# Patient Record
Sex: MALE | Race: WHITE | NOT HISPANIC OR LATINO | Employment: OTHER
[De-identification: names, ages, dates, MRNs, and addresses within clinical notes are randomized per-mention and may not be internally consistent; named-entity substitution may affect disease eponyms.]

---

## 2019-10-03 ENCOUNTER — HEALTH MAINTENANCE LETTER (OUTPATIENT)
Age: 69
End: 2019-10-03

## 2019-11-11 ENCOUNTER — PATIENT OUTREACH (OUTPATIENT)
Dept: ONCOLOGY | Facility: CLINIC | Age: 69
End: 2019-11-11

## 2019-11-11 NOTE — TELEPHONE ENCOUNTER
Per message from scheduling on 11/5, pt called regarding scheduling a 5 yr return appt with Dr. Blane Briceno.  Pt last seen in May, 2016 so not 5 years yet.  Called and left message for pt today, apologized for not calling back earlier but stated had hoped to speak with pt regarding appt request.  Stated last visit was in 2016 so not 5 years yet, however, wondering if he is having some symptoms or other concerns prompting his call.  Requested call back.   On home phone, left more general message stating first name and returning call to our office on 11/5.  Stated more detailed message on his cell.    Received voice mail message from pt stating he has not had any issues or concerns.  He will call back in couple years to schedule follow up.  Per message, no need for author to call him back.

## 2020-02-08 ENCOUNTER — HEALTH MAINTENANCE LETTER (OUTPATIENT)
Age: 70
End: 2020-02-08

## 2020-02-13 ENCOUNTER — TRANSFERRED RECORDS (OUTPATIENT)
Dept: HEALTH INFORMATION MANAGEMENT | Facility: CLINIC | Age: 70
End: 2020-02-13

## 2020-11-07 ENCOUNTER — HEALTH MAINTENANCE LETTER (OUTPATIENT)
Age: 70
End: 2020-11-07

## 2020-12-12 ENCOUNTER — TRANSFERRED RECORDS (OUTPATIENT)
Dept: HEALTH INFORMATION MANAGEMENT | Facility: CLINIC | Age: 70
End: 2020-12-12

## 2021-01-25 ENCOUNTER — TRANSFERRED RECORDS (OUTPATIENT)
Dept: HEALTH INFORMATION MANAGEMENT | Facility: CLINIC | Age: 71
End: 2021-01-25

## 2021-02-11 ENCOUNTER — TRANSFERRED RECORDS (OUTPATIENT)
Dept: HEALTH INFORMATION MANAGEMENT | Facility: CLINIC | Age: 71
End: 2021-02-11

## 2021-02-19 ENCOUNTER — TRANSFERRED RECORDS (OUTPATIENT)
Dept: HEALTH INFORMATION MANAGEMENT | Facility: CLINIC | Age: 71
End: 2021-02-19

## 2021-03-09 ENCOUNTER — PATIENT OUTREACH (OUTPATIENT)
Dept: ONCOLOGY | Facility: CLINIC | Age: 71
End: 2021-03-09

## 2021-03-09 NOTE — PROGRESS NOTES
Received voice mail message from pt on 3/8 discussing having local provider's office send records to us (including faxed records).  Spoke with pt and wife, on speaker phone, this afternoon apologizing for not calling back earlier.  They reported they spoke with Dr. Blane Briceno couple weeks ago and discussion was may not need to return visit but Dr. Blane Briceno did want records about recent pancreatic issues.  Per pt, they spoke with medical records at [facility], Dr. Echeverria's office yesterday.  Office was to send records to a fax number they received from a [facility] Coordinator (not at home so not sure which fax they received).  They thought it was going to be from [facility] medical records to our medical records.   Pt stated since ~ Aug, lost 18 lbs and GI tract out of sorts.  ~1/25/21, strong pains in abdomen going to ED.  After imaging, and labs, pancreatic insufficiency, shrunk, and malabsorption re: 3 pancreatic enzymes.  About 3 weeks ago start on Creon enzymes 3 times a day at meal time.  Creon seems to be helping.  Maintaining weight last 2 weeks and bowels more normal.   Update sent to Dr. Blane Briceno.  Request sent to Laurie to check on status of obtaining records from [facility].

## 2021-03-10 ENCOUNTER — PATIENT OUTREACH (OUTPATIENT)
Dept: ONCOLOGY | Facility: CLINIC | Age: 71
End: 2021-03-10

## 2021-03-10 NOTE — PROGRESS NOTES
"Spoke with pt this morning informing him discussed situation with Dr. Blane Briceno.  Dr. Briceno happy that seems that he's local providers have diagnosed and have him on the right track.  Pt doesn't need to see Dr. Blane Briceno at this time but \"See how he does over next 3 months.\"  Pt encouraged to call back prior to 3 months if any concerns develop.  Stated have colleague checking into records from [facility].  Pt appreciated update.  "

## 2021-03-27 ENCOUNTER — HEALTH MAINTENANCE LETTER (OUTPATIENT)
Age: 71
End: 2021-03-27

## 2021-05-14 ENCOUNTER — TRANSFERRED RECORDS (OUTPATIENT)
Dept: HEALTH INFORMATION MANAGEMENT | Facility: CLINIC | Age: 71
End: 2021-05-14

## 2021-09-05 ENCOUNTER — HEALTH MAINTENANCE LETTER (OUTPATIENT)
Age: 71
End: 2021-09-05

## 2022-01-01 ENCOUNTER — HOSPITAL ENCOUNTER (EMERGENCY)
Facility: HOSPITAL | Age: 72
Discharge: HOME | End: 2022-11-08
Attending: EMERGENCY MEDICINE | Admitting: EMERGENCY MEDICINE
Payer: MEDICARE

## 2022-01-01 ENCOUNTER — HEALTH MAINTENANCE LETTER (OUTPATIENT)
Age: 72
End: 2022-01-01

## 2022-01-01 ENCOUNTER — ONCOLOGY VISIT (OUTPATIENT)
Dept: ONCOLOGY | Facility: CLINIC | Age: 72
End: 2022-01-01
Attending: INTERNAL MEDICINE
Payer: MEDICARE

## 2022-01-01 ENCOUNTER — PATIENT OUTREACH (OUTPATIENT)
Dept: ONCOLOGY | Facility: CLINIC | Age: 72
End: 2022-01-01

## 2022-01-01 ENCOUNTER — TELEPHONE (OUTPATIENT)
Dept: INTERNAL MEDICINE | Facility: CLINIC | Age: 72
End: 2022-01-01

## 2022-01-01 ENCOUNTER — DOCUMENTATION ONLY (OUTPATIENT)
Dept: ONCOLOGY | Facility: CLINIC | Age: 72
End: 2022-01-01

## 2022-01-01 ENCOUNTER — TELEPHONE (OUTPATIENT)
Dept: ONCOLOGY | Facility: CLINIC | Age: 72
End: 2022-01-01

## 2022-01-01 ENCOUNTER — LAB (OUTPATIENT)
Dept: LAB | Facility: CLINIC | Age: 72
End: 2022-01-01
Payer: MEDICARE

## 2022-01-01 ENCOUNTER — PRE VISIT (OUTPATIENT)
Dept: ONCOLOGY | Facility: CLINIC | Age: 72
End: 2022-01-01

## 2022-01-01 ENCOUNTER — OFFICE VISIT (OUTPATIENT)
Dept: INTERNAL MEDICINE | Facility: CLINIC | Age: 72
End: 2022-01-01
Payer: MEDICARE

## 2022-01-01 ENCOUNTER — APPOINTMENT (OUTPATIENT)
Dept: CT IMAGING | Facility: HOSPITAL | Age: 72
End: 2022-01-01
Attending: EMERGENCY MEDICINE
Payer: MEDICARE

## 2022-01-01 VITALS
BODY MASS INDEX: 27.22 KG/M2 | HEIGHT: 69 IN | TEMPERATURE: 98.4 F | DIASTOLIC BLOOD PRESSURE: 54 MMHG | WEIGHT: 183.8 LBS | RESPIRATION RATE: 20 BRPM | OXYGEN SATURATION: 99 % | HEART RATE: 81 BPM | SYSTOLIC BLOOD PRESSURE: 103 MMHG

## 2022-01-01 VITALS
SYSTOLIC BLOOD PRESSURE: 102 MMHG | BODY MASS INDEX: 26.28 KG/M2 | HEIGHT: 69 IN | HEART RATE: 75 BPM | OXYGEN SATURATION: 100 % | WEIGHT: 177.4 LBS | DIASTOLIC BLOOD PRESSURE: 64 MMHG

## 2022-01-01 VITALS
WEIGHT: 175.9 LBS | OXYGEN SATURATION: 100 % | HEIGHT: 68 IN | HEART RATE: 79 BPM | SYSTOLIC BLOOD PRESSURE: 83 MMHG | TEMPERATURE: 97.6 F | BODY MASS INDEX: 26.66 KG/M2 | DIASTOLIC BLOOD PRESSURE: 47 MMHG

## 2022-01-01 DIAGNOSIS — I34.0 NONRHEUMATIC MITRAL VALVE REGURGITATION: ICD-10-CM

## 2022-01-01 DIAGNOSIS — G89.29 CHRONIC RIGHT SHOULDER PAIN: ICD-10-CM

## 2022-01-01 DIAGNOSIS — C7A.8 NEUROENDOCRINE CANCER (H): Primary | ICD-10-CM

## 2022-01-01 DIAGNOSIS — C7B.8: ICD-10-CM

## 2022-01-01 DIAGNOSIS — C7A.8 NEUROENDOCRINE CANCER (H): ICD-10-CM

## 2022-01-01 DIAGNOSIS — C81.18 NODULAR SCLEROSIS HODGKIN LYMPHOMA OF LYMPH NODES OF MULTIPLE REGIONS (H): Primary | ICD-10-CM

## 2022-01-01 DIAGNOSIS — I27.82 OTHER CHRONIC PULMONARY EMBOLISM, UNSPECIFIED WHETHER ACUTE COR PULMONALE PRESENT (H): ICD-10-CM

## 2022-01-01 DIAGNOSIS — C25.4 MALIGNANT NEOPLASM OF ENDOCRINE PANCREAS (H): Primary | ICD-10-CM

## 2022-01-01 DIAGNOSIS — E78.2 MIXED HYPERLIPIDEMIA: ICD-10-CM

## 2022-01-01 DIAGNOSIS — M85.89 OSTEOPENIA OF MULTIPLE SITES: ICD-10-CM

## 2022-01-01 DIAGNOSIS — S09.90XA INJURY OF HEAD, INITIAL ENCOUNTER: ICD-10-CM

## 2022-01-01 DIAGNOSIS — I89.0 LYMPHEDEMA OF EXTREMITY: Primary | ICD-10-CM

## 2022-01-01 DIAGNOSIS — C25.4 MALIGNANT NEOPLASM OF ENDOCRINE PANCREAS (H): ICD-10-CM

## 2022-01-01 DIAGNOSIS — C81.18 NODULAR SCLEROSIS HODGKIN LYMPHOMA OF LYMPH NODES OF MULTIPLE REGIONS (H): ICD-10-CM

## 2022-01-01 DIAGNOSIS — K86.89 PANCREATIC INSUFFICIENCY: ICD-10-CM

## 2022-01-01 DIAGNOSIS — Z00.00 ENCOUNTER FOR MEDICARE ANNUAL WELLNESS EXAM: Primary | ICD-10-CM

## 2022-01-01 DIAGNOSIS — F33.41 RECURRENT MAJOR DEPRESSIVE DISORDER, IN PARTIAL REMISSION (H): ICD-10-CM

## 2022-01-01 DIAGNOSIS — E03.9 ACQUIRED HYPOTHYROIDISM: ICD-10-CM

## 2022-01-01 DIAGNOSIS — M25.511 CHRONIC RIGHT SHOULDER PAIN: ICD-10-CM

## 2022-01-01 DIAGNOSIS — E55.9 VITAMIN D DEFICIENCY: ICD-10-CM

## 2022-01-01 DIAGNOSIS — I50.20 HFREF (HEART FAILURE WITH REDUCED EJECTION FRACTION) (H): ICD-10-CM

## 2022-01-01 DIAGNOSIS — Z85.71 HISTORY OF HODGKIN'S LYMPHOMA: ICD-10-CM

## 2022-01-01 LAB
ALBUMIN SERPL BCG-MCNC: 3.8 G/DL (ref 3.5–5.2)
ALBUMIN SERPL BCG-MCNC: 3.8 G/DL (ref 3.5–5.2)
ALP SERPL-CCNC: 93 U/L (ref 40–129)
ALP SERPL-CCNC: 94 U/L (ref 40–129)
ALT SERPL W P-5'-P-CCNC: 16 U/L (ref 10–50)
ALT SERPL W P-5'-P-CCNC: 16 U/L (ref 10–50)
ANION GAP SERPL CALCULATED.3IONS-SCNC: 11 MMOL/L (ref 7–15)
ANION GAP SERPL CALCULATED.3IONS-SCNC: 11 MMOL/L (ref 7–15)
AST SERPL W P-5'-P-CCNC: 28 U/L (ref 10–50)
AST SERPL W P-5'-P-CCNC: 36 U/L (ref 10–50)
BASOPHILS # BLD AUTO: 0 10E3/UL (ref 0–0.2)
BASOPHILS # BLD AUTO: 0 10E3/UL (ref 0–0.2)
BASOPHILS NFR BLD AUTO: 1 %
BASOPHILS NFR BLD AUTO: 1 %
BILIRUB SERPL-MCNC: 0.5 MG/DL
BILIRUB SERPL-MCNC: 0.6 MG/DL
BUN SERPL-MCNC: 21.6 MG/DL (ref 8–23)
BUN SERPL-MCNC: 23.5 MG/DL (ref 8–23)
CALCIUM SERPL-MCNC: 8.9 MG/DL (ref 8.8–10.2)
CALCIUM SERPL-MCNC: 9 MG/DL (ref 8.8–10.2)
CGA SERPL-MCNC: 134 NG/ML
CHLORIDE SERPL-SCNC: 103 MMOL/L (ref 98–107)
CHLORIDE SERPL-SCNC: 105 MMOL/L (ref 98–107)
CHOLEST SERPL-MCNC: 121 MG/DL
CREAT SERPL-MCNC: 1.16 MG/DL (ref 0.67–1.17)
CREAT SERPL-MCNC: 1.24 MG/DL (ref 0.67–1.17)
DEPRECATED CALCIDIOL+CALCIFEROL SERPL-MC: 22 UG/L (ref 20–75)
DEPRECATED HCO3 PLAS-SCNC: 22 MMOL/L (ref 22–29)
DEPRECATED HCO3 PLAS-SCNC: 23 MMOL/L (ref 22–29)
EOSINOPHIL # BLD AUTO: 0.1 10E3/UL (ref 0–0.7)
EOSINOPHIL # BLD AUTO: 0.1 10E3/UL (ref 0–0.7)
EOSINOPHIL NFR BLD AUTO: 2 %
EOSINOPHIL NFR BLD AUTO: 2 %
ERYTHROCYTE [DISTWIDTH] IN BLOOD BY AUTOMATED COUNT: 18.5 % (ref 10–15)
ERYTHROCYTE [DISTWIDTH] IN BLOOD BY AUTOMATED COUNT: 19.1 % (ref 10–15)
GFR SERPL CREATININE-BSD FRML MDRD: 62 ML/MIN/1.73M2
GFR SERPL CREATININE-BSD FRML MDRD: 67 ML/MIN/1.73M2
GLUCOSE SERPL-MCNC: 101 MG/DL (ref 70–99)
GLUCOSE SERPL-MCNC: 93 MG/DL (ref 70–99)
HCT VFR BLD AUTO: 31.4 % (ref 40–53)
HCT VFR BLD AUTO: 34.8 % (ref 40–53)
HDLC SERPL-MCNC: 54 MG/DL
HGB BLD-MCNC: 10.8 G/DL (ref 13.3–17.7)
HGB BLD-MCNC: 11.6 G/DL (ref 13.3–17.7)
IMM GRANULOCYTES # BLD: 0 10E3/UL
IMM GRANULOCYTES # BLD: 0 10E3/UL
IMM GRANULOCYTES NFR BLD: 0 %
IMM GRANULOCYTES NFR BLD: 0 %
LDLC SERPL CALC-MCNC: 15 MG/DL
LYMPHOCYTES # BLD AUTO: 0.5 10E3/UL (ref 0.8–5.3)
LYMPHOCYTES # BLD AUTO: 0.5 10E3/UL (ref 0.8–5.3)
LYMPHOCYTES NFR BLD AUTO: 9 %
LYMPHOCYTES NFR BLD AUTO: 9 %
MCH RBC QN AUTO: 38 PG (ref 26.5–33)
MCH RBC QN AUTO: 39 PG (ref 26.5–33)
MCHC RBC AUTO-ENTMCNC: 33.3 G/DL (ref 31.5–36.5)
MCHC RBC AUTO-ENTMCNC: 34.4 G/DL (ref 31.5–36.5)
MCV RBC AUTO: 113 FL (ref 78–100)
MCV RBC AUTO: 114 FL (ref 78–100)
MONOCYTES # BLD AUTO: 0.6 10E3/UL (ref 0–1.3)
MONOCYTES # BLD AUTO: 0.8 10E3/UL (ref 0–1.3)
MONOCYTES NFR BLD AUTO: 11 %
MONOCYTES NFR BLD AUTO: 15 %
NEUTROPHILS # BLD AUTO: 4.2 10E3/UL (ref 1.6–8.3)
NEUTROPHILS # BLD AUTO: 4.3 10E3/UL (ref 1.6–8.3)
NEUTROPHILS NFR BLD AUTO: 74 %
NEUTROPHILS NFR BLD AUTO: 77 %
NONHDLC SERPL-MCNC: 67 MG/DL
PLATELET # BLD AUTO: 154 10E3/UL (ref 150–450)
PLATELET # BLD AUTO: 192 10E3/UL (ref 150–450)
POTASSIUM SERPL-SCNC: 4.9 MMOL/L (ref 3.4–5.3)
POTASSIUM SERPL-SCNC: 5.6 MMOL/L (ref 3.4–5.3)
PROT SERPL-MCNC: 7 G/DL (ref 6.4–8.3)
PROT SERPL-MCNC: 7 G/DL (ref 6.4–8.3)
RBC # BLD AUTO: 2.77 10E6/UL (ref 4.4–5.9)
RBC # BLD AUTO: 3.05 10E6/UL (ref 4.4–5.9)
SODIUM SERPL-SCNC: 136 MMOL/L (ref 136–145)
SODIUM SERPL-SCNC: 139 MMOL/L (ref 136–145)
T4 FREE SERPL-MCNC: 0.81 NG/DL (ref 0.9–1.7)
TRIGL SERPL-MCNC: 262 MG/DL
TSH SERPL DL<=0.005 MIU/L-ACNC: 7 UIU/ML (ref 0.3–4.2)
WBC # BLD AUTO: 5.7 10E3/UL (ref 4–11)
WBC # BLD AUTO: 5.7 10E3/UL (ref 4–11)

## 2022-01-01 PROCEDURE — 99204 OFFICE O/P NEW MOD 45 MIN: CPT | Mod: 25 | Performed by: INTERNAL MEDICINE

## 2022-01-01 PROCEDURE — 80053 COMPREHEN METABOLIC PANEL: CPT

## 2022-01-01 PROCEDURE — 80061 LIPID PANEL: CPT | Performed by: INTERNAL MEDICINE

## 2022-01-01 PROCEDURE — G0463 HOSPITAL OUTPT CLINIC VISIT: HCPCS

## 2022-01-01 PROCEDURE — 82306 VITAMIN D 25 HYDROXY: CPT | Performed by: INTERNAL MEDICINE

## 2022-01-01 PROCEDURE — 36415 COLL VENOUS BLD VENIPUNCTURE: CPT | Performed by: INTERNAL MEDICINE

## 2022-01-01 PROCEDURE — 85025 COMPLETE CBC W/AUTO DIFF WBC: CPT

## 2022-01-01 PROCEDURE — 99000 SPECIMEN HANDLING OFFICE-LAB: CPT

## 2022-01-01 PROCEDURE — 36415 COLL VENOUS BLD VENIPUNCTURE: CPT

## 2022-01-01 PROCEDURE — G1010 CDSM STANSON: HCPCS

## 2022-01-01 PROCEDURE — 99205 OFFICE O/P NEW HI 60 MIN: CPT | Performed by: INTERNAL MEDICINE

## 2022-01-01 PROCEDURE — 99284 EMERGENCY DEPT VISIT MOD MDM: CPT | Mod: 25

## 2022-01-01 PROCEDURE — 99417 PROLNG OP E/M EACH 15 MIN: CPT | Performed by: INTERNAL MEDICINE

## 2022-01-01 PROCEDURE — G0439 PPPS, SUBSEQ VISIT: HCPCS | Performed by: INTERNAL MEDICINE

## 2022-01-01 PROCEDURE — 84439 ASSAY OF FREE THYROXINE: CPT | Performed by: INTERNAL MEDICINE

## 2022-01-01 PROCEDURE — 84443 ASSAY THYROID STIM HORMONE: CPT | Performed by: INTERNAL MEDICINE

## 2022-01-01 PROCEDURE — 86316 IMMUNOASSAY TUMOR OTHER: CPT | Mod: 90

## 2022-01-01 RX ORDER — VENLAFAXINE HYDROCHLORIDE 75 MG/1
75 CAPSULE, EXTENDED RELEASE ORAL DAILY
Qty: 90 CAPSULE | Refills: 3 | Status: SHIPPED | OUTPATIENT
Start: 2022-01-01 | End: 2023-01-01

## 2022-01-01 RX ORDER — UREA 200 MG/G
CREAM TOPICAL
COMMUNITY
Start: 2022-01-11 | End: 2022-01-01

## 2022-01-01 RX ORDER — KETOCONAZOLE 20 MG/G
CREAM TOPICAL
COMMUNITY
Start: 2021-05-19 | End: 2023-01-01

## 2022-01-01 RX ORDER — TEMOZOLOMIDE 100 MG/1
200 CAPSULE ORAL AT BEDTIME
Qty: 20 CAPSULE | Refills: 0 | Status: SHIPPED | OUTPATIENT
Start: 2022-01-01 | End: 2023-01-01

## 2022-01-01 RX ORDER — PROCHLORPERAZINE MALEATE 10 MG
10 TABLET ORAL EVERY 6 HOURS PRN
COMMUNITY
Start: 2021-08-10

## 2022-01-01 RX ORDER — ACETAMINOPHEN 100MG/10ML
SYRINGE (ML) INTRAVENOUS
COMMUNITY
Start: 2020-12-12 | End: 2023-01-01

## 2022-01-01 RX ORDER — CALCIUM CARBONATE 500(1250)
1 TABLET,CHEWABLE ORAL
COMMUNITY
End: 2023-01-01

## 2022-01-01 RX ORDER — LOVASTATIN 40 MG
40 TABLET ORAL AT BEDTIME
Qty: 90 TABLET | Refills: 3 | Status: SHIPPED | OUTPATIENT
Start: 2022-01-01

## 2022-01-01 RX ORDER — CAPECITABINE 150 MG/1
150 TABLET, FILM COATED ORAL 2 TIMES DAILY
Qty: 28 TABLET | Refills: 0 | Status: SHIPPED | OUTPATIENT
Start: 2022-01-01 | End: 2023-01-01

## 2022-01-01 RX ORDER — BETAMETHASONE DIPROPIONATE 0.05 %
OINTMENT (GRAM) TOPICAL
COMMUNITY
Start: 2022-02-10 | End: 2022-01-01

## 2022-01-01 RX ORDER — APIXABAN 5 MG/1
5 TABLET, FILM COATED ORAL 2 TIMES DAILY
COMMUNITY
Start: 2022-01-01 | End: 2023-01-01

## 2022-01-01 RX ORDER — LEVOTHYROXINE SODIUM 75 UG/1
75 TABLET ORAL
COMMUNITY
Start: 2022-02-24 | End: 2022-01-01

## 2022-01-01 RX ORDER — VENLAFAXINE HYDROCHLORIDE 150 MG/1
150 CAPSULE, EXTENDED RELEASE ORAL
COMMUNITY
Start: 2021-03-21 | End: 2022-01-01

## 2022-01-01 RX ORDER — CAPECITABINE 150 MG/1
150 TABLET, FILM COATED ORAL 2 TIMES DAILY
Qty: 28 TABLET | Refills: 0 | Status: SHIPPED | OUTPATIENT
Start: 2023-01-01 | End: 2023-01-01

## 2022-01-01 RX ORDER — ONDANSETRON 8 MG/1
8 TABLET, FILM COATED ORAL
COMMUNITY
Start: 2021-08-10 | End: 2023-01-01

## 2022-01-01 RX ORDER — LEVOTHYROXINE SODIUM 88 UG/1
88 TABLET ORAL DAILY
Qty: 30 TABLET | Refills: 3 | Status: SHIPPED | OUTPATIENT
Start: 2022-01-01 | End: 2023-01-01

## 2022-01-01 RX ORDER — LANOLIN ALCOHOL/MO/W.PET/CERES
3 CREAM (GRAM) TOPICAL
COMMUNITY
Start: 2022-02-22

## 2022-01-01 RX ORDER — CAPECITABINE 500 MG/1
625 TABLET, FILM COATED ORAL 2 TIMES DAILY
Qty: 56 TABLET | Refills: 0 | Status: SHIPPED | OUTPATIENT
Start: 2022-01-01 | End: 2023-01-01

## 2022-01-01 RX ORDER — SACUBITRIL AND VALSARTAN 24; 26 MG/1; MG/1
TABLET, FILM COATED ORAL
COMMUNITY
Start: 2022-01-01 | End: 2023-01-01

## 2022-01-01 RX ORDER — LOVASTATIN 40 MG
TABLET ORAL
COMMUNITY
Start: 2022-09-01 | End: 2022-01-01

## 2022-01-01 RX ORDER — TEMOZOLOMIDE 100 MG/1
200 CAPSULE ORAL AT BEDTIME
Qty: 20 CAPSULE | Refills: 0 | Status: SHIPPED | OUTPATIENT
Start: 2023-01-01 | End: 2023-01-01

## 2022-01-01 RX ORDER — ALBUTEROL SULFATE 90 UG/1
AEROSOL, METERED RESPIRATORY (INHALATION)
COMMUNITY
Start: 2022-02-10 | End: 2022-01-01

## 2022-01-01 RX ORDER — CAPECITABINE 500 MG/1
625 TABLET, FILM COATED ORAL 2 TIMES DAILY
Qty: 56 TABLET | Refills: 0 | Status: SHIPPED | OUTPATIENT
Start: 2023-01-01 | End: 2023-01-01

## 2022-01-01 RX ORDER — TEMOZOLOMIDE 100 MG/1
400 CAPSULE ORAL
COMMUNITY
Start: 2021-08-10 | End: 2023-01-01

## 2022-01-01 RX ORDER — ERGOCALCIFEROL 1.25 MG/1
CAPSULE ORAL
COMMUNITY
Start: 2022-05-02 | End: 2023-01-01

## 2022-01-01 RX ORDER — PSYLLIUM SEED
1 PACKET (EA) ORAL DAILY PRN
COMMUNITY
End: 2023-01-01

## 2022-01-01 RX ORDER — VENLAFAXINE HYDROCHLORIDE 150 MG/1
150 CAPSULE, EXTENDED RELEASE ORAL DAILY
Qty: 90 CAPSULE | Refills: 3 | Status: SHIPPED | OUTPATIENT
Start: 2022-01-01 | End: 2023-01-01

## 2022-01-01 RX ORDER — CAPECITABINE 500 MG/1
1000 TABLET, FILM COATED ORAL
COMMUNITY
Start: 2022-01-01 | End: 2023-01-01

## 2022-01-01 ASSESSMENT — PAIN SCALES - GENERAL
PAINLEVEL: NO PAIN (0)
PAINLEVEL: NO PAIN (0)

## 2022-01-01 ASSESSMENT — ENCOUNTER SYMPTOMS
SORE THROAT: 0
PARESTHESIAS: 1
HEMATOCHEZIA: 0
JOINT SWELLING: 0
MYALGIAS: 0
WEAKNESS: 1
HEARTBURN: 0
NAUSEA: 0
CONSTIPATION: 0
FREQUENCY: 1
FEVER: 0
HEMATURIA: 0
SHORTNESS OF BREATH: 1
ABDOMINAL PAIN: 0
CHILLS: 0
NERVOUS/ANXIOUS: 0
HEADACHES: 0
EYE PAIN: 0
DIARRHEA: 0
ARTHRALGIAS: 0
DIZZINESS: 1
PALPITATIONS: 0
COUGH: 0
DYSURIA: 0

## 2022-01-01 ASSESSMENT — ACTIVITIES OF DAILY LIVING (ADL)
CURRENT_FUNCTION: TRANSPORTATION REQUIRES ASSISTANCE
CURRENT_FUNCTION: SHOPPING REQUIRES ASSISTANCE
CURRENT_FUNCTION: TELEPHONE REQUIRES ASSISTANCE
ADLS_ACUITY_SCORE: 35

## 2022-04-13 DIAGNOSIS — I34.0 NONRHEUMATIC MITRAL VALVE REGURGITATION: Primary | ICD-10-CM

## 2022-04-13 NOTE — PROGRESS NOTES
Is going to be having mitral valve surgery.  Needs preop Covid test.  Currently without any symptoms.  Has been fully vaccinated.  Surgery is planned for April 22.    Marci DOMINGUEZ

## 2022-04-17 ENCOUNTER — HEALTH MAINTENANCE LETTER (OUTPATIENT)
Age: 72
End: 2022-04-17

## 2022-04-19 ENCOUNTER — LAB (OUTPATIENT)
Dept: LAB | Facility: CLINIC | Age: 72
End: 2022-04-19
Payer: MEDICARE

## 2022-04-19 DIAGNOSIS — I34.0 NONRHEUMATIC MITRAL VALVE REGURGITATION: ICD-10-CM

## 2022-04-19 PROCEDURE — U0003 INFECTIOUS AGENT DETECTION BY NUCLEIC ACID (DNA OR RNA); SEVERE ACUTE RESPIRATORY SYNDROME CORONAVIRUS 2 (SARS-COV-2) (CORONAVIRUS DISEASE [COVID-19]), AMPLIFIED PROBE TECHNIQUE, MAKING USE OF HIGH THROUGHPUT TECHNOLOGIES AS DESCRIBED BY CMS-2020-01-R: HCPCS

## 2022-04-19 PROCEDURE — U0005 INFEC AGEN DETEC AMPLI PROBE: HCPCS

## 2022-04-20 LAB — SARS-COV-2 RNA RESP QL NAA+PROBE: NEGATIVE

## 2022-06-16 ENCOUNTER — TRANSFERRED RECORDS (OUTPATIENT)
Dept: HEALTH INFORMATION MANAGEMENT | Facility: CLINIC | Age: 72
End: 2022-06-16

## 2022-10-31 NOTE — PROGRESS NOTES
"[facility]: Cancer Care                                                                                          RN received call from pt, William, and his wife, Kacie.  He has hx of Hodgkin's lymphoma that Dr. Blane Briceno followed him for and currently on 5 yr plan for return visit.  Pt reports he has moved to [facility] from [place] and need advise on transferring care as he developed neuroendocrine of pancrease and live in Aug, 2021 but doing well on trt with capecitabine.  Asking to speak with Dr. Blane Briceno regarding advise for transferring care.   Per chart review, last saw Dr. Blane Briceno in 2021. Per care everywhere, pt seen by Alba Dubois, of [facility], [place], [place], on 10/24/22.  Pt taking capecitabine and temozolomide (CAPTEM).  Next cycle to start on 11/4/22.  Also follows with Dr. Velazquez at [facility] every 3 months.  Spoke with pt and wife.  They confirmed above information and stated he was doing so well from Hodgkin's perspective they \"skipped\" last years visit with Dr. Blane Briceno.  They need new provider here for the monthly neuroendocrine care (including chemo) but will continue to follow with Dr. Velazquez @ [facility].  They also stated William has developed CHF and had micro clips placed thru [facility].  They would like to discuss provider names they received regarding cardiac follow up as well.  Message sent to Dr. lBane Briceno.    Signature:  Helena Hobson, LAURA, OCN  "

## 2022-11-08 NOTE — ED TRIAGE NOTES
Patient arrives to triage with his son following a fall from ground level onto the grass.  Patient reports no LOC, hit his head, is on thinners.  Patient reports hitting left elbow and then the back of his head.  Minimal neck tenderness, per Dr. Lewis no C-collar indicated at this time.  Patient is alert and oriented x4.

## 2022-11-08 NOTE — ED NOTES
ED Triage Provider Note  [facility] EMERGENCY DEPARTMENT  Encounter Date: Nov 8, 2022    History:  Chief Complaint   Patient presents with     Fall     Trauma Alert     Tyrel Harrell is a 72 year old male who presents to the ED with nonsyncopal fall on anticoag.  Eliquis.  Fell working out side.  Hit head.  Mild neck pain initially now resolved. No other injury in the incident.    Review of Systems:  Mild head pain     Exam:  /55   Pulse 85   Resp 20   Wt 83.4 kg (183 lb 12.8 oz)   SpO2 98%   General: No acute distress. Appears stated age.   Cardio: normal rate, extremities well perfused  Resp: Normal work of breathing, grossly normal respiratory rate  Neuro: Alert. Facial movement grossly symmetric. Grossly intact strength.       Medical Decision Making:  Patient arriving to the ED with problem as above. A medical screening exam was performed. Initial differential diagnosis includes but not limited to contusion, head bleed.    Orders Placed This Encounter   Procedures     Head CT w/o contrast     Cervical spine CT w/o contrast     [place] coma scale     ED Place Call To: CT awareness - Trauma Alert     ED Place Call To: Radiology awareness - Trauma Alert     ED Place Call To: Lab awareness - Trauma Alert     ED Place Call To: Nursing Supervisor - Trauma Alert      orders initiated from Triage. The patient is most appropriate to be immediately roomed.       Marcello Lewis MD  11/8/2022 at 4:30 PM     Marcello Lewis MD  11/08/22 1237

## 2022-11-08 NOTE — ED PROVIDER NOTES
EMERGENCY DEPARTMENT ENCOUNTER        IMPRESSION  Fall  Head injury      MEDICAL DECISION MAKING    Patient evaluated for head and neck injury.  He is on anticoagulation.  He fell backward today from standing position striking his head.  He denies additional injury.  There was no syncope or loss of consciousness    On exam his vital signs are normal.  He does not appear ill.  There is no evidence of head injury.  No cervical spine tenderness.  He has right upper extremity lymphedema.    CT of the head and cervical spine are normal    Results were discussed with the patient.  He is stable for discharge home.        CHIEF COMPLAINT    Chief Complaint   Patient presents with     Fall     Trauma Alert          HPI    Tyrel Harrell is a 72 year old male with a history of PE, CHF, hodgkin's lymphoma, HLD, afib, and sepsis who presents to the ED by walk in for evaluation of a fall.     This afternoon, the patient was working outside when he fell to the ground onto the grass. He reports hitting his left elbow and then the back of his head. No LOC. The patient reports mild neck pain initially but notes it has resolved. He denies any other complaints at this time.     The patient is taking Eliquis.     I, Eva Ram am serving as a scribe to document services personally performed by Dr. Kenney Main MD, based on my observation and the provider's statements to me. I, Dr. Kenney Main MD attest that Eva Ram is acting in a scribe capacity, has observed my performance of the services and has documented them in accordance with my direction.    PAST MEDICAL HISTORY    No past surgical history on file.    CURRENT MEDICATIONS    Discharge Medication List as of 11/8/2022  5:25 PM      CONTINUE these medications which have NOT CHANGED    Details   aspirin 81 MG tablet Take 1 tablet by mouth daily., 1 tablet, Oral, DAILY, Until Discontinued, Historical      LOVASTATIN 1 tablet daily., 1 tablet, DAILY, Until Discontinued,  "Historical      Multiple Vitamin (MULTIVITAMIN) per tablet Take 1 tablet by mouth daily., Historical             ALLERGIES    Allergies   Allergen Reactions     Nkda [No Known Drug Allergies]        FAMILY HISTORY    No family history on file.    SOCIAL HISTORY    Social History     Socioeconomic History     Marital status:    Tobacco Use     Smoking status: Never     Smokeless tobacco: Never       REVIEW OF SYSTEMS    Constitutional: No dizziness, lightheadedness,   Eyes:  No pain, diplopia, or vision loss,   HENT:  Denies sore throat or ear pain. No Dysphagia, no neck pain, no facial pain  Respiratory: No SOB, wheeze or cough, no hemoptysis,   Cardiovascular:  No CP, ALLEN, palpitations, syncope, no chest wall tenderness  GI:  Denies abdominal pain, nausea, vomiting, or dark, bloody stools.   Musculoskeletal:  Denies any new muscle/joint pain, swelling or loss of function.  No back pain, patient is ambulatory  Skin:  Denies laceration or abrasion  Neurologic:  Denies headache, focal weakness or sensory changes, vertigo, seizure      All other systems negative unless noted in HPI.    PHYSICAL EXAM    VITAL SIGNS: /54   Pulse 81   Temp 98.4  F (36.9  C) (Oral)   Resp 20   Ht 1.753 m (5' 9\")   Wt 83.4 kg (183 lb 12.8 oz)   SpO2 99%   BMI 27.14 kg/m      General Appearance: Alert, cooperative, normal speech and facial symmetry,  appears stated age,    Head:  Normocephalic, without obvious abnormality, atraumatic  ENT:  No obvious facial deformity.    Neck:  No midline cervical spine tenderness.  No paraspinal tenderness.  Supple, symmetrical, trachea midline, no stridor or dysphonia, no soft tissue swelling or tenderness  Chest:  No tenderness or deformity,   Cardio:  Regular   Pulm:  respirations unlabored,   Back:  Symmetric, no curvature, ROM normal, no CVA tenderness, no spinal tenderness  Abdomen:  Soft, non-tender, no distention  Extremities:   No  obvious deformity, all joints palpated with " full range of motion.    Skin: No abrasions ecchymosis or open injury  Neuro:  Awake, alert, responsive to voice, follows commands, normal speech, No gross motor weakness or sensory loss, moves all extremities, baseline ambulation, GCS 15    LABS  Pertinent lab results reviewed in chart.  Results for orders placed or performed during the hospital encounter of 11/08/22   Head CT w/o contrast    Impression    IMPRESSION:  HEAD CT:  1.  No acute intracranial process.    CERVICAL SPINE CT:  1.  No CT evidence for acute fracture or post traumatic subluxation.   Cervical spine CT w/o contrast    Impression    IMPRESSION:  HEAD CT:  1.  No acute intracranial process.    CERVICAL SPINE CT:  1.  No CT evidence for acute fracture or post traumatic subluxation.       RADIOLOGY  Cervical spine CT w/o contrast   Final Result   IMPRESSION:   HEAD CT:   1.  No acute intracranial process.      CERVICAL SPINE CT:   1.  No CT evidence for acute fracture or post traumatic subluxation.      Head CT w/o contrast   Final Result   IMPRESSION:   HEAD CT:   1.  No acute intracranial process.      CERVICAL SPINE CT:   1.  No CT evidence for acute fracture or post traumatic subluxation.             ED COURSE   4:26PM I met the patient and performed my initial interview and exam.   5:20 PM I rechecked and updated the patient   5:25 PM We discussed the plan for discharge and the patient is agreeable. Reviewed supportive cares, symptomatic treatment, outpatient follow up, and reasons to return to the Emergency Department. All questions and concerns were addressed. Patient to be discharged by ED RN.        FINAL DIAGNOSIS:     ICD-10-CM    1. Injury of head, initial encounter  S09.90XA                At the conclusion of the encounter I discussed  the results of all of the tests and the disposition.   The questions were answered.  The patient or family acknowledged understanding and was agreeable with the care plan.        Kenney Main,  MD  11/08/22 8364

## 2022-11-09 NOTE — PROGRESS NOTES
RECORDS STATUS - ALL OTHER DIAGNOSIS     Neuroendocrine tumor and primary care to establish care   RECORDS RECEIVED FROM: [facility]/ [facility] including [facility] and [facility]/ [facility]/ [facility]     DATE RECEIVED: 11/14/2022    Action    Action Taken 11/9/2022 12:59PM KECOSME   I called [place] IMG dept  [phone] option 2    3:08PM KECOSME   I resolved scans from [place] in PACS    11/11/2022 2:47pm HANNAH   I called [facility] including [facility] and [facility]    I called [facility] Ph: [phone] - unavailable. They are closed for the weekend.     I called pt Tyrel - all records should be accounted for in Epic/    11/14/2022 9:27AM KECOSME   I called [facility] again - they will push the four MRI scans from 2022 to [place] PACS      NOTES STATUS DETAILS   OFFICE NOTE from referring provider Complete EPIC  Bruno Briceno MD   OFFICE NOTE from medical oncologist Complete 10/24/2022- Neuroendocrine tumor of pancreas (Primary Dx)    More in [facility] Recs are in Epic [facility] are in    OPERATIVE REPORT Complete See Biopsy From [facility] in [facility]   MEDICATION LIST Complete [facility]   CLINICAL TRIAL TREATMENTS TO DATE     LABS     PATHOLOGY REPORTS  [facility]     Colon Biopsy 1/25/2021  Case: [id number]     ANYTHING RELATED TO DIAGNOSIS Complete CE- labs last updated on 11/4/2022   GENONOMIC TESTING     TYPE:     IMAGING (NEED IMAGES & REPORT)     CT SCANS Complete - [place] CT Abdomen Pelvis 9/14/2022, 5/9/2022, 2/7/2022, 10/26/2021    CT Chest 10/26/2021   MRI Complete- [place]    Requested- [facility]  MRI Brain 5/27/2020   MAMMO     ULTRASOUND     PET Requested- [facility]     Ph:  [phone]  Fax: [phone]    Mailing Address:   [address] [place] [address]    [email or web address] Tracking Number:  [id number]

## 2022-11-14 PROBLEM — I77.810 ASCENDING AORTA DILATION (H): Status: ACTIVE | Noted: 2022-02-01

## 2022-11-14 PROBLEM — Z85.71 HISTORY OF HODGKIN'S LYMPHOMA: Status: ACTIVE | Noted: 2021-05-28

## 2022-11-14 PROBLEM — H25.813 COMBINED FORMS OF AGE-RELATED CATARACT OF BOTH EYES: Status: ACTIVE | Noted: 2021-05-14

## 2022-11-14 PROBLEM — R73.02 IMPAIRED GLUCOSE TOLERANCE: Status: ACTIVE | Noted: 2022-01-01

## 2022-11-14 PROBLEM — D69.6 THROMBOCYTOPENIA (H): Status: ACTIVE | Noted: 2022-01-31

## 2022-11-14 PROBLEM — E66.3 OVERWEIGHT: Status: ACTIVE | Noted: 2022-01-01

## 2022-11-14 PROBLEM — K86.89 ATROPHY OF PANCREAS: Status: ACTIVE | Noted: 2021-05-28

## 2022-11-14 PROBLEM — D63.8 ANEMIA IN OTHER CHRONIC DISEASES CLASSIFIED ELSEWHERE: Status: ACTIVE | Noted: 2022-01-31

## 2022-11-14 PROBLEM — D12.6 ADENOMATOUS POLYP OF COLON: Status: ACTIVE | Noted: 2022-01-01

## 2022-11-14 PROBLEM — Q89.01 ASPLENIA: Status: ACTIVE | Noted: 2021-08-05

## 2022-11-14 PROBLEM — C25.4: Status: ACTIVE | Noted: 2021-10-27

## 2022-11-14 PROBLEM — E78.5 HYPERLIPIDEMIA: Status: ACTIVE | Noted: 2022-01-01

## 2022-11-14 PROBLEM — I26.99 ACUTE PULMONARY EMBOLISM (H): Status: ACTIVE | Noted: 2022-01-31

## 2022-11-14 PROBLEM — I89.0 LYMPHEDEMA OF EXTREMITY: Status: ACTIVE | Noted: 2022-01-01

## 2022-11-14 PROBLEM — F33.8 SEASONAL AFFECTIVE DISORDER (H): Status: ACTIVE | Noted: 2022-01-31

## 2022-11-14 PROBLEM — I50.20 HFREF (HEART FAILURE WITH REDUCED EJECTION FRACTION) (H): Status: ACTIVE | Noted: 2022-02-01

## 2022-11-14 PROBLEM — C77.9 MALIGNANT NEOPLASM METASTATIC TO LYMPH NODES (H): Status: ACTIVE | Noted: 2022-01-01

## 2022-11-14 PROBLEM — F41.1 GENERALIZED ANXIETY DISORDER: Status: ACTIVE | Noted: 2020-05-19

## 2022-11-14 PROBLEM — C44.91 BCC (BASAL CELL CARCINOMA): Status: ACTIVE | Noted: 2022-08-11

## 2022-11-14 PROBLEM — C7B.8: Status: ACTIVE | Noted: 2021-10-27

## 2022-11-14 PROBLEM — E03.8 OTHER SPECIFIED HYPOTHYROIDISM: Status: ACTIVE | Noted: 2022-01-31

## 2022-11-14 PROBLEM — E55.9 VITAMIN D DEFICIENCY: Status: ACTIVE | Noted: 2022-01-01

## 2022-11-14 PROBLEM — I48.0 PAROXYSMAL ATRIAL FIBRILLATION (H): Status: ACTIVE | Noted: 2022-02-01

## 2022-11-14 PROBLEM — R44.1 VISUAL HALLUCINATIONS: Status: ACTIVE | Noted: 2020-05-29

## 2022-11-14 PROBLEM — K86.89 PANCREATIC INSUFFICIENCY: Status: ACTIVE | Noted: 2021-05-27

## 2022-11-14 NOTE — NURSING NOTE
"Oncology Rooming Note    November 14, 2022 4:04 PM   Tyrel Harrell is a 72 year old male who presents for:    Chief Complaint   Patient presents with     Oncology Clinic Visit     New Eval for Hodgkin's      Initial Vitals: Blood Pressure 102/64   Pulse 75   Height 1.753 m (5' 9\")   Weight 80.5 kg (177 lb 6.4 oz)   Oxygen Saturation 100%   Body Mass Index 26.20 kg/m   Estimated body mass index is 26.2 kg/m  as calculated from the following:    Height as of this encounter: 1.753 m (5' 9\").    Weight as of this encounter: 80.5 kg (177 lb 6.4 oz). Body surface area is 1.98 meters squared.  No Pain (0) Comment: Data Unavailable   No LMP for male patient.  Allergies reviewed: Yes  Medications reviewed: Yes    Medications: Medication refills not needed today.  Pharmacy name entered into [employer]: [id number] [place] [address]    Clinical concerns: none       Julienne Davies MA            "

## 2022-11-14 NOTE — LETTER
11/14/2022         RE: Tyrel Harrell  [address]  [place] [address]          Tyrel Harrell is a new patient who has just moved back to the [facility] here today for ongoing care of metastatic neuroendocrine carcinoma the pancreas.    He is a 72-year-old gentleman cured of Hodgkin's disease more than 20 years ago and recently found to have a high-grade but well differentiated neuroendocrine carcinoma in his pancreas a little over a year ago.  His tumor had a Ki-67 labeling index of 60% and was metastatic from the pancreas to the liver at diagnosis.  He was started on treatment with CAPTEM in August 2021 with a good response but treatment was held after 6 cycles in January of this year when he developed heart failure with severe mitral regurgitation.  After that issue been dealt with and he had some evidence of progression he resumed CAPTEM in May of this year and has been able to continue that on a regular basis ever since.  His last evaluation of his disease status was in September when his liver metastases were improving but his primary pancreatic mass appeared to be getting larger.  He has plans for another response assessment in mid December.  He tells me he currently feels fairly well and is always quite busy but tires more easily than he is used to.  Says he currently takes a nap for about 90 minutes every day.  He has been very busy the last 3 weeks continuing to unpack their belongings in their new house.  He tells me he tolerates his chemotherapy quite well.  He had some trouble with hand-foot toxicity early on but with some adjustments in his regimen he now gets minimal to no discernible toxicity.  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  He has chronic lymphedema in his right upper extremity related to his prior Hodgkin's disease.  He currently has no problems with chest pains or dyspnea or other  peripheral edema.    His past medical history is most notable for Hodgkin's disease treated with mantle radiation and I believe MOPP/ABVD in 1988.  He also has a history of depression which has been severe enough to acquire ECT in the past but he feels that is currently well controlled.  He has a history of a pulmonary embolism at the time of his original presentation of his neuroendocrine tumor and has not had further thrombotic events since.  With regards his heart disease he is required clipping of his mitral valve and has some degree of mitral regurgitation.  He has had ejection fractions as low as 29% in the past but more recently these have been in the 45% range.    On physical exam he appears well and younger than his stated age.  His vital signs are unremarkable.  He has no icterus.  He has obvious edema of his right upper extremity in which he is wearing a compression sleeve.    I reviewed his I reviewed his most recent outside imaging confirming the above impression that he has had some progression of the primary in the pancreas but improvement in his liver metastases with his current therapy.  His most recent outside lab work shows normal electrolytes and renal function, normal liver enzymes and unremarkable blood counts with marked macrocytosis.    Assessment/plan: Metastatic neuroendocrine carcinoma the pancreas currently on active therapy with CAPTEM.  He has a planned repeat response assessment coming up down at [place] in the next few weeks.  We will help facilitate locally things we can do for his lab work, etc.  He is going to decide after his next visit where he wishes to manage the bulk of his care.    Total time by me on the date of service in elusive of the above visit and review of very extensive outside records and imaging was greater than 90 minutes.      Bradly Parker MD

## 2022-11-14 NOTE — Clinical Note
11/14/2022         RE: Tyrel Harrell  [address]  [place] [address]        Dear Colleague,    Thank you for referring your patient, Tyrel Harrell, to the [facility]. Please see a copy of my visit note below.    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    He is a 72-year-old gentleman cured of Hodgkin's disease more than 20 years ago and recently found to have a high-grade but well differentiated neuroendocrine carcinoma in his pancreas a little over a year ago.  His tumor had a Ki-67 labeling index of 60% and was metastatic from the pancreas to the liver at diagnosis.  He was started on treatment with CAPTEM in August 2021 with a good response but treatment was held after 6 cycles in January of this year when he developed heart failure with severe mitral regurgitation.  After that issue been dealt with and he had some evidence of progression he resumed CAPTEM in May of this year and has been able to continue that on a regular basis ever since.  His last evaluation of his disease status was in September when his liver metastases were improving but his primary pancreatic mass appeared to be getting larger.  He has plans for another response assessment in mid December.  He tells me he currently feels fairly well and is always quite busy but tires more easily than he is used to.  Says he currently takes a nap for about 90 minutes every day.  He has been very busy the last 3 weeks continuing to unpack their belongings in their new house.  He tells me he tolerates his chemotherapy quite well.  He had some trouble with hand-foot toxicity early on but  with some adjustments in his regimen he now gets minimal to no discernible toxicity.  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  He has chronic lymphedema in his right upper extremity related to his prior Hodgkin's disease.  He currently has no problems with chest pains or dyspnea or other peripheral edema.    His past medical history is most notable for Hodgkin's disease treated with mantle radiation and I believe MOPP/ABVD in 1988.  He also has a history of depression which has been severe enough to acquire ECT in the past but he feels that is currently well controlled.  He has a history of a pulmonary embolism at the time of his original presentation of his neuroendocrine tumor and has not had further thrombotic events since.  With regards his heart disease he is required clipping of his mitral valve and has some degree of mitral regurgitation.  He has had ejection fractions as low as 29% in the past but more recently these have been in the 45% range.    On physical exam he appears well and younger than his stated age.  His vital signs are unremarkable.  He has no icterus.  He has obvious edema of his right upper extremity in which he is wearing a compression sleeve.          Again, thank you for allowing me to participate in the care of your patient.        Sincerely,        Bradly Parker MD

## 2022-11-14 NOTE — PROGRESS NOTES
Tyrel Harrell is a new patient who has just moved back to the [facility] here today for ongoing care of metastatic neuroendocrine carcinoma the pancreas.    He is a 72-year-old gentleman cured of Hodgkin's disease more than 20 years ago and recently found to have a high-grade but well differentiated neuroendocrine carcinoma in his pancreas a little over a year ago.  His tumor had a Ki-67 labeling index of 60% and was metastatic from the pancreas to the liver at diagnosis.  He was started on treatment with CAPTEM in August 2021 with a good response but treatment was held after 6 cycles in January of this year when he developed heart failure with severe mitral regurgitation.  After that issue been dealt with and he had some evidence of progression he resumed CAPTEM in May of this year and has been able to continue that on a regular basis ever since.  His last evaluation of his disease status was in September when his liver metastases were improving but his primary pancreatic mass appeared to be getting larger.  He has plans for another response assessment in mid December.  He tells me he currently feels fairly well and is always quite busy but tires more easily than he is used to.  Says he currently takes a nap for about 90 minutes every day.  He has been very busy the last 3 weeks continuing to unpack their belongings in their new house.  He tells me he tolerates his chemotherapy quite well.  He had some trouble with hand-foot toxicity early on but with some adjustments in his regimen he now gets minimal to no discernible toxicity.  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  He has chronic lymphedema in his right upper extremity related to his prior Hodgkin's disease.  He currently has no problems with chest pains or dyspnea or other peripheral edema.    His past medical history is most notable for Hodgkin's disease treated with  mantle radiation and I believe MOPP/ABVD in 1988.  He also has a history of depression which has been severe enough to acquire ECT in the past but he feels that is currently well controlled.  He has a history of a pulmonary embolism at the time of his original presentation of his neuroendocrine tumor and has not had further thrombotic events since.  With regards his heart disease he is required clipping of his mitral valve and has some degree of mitral regurgitation.  He has had ejection fractions as low as 29% in the past but more recently these have been in the 45% range.    On physical exam he appears well and younger than his stated age.  His vital signs are unremarkable.  He has no icterus.  He has obvious edema of his right upper extremity in which he is wearing a compression sleeve.    I reviewed his I reviewed his most recent outside imaging confirming the above impression that he has had some progression of the primary in the pancreas but improvement in his liver metastases with his current therapy.  His most recent outside lab work shows normal electrolytes and renal function, normal liver enzymes and unremarkable blood counts with marked macrocytosis.    Assessment/plan: Metastatic neuroendocrine carcinoma the pancreas currently on active therapy with CAPTEM.  He has a planned repeat response assessment coming up down at [place] in the next few weeks.  We will help facilitate locally things we can do for his lab work, etc.  He is going to decide after his next visit where he wishes to manage the bulk of his care.    Total time by me on the date of service in elusive of the above visit and review of very extensive outside records and imaging was greater than 90 minutes.

## 2022-11-15 NOTE — TELEPHONE ENCOUNTER
Imaging Received  November 15, 2022 11:43 AM ABT   Action: Imaging disc from [facility] received and taken to [address] [place] [address] for upload.

## 2022-11-28 NOTE — PROGRESS NOTES
[facility]: Cancer Care Initial Note                                    Discussion with Patient:                                                      Introduced self and role of RN Care Coordinator at [facility]. Provided my contact information, [facility] phone number (which has options to talk with a Nurse available 24/7 - triage and RNCC via this option during business hours).     Reviewed current adjustments in clinic flow due to Covid-19 pandemic including addition of telemedicine visits, visitor restrictions, RNCCs working remotely at varying intervals, and Covid testing.    Reviewed [facility] care team members including midlevel providers in oncology dept and Dr. Parker usual clinic hours.    Patient states he is currently on his 2 weeks off Cap/Tem. He is on a 14 day on/14 day off cycle. He will be having labs rechecked on 11/30. He reports he will be due again to start next cycle on 12/3. RNCC introduced oral chemo pharmacy monitoring program.     Answered all questions to their stated satisfaction.      Assessment:                                                      Initial  Current living arrangement:: I live in a private home with spouse  Informal Support system:: Family  Mobility Status: Independent  Transportation means:: Regular car      Intervention/Education provided during outreach:                                                       Patient to follow up as scheduled at next appt  Patient to call/[employer] message with updates  Confirmed patient has clinic and triage numbers      Halle Narvaez RN, BSN, OCN   RN Care Coordinator   [facility]

## 2022-12-01 NOTE — TELEPHONE ENCOUNTER
Prior Authorization Not Needed per Insurance    Medication: Capecitabine and Temozolomide PA Not Needed  Insurance Company:    Expected CoPay: $0  Medicare + Supplement   Pharmacy Filling the Rx:   [facility]  Pharmacy Notified:    Patient Notified:      Eduardo Biggs CPhT  [facility]  Oncology Pharmacy Liaison   [email or web address]  [phone] (phone  [phone] (fax

## 2022-12-01 NOTE — ORAL ONC MGMT
Oral Chemotherapy Monitoring Program    Lab Monitoring Plan  Monthly: CMP, CBC  Subjective/Objective:  Tyrel Harrell is a 72 year old male contacted by phone for an initial visit for oral chemotherapy education.  His wife Kacie was also on the call    Tyrel did report to me his prior Xeloda dosing has been 1,150 mg (2x 500mg tabs + 1x 150mg tab) and wanted me to clarify if Dr Parker intended to change him to 1,000 mg that the signed orders were for. I did release the 1,000mg prescription to [facility] so that Tyrel will be able to start next cycle on 12/3/22. If dose is changed to 1,150mg will need to send additional RX for the 150mg tablets.      Assessment:  Patient is appropriate to start therapy.    Plan:  Basic chemotherapy teaching was reviewed with the patient and the patient's family including indication, start date of therapy, dose, administration, adverse effects, missed doses, food and drug interactions, monitoring, side effect management, office contact information, and safe handling. Written materials were provided and all questions answered.    Follow-Up:  1 week following initial start of therapy    Milla Grant, PharmD  Oral Chemotherapy Monitoring Program  [facility]  [phone]  December 1, 2022        ORAL CHEMOTHERAPY 12/1/2022 12/1/2022 12/1/2022 12/1/2022   Assessment Type Initial Work up Initial Work up New Teach New Teach   Diagnosis Code Neuroendocrine Cancer Neuroendocrine Cancer Neuroendocrine Cancer Neuroendocrine Cancer   Providers Dr Keith Parker   [facility]   Drug Name Xeloda (capecitabine) Temodar (temozolomide) Temodar (temozolomide) Xeloda (capecitabine)   Dose 1,000 mg 400 mg 400 mg 1,000 mg   Current Schedule BID Daily Daily BID   Cycle Details 2 weeks on, 2 weeks off (No Data) (No Data) 2 weeks on, 2 weeks off   Any new drug interactions? No No No No   Is the dose as ordered appropriate for the  "patient? Yes Yes Yes Yes       Last PHQ-2 Score on record: No flowsheet data found.    Vitals:  BP:   BP Readings from Last 1 Encounters:   11/14/22 102/64     Wt Readings from Last 1 Encounters:   11/14/22 80.5 kg (177 lb 6.4 oz)     Estimated body surface area is 1.98 meters squared as calculated from the following:    Height as of 11/14/22: 1.753 m (5' 9\").    Weight as of 11/14/22: 80.5 kg (177 lb 6.4 oz).    Labs:  _  Result Component Current Result Ref Range   Sodium 136 (11/30/2022) 136 - 145 mmol/L     _  Result Component Current Result Ref Range   Potassium 5.6 (H) (11/30/2022) 3.4 - 5.3 mmol/L     _  Result Component Current Result Ref Range   Calcium 8.9 (11/30/2022) 8.8 - 10.2 mg/dL     No results found for Mag within last 30 days.     No results found for Phos within last 30 days.     _  Result Component Current Result Ref Range   Albumin 3.8 (11/30/2022) 3.5 - 5.2 g/dL     _  Result Component Current Result Ref Range   Urea Nitrogen 23.5 (H) (11/30/2022) 8.0 - 23.0 mg/dL     _  Result Component Current Result Ref Range   Creatinine 1.16 (11/30/2022) 0.67 - 1.17 mg/dL     _  Result Component Current Result Ref Range   AST 36 (11/30/2022) 10 - 50 U/L     _  Result Component Current Result Ref Range   ALT 16 (11/30/2022) 10 - 50 U/L     _  Result Component Current Result Ref Range   Bilirubin Total 0.6 (11/30/2022) <=1.2 mg/dL     _  Result Component Current Result Ref Range   WBC Count 5.7 (11/30/2022) 4.0 - 11.0 10e3/uL     _  Result Component Current Result Ref Range   Hemoglobin 11.6 (L) (11/30/2022) 13.3 - 17.7 g/dL     _  Result Component Current Result Ref Range   Platelet Count 154 (11/30/2022) 150 - 450 10e3/uL     No results found for ANC within last 30 days.       "

## 2022-12-02 NOTE — ORAL ONC MGMT
"Oral Chemotherapy Monitoring Program     Tyrel Harrell called in follow up of Xeloda oral chemotherapy.     Tyrel has been on Xeloda 1,150 mg twice daily, 2 weeks on and 2 weeks off and asked me to clarify with Dr Parker if he can continue that dose. Per IB from Mick, RNCC: \"I talked with Dr Parker. He said he's fine with moving to 1150mg. He didn't think there was enough of a difference between 1000mg and 1150 to make much difference but if Tyrel felt strongly about it, he was fine changing it.\"  Order was updated, signed by Dr Parker, and released to [facility] pharmacy today. I spoke with Tyrel to relay the plan to continue Xeloda 1,150 mg as he has been on prior and that [facility] will reach out today/Monday to arrange the delivery of the extra RX.     Pt verbalized understanding and agrees to plan. No further questions or concerns at this time, but encouraged pt to call if any were to arise.    Milla Grant, PharmD  Oral Chemotherapy Monitoring Program  [facility]  [phone]  December 2, 2022  "

## 2022-12-09 NOTE — ORAL ONC MGMT
Oral Chemotherapy Monitoring Program    Subjective/Objective:  Tyrel Harrell is a 72 year old male contacted by phone for a follow-up visit for oral chemotherapy.  Tyrel reports doing well on the Capectiabine/Temozolomide therapy. Tyrel report he does have some peeling on the palms of his hands but has no pain. He has had issues with HFS before with major blisters/pain on his feet so he knows what to look out for and will call in if this gets any worse, he feels the current amount of peeling is manageable. He has not had any nausea issues with the temozolomide as long as he takes the ondansetron prior. He has noticed more fatigue and the need for an afternoon nap but he is still able to get his normal activities completed, he and his wife are out running errands right now. Tyrel has no other concerns but is wondering if either of these medications can cause dental issues, as he was found to have a number of cavities at the dentist recently, I'm not finding any information that either of these medications would likely contribute to that. Tyrel has no other questions and thanked me for the call.    ORAL CHEMOTHERAPY 12/1/2022 12/1/2022 12/1/2022 12/1/2022 12/2/2022 12/9/2022 12/9/2022   Assessment Type Initial Work up Initial Work up New Teach New Teach Other Initial Follow up Initial Follow up   Diagnosis Code Neuroendocrine Cancer Neuroendocrine Cancer Neuroendocrine Cancer Neuroendocrine Cancer Neuroendocrine Cancer Neuroendocrine Cancer Neuroendocrine Cancer   Providers Dr Keith Parker   [facility]   Drug Name Xeloda (capecitabine) Temodar (temozolomide) Temodar (temozolomide) Xeloda (capecitabine) Xeloda (capecitabine) Xeloda (capecitabine) Temodar (temozolomide)   Dose 1,000 mg 400 mg 400 mg 1,000 mg (No Data) (No Data) 400 mg   Current Schedule BID Daily Daily BID BID BID Daily   Cycle  "Details 2 weeks on, 2 weeks off (No Data) (No Data) 2 weeks on, 2 weeks off 2 weeks on, 2 weeks off 2 weeks on, 2 weeks off (No Data)   Start Date of Last Cycle - - - - - 12/3/2022 -   Planned next cycle start date - - - - 12/3/2022 - -   Doses missed in last 2 weeks - - - - - - 0   Adherence Assessment - - - - - - Adherent   Adverse Effects - - - - - - Palmar-plantar Erythrodysethesia Syndrome   Palmar-plantar Erythrodysethesia syndrome[hand-foot syndrome] - - - - - - Grade 1   Pharmacist Intervention(Palmar-plantar) - - - - - - No   Any new drug interactions? No No No No No - -   Is the dose as ordered appropriate for the patient? Yes Yes Yes Yes Yes - -       Last PHQ-2 Score on record: No flowsheet data found.    Vitals:  BP:   BP Readings from Last 1 Encounters:   11/14/22 102/64     Wt Readings from Last 1 Encounters:   11/14/22 80.5 kg (177 lb 6.4 oz)     Estimated body surface area is 1.98 meters squared as calculated from the following:    Height as of 11/14/22: 1.753 m (5' 9\").    Weight as of 11/14/22: 80.5 kg (177 lb 6.4 oz).    Labs:  _  Result Component Current Result Ref Range   Sodium 136 (11/30/2022) 136 - 145 mmol/L     _  Result Component Current Result Ref Range   Potassium 5.6 (H) (11/30/2022) 3.4 - 5.3 mmol/L     _  Result Component Current Result Ref Range   Calcium 8.9 (11/30/2022) 8.8 - 10.2 mg/dL     No results found for Mag within last 30 days.     No results found for Phos within last 30 days.     _  Result Component Current Result Ref Range   Albumin 3.8 (11/30/2022) 3.5 - 5.2 g/dL     _  Result Component Current Result Ref Range   Urea Nitrogen 23.5 (H) (11/30/2022) 8.0 - 23.0 mg/dL     _  Result Component Current Result Ref Range   Creatinine 1.16 (11/30/2022) 0.67 - 1.17 mg/dL     _  Result Component Current Result Ref Range   AST 36 (11/30/2022) 10 - 50 U/L     _  Result Component Current Result Ref Range   ALT 16 (11/30/2022) 10 - 50 U/L     _  Result Component Current Result Ref " Range   Bilirubin Total 0.6 (11/30/2022) <=1.2 mg/dL     _  Result Component Current Result Ref Range   WBC Count 5.7 (11/30/2022) 4.0 - 11.0 10e3/uL     _  Result Component Current Result Ref Range   Hemoglobin 11.6 (L) (11/30/2022) 13.3 - 17.7 g/dL     _  Result Component Current Result Ref Range   Platelet Count 154 (11/30/2022) 150 - 450 10e3/uL     No results found for ANC within last 30 days.     _  Result Component Current Result Ref Range   Absolute Neutrophils 4.2 (11/30/2022) 1.6 - 8.3 10e3/uL          Assessment/Plan:  Continue capecitabine/temozolomide as prescribed.    Follow-Up:  Monthly labs will be due around the end of December - IB sent to scheduling team to get this scheduled.    Refill Due:  12/31/22    Maricel Arroyo, PharmD, [facility]  Oral Chemotherapy Monitoring Program  [facility]  [phone]

## 2022-12-20 PROBLEM — Z00.00 ENCOUNTER FOR MEDICARE ANNUAL WELLNESS EXAM: Status: ACTIVE | Noted: 2022-01-01

## 2022-12-20 PROBLEM — M25.511 CHRONIC RIGHT SHOULDER PAIN: Status: ACTIVE | Noted: 2022-01-01

## 2022-12-20 PROBLEM — I34.0 NONRHEUMATIC MITRAL VALVE REGURGITATION: Status: ACTIVE | Noted: 2021-05-04

## 2022-12-20 PROBLEM — G89.29 CHRONIC RIGHT SHOULDER PAIN: Status: ACTIVE | Noted: 2022-01-01

## 2022-12-20 PROBLEM — E03.9 ACQUIRED HYPOTHYROIDISM: Status: ACTIVE | Noted: 2022-01-31

## 2022-12-20 NOTE — ASSESSMENT & PLAN NOTE
Patient is doing very well today.  We will update his labs today.  He is up-to-date on all cancer screening and immunizations.

## 2022-12-20 NOTE — ASSESSMENT & PLAN NOTE
2/2 MR s/p MitraClip (4/22/22). EF improved from 29% to 41% after clip (most recent study was cardiac MRI 10/2022). Follows with cardiology. Slowly adding back GDMT. Current regimen is low dose entresto 1/2 24/26 tab BID, metop XL 37.5 daily and spironolactone 12.5 mg daily. BP in clinic today is low, patient denies current lightheadedness or dizziness.  Home blood pressures are better usually at 90-100s/50s.  He will continue current regimen and follow-up with cardiology if blood pressure remains low after addition of spironolactone.

## 2022-12-20 NOTE — ASSESSMENT & PLAN NOTE
Reports chronic pain in his right shoulder.  He this did improve with cortisone injection 5 years ago.  He is interested in another injection.  He does have pain with palpation along the joint line that is worse with abduction.  Ortho referral placed for consideration of repeat injection

## 2022-12-20 NOTE — ASSESSMENT & PLAN NOTE
Patient is on high-dose vitamin D placement currently.  We will recheck vitamin D and determine further dosing requirements.

## 2022-12-20 NOTE — ASSESSMENT & PLAN NOTE
Following with oncology (Dr. Velazquez at [place] and Dr. Parker at [facility]). Currently on active therapy with CAPTEM (2 weeks on and 2 weeks off). At last visit (12/15/22), CT A/P showed stable liver disease and slight increase in size of the pancreas/ampullary primary. They had discussion on next steps at that visit (continuing current regimen with re-eval in 3 months, switch to systemic therapy, add pembro and/or add radiation to primary). They are going to get rad onc consult and then discuss further.

## 2022-12-20 NOTE — ASSESSMENT & PLAN NOTE
In setting of malignancy.  We will continue lifelong Eliquis 5 mg twice daily given continued active metastatic cancer.

## 2022-12-20 NOTE — PATIENT INSTRUCTIONS
Patient Education   Personalized Prevention Plan  You are due for the preventive services outlined below.  Your care team is available to assist you in scheduling these services.  If you have already completed any of these items, please share that information with your care team to update in your medical record.  Health Maintenance Due   Topic Date Due     ANNUAL REVIEW OF HM ORDERS  Never done     Hepatitis C Screening  Never done     Cholesterol Lab  04/06/2016     Annual Wellness Visit  Never done     AORTIC ANEURYSM SCREENING (SYSTEM ASSIGNED)  Never done     Your Health Risk Assessment indicates you feel you are not in good health    A healthy lifestyle helps keep the body fit and the mind alert. It helps protect you from disease, helps you fight disease, and helps prevent chronic disease (disease that doesn't go away) from getting worse. This is important as you get older and begin to notice twinges in muscles and joints and a decline in the strength and stamina you once took for granted. A healthy lifestyle includes good healthcare, good nutrition, weight control, recreation, and regular exercise. Avoid harmful substances and do what you can to keep safe. Another part of a healthy lifestyle is stay mentally active and socially involved.    Good healthcare     Have a wellness visit every year.     If you have new symptoms, let us know right away. Don't wait until the next checkup.     Take medicines exactly as prescribed and keep your medicines in a safe place. Tell us if your medicine causes problems.   Healthy diet and weight control     Eat 3 or 4 small, nutritious, low-fat, high-fiber meals a day. Include a variety of fruits, vegetables, and whole-grain foods.     Make sure you get enough calcium in your diet. Calcium, vitamin D, and exercise help prevent osteoporosis (bone thinning).     If you live alone, try eating with others when you can. That way you get a good meal and have company while you eat  it.     Try to keep a healthy weight. If you eat more calories than your body uses for energy, it will be stored as fat and you will gain weight.     Recreation   Recreation is not limited to sports and team events. It includes any activity that provides relaxation, interest, enjoyment, and exercise. Recreation provides an outlet for physical, mental, and social energy. It can give a sense of worth and achievement. It can help you stay healthy.    Mental Exercise and Social Involvement  Mental and emotional health is as important as physical health. Keep in touch with friends and family. Stay as active as possible. Continue to learn and challenge yourself.   Things you can do to stay mentally active are:    Learn something new, like a foreign language or musical instrument.     Play SCRABBLE or do crossword puzzles. If you cannot find people to play these games with you at home, you can play them with others on your computer through the Internet.     Join a games club--anything from card games to chess or checkers or lawn bowling.     Start a new hobby.     Go back to school.     Volunteer.     Read.   Keep up with world events.  Activities of Daily Living    Your Health Risk Assessment indicates you have difficulties with activities of daily living such as housework, bathing, preparing meals, taking medication, etc. Please make a follow up appointment for us to address this issue in more detail.    Signs of Hearing Loss      Hearing much better with one ear can be a sign of hearing loss.   Hearing loss is a problem shared by many people. In fact, it is one of the most common health problems, particularly as people age. Most people age 65 and older have some hearing loss. By age 80, almost everyone does. Hearing loss often occurs slowly over the years. So you may not realize your hearing has gotten worse.  Have your hearing checked  Call your healthcare provider if you:    Have to strain to hear normal  conversation    Have to watch other people s faces very carefully to follow what they re saying    Need to ask people to repeat what they ve said    Often misunderstand what people are saying    Turn the volume of the television or radio up so high that others complain    Feel that people are mumbling when they re talking to you    Find that the effort to hear leaves you feeling tired and irritated    Notice, when using the phone, that you hear better with one ear than the other  [employer] last reviewed this educational content on 1/1/2020 2000-2021 [employer]. All rights reserved. This information is not intended as a substitute for professional medical care. Always follow your healthcare professional's instructions.          Preventing Falls at Home  A person can fall for many reasons. Older adults may fall because reaction time slows down as we age. Your muscles and joints may get stiff, weak, or less flexible because of illness, medicines, or a physical condition.   Other health problems that make falls more likely include:     Arthritis    Dizziness or lightheadedness when you stand up (orthostatic hypotension)    History of a stroke    Dizziness    Anemia    Certain medicines taken for mental illness or to control blood pressure.    Problems with balance or gait    Bladder or urinary problems    History of falling    Changes in vision (vision impairment)    Changes in thinking skills and memory (cognitive impairment)  Injuries from a fall can include serious injuries such as broken bones, dislocated joints, internal bleeding and cuts. Injuries like these can limit your independence.   Prevention tips  To help prevent falls and fall-related injuries, follow the tips below.    Floors  To make floors safer:     Put nonskid pads under area rugs.    Remove small rugs.    Replace worn floor coverings.    Tack carpets firmly to each step on carpeted stairs. Put nonskid strips on the edges of  uncarpeted stairs.    Keep floors and stairs free of clutter and cords.    Arrange furniture so there are clear pathways.    Clean up any spills right away.  Bathrooms    To make bathrooms safer:     Install grab bars in the tub or shower.    Apply nonskid strips or put a nonskid rubber mat in the tub or shower.    Sit on a bath chair to bathe.    Use bathmats with nonskid backing.  Lighting  To improve visibility in your home:      Keep a flashlight in each room. Or put a lamp next to the bed within easy reach.    Put nightlights in the bedrooms, hallways, kitchen, and bathrooms.    Make sure all stairways have good lighting.    Take your time when going up and down stairs.    Put handrails on both sides of stairs and in walkways for more support. To prevent injury to your wrist or arm, don t use handrails to pull yourself up.    Install grab bars to pull yourself up.    Move or rearrange items that you use often. This will make them easier to find or reach.    Look at your home to find any safety hazards. Especially look at doorways, walkways, and the driveway. Remove or repair any safety problems that you find.  Other changes to make    Look around to find any safety hazards. Look closely at doorways, walkways, and the driveway. Remove or repair any safety problems that you find.    Wear shoes that fit well.    Take your time when going up and down stairs.    Put handrails on both sides of stairs and in walkways for more support. To prevent injury to your wrist or arm, don t use handrails to pull yourself up.    Install grab bars wherever needed to pull yourself up.    Arrange items that you use often. This will make them easier to find or reach.    [employer] last reviewed this educational content on 3/1/2020    8866-5232 [employer]. All rights reserved. This information is not intended as a substitute for professional medical care. Always follow your healthcare professional's instructions.

## 2022-12-20 NOTE — ASSESSMENT & PLAN NOTE
2/2 pancreatic neuroendocrine carcinoma.  Stable with Creon.  Currently using 2-3 tabs before meals, will increase if he notices more mucousy stools.

## 2022-12-20 NOTE — PROGRESS NOTES
SUBJECTIVE:   Tyrel is a 72 year old male with PMH of remote Hodgkins disease (30 years ago), metastatic neuroendocrine carcinoma of pancreas, PE, HFrEF, MDD, HLD and hypothyroidism who presents for Preventive Visit.    Patient has been advised of split billing requirements and indicates understanding: Yes  Are you in the first 12 months of your Medicare coverage?  No    Neuroendocrine carcinoma: Following with oncology (Dr. Velazquez at [place] and Dr. Parker at [facility]), currently on active therapy with CAPTEM (2 weeks on and 2 weeks off). Saw oncology 12/15, CT A/P showed stable liver disease and slight increase in size of the pancreas/ampullary primary. They had discussion on next steps at that visit (continuing current regimen with re-eval in 3 months, switch to systemic therapy, add pembro and/or add radiation to primary). They are going to get rad onc consult and then discuss further. He is using Creon for pancreatic insufficiency (2-3 tabs TID before meals) and also vanicream for hand/foot pealing and pain 2/2 chemo.     PE: in setting of above cancer, on lifelong eliquis    HFrEF: in s/o MR s/p MitraClip 4/22/22 likely 2/2 radiation for Hodgkin's lympmhoma. EF improved from 29% to 41% after clip. Slowly adding back GDMT, on low dose entresto 1/2 tab 24/26 BID and metop XL 37.5 mg daily -  and spironolactone 12.5 was added by cards yesterday.   - BP today 83/47 (taken over thick sweater and lymphedema cuff); home usually 100s-90s/50s    R rotator cuff - waking up at night, helped with steroid shot 5 years ago - helped for 2 years    HLD: lovastatin 40 mg daily, will repeat lipids today     Eyes glued shut in AM and runny nose often. No cold symptoms.     Hypothyroidism: 75 mcg daily, due for repeat TSH    MDD: venlafaxine 150 mg + 75 mg daily in AM - hospitalized at [place] for depression, needed ECT    Grandkids: 6 years, 3 years and 9 months live in the area - this is why they moved from [place]    Healthy Habits:     " In general, how would you rate your overall health?  Poor    Frequency of exercise:  2-3 days/week    Do you usually eat at least 4 servings of fruit and vegetables a day, include whole grains    & fiber and avoid regularly eating high fat or \"junk\" foods?  Yes    Taking medications regularly:  No    Barriers to taking medications:  None    Medication side effects:  Muscle aches, Lightheadedness and Other    Ability to successfully perform activities of daily living:  Telephone requires assistance, transportation requires assistance and shopping requires assistance    Home Safety:  No safety concerns identified    Hearing Impairment:  Difficulty understanding soft or whispered speech    In the past 6 months, have you been bothered by leaking of urine?  No    In general, how would you rate your overall mental or emotional health?  Good      PHQ-2 Total Score: 0    Additional concerns today:  Yes    Have you ever done Advance Care Planning? (For example, a Health Directive, POLST, or a discussion with a medical provider or your loved ones about your wishes): Yes, patient states has an Advance Care Planning document and will bring a copy to the clinic.    Fall risk  Fallen 2 or more times in the past year?: Yes  Any fall with injury in the past year?: No  Cognitive Screening   1) Repeat 3 items (Leader, Season, Table)    2) Clock draw: NORMAL  3) 3 item recall: Recalls 3 objects  Results: 3 items recalled: COGNITIVE IMPAIRMENT LESS LIKELY    Mini-CogTM Copyright ALESSIA Moore. Licensed by the author for use in [facility]; reprinted with permission (chelsie@.[email or web address]). All rights reserved.      Do you have sleep apnea, excessive snoring or daytime drowsiness?: no    Reviewed and updated as needed this visit by clinical staff   Tobacco  Allergies  Meds  Problems             Reviewed and updated as needed this visit by Provider    Allergies  Meds  Problems            Social History     Tobacco Use     Smoking " status: Never     Smokeless tobacco: Never   Substance Use Topics     Alcohol use: Not on file       Alcohol Use 12/20/2022   Prescreen: >3 drinks/day or >7 drinks/week? No         Current providers sharing in care for this patient include:  Patient Care Team:  Carmen Cho MD as PCP - General (Internal Medicine)  Bradly Parker MD as MD (Hematology & Oncology)  Halle Narvaez, RN as Specialty Care Coordinator (Hematology & Oncology)    The following health maintenance items are reviewed in Epic and correct as of today:  Health Maintenance   Topic Date Due     ANNUAL REVIEW OF HM ORDERS  Never done     LIPID  04/06/2016     MEDICARE ANNUAL WELLNESS VISIT  12/20/2023     FALL RISK ASSESSMENT  12/20/2023     MENINGITIS IMMUNIZATION (3 - Risk start 2-23 months series) 09/23/2025     COLORECTAL CANCER SCREENING  11/02/2025     DTAP/TDAP/TD IMMUNIZATION (2 - Td or Tdap) 01/09/2027     ADVANCE CARE PLANNING  12/20/2027     INFLUENZA VACCINE  Completed     Pneumococcal Vaccine: 65+ Years  Completed     ZOSTER IMMUNIZATION  Completed     COVID-19 Vaccine  Completed     IPV IMMUNIZATION  Aged Out     HEPATITIS C SCREENING  Discontinued     AORTIC ANEURYSM SCREENING (SYSTEM ASSIGNED)  Discontinued       Review of Systems   Constitutional: Negative for chills and fever.   HENT: Negative for congestion, ear pain, hearing loss and sore throat.    Eyes: Negative for pain and visual disturbance.   Respiratory: Positive for shortness of breath. Negative for cough.    Cardiovascular: Negative for chest pain, palpitations and peripheral edema.   Gastrointestinal: Negative for abdominal pain, constipation, diarrhea, heartburn, hematochezia and nausea.   Genitourinary: Positive for frequency and urgency. Negative for dysuria, genital sores, hematuria, impotence and penile discharge.   Musculoskeletal: Negative for arthralgias, joint swelling and myalgias.   Skin: Negative for rash.   Neurological: Positive for  "dizziness, weakness and paresthesias. Negative for headaches.   Psychiatric/Behavioral: Negative for mood changes. The patient is not nervous/anxious.      OBJECTIVE:   BP (!) 83/47 (BP Location: Left arm, Patient Position: Left side, Cuff Size: Adult Regular)   Pulse 79   Temp 97.6  F (36.4  C) (Temporal)   Ht 1.727 m (5' 8\")   Wt 79.8 kg (175 lb 14.4 oz)   SpO2 100%   BMI 26.75 kg/m   Estimated body mass index is 26.75 kg/m  as calculated from the following:    Height as of this encounter: 1.727 m (5' 8\").    Weight as of this encounter: 79.8 kg (175 lb 14.4 oz).  Physical Exam  GENERAL: healthy, alert and no distress  EYES: Eyes grossly normal to inspection, PERRL and conjunctivae and sclerae normal  HENT: ear canals and TM's normal, nose and mouth without ulcers or lesions  NECK: no adenopathy, no asymmetry, masses, or scars and thyroid normal to palpation  RESP: lungs clear to auscultation - no rales, rhonchi or wheezes  CV: regular rate and rhythm, normal S1 S2, no S3 or S4, no murmur, click or rub, trace if any peripheral edema and peripheral pulses strong  ABDOMEN: soft, nontender, no hepatosplenomegaly, no masses and bowel sounds normal  MS: + lymphedema of RUE, pain in shoulder with full abduction of R shoulder. No other MSK defects noted, trace if any peripheral edema of LEs  SKIN: no suspicious lesions or rashes  NEURO: Normal strength and tone, mentation intact and speech normal  PSYCH: mentation appears normal, affect normal/bright    Diagnostic Test Results:  Labs reviewed in Epic    ASSESSMENT / PLAN:     Problem List Items Addressed This Visit        Nervous and Auditory    Chronic right shoulder pain     Reports chronic pain in his right shoulder.  He this did improve with cortisone injection 5 years ago.  He is interested in another injection.  He does have pain with palpation along the joint line that is worse with abduction.  Ortho referral placed for consideration of repeat injection      "    Relevant Orders    Orthopedic  Referral       Digestive    Pancreatic insufficiency     2/2 pancreatic neuroendocrine carcinoma.  Stable with Creon.  Currently using 2-3 tabs before meals, will increase if he notices more mucousy stools.         Relevant Medications    lipase-protease-amylase (CREON 12) 56112-11995-27715 units CPEP    Secondary malignant neuroendocrine tumor of liver (H)     Following with oncology (Dr. Velazquez at [place] and Dr. Parker at [facility]). Currently on active therapy with CAPTEM (2 weeks on and 2 weeks off). At last visit (12/15/22), CT A/P showed stable liver disease and slight increase in size of the pancreas/ampullary primary. They had discussion on next steps at that visit (continuing current regimen with re-eval in 3 months, switch to systemic therapy, add pembro and/or add radiation to primary). They are going to get rad onc consult and then discuss further.          Vitamin D deficiency     Patient is on high-dose vitamin D placement currently.  We will recheck vitamin D and determine further dosing requirements.         Relevant Orders    Vitamin D Deficiency       Endocrine    Hyperlipidemia     On lovastatin 40 daily.  Due for repeat lipids today.         Relevant Medications    lovastatin (MEVACOR) 40 MG tablet    Other Relevant Orders    Lipid Profile (Chol, Trig, HDL, LDL calc)    Acquired hypothyroidism     Has been on Synthroid for many years.  Current dose of 75 mcg daily.  Due for repeat thyroid function test today.         Relevant Orders    TSH with free T4 reflex       Circulatory    Pulmonary embolism (H)     In setting of malignancy.  We will continue lifelong Eliquis 5 mg twice daily given continued active metastatic cancer.         HFrEF (heart failure with reduced ejection fraction) (H)     2/2 MR s/p MitraClip (4/22/22). EF improved from 29% to 41% after clip (most recent study was cardiac MRI 10/2022). Follows with cardiology. Slowly adding back GDMT.  "Current regimen is low dose entresto 1/2 24/26 tab BID, metop XL 37.5 daily and spironolactone 12.5 mg daily. BP in clinic today is low, patient denies current lightheadedness or dizziness.  Home blood pressures are better usually at 90-100s/50s.  He will continue current regimen and follow-up with cardiology if blood pressure remains low after addition of spironolactone.          s/p Leslie (4/2022)     Following with cardiology.  Plan per heart failure.            Musculoskeletal and Integumentary    Osteopenia     Found DEXA 2/13/20. On vitamin D and calcium supplements. Due for repeat DEXA, will order today.          Relevant Orders    DX Hip/Pelvis/Spine       Behavioral    Recurrent major depressive disorder, in partial remission (H)     Patient had fairly severe episode of depression in 2020, requiring admission and ECT.  Current regimen is venlafaxine 225 mg daily which works well for him.  We will refill this today.         Relevant Medications    venlafaxine (EFFEXOR XR) 150 MG 24 hr capsule    venlafaxine (EFFEXOR XR) 75 MG 24 hr capsule       Other    History of Hodgkin's lymphoma    Encounter for Medicare annual wellness exam - Primary     Patient is doing very well today.  We will update his labs today.  He is up-to-date on all cancer screening and immunizations.            Patient has been advised of split billing requirements and indicates understanding: Yes      COUNSELING:  Reviewed preventive health counseling, as reflected in patient instructions  Special attention given to:       Regular exercise       Healthy diet/nutrition       Fall risk prevention      BMI:   Estimated body mass index is 26.75 kg/m  as calculated from the following:    Height as of this encounter: 1.727 m (5' 8\").    Weight as of this encounter: 79.8 kg (175 lb 14.4 oz).   Weight management plan: Discussed healthy diet and exercise guidelines      He reports that he has never smoked. He has never used smokeless " tobacco.      Appropriate preventive services were discussed with this patient, including applicable screening as appropriate for cardiovascular disease, diabetes, osteopenia/osteoporosis, and glaucoma.  As appropriate for age/gender, discussed screening for colorectal cancer, prostate cancer, breast cancer, and cervical cancer. Checklist reviewing preventive services available has been given to the patient.    Reviewed patients plan of care and provided an AVS. The Basic Care Plan (routine screening as documented in Health Maintenance) for Tyrel meets the Care Plan requirement. This Care Plan has been established and reviewed with the Patient and spouse.          Carmen Cho MD  [facility]    Identified Health Risks:    The patient was provided with suggestions to help him develop a healthy physical lifestyle.  The patient reports that he has difficulty with activities of daily living. I have asked that the patient make a follow up appointment in 6 months where this issue will be further evaluated and addressed.  The patient was provided with written information regarding signs of hearing loss.  He is at risk for falling and has been provided with information to reduce the risk of falling at home.

## 2022-12-20 NOTE — ASSESSMENT & PLAN NOTE
Has been on Synthroid for many years.  Current dose of 75 mcg daily.  Due for repeat thyroid function test today.

## 2022-12-20 NOTE — ASSESSMENT & PLAN NOTE
Patient had fairly severe episode of depression in 2020, requiring admission and ECT.  Current regimen is venlafaxine 225 mg daily which works well for him.  We will refill this today.

## 2022-12-21 NOTE — RESULT ENCOUNTER NOTE
It was nice to meet you and Kacie yesterday. Here are your lab results:   - Your vitamin D level is normal but at the low end of normal. Since it is in the normal range now, we don't need to have you do the high dose replacement again. But you should get an over the counter vitamin D3 supplement [id number] international units daily.     - Your thyroid levels look a little low. So, we are going to increase your synthroid to 88 mcg daily. I sent the updated prescription to your pharmacy. We will need to recheck your levels in 6-8 weeks on this increased dose. I will place the lab orders and you can make a lab appointment or call the clinic at [phone] to get a lab appointment in 6-8 weeks.     - Your cholesterol numbers look good on the dose of lovastatin that you are taking.     Please let us know if you have any questions or concerns.

## 2022-12-27 NOTE — TELEPHONE ENCOUNTER
Voicemail left for patient. Message also included that results are available via [employer] for patient to view.     If patient returns call- please relay PCP's message below and assist in scheduling a lab only appt in 6-8 weeks to recheck thyroid level.     Thank you

## 2022-12-27 NOTE — TELEPHONE ENCOUNTER
----- Message from Carmen Cho MD sent at 12/26/2022 12:28 PM CST -----  Team - please call patient with results as he did not open my [employer] message - needs to schedule repeat lab appointment for thyroid function tests in 6-8 weeks.   ---  It was nice to meet you and Kacie yesterday. Here are your lab results:   - Your vitamin D level is normal but at the low end of normal. Since it is in the normal range now, we don't need to have you do the high dose replacement again. But you should get an over the counter vitamin D3 supplement [id number] international units daily.      - Your thyroid levels look a little low. So, we are going to increase your synthroid to 88 mcg daily. I sent the updated prescription to your pharmacy. We will need to recheck your levels in 6-8 weeks on this increased dose. I will place the lab orders and you can make a lab appointment or call the clinic at [phone] to get a lab appointment in 6-8 weeks.      - Your cholesterol numbers look good on the dose of lovastatin that you are taking.      Please let us know if you have any questions or concerns.

## 2022-12-28 NOTE — TELEPHONE ENCOUNTER
Patient calling to request a:    Compression sleeve ( R arm)   *lymphadenia     -insurance should cover 2 per year   -patient requesting 2 of them     Patient needs a referral for [facility] so he can get the measurements done.    Patient stated they forgot to request this at last visit     Call Back   [phone]

## 2022-12-28 NOTE — TELEPHONE ENCOUNTER
Order flavia'd up for review & approval  Once signed, please route back to team so we can fax orders

## 2022-12-30 NOTE — ORAL ONC MGMT
Oral Chemotherapy Monitoring Program  Lab Follow Up    Reviewed lab results from 12/30/22.    ORAL CHEMOTHERAPY 12/2/2022 12/9/2022 12/9/2022 12/23/2022 12/23/2022 12/30/2022 12/30/2022   Assessment Type Other Initial Follow up Initial Follow up Refill Refill Lab Monitoring Lab Monitoring   Diagnosis Code Neuroendocrine Cancer Neuroendocrine Cancer Neuroendocrine Cancer Neuroendocrine Cancer Neuroendocrine Cancer Neuroendocrine Cancer Neuroendocrine Cancer   Providers Dr Keith Parker   [facility]   Drug Name Xeloda (capecitabine) Xeloda (capecitabine) Temodar (temozolomide) Xeloda (capecitabine) Temodar (temozolomide) Temodar (temozolomide) Xeloda (capecitabine)   Dose (No Data) (No Data) 400 mg (No Data) 400 mg 400 mg (No Data)   Current Schedule BID BID Daily BID Daily Daily BID   Cycle Details 2 weeks on, 2 weeks off 2 weeks on, 2 weeks off (No Data) 2 weeks on, 2 weeks off (No Data) (No Data) 2 weeks on, 2 weeks off   Start Date of Last Cycle - 12/3/2022 - - - - -   Planned next cycle start date 12/3/2022 - - - - - -   Doses missed in last 2 weeks - - 0 - - - -   Adherence Assessment - - Adherent - - - -   Adverse Effects - - Palmar-plantar Erythrodysethesia Syndrome - - - -   Palmar-plantar Erythrodysethesia syndrome[hand-foot syndrome] - - Grade 1 - - - -   Pharmacist Intervention(Palmar-plantar) - - No - - - -   Any new drug interactions? No - - - - - -   Is the dose as ordered appropriate for the patient? Yes - - - - - -       Labs:  _  Result Component Current Result Ref Range   Sodium 139 (12/30/2022) 136 - 145 mmol/L     _  Result Component Current Result Ref Range   Potassium 4.9 (12/30/2022) 3.4 - 5.3 mmol/L     _  Result Component Current Result Ref Range   Calcium 9.0 (12/30/2022) 8.8 - 10.2 mg/dL     No results found for Mag within last 30 days.     No results found for Phos within  last 30 days.     _  Result Component Current Result Ref Range   Albumin 3.8 (12/30/2022) 3.5 - 5.2 g/dL     _  Result Component Current Result Ref Range   Urea Nitrogen 21.6 (12/30/2022) 8.0 - 23.0 mg/dL     _  Result Component Current Result Ref Range   Creatinine 1.24 (H) (12/30/2022) 0.67 - 1.17 mg/dL     _  Result Component Current Result Ref Range   AST 28 (12/30/2022) 10 - 50 U/L     _  Result Component Current Result Ref Range   ALT 16 (12/30/2022) 10 - 50 U/L     _  Result Component Current Result Ref Range   Bilirubin Total 0.5 (12/30/2022) <=1.2 mg/dL     _  Result Component Current Result Ref Range   WBC Count 5.7 (12/30/2022) 4.0 - 11.0 10e3/uL     _  Result Component Current Result Ref Range   Hemoglobin 10.8 (L) (12/30/2022) 13.3 - 17.7 g/dL     _  Result Component Current Result Ref Range   Platelet Count 192 (12/30/2022) 150 - 450 10e3/uL     No results found for ANC within last 30 days.     _  Result Component Current Result Ref Range   Absolute Neutrophils 4.3 (12/30/2022) 1.6 - 8.3 10e3/uL        Assessment & Plan:  No concerning abnormalities.    Left voicemail for patient to follow up on labs and for routine assessment    Follow-Up:  Patient currently deciding whether care will be at [facility] or [place]    Maxine Gonzalez, PharmD, BCOP  Hematology/Oncology Clinical Pharmacist  [facility]  [facility]  [phone]

## 2023-01-01 ENCOUNTER — APPOINTMENT (OUTPATIENT)
Dept: PHYSICAL THERAPY | Facility: CLINIC | Age: 73
DRG: 057 | End: 2023-01-01
Payer: MEDICARE

## 2023-01-01 ENCOUNTER — APPOINTMENT (OUTPATIENT)
Dept: CARDIOLOGY | Facility: CLINIC | Age: 73
DRG: 871 | End: 2023-01-01
Attending: PHYSICIAN ASSISTANT
Payer: MEDICARE

## 2023-01-01 ENCOUNTER — APPOINTMENT (OUTPATIENT)
Dept: PHYSICAL THERAPY | Facility: CLINIC | Age: 73
DRG: 871 | End: 2023-01-01
Attending: PHYSICIAN ASSISTANT
Payer: MEDICARE

## 2023-01-01 ENCOUNTER — APPOINTMENT (OUTPATIENT)
Dept: MRI IMAGING | Facility: CLINIC | Age: 73
DRG: 057 | End: 2023-01-01
Attending: STUDENT IN AN ORGANIZED HEALTH CARE EDUCATION/TRAINING PROGRAM
Payer: MEDICARE

## 2023-01-01 ENCOUNTER — APPOINTMENT (OUTPATIENT)
Dept: GENERAL RADIOLOGY | Facility: CLINIC | Age: 73
End: 2023-01-01
Attending: INTERNAL MEDICINE
Payer: MEDICARE

## 2023-01-01 ENCOUNTER — NURSE TRIAGE (OUTPATIENT)
Dept: ONCOLOGY | Facility: CLINIC | Age: 73
End: 2023-01-01
Payer: MEDICARE

## 2023-01-01 ENCOUNTER — APPOINTMENT (OUTPATIENT)
Dept: CT IMAGING | Facility: CLINIC | Age: 73
DRG: 871 | End: 2023-01-01
Attending: EMERGENCY MEDICINE
Payer: MEDICARE

## 2023-01-01 ENCOUNTER — APPOINTMENT (OUTPATIENT)
Dept: CT IMAGING | Facility: CLINIC | Age: 73
DRG: 057 | End: 2023-01-01
Attending: NURSE PRACTITIONER
Payer: MEDICARE

## 2023-01-01 ENCOUNTER — ANESTHESIA EVENT (OUTPATIENT)
Dept: SURGERY | Facility: CLINIC | Age: 73
DRG: 445 | End: 2023-01-01
Payer: MEDICARE

## 2023-01-01 ENCOUNTER — LAB (OUTPATIENT)
Dept: LAB | Facility: CLINIC | Age: 73
End: 2023-01-01
Payer: MEDICARE

## 2023-01-01 ENCOUNTER — DOCUMENTATION ONLY (OUTPATIENT)
Dept: OTHER | Facility: CLINIC | Age: 73
End: 2023-01-01

## 2023-01-01 ENCOUNTER — VIRTUAL VISIT (OUTPATIENT)
Dept: INTERNAL MEDICINE | Facility: CLINIC | Age: 73
End: 2023-01-01
Payer: MEDICARE

## 2023-01-01 ENCOUNTER — TELEPHONE (OUTPATIENT)
Dept: FAMILY MEDICINE | Facility: CLINIC | Age: 73
End: 2023-01-01
Payer: MEDICARE

## 2023-01-01 ENCOUNTER — ANCILLARY PROCEDURE (OUTPATIENT)
Dept: GENERAL RADIOLOGY | Facility: CLINIC | Age: 73
End: 2023-01-01
Attending: FAMILY MEDICINE
Payer: MEDICARE

## 2023-01-01 ENCOUNTER — TELEPHONE (OUTPATIENT)
Dept: INTERNAL MEDICINE | Facility: CLINIC | Age: 73
End: 2023-01-01
Payer: MEDICARE

## 2023-01-01 ENCOUNTER — TELEPHONE (OUTPATIENT)
Dept: ONCOLOGY | Facility: CLINIC | Age: 73
End: 2023-01-01

## 2023-01-01 ENCOUNTER — MYC MEDICAL ADVICE (OUTPATIENT)
Dept: INTERNAL MEDICINE | Facility: CLINIC | Age: 73
End: 2023-01-01

## 2023-01-01 ENCOUNTER — OFFICE VISIT (OUTPATIENT)
Dept: INTERNAL MEDICINE | Facility: CLINIC | Age: 73
End: 2023-01-01
Payer: MEDICARE

## 2023-01-01 ENCOUNTER — TELEPHONE (OUTPATIENT)
Dept: ONCOLOGY | Facility: CLINIC | Age: 73
End: 2023-01-01
Payer: MEDICARE

## 2023-01-01 ENCOUNTER — ANESTHESIA EVENT (OUTPATIENT)
Dept: SURGERY | Facility: CLINIC | Age: 73
End: 2023-01-01
Payer: MEDICARE

## 2023-01-01 ENCOUNTER — MYC MEDICAL ADVICE (OUTPATIENT)
Dept: ONCOLOGY | Facility: CLINIC | Age: 73
End: 2023-01-01
Payer: MEDICARE

## 2023-01-01 ENCOUNTER — PATIENT OUTREACH (OUTPATIENT)
Dept: CARE COORDINATION | Facility: CLINIC | Age: 73
End: 2023-01-01
Payer: MEDICARE

## 2023-01-01 ENCOUNTER — HOSPITAL ENCOUNTER (INPATIENT)
Facility: CLINIC | Age: 73
LOS: 4 days | Discharge: HOME | DRG: 057 | End: 2023-06-26
Attending: INTERNAL MEDICINE | Admitting: INTERNAL MEDICINE
Payer: MEDICARE

## 2023-01-01 ENCOUNTER — PATIENT OUTREACH (OUTPATIENT)
Dept: ONCOLOGY | Facility: CLINIC | Age: 73
End: 2023-01-01
Payer: MEDICARE

## 2023-01-01 ENCOUNTER — IMMUNIZATION (OUTPATIENT)
Dept: FAMILY MEDICINE | Facility: CLINIC | Age: 73
End: 2023-01-01
Payer: MEDICARE

## 2023-01-01 ENCOUNTER — ANESTHESIA (OUTPATIENT)
Dept: SURGERY | Facility: CLINIC | Age: 73
End: 2023-01-01
Payer: MEDICARE

## 2023-01-01 ENCOUNTER — NURSE TRIAGE (OUTPATIENT)
Dept: NURSING | Facility: CLINIC | Age: 73
End: 2023-01-01
Payer: MEDICARE

## 2023-01-01 ENCOUNTER — VIRTUAL VISIT (OUTPATIENT)
Dept: ONCOLOGY | Facility: CLINIC | Age: 73
End: 2023-01-01
Attending: INTERNAL MEDICINE
Payer: MEDICARE

## 2023-01-01 ENCOUNTER — PATIENT OUTREACH (OUTPATIENT)
Dept: GASTROENTEROLOGY | Facility: CLINIC | Age: 73
End: 2023-01-01
Payer: MEDICARE

## 2023-01-01 ENCOUNTER — VIRTUAL VISIT (OUTPATIENT)
Dept: SURGERY | Facility: CLINIC | Age: 73
End: 2023-01-01
Payer: MEDICARE

## 2023-01-01 ENCOUNTER — APPOINTMENT (OUTPATIENT)
Dept: ULTRASOUND IMAGING | Facility: CLINIC | Age: 73
DRG: 871 | End: 2023-01-01
Attending: EMERGENCY MEDICINE
Payer: MEDICARE

## 2023-01-01 ENCOUNTER — DOCUMENTATION ONLY (OUTPATIENT)
Dept: ONCOLOGY | Facility: CLINIC | Age: 73
End: 2023-01-01
Payer: MEDICARE

## 2023-01-01 ENCOUNTER — OFFICE VISIT (OUTPATIENT)
Dept: ORTHOPEDICS | Facility: CLINIC | Age: 73
End: 2023-01-01
Attending: INTERNAL MEDICINE
Payer: MEDICARE

## 2023-01-01 ENCOUNTER — APPOINTMENT (OUTPATIENT)
Dept: CARDIOLOGY | Facility: CLINIC | Age: 73
DRG: 057 | End: 2023-01-01
Payer: MEDICARE

## 2023-01-01 ENCOUNTER — APPOINTMENT (OUTPATIENT)
Dept: PHYSICAL THERAPY | Facility: CLINIC | Age: 73
DRG: 871 | End: 2023-01-01
Payer: MEDICARE

## 2023-01-01 ENCOUNTER — PATIENT OUTREACH (OUTPATIENT)
Dept: GASTROENTEROLOGY | Facility: CLINIC | Age: 73
End: 2023-01-01

## 2023-01-01 ENCOUNTER — PRE VISIT (OUTPATIENT)
Dept: SURGERY | Facility: CLINIC | Age: 73
End: 2023-01-01

## 2023-01-01 ENCOUNTER — ANESTHESIA (OUTPATIENT)
Dept: SURGERY | Facility: CLINIC | Age: 73
DRG: 445 | End: 2023-01-01
Payer: MEDICARE

## 2023-01-01 ENCOUNTER — VIRTUAL VISIT (OUTPATIENT)
Dept: FAMILY MEDICINE | Facility: CLINIC | Age: 73
End: 2023-01-01
Payer: MEDICARE

## 2023-01-01 ENCOUNTER — MYC MEDICAL ADVICE (OUTPATIENT)
Dept: INTERNAL MEDICINE | Facility: CLINIC | Age: 73
End: 2023-01-01
Payer: MEDICARE

## 2023-01-01 ENCOUNTER — HOSPITAL ENCOUNTER (OUTPATIENT)
Facility: CLINIC | Age: 73
Discharge: HOME | End: 2023-06-12
Attending: INTERNAL MEDICINE | Admitting: INTERNAL MEDICINE
Payer: MEDICARE

## 2023-01-01 ENCOUNTER — VIRTUAL VISIT (OUTPATIENT)
Dept: PALLIATIVE MEDICINE | Facility: CLINIC | Age: 73
End: 2023-01-01
Payer: MEDICARE

## 2023-01-01 ENCOUNTER — TELEPHONE (OUTPATIENT)
Dept: INTERNAL MEDICINE | Facility: CLINIC | Age: 73
End: 2023-01-01

## 2023-01-01 ENCOUNTER — APPOINTMENT (OUTPATIENT)
Dept: GENERAL RADIOLOGY | Facility: CLINIC | Age: 73
DRG: 445 | End: 2023-01-01
Attending: INTERNAL MEDICINE
Payer: MEDICARE

## 2023-01-01 ENCOUNTER — OFFICE VISIT (OUTPATIENT)
Dept: DERMATOLOGY | Facility: CLINIC | Age: 73
End: 2023-01-01
Payer: MEDICARE

## 2023-01-01 ENCOUNTER — PATIENT OUTREACH (OUTPATIENT)
Dept: ONCOLOGY | Facility: CLINIC | Age: 73
End: 2023-01-01

## 2023-01-01 ENCOUNTER — VIRTUAL VISIT (OUTPATIENT)
Dept: PALLIATIVE CARE | Facility: CLINIC | Age: 73
End: 2023-01-01
Attending: SOCIAL WORKER
Payer: MEDICARE

## 2023-01-01 ENCOUNTER — APPOINTMENT (OUTPATIENT)
Dept: ULTRASOUND IMAGING | Facility: CLINIC | Age: 73
DRG: 445 | End: 2023-01-01
Attending: NURSE PRACTITIONER
Payer: MEDICARE

## 2023-01-01 ENCOUNTER — TELEPHONE (OUTPATIENT)
Dept: GASTROENTEROLOGY | Facility: CLINIC | Age: 73
End: 2023-01-01

## 2023-01-01 ENCOUNTER — HOSPITAL ENCOUNTER (OUTPATIENT)
Dept: CT IMAGING | Facility: HOSPITAL | Age: 73
Discharge: HOME | End: 2023-07-26
Attending: INTERNAL MEDICINE | Admitting: INTERNAL MEDICINE
Payer: MEDICARE

## 2023-01-01 ENCOUNTER — APPOINTMENT (OUTPATIENT)
Dept: CT IMAGING | Facility: CLINIC | Age: 73
DRG: 445 | End: 2023-01-01
Attending: NURSE PRACTITIONER
Payer: MEDICARE

## 2023-01-01 ENCOUNTER — HOSPITAL ENCOUNTER (OUTPATIENT)
Facility: CLINIC | Age: 73
Discharge: HOME | End: 2023-06-07
Attending: INTERNAL MEDICINE | Admitting: INTERNAL MEDICINE
Payer: MEDICARE

## 2023-01-01 ENCOUNTER — APPOINTMENT (OUTPATIENT)
Dept: GENERAL RADIOLOGY | Facility: CLINIC | Age: 73
DRG: 871 | End: 2023-01-01
Attending: EMERGENCY MEDICINE
Payer: MEDICARE

## 2023-01-01 ENCOUNTER — OFFICE VISIT (OUTPATIENT)
Dept: OPHTHALMOLOGY | Facility: CLINIC | Age: 73
End: 2023-01-01
Payer: MEDICARE

## 2023-01-01 ENCOUNTER — HOSPITAL ENCOUNTER (INPATIENT)
Facility: CLINIC | Age: 73
LOS: 4 days | Discharge: HOSPICE | DRG: 871 | End: 2023-09-16
Attending: EMERGENCY MEDICINE | Admitting: STUDENT IN AN ORGANIZED HEALTH CARE EDUCATION/TRAINING PROGRAM
Payer: MEDICARE

## 2023-01-01 ENCOUNTER — APPOINTMENT (OUTPATIENT)
Dept: INTERVENTIONAL RADIOLOGY/VASCULAR | Facility: CLINIC | Age: 73
DRG: 871 | End: 2023-01-01
Attending: RADIOLOGY
Payer: MEDICARE

## 2023-01-01 ENCOUNTER — APPOINTMENT (OUTPATIENT)
Dept: LAB | Facility: CLINIC | Age: 73
End: 2023-01-01
Payer: MEDICARE

## 2023-01-01 ENCOUNTER — HOSPITAL ENCOUNTER (INPATIENT)
Facility: CLINIC | Age: 73
LOS: 4 days | Discharge: HOME | DRG: 445 | End: 2023-08-05
Attending: EMERGENCY MEDICINE | Admitting: STUDENT IN AN ORGANIZED HEALTH CARE EDUCATION/TRAINING PROGRAM
Payer: MEDICARE

## 2023-01-01 ENCOUNTER — PREP FOR PROCEDURE (OUTPATIENT)
Dept: GASTROENTEROLOGY | Facility: CLINIC | Age: 73
End: 2023-01-01
Payer: MEDICARE

## 2023-01-01 ENCOUNTER — VIRTUAL VISIT (OUTPATIENT)
Dept: ONCOLOGY | Facility: CLINIC | Age: 73
End: 2023-01-01
Attending: PHYSICIAN ASSISTANT
Payer: MEDICARE

## 2023-01-01 VITALS — BODY MASS INDEX: 22.81 KG/M2 | HEIGHT: 69 IN | WEIGHT: 154 LBS

## 2023-01-01 VITALS — BODY MASS INDEX: 23.79 KG/M2 | HEIGHT: 68 IN | WEIGHT: 157 LBS

## 2023-01-01 VITALS
HEART RATE: 107 BPM | RESPIRATION RATE: 18 BRPM | OXYGEN SATURATION: 97 % | SYSTOLIC BLOOD PRESSURE: 109 MMHG | BODY MASS INDEX: 24.96 KG/M2 | WEIGHT: 166.6 LBS | DIASTOLIC BLOOD PRESSURE: 53 MMHG | TEMPERATURE: 98.9 F

## 2023-01-01 VITALS
HEIGHT: 68 IN | BODY MASS INDEX: 26.75 KG/M2 | DIASTOLIC BLOOD PRESSURE: 52 MMHG | SYSTOLIC BLOOD PRESSURE: 87 MMHG | HEART RATE: 83 BPM

## 2023-01-01 VITALS
BODY MASS INDEX: 23.05 KG/M2 | HEIGHT: 70 IN | DIASTOLIC BLOOD PRESSURE: 61 MMHG | WEIGHT: 161 LBS | SYSTOLIC BLOOD PRESSURE: 124 MMHG

## 2023-01-01 VITALS
HEART RATE: 96 BPM | HEIGHT: 68 IN | DIASTOLIC BLOOD PRESSURE: 82 MMHG | WEIGHT: 154.1 LBS | SYSTOLIC BLOOD PRESSURE: 150 MMHG | TEMPERATURE: 96 F | OXYGEN SATURATION: 95 % | BODY MASS INDEX: 23.36 KG/M2 | RESPIRATION RATE: 14 BRPM

## 2023-01-01 VITALS
HEART RATE: 74 BPM | WEIGHT: 172.4 LBS | RESPIRATION RATE: 12 BRPM | BODY MASS INDEX: 25.53 KG/M2 | TEMPERATURE: 97.9 F | HEIGHT: 69 IN | SYSTOLIC BLOOD PRESSURE: 142 MMHG | DIASTOLIC BLOOD PRESSURE: 60 MMHG | OXYGEN SATURATION: 100 %

## 2023-01-01 VITALS
TEMPERATURE: 98 F | HEART RATE: 93 BPM | OXYGEN SATURATION: 99 % | WEIGHT: 168.8 LBS | BODY MASS INDEX: 25.29 KG/M2 | DIASTOLIC BLOOD PRESSURE: 61 MMHG | SYSTOLIC BLOOD PRESSURE: 112 MMHG | RESPIRATION RATE: 16 BRPM

## 2023-01-01 VITALS
DIASTOLIC BLOOD PRESSURE: 58 MMHG | OXYGEN SATURATION: 100 % | SYSTOLIC BLOOD PRESSURE: 102 MMHG | RESPIRATION RATE: 16 BRPM | BODY MASS INDEX: 24.39 KG/M2 | HEART RATE: 71 BPM | WEIGHT: 162.8 LBS

## 2023-01-01 VITALS
HEART RATE: 85 BPM | RESPIRATION RATE: 18 BRPM | TEMPERATURE: 98.2 F | DIASTOLIC BLOOD PRESSURE: 57 MMHG | OXYGEN SATURATION: 99 % | BODY MASS INDEX: 23.17 KG/M2 | WEIGHT: 159.2 LBS | SYSTOLIC BLOOD PRESSURE: 115 MMHG

## 2023-01-01 VITALS
DIASTOLIC BLOOD PRESSURE: 61 MMHG | WEIGHT: 158 LBS | TEMPERATURE: 97.5 F | HEIGHT: 69 IN | BODY MASS INDEX: 23.4 KG/M2 | HEART RATE: 83 BPM | SYSTOLIC BLOOD PRESSURE: 124 MMHG

## 2023-01-01 VITALS
SYSTOLIC BLOOD PRESSURE: 131 MMHG | RESPIRATION RATE: 16 BRPM | DIASTOLIC BLOOD PRESSURE: 73 MMHG | HEIGHT: 68 IN | WEIGHT: 170.19 LBS | HEART RATE: 85 BPM | BODY MASS INDEX: 25.79 KG/M2 | TEMPERATURE: 97.9 F | OXYGEN SATURATION: 98 %

## 2023-01-01 VITALS — TEMPERATURE: 97.3 F

## 2023-01-01 VITALS
SYSTOLIC BLOOD PRESSURE: 114 MMHG | HEIGHT: 69 IN | WEIGHT: 166.4 LBS | DIASTOLIC BLOOD PRESSURE: 54 MMHG | BODY MASS INDEX: 24.65 KG/M2

## 2023-01-01 DIAGNOSIS — I50.20 HFREF (HEART FAILURE WITH REDUCED EJECTION FRACTION) (H): Primary | ICD-10-CM

## 2023-01-01 DIAGNOSIS — D3A.8 NEUROENDOCRINE TUMOR OF PANCREAS (H): ICD-10-CM

## 2023-01-01 DIAGNOSIS — K83.1 BILIARY OBSTRUCTION (H): Primary | ICD-10-CM

## 2023-01-01 DIAGNOSIS — C25.4 MALIGNANT NEOPLASM OF ENDOCRINE PANCREAS (H): ICD-10-CM

## 2023-01-01 DIAGNOSIS — R53.1 GENERALIZED WEAKNESS: ICD-10-CM

## 2023-01-01 DIAGNOSIS — D69.6 THROMBOCYTOPENIA (H): ICD-10-CM

## 2023-01-01 DIAGNOSIS — C25.4 MALIGNANT NEOPLASM OF ENDOCRINE PANCREAS (H): Primary | ICD-10-CM

## 2023-01-01 DIAGNOSIS — K83.1 BILIARY OBSTRUCTION (H): ICD-10-CM

## 2023-01-01 DIAGNOSIS — G89.29 CHRONIC RIGHT SHOULDER PAIN: ICD-10-CM

## 2023-01-01 DIAGNOSIS — L82.1 SEBORRHEIC KERATOSES: ICD-10-CM

## 2023-01-01 DIAGNOSIS — Z12.83 ENCOUNTER FOR SCREENING FOR MALIGNANT NEOPLASM OF SKIN: ICD-10-CM

## 2023-01-01 DIAGNOSIS — F33.41 RECURRENT MAJOR DEPRESSIVE DISORDER, IN PARTIAL REMISSION (H): ICD-10-CM

## 2023-01-01 DIAGNOSIS — J90 PLEURAL EFFUSION: ICD-10-CM

## 2023-01-01 DIAGNOSIS — C7A.8 NEUROENDOCRINE CANCER (H): ICD-10-CM

## 2023-01-01 DIAGNOSIS — Z23 ENCOUNTER FOR IMMUNIZATION: Primary | ICD-10-CM

## 2023-01-01 DIAGNOSIS — C7A.094: ICD-10-CM

## 2023-01-01 DIAGNOSIS — Z51.5 END OF LIFE CARE: ICD-10-CM

## 2023-01-01 DIAGNOSIS — D63.8 ANEMIA IN OTHER CHRONIC DISEASES CLASSIFIED ELSEWHERE: ICD-10-CM

## 2023-01-01 DIAGNOSIS — R53.1 WEAKNESS GENERALIZED: ICD-10-CM

## 2023-01-01 DIAGNOSIS — C7B.8: ICD-10-CM

## 2023-01-01 DIAGNOSIS — E03.9 ACQUIRED HYPOTHYROIDISM: ICD-10-CM

## 2023-01-01 DIAGNOSIS — K86.89 PANCREATIC INSUFFICIENCY: ICD-10-CM

## 2023-01-01 DIAGNOSIS — Z85.71 HISTORY OF HODGKIN'S LYMPHOMA: ICD-10-CM

## 2023-01-01 DIAGNOSIS — K83.09 CHOLANGITIS (H): Primary | ICD-10-CM

## 2023-01-01 DIAGNOSIS — H52.13 MYOPIA OF BOTH EYES WITH REGULAR ASTIGMATISM AND PRESBYOPIA: ICD-10-CM

## 2023-01-01 DIAGNOSIS — E83.51 HYPOCALCEMIA: ICD-10-CM

## 2023-01-01 DIAGNOSIS — A41.9 SEPSIS, DUE TO UNSPECIFIED ORGANISM, UNSPECIFIED WHETHER ACUTE ORGAN DYSFUNCTION PRESENT (H): ICD-10-CM

## 2023-01-01 DIAGNOSIS — I95.1 ORTHOSTATIC HYPOTENSION: ICD-10-CM

## 2023-01-01 DIAGNOSIS — I50.20 HFREF (HEART FAILURE WITH REDUCED EJECTION FRACTION) (H): ICD-10-CM

## 2023-01-01 DIAGNOSIS — R42 LIGHTHEADEDNESS: ICD-10-CM

## 2023-01-01 DIAGNOSIS — H04.123 DRY EYE SYNDROME, BILATERAL: ICD-10-CM

## 2023-01-01 DIAGNOSIS — H25.813 COMBINED FORMS OF AGE-RELATED CATARACT OF BOTH EYES: Primary | ICD-10-CM

## 2023-01-01 DIAGNOSIS — I34.0 NONRHEUMATIC MITRAL VALVE REGURGITATION: ICD-10-CM

## 2023-01-01 DIAGNOSIS — R11.0 CHEMOTHERAPY-INDUCED NAUSEA: ICD-10-CM

## 2023-01-01 DIAGNOSIS — R79.89 ELEVATED LFTS: Primary | ICD-10-CM

## 2023-01-01 DIAGNOSIS — H52.223 MYOPIA OF BOTH EYES WITH REGULAR ASTIGMATISM AND PRESBYOPIA: ICD-10-CM

## 2023-01-01 DIAGNOSIS — R30.0 DYSURIA: Primary | ICD-10-CM

## 2023-01-01 DIAGNOSIS — Z86.711 HISTORY OF PULMONARY EMBOLISM: ICD-10-CM

## 2023-01-01 DIAGNOSIS — C7A.8 NEUROENDOCRINE CARCINOMA OF PANCREAS (H): ICD-10-CM

## 2023-01-01 DIAGNOSIS — G90.9 AUTONOMIC DYSFUNCTION: ICD-10-CM

## 2023-01-01 DIAGNOSIS — R06.02 SHORTNESS OF BREATH: ICD-10-CM

## 2023-01-01 DIAGNOSIS — R07.9 CHEST PAIN, UNSPECIFIED TYPE: ICD-10-CM

## 2023-01-01 DIAGNOSIS — Z91.030 BEE ALLERGY STATUS: Primary | ICD-10-CM

## 2023-01-01 DIAGNOSIS — R41.0 CONFUSION: ICD-10-CM

## 2023-01-01 DIAGNOSIS — L81.4 LENTIGINES: ICD-10-CM

## 2023-01-01 DIAGNOSIS — E78.2 MIXED HYPERLIPIDEMIA: ICD-10-CM

## 2023-01-01 DIAGNOSIS — J96.01 ACUTE RESPIRATORY FAILURE WITH HYPOXIA (H): ICD-10-CM

## 2023-01-01 DIAGNOSIS — H52.4 MYOPIA OF BOTH EYES WITH REGULAR ASTIGMATISM AND PRESBYOPIA: ICD-10-CM

## 2023-01-01 DIAGNOSIS — I50.32 CHRONIC DIASTOLIC HEART FAILURE (H): ICD-10-CM

## 2023-01-01 DIAGNOSIS — Z01.818 PREOP EXAMINATION: Primary | ICD-10-CM

## 2023-01-01 DIAGNOSIS — L30.9 HAND DERMATITIS: Primary | ICD-10-CM

## 2023-01-01 DIAGNOSIS — T45.1X5A CHEMOTHERAPY-INDUCED NAUSEA: ICD-10-CM

## 2023-01-01 DIAGNOSIS — R79.89 ELEVATED BRAIN NATRIURETIC PEPTIDE (BNP) LEVEL: ICD-10-CM

## 2023-01-01 DIAGNOSIS — E03.9 ACQUIRED HYPOTHYROIDISM: Primary | ICD-10-CM

## 2023-01-01 DIAGNOSIS — Z20.822 LAB TEST NEGATIVE FOR COVID-19 VIRUS: Primary | ICD-10-CM

## 2023-01-01 DIAGNOSIS — C7B.8: Primary | ICD-10-CM

## 2023-01-01 DIAGNOSIS — J84.9 INTERSTITIAL LUNG DISEASE (H): ICD-10-CM

## 2023-01-01 DIAGNOSIS — R35.1 NOCTURIA: Primary | ICD-10-CM

## 2023-01-01 DIAGNOSIS — Z11.52 ENCOUNTER FOR SCREENING LABORATORY TESTING FOR COVID-19 VIRUS: ICD-10-CM

## 2023-01-01 DIAGNOSIS — M25.511 CHRONIC RIGHT SHOULDER PAIN: ICD-10-CM

## 2023-01-01 DIAGNOSIS — D64.9 ANEMIA, UNSPECIFIED: ICD-10-CM

## 2023-01-01 DIAGNOSIS — D22.9 MULTIPLE BENIGN NEVI: ICD-10-CM

## 2023-01-01 DIAGNOSIS — N20.0 CALCULUS OF KIDNEY: ICD-10-CM

## 2023-01-01 DIAGNOSIS — L29.9 ITCHING: ICD-10-CM

## 2023-01-01 DIAGNOSIS — R79.89 ELEVATED LFTS: ICD-10-CM

## 2023-01-01 DIAGNOSIS — D18.01 CHERRY ANGIOMA: ICD-10-CM

## 2023-01-01 DIAGNOSIS — R42 DIZZINESS: ICD-10-CM

## 2023-01-01 DIAGNOSIS — E87.1 HYPONATREMIA: ICD-10-CM

## 2023-01-01 DIAGNOSIS — Z51.5 PALLIATIVE CARE PATIENT: Primary | ICD-10-CM

## 2023-01-01 DIAGNOSIS — R53.81 PHYSICAL DECONDITIONING: ICD-10-CM

## 2023-01-01 DIAGNOSIS — L57.0 AK (ACTINIC KERATOSIS): ICD-10-CM

## 2023-01-01 DIAGNOSIS — N39.41 URGE INCONTINENCE OF URINE: ICD-10-CM

## 2023-01-01 LAB
ABO/RH(D): NORMAL
ABO/RH(D): NORMAL
ACANTHOCYTES BLD QL SMEAR: SLIGHT
ACINETOBACTER SPECIES: NOT DETECTED
ACINETOBACTER SPECIES: NOT DETECTED
ALBUMIN SERPL BCG-MCNC: 2.4 G/DL (ref 3.5–5.2)
ALBUMIN SERPL BCG-MCNC: 2.5 G/DL (ref 3.5–5.2)
ALBUMIN SERPL BCG-MCNC: 2.6 G/DL (ref 3.5–5.2)
ALBUMIN SERPL BCG-MCNC: 2.6 G/DL (ref 3.5–5.2)
ALBUMIN SERPL BCG-MCNC: 2.7 G/DL (ref 3.5–5.2)
ALBUMIN SERPL BCG-MCNC: 2.8 G/DL (ref 3.5–5.2)
ALBUMIN SERPL BCG-MCNC: 3 G/DL (ref 3.5–5.2)
ALBUMIN SERPL BCG-MCNC: 3.1 G/DL (ref 3.5–5.2)
ALBUMIN SERPL BCG-MCNC: 3.2 G/DL (ref 3.5–5.2)
ALBUMIN SERPL BCG-MCNC: 3.2 G/DL (ref 3.5–5.2)
ALBUMIN SERPL BCG-MCNC: 3.3 G/DL (ref 3.5–5.2)
ALBUMIN SERPL BCG-MCNC: 3.6 G/DL (ref 3.5–5.2)
ALBUMIN SERPL BCG-MCNC: 3.7 G/DL (ref 3.5–5.2)
ALBUMIN SERPL BCG-MCNC: 3.7 G/DL (ref 3.5–5.2)
ALBUMIN SERPL-MCNC: 3.3 G/DL (ref 3.4–5)
ALBUMIN SERPL-MCNC: 3.4 G/DL (ref 3.4–5)
ALBUMIN SERPL-MCNC: 3.7 G/DL (ref 3.4–5)
ALBUMIN UR-MCNC: 10 MG/DL
ALBUMIN UR-MCNC: 30 MG/DL
ALBUMIN UR-MCNC: NEGATIVE MG/DL
ALP SERPL-CCNC: 114 U/L (ref 40–129)
ALP SERPL-CCNC: 143 U/L (ref 40–129)
ALP SERPL-CCNC: 150 U/L (ref 40–129)
ALP SERPL-CCNC: 156 U/L (ref 40–129)
ALP SERPL-CCNC: 180 U/L (ref 40–150)
ALP SERPL-CCNC: 208 U/L (ref 40–129)
ALP SERPL-CCNC: 246 U/L (ref 40–129)
ALP SERPL-CCNC: 247 U/L (ref 40–129)
ALP SERPL-CCNC: 256 U/L (ref 40–129)
ALP SERPL-CCNC: 287 U/L (ref 40–129)
ALP SERPL-CCNC: 297 U/L (ref 40–129)
ALP SERPL-CCNC: 319 U/L (ref 40–129)
ALP SERPL-CCNC: 322 U/L (ref 40–129)
ALP SERPL-CCNC: 331 U/L (ref 40–129)
ALP SERPL-CCNC: 354 U/L (ref 40–129)
ALP SERPL-CCNC: 363 U/L (ref 40–129)
ALP SERPL-CCNC: 383 U/L (ref 40–129)
ALP SERPL-CCNC: 384 U/L (ref 40–129)
ALP SERPL-CCNC: 493 U/L (ref 40–129)
ALP SERPL-CCNC: 554 U/L (ref 40–129)
ALP SERPL-CCNC: 651 U/L (ref 40–129)
ALP SERPL-CCNC: 89 U/L (ref 40–129)
ALP SERPL-CCNC: 93 U/L (ref 40–150)
ALP SERPL-CCNC: 99 U/L (ref 40–150)
ALT SERPL W P-5'-P-CCNC: 13 U/L (ref 10–50)
ALT SERPL W P-5'-P-CCNC: 161 U/L (ref 10–50)
ALT SERPL W P-5'-P-CCNC: 18 U/L (ref 0–70)
ALT SERPL W P-5'-P-CCNC: 21 U/L (ref 0–70)
ALT SERPL W P-5'-P-CCNC: 21 U/L (ref 0–70)
ALT SERPL W P-5'-P-CCNC: 22 U/L (ref 0–70)
ALT SERPL W P-5'-P-CCNC: 23 U/L (ref 0–70)
ALT SERPL W P-5'-P-CCNC: 25 U/L (ref 0–70)
ALT SERPL W P-5'-P-CCNC: 33 U/L (ref 0–70)
ALT SERPL W P-5'-P-CCNC: 36 U/L (ref 0–70)
ALT SERPL W P-5'-P-CCNC: 37 U/L (ref 0–70)
ALT SERPL W P-5'-P-CCNC: 38 U/L (ref 0–70)
ALT SERPL W P-5'-P-CCNC: 41 U/L (ref 0–70)
ALT SERPL W P-5'-P-CCNC: 43 U/L (ref 0–70)
ALT SERPL W P-5'-P-CCNC: 44 U/L (ref 0–70)
ALT SERPL W P-5'-P-CCNC: 45 U/L (ref 0–70)
ALT SERPL W P-5'-P-CCNC: 49 U/L (ref 0–70)
ALT SERPL W P-5'-P-CCNC: 51 U/L (ref 0–70)
ALT SERPL W P-5'-P-CCNC: 52 U/L (ref 0–70)
ALT SERPL W P-5'-P-CCNC: 53 U/L (ref 10–50)
ALT SERPL W P-5'-P-CCNC: 55 U/L (ref 0–70)
ALT SERPL W P-5'-P-CCNC: 66 U/L (ref 0–70)
ALT SERPL W P-5'-P-CCNC: 85 U/L (ref 10–50)
ALT SERPL W P-5'-P-CCNC: 93 U/L (ref 0–70)
AMMONIA PLAS-SCNC: 29 UMOL/L (ref 16–60)
AMMONIA PLAS-SCNC: 51 UMOL/L (ref 16–60)
AMORPH CRY #/AREA URNS HPF: ABNORMAL /HPF
AMYLASE SERPL-CCNC: 33 U/L (ref 28–100)
AMYLASE SERPL-CCNC: 45 U/L (ref 28–100)
ANION GAP SERPL CALCULATED.3IONS-SCNC: 10 MMOL/L (ref 7–15)
ANION GAP SERPL CALCULATED.3IONS-SCNC: 11 MMOL/L (ref 7–15)
ANION GAP SERPL CALCULATED.3IONS-SCNC: 12 MMOL/L (ref 7–15)
ANION GAP SERPL CALCULATED.3IONS-SCNC: 12 MMOL/L (ref 7–15)
ANION GAP SERPL CALCULATED.3IONS-SCNC: 13 MMOL/L (ref 7–15)
ANION GAP SERPL CALCULATED.3IONS-SCNC: 4 MMOL/L (ref 3–14)
ANION GAP SERPL CALCULATED.3IONS-SCNC: 4 MMOL/L (ref 3–14)
ANION GAP SERPL CALCULATED.3IONS-SCNC: 6 MMOL/L (ref 3–14)
ANION GAP SERPL CALCULATED.3IONS-SCNC: 9 MMOL/L (ref 7–15)
ANION GAP SERPL CALCULATED.3IONS-SCNC: 9 MMOL/L (ref 7–15)
ANTIBODY SCREEN: NEGATIVE
ANTIBODY SCREEN: NEGATIVE
APPEARANCE FLD: ABNORMAL
APPEARANCE UR: ABNORMAL
APPEARANCE UR: CLEAR
APPEARANCE UR: CLEAR
AST SERPL W P-5'-P-CCNC: 102 U/L (ref 0–45)
AST SERPL W P-5'-P-CCNC: 132 U/L (ref 10–50)
AST SERPL W P-5'-P-CCNC: 139 U/L (ref 0–45)
AST SERPL W P-5'-P-CCNC: 155 U/L (ref 0–45)
AST SERPL W P-5'-P-CCNC: 24 U/L (ref 0–45)
AST SERPL W P-5'-P-CCNC: 24 U/L (ref 10–50)
AST SERPL W P-5'-P-CCNC: 34 U/L (ref 0–45)
AST SERPL W P-5'-P-CCNC: 40 U/L (ref 0–45)
AST SERPL W P-5'-P-CCNC: 47 U/L (ref 0–45)
AST SERPL W P-5'-P-CCNC: 48 U/L (ref 0–45)
AST SERPL W P-5'-P-CCNC: 49 U/L (ref 0–45)
AST SERPL W P-5'-P-CCNC: 52 U/L (ref 0–45)
AST SERPL W P-5'-P-CCNC: 55 U/L (ref 10–50)
AST SERPL W P-5'-P-CCNC: 59 U/L (ref 0–45)
AST SERPL W P-5'-P-CCNC: 59 U/L (ref 0–45)
AST SERPL W P-5'-P-CCNC: 64 U/L (ref 10–50)
AST SERPL W P-5'-P-CCNC: 68 U/L (ref 0–45)
AST SERPL W P-5'-P-CCNC: 69 U/L (ref 0–45)
AST SERPL W P-5'-P-CCNC: 71 U/L (ref 0–45)
AST SERPL W P-5'-P-CCNC: 72 U/L (ref 0–45)
AST SERPL W P-5'-P-CCNC: 82 U/L (ref 0–45)
AST SERPL W P-5'-P-CCNC: 85 U/L (ref 0–45)
AST SERPL W P-5'-P-CCNC: ABNORMAL U/L
AST SERPL W P-5'-P-CCNC: ABNORMAL U/L
ATRIAL RATE - MUSE: 110 BPM
ATRIAL RATE - MUSE: 111 BPM
ATRIAL RATE - MUSE: 66 BPM
BACTERIA #/AREA URNS HPF: ABNORMAL /HPF
BACTERIA BLD CULT: ABNORMAL
BACTERIA BLD CULT: NO GROWTH
BACTERIA PLR CULT: NO GROWTH
BACTERIA PLR CULT: NORMAL
BACTERIA UR CULT: NO GROWTH
BACTERIA UR CULT: NORMAL
BACTERIA UR CULT: NORMAL
BASOPHILS # BLD AUTO: 0 10E3/UL (ref 0–0.2)
BASOPHILS # BLD AUTO: 0.1 10E3/UL (ref 0–0.2)
BASOPHILS # BLD AUTO: 0.1 10E3/UL (ref 0–0.2)
BASOPHILS # BLD MANUAL: 0 10E3/UL (ref 0–0.2)
BASOPHILS # BLD MANUAL: 0.1 10E3/UL (ref 0–0.2)
BASOPHILS NFR BLD AUTO: 0 %
BASOPHILS NFR BLD AUTO: 1 %
BASOPHILS NFR BLD MANUAL: 0 %
BASOPHILS NFR BLD MANUAL: 1 %
BI-PLANE LVEF ECHO: NORMAL
BILIRUB DIRECT SERPL-MCNC: 2.36 MG/DL (ref 0–0.3)
BILIRUB DIRECT SERPL-MCNC: 5.75 MG/DL (ref 0–0.3)
BILIRUB DIRECT SERPL-MCNC: NORMAL MG/DL
BILIRUB SERPL-MCNC: 0.3 MG/DL (ref 0.2–1.3)
BILIRUB SERPL-MCNC: 0.4 MG/DL
BILIRUB SERPL-MCNC: 0.5 MG/DL (ref 0.2–1.3)
BILIRUB SERPL-MCNC: 0.7 MG/DL (ref 0.2–1.3)
BILIRUB SERPL-MCNC: 0.8 MG/DL
BILIRUB SERPL-MCNC: 0.9 MG/DL
BILIRUB SERPL-MCNC: 1.2 MG/DL
BILIRUB SERPL-MCNC: 1.5 MG/DL
BILIRUB SERPL-MCNC: 1.5 MG/DL
BILIRUB SERPL-MCNC: 1.9 MG/DL
BILIRUB SERPL-MCNC: 15.8 MG/DL
BILIRUB SERPL-MCNC: 2.2 MG/DL
BILIRUB SERPL-MCNC: 2.3 MG/DL
BILIRUB SERPL-MCNC: 2.3 MG/DL
BILIRUB SERPL-MCNC: 2.6 MG/DL
BILIRUB SERPL-MCNC: 2.8 MG/DL
BILIRUB SERPL-MCNC: 2.9 MG/DL
BILIRUB SERPL-MCNC: 3.3 MG/DL
BILIRUB SERPL-MCNC: 5 MG/DL
BILIRUB SERPL-MCNC: 6.1 MG/DL
BILIRUB SERPL-MCNC: 6.9 MG/DL
BILIRUB SERPL-MCNC: 7.6 MG/DL
BILIRUB SERPL-MCNC: 8.3 MG/DL
BILIRUB SERPL-MCNC: 8.8 MG/DL
BILIRUB UR QL STRIP: ABNORMAL
BILIRUB UR QL STRIP: NEGATIVE
BILIRUB UR QL STRIP: NEGATIVE
BLD PROD TYP BPU: NORMAL
BLOOD COMPONENT TYPE: NORMAL
BUN SERPL-MCNC: 11.6 MG/DL (ref 8–23)
BUN SERPL-MCNC: 12.4 MG/DL (ref 8–23)
BUN SERPL-MCNC: 14.2 MG/DL (ref 8–23)
BUN SERPL-MCNC: 14.7 MG/DL (ref 8–23)
BUN SERPL-MCNC: 15.2 MG/DL (ref 8–23)
BUN SERPL-MCNC: 16.4 MG/DL (ref 8–23)
BUN SERPL-MCNC: 16.6 MG/DL (ref 8–23)
BUN SERPL-MCNC: 17.3 MG/DL (ref 8–23)
BUN SERPL-MCNC: 17.7 MG/DL (ref 8–23)
BUN SERPL-MCNC: 18 MG/DL (ref 8–23)
BUN SERPL-MCNC: 18.7 MG/DL (ref 8–23)
BUN SERPL-MCNC: 19.1 MG/DL (ref 8–23)
BUN SERPL-MCNC: 19.4 MG/DL (ref 8–23)
BUN SERPL-MCNC: 19.8 MG/DL (ref 8–23)
BUN SERPL-MCNC: 20.1 MG/DL (ref 8–23)
BUN SERPL-MCNC: 20.3 MG/DL (ref 8–23)
BUN SERPL-MCNC: 22.1 MG/DL (ref 8–23)
BUN SERPL-MCNC: 22.8 MG/DL (ref 8–23)
BUN SERPL-MCNC: 22.8 MG/DL (ref 8–23)
BUN SERPL-MCNC: 23.6 MG/DL (ref 8–23)
BUN SERPL-MCNC: 24 MG/DL (ref 7–30)
BUN SERPL-MCNC: 28.7 MG/DL (ref 8–23)
BUN SERPL-MCNC: 31 MG/DL (ref 7–30)
BUN SERPL-MCNC: 32 MG/DL (ref 7–30)
CALCIUM SERPL-MCNC: 7.9 MG/DL (ref 8.8–10.2)
CALCIUM SERPL-MCNC: 8 MG/DL (ref 8.8–10.2)
CALCIUM SERPL-MCNC: 8 MG/DL (ref 8.8–10.2)
CALCIUM SERPL-MCNC: 8.1 MG/DL (ref 8.8–10.2)
CALCIUM SERPL-MCNC: 8.1 MG/DL (ref 8.8–10.2)
CALCIUM SERPL-MCNC: 8.2 MG/DL (ref 8.5–10.1)
CALCIUM SERPL-MCNC: 8.3 MG/DL (ref 8.8–10.2)
CALCIUM SERPL-MCNC: 8.4 MG/DL (ref 8.8–10.2)
CALCIUM SERPL-MCNC: 8.5 MG/DL (ref 8.8–10.2)
CALCIUM SERPL-MCNC: 8.6 MG/DL (ref 8.8–10.2)
CALCIUM SERPL-MCNC: 8.7 MG/DL (ref 8.8–10.2)
CALCIUM SERPL-MCNC: 8.8 MG/DL (ref 8.8–10.2)
CALCIUM SERPL-MCNC: 8.9 MG/DL (ref 8.5–10.1)
CALCIUM SERPL-MCNC: 8.9 MG/DL (ref 8.8–10.2)
CALCIUM SERPL-MCNC: 9 MG/DL (ref 8.8–10.2)
CALCIUM SERPL-MCNC: 9.1 MG/DL (ref 8.5–10.1)
CAOX CRY #/AREA URNS HPF: ABNORMAL /HPF
CELL COUNT BODY FLUID SOURCE: ABNORMAL
CGA SERPL-MCNC: 178 NG/ML
CGA SERPL-MCNC: 202 NG/ML
CGA SERPL-MCNC: 272 NG/ML
CHLORIDE BLD-SCNC: 105 MMOL/L (ref 94–109)
CHLORIDE BLD-SCNC: 106 MMOL/L (ref 94–109)
CHLORIDE BLD-SCNC: 107 MMOL/L (ref 94–109)
CHLORIDE SERPL-SCNC: 100 MMOL/L (ref 98–107)
CHLORIDE SERPL-SCNC: 101 MMOL/L (ref 98–107)
CHLORIDE SERPL-SCNC: 101 MMOL/L (ref 98–107)
CHLORIDE SERPL-SCNC: 102 MMOL/L (ref 98–107)
CHLORIDE SERPL-SCNC: 103 MMOL/L (ref 98–107)
CHLORIDE SERPL-SCNC: 104 MMOL/L (ref 98–107)
CHLORIDE SERPL-SCNC: 97 MMOL/L (ref 98–107)
CHLORIDE SERPL-SCNC: 98 MMOL/L (ref 98–107)
CHLORIDE SERPL-SCNC: 98 MMOL/L (ref 98–107)
CHLORIDE SERPL-SCNC: 99 MMOL/L (ref 98–107)
CITROBACTER SPECIES: NOT DETECTED
CITROBACTER SPECIES: NOT DETECTED
CO2 SERPL-SCNC: 24 MMOL/L (ref 20–32)
CO2 SERPL-SCNC: 25 MMOL/L (ref 20–32)
CO2 SERPL-SCNC: 27 MMOL/L (ref 20–32)
CODING SYSTEM: NORMAL
COLOR FLD: YELLOW
COLOR UR AUTO: ABNORMAL
COLOR UR AUTO: ABNORMAL
COLOR UR AUTO: YELLOW
CORTIS SERPL-MCNC: 20.5 UG/DL
CREAT BLD-MCNC: 1 MG/DL (ref 0.7–1.3)
CREAT BLD-MCNC: 1.1 MG/DL (ref 0.7–1.3)
CREAT SERPL-MCNC: 0.8 MG/DL (ref 0.67–1.17)
CREAT SERPL-MCNC: 0.85 MG/DL (ref 0.67–1.17)
CREAT SERPL-MCNC: 0.89 MG/DL (ref 0.67–1.17)
CREAT SERPL-MCNC: 0.96 MG/DL (ref 0.67–1.17)
CREAT SERPL-MCNC: 0.96 MG/DL (ref 0.67–1.17)
CREAT SERPL-MCNC: 0.98 MG/DL (ref 0.67–1.17)
CREAT SERPL-MCNC: 0.98 MG/DL (ref 0.67–1.17)
CREAT SERPL-MCNC: 1.02 MG/DL (ref 0.67–1.17)
CREAT SERPL-MCNC: 1.03 MG/DL (ref 0.67–1.17)
CREAT SERPL-MCNC: 1.04 MG/DL (ref 0.67–1.17)
CREAT SERPL-MCNC: 1.06 MG/DL (ref 0.67–1.17)
CREAT SERPL-MCNC: 1.08 MG/DL (ref 0.67–1.17)
CREAT SERPL-MCNC: 1.1 MG/DL (ref 0.67–1.17)
CREAT SERPL-MCNC: 1.12 MG/DL (ref 0.66–1.25)
CREAT SERPL-MCNC: 1.16 MG/DL (ref 0.66–1.25)
CREAT SERPL-MCNC: 1.16 MG/DL (ref 0.66–1.25)
CREAT SERPL-MCNC: 1.16 MG/DL (ref 0.67–1.17)
CREAT SERPL-MCNC: 1.21 MG/DL (ref 0.67–1.17)
CREAT SERPL-MCNC: 1.21 MG/DL (ref 0.67–1.17)
CREAT SERPL-MCNC: 1.24 MG/DL (ref 0.67–1.17)
CREAT SERPL-MCNC: 1.24 MG/DL (ref 0.67–1.17)
CREAT SERPL-MCNC: 1.27 MG/DL (ref 0.67–1.17)
CREAT SERPL-MCNC: 1.33 MG/DL (ref 0.67–1.17)
CREAT SERPL-MCNC: 1.39 MG/DL (ref 0.67–1.17)
CROSSMATCH: NORMAL
CRP SERPL-MCNC: 19.1 MG/L
CRP SERPL-MCNC: 42.3 MG/L
CRP SERPL-MCNC: 62.7 MG/L
CTX-M: NOT DETECTED
CTX-M: NOT DETECTED
DEPRECATED HCO3 PLAS-SCNC: 21 MMOL/L (ref 22–29)
DEPRECATED HCO3 PLAS-SCNC: 22 MMOL/L (ref 22–29)
DEPRECATED HCO3 PLAS-SCNC: 23 MMOL/L (ref 22–29)
DEPRECATED HCO3 PLAS-SCNC: 24 MMOL/L (ref 22–29)
DEPRECATED HCO3 PLAS-SCNC: 24 MMOL/L (ref 22–29)
DEPRECATED HCO3 PLAS-SCNC: 25 MMOL/L (ref 22–29)
DEPRECATED HCO3 PLAS-SCNC: 27 MMOL/L (ref 22–29)
DEPRECATED HCO3 PLAS-SCNC: 29 MMOL/L (ref 22–29)
DIASTOLIC BLOOD PRESSURE - MUSE: NORMAL MMHG
EGFRCR SERPLBLD CKD-EPI 2021: 64 ML/MIN/1.73M2
EGFRCR SERPLBLD CKD-EPI 2021: 72 ML/MIN/1.73M2
EGFRCR SERPLBLD CKD-EPI 2021: 77 ML/MIN/1.73M2
EGFRCR SERPLBLD CKD-EPI 2021: 85 ML/MIN/1.73M2
ENTEROBACTER SPECIES: NOT DETECTED
ENTEROBACTER SPECIES: NOT DETECTED
ENTEROCOCCUS FAECALIS: DETECTED
ENTEROCOCCUS FAECALIS: NOT DETECTED
ENTEROCOCCUS FAECIUM: NOT DETECTED
ENTEROCOCCUS FAECIUM: NOT DETECTED
EOSINOPHIL # BLD AUTO: 0 10E3/UL (ref 0–0.7)
EOSINOPHIL # BLD AUTO: 0.1 10E3/UL (ref 0–0.7)
EOSINOPHIL # BLD MANUAL: 0 10E3/UL (ref 0–0.7)
EOSINOPHIL # BLD MANUAL: 0.1 10E3/UL (ref 0–0.7)
EOSINOPHIL # BLD MANUAL: 0.1 10E3/UL (ref 0–0.7)
EOSINOPHIL # BLD MANUAL: 0.2 10E3/UL (ref 0–0.7)
EOSINOPHIL NFR BLD AUTO: 0 %
EOSINOPHIL NFR BLD AUTO: 0 %
EOSINOPHIL NFR BLD AUTO: 1 %
EOSINOPHIL NFR BLD AUTO: 2 %
EOSINOPHIL NFR BLD AUTO: 3 %
EOSINOPHIL NFR BLD MANUAL: 0 %
EOSINOPHIL NFR BLD MANUAL: 1 %
EOSINOPHIL NFR BLD MANUAL: 2 %
EOSINOPHIL NFR BLD MANUAL: 3 %
ERCP: NORMAL
ERCP: NORMAL
ERYTHROCYTE [DISTWIDTH] IN BLOOD BY AUTOMATED COUNT: 18.5 % (ref 10–15)
ERYTHROCYTE [DISTWIDTH] IN BLOOD BY AUTOMATED COUNT: 18.6 % (ref 10–15)
ERYTHROCYTE [DISTWIDTH] IN BLOOD BY AUTOMATED COUNT: 18.7 % (ref 10–15)
ERYTHROCYTE [DISTWIDTH] IN BLOOD BY AUTOMATED COUNT: 19 % (ref 10–15)
ERYTHROCYTE [DISTWIDTH] IN BLOOD BY AUTOMATED COUNT: 19.2 % (ref 10–15)
ERYTHROCYTE [DISTWIDTH] IN BLOOD BY AUTOMATED COUNT: 19.6 % (ref 10–15)
ERYTHROCYTE [DISTWIDTH] IN BLOOD BY AUTOMATED COUNT: 19.9 % (ref 10–15)
ERYTHROCYTE [DISTWIDTH] IN BLOOD BY AUTOMATED COUNT: 20.1 % (ref 10–15)
ERYTHROCYTE [DISTWIDTH] IN BLOOD BY AUTOMATED COUNT: 21.2 % (ref 10–15)
ERYTHROCYTE [DISTWIDTH] IN BLOOD BY AUTOMATED COUNT: 21.5 % (ref 10–15)
ERYTHROCYTE [DISTWIDTH] IN BLOOD BY AUTOMATED COUNT: 21.6 % (ref 10–15)
ERYTHROCYTE [DISTWIDTH] IN BLOOD BY AUTOMATED COUNT: 21.6 % (ref 10–15)
ERYTHROCYTE [DISTWIDTH] IN BLOOD BY AUTOMATED COUNT: 21.8 % (ref 10–15)
ERYTHROCYTE [DISTWIDTH] IN BLOOD BY AUTOMATED COUNT: 22 % (ref 10–15)
ERYTHROCYTE [DISTWIDTH] IN BLOOD BY AUTOMATED COUNT: 22 % (ref 10–15)
ERYTHROCYTE [DISTWIDTH] IN BLOOD BY AUTOMATED COUNT: 23.8 % (ref 10–15)
ERYTHROCYTE [DISTWIDTH] IN BLOOD BY AUTOMATED COUNT: 23.9 % (ref 10–15)
ERYTHROCYTE [DISTWIDTH] IN BLOOD BY AUTOMATED COUNT: 24.3 % (ref 10–15)
ERYTHROCYTE [DISTWIDTH] IN BLOOD BY AUTOMATED COUNT: 24.3 % (ref 10–15)
ERYTHROCYTE [DISTWIDTH] IN BLOOD BY AUTOMATED COUNT: 24.9 % (ref 10–15)
ERYTHROCYTE [DISTWIDTH] IN BLOOD BY AUTOMATED COUNT: 25.1 % (ref 10–15)
ERYTHROCYTE [DISTWIDTH] IN BLOOD BY AUTOMATED COUNT: 25.2 % (ref 10–15)
ERYTHROCYTE [DISTWIDTH] IN BLOOD BY AUTOMATED COUNT: 25.4 % (ref 10–15)
ERYTHROCYTE [SEDIMENTATION RATE] IN BLOOD BY WESTERGREN METHOD: 22 MM/HR (ref 0–20)
ESCHERICHIA COLI: NOT DETECTED
ESCHERICHIA COLI: NOT DETECTED
FERRITIN SERPL-MCNC: 330 NG/ML (ref 31–409)
FLUAV RNA SPEC QL NAA+PROBE: NEGATIVE
FLUAV RNA SPEC QL NAA+PROBE: NEGATIVE
FLUBV RNA RESP QL NAA+PROBE: NEGATIVE
FLUBV RNA RESP QL NAA+PROBE: NEGATIVE
FOLATE SERPL-MCNC: NORMAL NG/ML
FRAGMENTS BLD QL SMEAR: SLIGHT
GFR SERPL CREATININE-BSD FRML MDRD: 54 ML/MIN/1.73M2
GFR SERPL CREATININE-BSD FRML MDRD: 57 ML/MIN/1.73M2
GFR SERPL CREATININE-BSD FRML MDRD: 60 ML/MIN/1.73M2
GFR SERPL CREATININE-BSD FRML MDRD: 62 ML/MIN/1.73M2
GFR SERPL CREATININE-BSD FRML MDRD: 62 ML/MIN/1.73M2
GFR SERPL CREATININE-BSD FRML MDRD: 64 ML/MIN/1.73M2
GFR SERPL CREATININE-BSD FRML MDRD: 67 ML/MIN/1.73M2
GFR SERPL CREATININE-BSD FRML MDRD: 70 ML/MIN/1.73M2
GFR SERPL CREATININE-BSD FRML MDRD: 73 ML/MIN/1.73M2
GFR SERPL CREATININE-BSD FRML MDRD: 75 ML/MIN/1.73M2
GFR SERPL CREATININE-BSD FRML MDRD: 78 ML/MIN/1.73M2
GFR SERPL CREATININE-BSD FRML MDRD: 79 ML/MIN/1.73M2
GFR SERPL CREATININE-BSD FRML MDRD: 82 ML/MIN/1.73M2
GFR SERPL CREATININE-BSD FRML MDRD: 82 ML/MIN/1.73M2
GFR SERPL CREATININE-BSD FRML MDRD: 85 ML/MIN/1.73M2
GFR SERPL CREATININE-BSD FRML MDRD: >60 ML/MIN/1.73M2
GFR SERPL CREATININE-BSD FRML MDRD: >60 ML/MIN/1.73M2
GFR SERPL CREATININE-BSD FRML MDRD: >90 ML/MIN/1.73M2
GLUCOSE BLD-MCNC: 81 MG/DL (ref 70–99)
GLUCOSE BLD-MCNC: 92 MG/DL (ref 70–99)
GLUCOSE BLD-MCNC: 93 MG/DL (ref 70–99)
GLUCOSE BODY FLUID SOURCE: NORMAL
GLUCOSE FLD-MCNC: 119 MG/DL
GLUCOSE SERPL-MCNC: 100 MG/DL (ref 70–99)
GLUCOSE SERPL-MCNC: 104 MG/DL (ref 70–99)
GLUCOSE SERPL-MCNC: 104 MG/DL (ref 70–99)
GLUCOSE SERPL-MCNC: 107 MG/DL (ref 70–99)
GLUCOSE SERPL-MCNC: 108 MG/DL (ref 70–99)
GLUCOSE SERPL-MCNC: 114 MG/DL (ref 70–99)
GLUCOSE SERPL-MCNC: 116 MG/DL (ref 70–99)
GLUCOSE SERPL-MCNC: 117 MG/DL (ref 70–99)
GLUCOSE SERPL-MCNC: 118 MG/DL (ref 70–99)
GLUCOSE SERPL-MCNC: 133 MG/DL (ref 70–99)
GLUCOSE SERPL-MCNC: 137 MG/DL (ref 70–99)
GLUCOSE SERPL-MCNC: 138 MG/DL (ref 70–99)
GLUCOSE SERPL-MCNC: 147 MG/DL (ref 70–99)
GLUCOSE SERPL-MCNC: 81 MG/DL (ref 70–99)
GLUCOSE SERPL-MCNC: 83 MG/DL (ref 70–99)
GLUCOSE SERPL-MCNC: 89 MG/DL (ref 70–99)
GLUCOSE SERPL-MCNC: 93 MG/DL (ref 70–99)
GLUCOSE SERPL-MCNC: 97 MG/DL (ref 70–99)
GLUCOSE SERPL-MCNC: 98 MG/DL (ref 70–99)
GLUCOSE UR STRIP-MCNC: 500 MG/DL
GLUCOSE UR STRIP-MCNC: 500 MG/DL
GLUCOSE UR STRIP-MCNC: >=1000 MG/DL
GRAM STAIN RESULT: NORMAL
GRAM STAIN RESULT: NORMAL
HCO3 BLDV-SCNC: 28 MMOL/L (ref 21–28)
HCO3 BLDV-SCNC: 31 MMOL/L (ref 21–28)
HCT VFR BLD AUTO: 20.3 % (ref 40–53)
HCT VFR BLD AUTO: 22.4 % (ref 40–53)
HCT VFR BLD AUTO: 23.2 % (ref 40–53)
HCT VFR BLD AUTO: 23.2 % (ref 40–53)
HCT VFR BLD AUTO: 24.2 % (ref 40–53)
HCT VFR BLD AUTO: 24.9 % (ref 40–53)
HCT VFR BLD AUTO: 25.2 % (ref 40–53)
HCT VFR BLD AUTO: 25.6 % (ref 40–53)
HCT VFR BLD AUTO: 25.6 % (ref 40–53)
HCT VFR BLD AUTO: 25.7 % (ref 40–53)
HCT VFR BLD AUTO: 25.8 % (ref 40–53)
HCT VFR BLD AUTO: 26 % (ref 40–53)
HCT VFR BLD AUTO: 26.1 % (ref 40–53)
HCT VFR BLD AUTO: 26.1 % (ref 40–53)
HCT VFR BLD AUTO: 26.3 % (ref 40–53)
HCT VFR BLD AUTO: 26.8 % (ref 40–53)
HCT VFR BLD AUTO: 27.8 % (ref 40–53)
HCT VFR BLD AUTO: 28.1 % (ref 40–53)
HCT VFR BLD AUTO: 28.3 % (ref 40–53)
HCT VFR BLD AUTO: 29.3 % (ref 40–53)
HCT VFR BLD AUTO: 29.3 % (ref 40–53)
HCT VFR BLD AUTO: 30 % (ref 40–53)
HCT VFR BLD AUTO: 31 % (ref 40–53)
HCT VFR BLD AUTO: 31.4 % (ref 40–53)
HCT VFR BLD AUTO: 31.5 % (ref 40–53)
HGB BLD-MCNC: 10 G/DL (ref 13.3–17.7)
HGB BLD-MCNC: 10.2 G/DL (ref 13.3–17.7)
HGB BLD-MCNC: 10.2 G/DL (ref 13.3–17.7)
HGB BLD-MCNC: 10.4 G/DL (ref 13.3–17.7)
HGB BLD-MCNC: 10.8 G/DL (ref 13.3–17.7)
HGB BLD-MCNC: 6.8 G/DL (ref 13.3–17.7)
HGB BLD-MCNC: 7.4 G/DL (ref 13.3–17.7)
HGB BLD-MCNC: 7.5 G/DL (ref 13.3–17.7)
HGB BLD-MCNC: 8.1 G/DL (ref 13.3–17.7)
HGB BLD-MCNC: 8.2 G/DL (ref 13.3–17.7)
HGB BLD-MCNC: 8.3 G/DL (ref 13.3–17.7)
HGB BLD-MCNC: 8.3 G/DL (ref 13.3–17.7)
HGB BLD-MCNC: 8.8 G/DL (ref 13.3–17.7)
HGB BLD-MCNC: 8.9 G/DL (ref 13.3–17.7)
HGB BLD-MCNC: 8.9 G/DL (ref 13.3–17.7)
HGB BLD-MCNC: 9 G/DL (ref 13.3–17.7)
HGB BLD-MCNC: 9 G/DL (ref 13.3–17.7)
HGB BLD-MCNC: 9.1 G/DL (ref 13.3–17.7)
HGB BLD-MCNC: 9.1 G/DL (ref 13.3–17.7)
HGB BLD-MCNC: 9.2 G/DL (ref 13.3–17.7)
HGB BLD-MCNC: 9.3 G/DL (ref 13.3–17.7)
HGB BLD-MCNC: 9.5 G/DL (ref 13.3–17.7)
HGB BLD-MCNC: 9.6 G/DL (ref 13.3–17.7)
HGB BLD-MCNC: 9.8 G/DL (ref 13.3–17.7)
HGB BLD-MCNC: 9.9 G/DL (ref 13.3–17.7)
HGB UR QL STRIP: ABNORMAL
HGB UR QL STRIP: NEGATIVE
HOLD SPECIMEN: NORMAL
HOWELL-JOLLY BOD BLD QL SMEAR: PRESENT
HOWELL-JOLLY BOD BLD QL SMEAR: PRESENT
HYALINE CASTS: 1 /LPF
IMM GRANULOCYTES # BLD: 0 10E3/UL
IMM GRANULOCYTES # BLD: 0.1 10E3/UL
IMM GRANULOCYTES # BLD: 0.2 10E3/UL
IMM GRANULOCYTES NFR BLD: 1 %
IMP: NOT DETECTED
IMP: NOT DETECTED
INR PPP: 1.33 (ref 0.85–1.15)
INR PPP: 1.47 (ref 0.85–1.15)
INR PPP: 1.72 (ref 0.85–1.15)
INTERPRETATION ECG - MUSE: NORMAL
IRON BINDING CAPACITY (ROCHE): 306 UG/DL (ref 240–430)
IRON BINDING CAPACITY (ROCHE): ABNORMAL
IRON SATN MFR SERPL: 5 % (ref 15–46)
IRON SATN MFR SERPL: ABNORMAL %
IRON SERPL-MCNC: 15 UG/DL (ref 61–157)
IRON SERPL-MCNC: 21 UG/DL (ref 61–157)
ISSUE DATE AND TIME: NORMAL
KETONES UR STRIP-MCNC: NEGATIVE MG/DL
KLEBSIELLA OXYTOCA: NOT DETECTED
KLEBSIELLA OXYTOCA: NOT DETECTED
KLEBSIELLA PNEUMONIAE: DETECTED
KLEBSIELLA PNEUMONIAE: DETECTED
KPC: NOT DETECTED
KPC: NOT DETECTED
LACTATE BLD-SCNC: 0.8 MMOL/L
LACTATE BLD-SCNC: 2.3 MMOL/L
LACTATE SERPL-SCNC: 1.2 MMOL/L (ref 0.7–2)
LACTATE SERPL-SCNC: 1.3 MMOL/L (ref 0.7–2)
LACTATE SERPL-SCNC: 1.5 MMOL/L (ref 0.7–2)
LACTATE SERPL-SCNC: 1.6 MMOL/L (ref 0.7–2)
LACTATE SERPL-SCNC: 1.6 MMOL/L (ref 0.7–2)
LACTATE SERPL-SCNC: 1.7 MMOL/L (ref 0.7–2)
LACTATE SERPL-SCNC: 2.2 MMOL/L (ref 0.7–2)
LACTATE SERPL-SCNC: 2.3 MMOL/L (ref 0.7–2)
LACTATE SERPL-SCNC: 2.6 MMOL/L (ref 0.7–2)
LACTATE SERPL-SCNC: 2.8 MMOL/L (ref 0.7–2)
LD BODY BODY FLUID SOURCE: NORMAL
LDH FLD L TO P-CCNC: 74 U/L
LDH SERPL L TO P-CCNC: 402 U/L (ref 0–250)
LEUKOCYTE ESTERASE UR QL STRIP: NEGATIVE
LIPASE SERPL-CCNC: 10 U/L (ref 13–60)
LIPASE SERPL-CCNC: 12 U/L (ref 13–60)
LIPASE SERPL-CCNC: 7 U/L (ref 13–60)
LIPASE SERPL-CCNC: 7 U/L (ref 13–60)
LISTERIA SPECIES (DETECTED/NOT DETECTED): NOT DETECTED
LISTERIA SPECIES (DETECTED/NOT DETECTED): NOT DETECTED
LVEF ECHO: NORMAL
LYMPHOCYTES # BLD AUTO: 0 10E3/UL (ref 0.8–5.3)
LYMPHOCYTES # BLD AUTO: 0.2 10E3/UL (ref 0.8–5.3)
LYMPHOCYTES # BLD AUTO: 0.4 10E3/UL (ref 0.8–5.3)
LYMPHOCYTES # BLD AUTO: 0.5 10E3/UL (ref 0.8–5.3)
LYMPHOCYTES # BLD AUTO: 0.5 10E3/UL (ref 0.8–5.3)
LYMPHOCYTES # BLD AUTO: 0.7 10E3/UL (ref 0.8–5.3)
LYMPHOCYTES # BLD AUTO: 1.6 10E3/UL (ref 0.8–5.3)
LYMPHOCYTES # BLD MANUAL: 0 10E3/UL (ref 0.8–5.3)
LYMPHOCYTES # BLD MANUAL: 0.1 10E3/UL (ref 0.8–5.3)
LYMPHOCYTES # BLD MANUAL: 0.1 10E3/UL (ref 0.8–5.3)
LYMPHOCYTES # BLD MANUAL: 0.2 10E3/UL (ref 0.8–5.3)
LYMPHOCYTES # BLD MANUAL: 0.4 10E3/UL (ref 0.8–5.3)
LYMPHOCYTES # BLD MANUAL: 0.4 10E3/UL (ref 0.8–5.3)
LYMPHOCYTES NFR BLD AUTO: 0 %
LYMPHOCYTES NFR BLD AUTO: 14 %
LYMPHOCYTES NFR BLD AUTO: 18 %
LYMPHOCYTES NFR BLD AUTO: 2 %
LYMPHOCYTES NFR BLD AUTO: 7 %
LYMPHOCYTES NFR BLD AUTO: 8 %
LYMPHOCYTES NFR BLD AUTO: 9 %
LYMPHOCYTES NFR BLD MANUAL: 0 %
LYMPHOCYTES NFR BLD MANUAL: 1 %
LYMPHOCYTES NFR BLD MANUAL: 2 %
LYMPHOCYTES NFR BLD MANUAL: 4 %
LYMPHOCYTES NFR BLD MANUAL: 4 %
LYMPHOCYTES NFR BLD MANUAL: 5 %
LYMPHOCYTES NFR FLD MANUAL: 22 %
MAGNESIUM SERPL-MCNC: 1.7 MG/DL (ref 1.7–2.3)
MAGNESIUM SERPL-MCNC: 1.8 MG/DL (ref 1.7–2.3)
MAGNESIUM SERPL-MCNC: 1.8 MG/DL (ref 1.7–2.3)
MAGNESIUM SERPL-MCNC: 2 MG/DL (ref 1.7–2.3)
MAGNESIUM SERPL-MCNC: 2.5 MG/DL (ref 1.7–2.3)
MCH RBC QN AUTO: 32.3 PG (ref 26.5–33)
MCH RBC QN AUTO: 33 PG (ref 26.5–33)
MCH RBC QN AUTO: 33.3 PG (ref 26.5–33)
MCH RBC QN AUTO: 34 PG (ref 26.5–33)
MCH RBC QN AUTO: 34.5 PG (ref 26.5–33)
MCH RBC QN AUTO: 34.7 PG (ref 26.5–33)
MCH RBC QN AUTO: 34.8 PG (ref 26.5–33)
MCH RBC QN AUTO: 35.1 PG (ref 26.5–33)
MCH RBC QN AUTO: 35.3 PG (ref 26.5–33)
MCH RBC QN AUTO: 35.5 PG (ref 26.5–33)
MCH RBC QN AUTO: 35.7 PG (ref 26.5–33)
MCH RBC QN AUTO: 36.3 PG (ref 26.5–33)
MCH RBC QN AUTO: 36.6 PG (ref 26.5–33)
MCH RBC QN AUTO: 37.6 PG (ref 26.5–33)
MCH RBC QN AUTO: 37.8 PG (ref 26.5–33)
MCH RBC QN AUTO: 38 PG (ref 26.5–33)
MCH RBC QN AUTO: 38 PG (ref 26.5–33)
MCH RBC QN AUTO: 38.1 PG (ref 26.5–33)
MCH RBC QN AUTO: 38.2 PG (ref 26.5–33)
MCH RBC QN AUTO: 38.4 PG (ref 26.5–33)
MCH RBC QN AUTO: 38.4 PG (ref 26.5–33)
MCH RBC QN AUTO: 39.1 PG (ref 26.5–33)
MCH RBC QN AUTO: 39.2 PG (ref 26.5–33)
MCH RBC QN AUTO: 39.6 PG (ref 26.5–33)
MCH RBC QN AUTO: 40.1 PG (ref 26.5–33)
MCHC RBC AUTO-ENTMCNC: 31.6 G/DL (ref 31.5–36.5)
MCHC RBC AUTO-ENTMCNC: 32.2 G/DL (ref 31.5–36.5)
MCHC RBC AUTO-ENTMCNC: 32.3 G/DL (ref 31.5–36.5)
MCHC RBC AUTO-ENTMCNC: 32.3 G/DL (ref 31.5–36.5)
MCHC RBC AUTO-ENTMCNC: 32.4 G/DL (ref 31.5–36.5)
MCHC RBC AUTO-ENTMCNC: 32.8 G/DL (ref 31.5–36.5)
MCHC RBC AUTO-ENTMCNC: 32.9 G/DL (ref 31.5–36.5)
MCHC RBC AUTO-ENTMCNC: 32.9 G/DL (ref 31.5–36.5)
MCHC RBC AUTO-ENTMCNC: 33 G/DL (ref 31.5–36.5)
MCHC RBC AUTO-ENTMCNC: 33.1 G/DL (ref 31.5–36.5)
MCHC RBC AUTO-ENTMCNC: 33.2 G/DL (ref 31.5–36.5)
MCHC RBC AUTO-ENTMCNC: 33.3 G/DL (ref 31.5–36.5)
MCHC RBC AUTO-ENTMCNC: 33.5 G/DL (ref 31.5–36.5)
MCHC RBC AUTO-ENTMCNC: 33.8 G/DL (ref 31.5–36.5)
MCHC RBC AUTO-ENTMCNC: 34.3 G/DL (ref 31.5–36.5)
MCHC RBC AUTO-ENTMCNC: 34.6 G/DL (ref 31.5–36.5)
MCHC RBC AUTO-ENTMCNC: 34.8 G/DL (ref 31.5–36.5)
MCHC RBC AUTO-ENTMCNC: 35 G/DL (ref 31.5–36.5)
MCHC RBC AUTO-ENTMCNC: 35 G/DL (ref 31.5–36.5)
MCHC RBC AUTO-ENTMCNC: 35.5 G/DL (ref 31.5–36.5)
MCHC RBC AUTO-ENTMCNC: 35.6 G/DL (ref 31.5–36.5)
MCHC RBC AUTO-ENTMCNC: 36.1 G/DL (ref 31.5–36.5)
MCHC RBC AUTO-ENTMCNC: 36.4 G/DL (ref 31.5–36.5)
MCHC RBC AUTO-ENTMCNC: 37.5 G/DL (ref 31.5–36.5)
MCHC RBC AUTO-ENTMCNC: 38.4 G/DL (ref 31.5–36.5)
MCV RBC AUTO: 101 FL (ref 78–100)
MCV RBC AUTO: 102 FL (ref 78–100)
MCV RBC AUTO: 103 FL (ref 78–100)
MCV RBC AUTO: 105 FL (ref 78–100)
MCV RBC AUTO: 105 FL (ref 78–100)
MCV RBC AUTO: 106 FL (ref 78–100)
MCV RBC AUTO: 106 FL (ref 78–100)
MCV RBC AUTO: 107 FL (ref 78–100)
MCV RBC AUTO: 108 FL (ref 78–100)
MCV RBC AUTO: 108 FL (ref 78–100)
MCV RBC AUTO: 109 FL (ref 78–100)
MCV RBC AUTO: 109 FL (ref 78–100)
MCV RBC AUTO: 110 FL (ref 78–100)
MCV RBC AUTO: 112 FL (ref 78–100)
MCV RBC AUTO: 115 FL (ref 78–100)
MCV RBC AUTO: 116 FL (ref 78–100)
MCV RBC AUTO: 96 FL (ref 78–100)
MCV RBC AUTO: 99 FL (ref 78–100)
MCV RBC AUTO: 99 FL (ref 78–100)
METAMYELOCYTES # BLD MANUAL: 0.2 10E3/UL
METAMYELOCYTES NFR BLD MANUAL: 3 %
MONOCYTES # BLD AUTO: 0.4 10E3/UL (ref 0–1.3)
MONOCYTES # BLD AUTO: 0.5 10E3/UL (ref 0–1.3)
MONOCYTES # BLD AUTO: 0.6 10E3/UL (ref 0–1.3)
MONOCYTES # BLD AUTO: 0.7 10E3/UL (ref 0–1.3)
MONOCYTES # BLD AUTO: 0.8 10E3/UL (ref 0–1.3)
MONOCYTES # BLD AUTO: 1 10E3/UL (ref 0–1.3)
MONOCYTES # BLD AUTO: 1.2 10E3/UL (ref 0–1.3)
MONOCYTES # BLD MANUAL: 0.1 10E3/UL (ref 0–1.3)
MONOCYTES # BLD MANUAL: 0.4 10E3/UL (ref 0–1.3)
MONOCYTES # BLD MANUAL: 0.4 10E3/UL (ref 0–1.3)
MONOCYTES # BLD MANUAL: 0.7 10E3/UL (ref 0–1.3)
MONOCYTES # BLD MANUAL: 1.2 10E3/UL (ref 0–1.3)
MONOCYTES # BLD MANUAL: 1.2 10E3/UL (ref 0–1.3)
MONOCYTES NFR BLD AUTO: 11 %
MONOCYTES NFR BLD AUTO: 11 %
MONOCYTES NFR BLD AUTO: 13 %
MONOCYTES NFR BLD AUTO: 18 %
MONOCYTES NFR BLD AUTO: 20 %
MONOCYTES NFR BLD AUTO: 4 %
MONOCYTES NFR BLD AUTO: 6 %
MONOCYTES NFR BLD AUTO: 7 %
MONOCYTES NFR BLD AUTO: 7 %
MONOCYTES NFR BLD AUTO: 9 %
MONOCYTES NFR BLD AUTO: 9 %
MONOCYTES NFR BLD MANUAL: 1 %
MONOCYTES NFR BLD MANUAL: 11 %
MONOCYTES NFR BLD MANUAL: 13 %
MONOCYTES NFR BLD MANUAL: 15 %
MONOCYTES NFR BLD MANUAL: 4 %
MONOCYTES NFR BLD MANUAL: 7 %
MONOS+MACROS NFR FLD MANUAL: 47 %
MUCOUS THREADS #/AREA URNS LPF: PRESENT /LPF
MYELOCYTES # BLD MANUAL: 0.1 10E3/UL
MYELOCYTES # BLD MANUAL: 0.1 10E3/UL
MYELOCYTES NFR BLD MANUAL: 1 %
MYELOCYTES NFR BLD MANUAL: 2 %
NDM: NOT DETECTED
NDM: NOT DETECTED
NEUTROPHILS # BLD AUTO: 10 10E3/UL (ref 1.6–8.3)
NEUTROPHILS # BLD AUTO: 12.2 10E3/UL (ref 1.6–8.3)
NEUTROPHILS # BLD AUTO: 3.4 10E3/UL (ref 1.6–8.3)
NEUTROPHILS # BLD AUTO: 3.9 10E3/UL (ref 1.6–8.3)
NEUTROPHILS # BLD AUTO: 4.4 10E3/UL (ref 1.6–8.3)
NEUTROPHILS # BLD AUTO: 4.6 10E3/UL (ref 1.6–8.3)
NEUTROPHILS # BLD AUTO: 4.9 10E3/UL (ref 1.6–8.3)
NEUTROPHILS # BLD AUTO: 5 10E3/UL (ref 1.6–8.3)
NEUTROPHILS # BLD AUTO: 6 10E3/UL (ref 1.6–8.3)
NEUTROPHILS # BLD AUTO: 7.3 10E3/UL (ref 1.6–8.3)
NEUTROPHILS # BLD AUTO: 7.8 10E3/UL (ref 1.6–8.3)
NEUTROPHILS # BLD MANUAL: 14.8 10E3/UL (ref 1.6–8.3)
NEUTROPHILS # BLD MANUAL: 5.1 10E3/UL (ref 1.6–8.3)
NEUTROPHILS # BLD MANUAL: 5.3 10E3/UL (ref 1.6–8.3)
NEUTROPHILS # BLD MANUAL: 6.4 10E3/UL (ref 1.6–8.3)
NEUTROPHILS # BLD MANUAL: 7.7 10E3/UL (ref 1.6–8.3)
NEUTROPHILS # BLD MANUAL: 8.3 10E3/UL (ref 1.6–8.3)
NEUTROPHILS NFR BLD AUTO: 68 %
NEUTROPHILS NFR BLD AUTO: 69 %
NEUTROPHILS NFR BLD AUTO: 72 %
NEUTROPHILS NFR BLD AUTO: 73 %
NEUTROPHILS NFR BLD AUTO: 77 %
NEUTROPHILS NFR BLD AUTO: 78 %
NEUTROPHILS NFR BLD AUTO: 80 %
NEUTROPHILS NFR BLD AUTO: 90 %
NEUTROPHILS NFR BLD AUTO: 91 %
NEUTROPHILS NFR BLD AUTO: 92 %
NEUTROPHILS NFR BLD AUTO: 95 %
NEUTROPHILS NFR BLD MANUAL: 79 %
NEUTROPHILS NFR BLD MANUAL: 79 %
NEUTROPHILS NFR BLD MANUAL: 85 %
NEUTROPHILS NFR BLD MANUAL: 87 %
NEUTROPHILS NFR BLD MANUAL: 91 %
NEUTROPHILS NFR BLD MANUAL: 99 %
NEUTS BAND NFR FLD MANUAL: 31 %
NITRATE UR QL: NEGATIVE
NRBC # BLD AUTO: 0 10E3/UL
NRBC # BLD AUTO: 0.1 10E3/UL
NRBC # BLD AUTO: 0.3 10E3/UL
NRBC # BLD AUTO: 0.4 10E3/UL
NRBC # BLD AUTO: 0.5 10E3/UL
NRBC # BLD AUTO: 0.6 10E3/UL
NRBC # BLD AUTO: 0.9 10E3/UL
NRBC # BLD AUTO: 1 10E3/UL
NRBC # BLD AUTO: 1 10E3/UL
NRBC # BLD AUTO: 1.2 10E3/UL
NRBC # BLD AUTO: 1.3 10E3/UL
NRBC # BLD AUTO: 1.4 10E3/UL
NRBC # BLD AUTO: 1.8 10E3/UL
NRBC # BLD AUTO: 1.9 10E3/UL
NRBC BLD AUTO-RTO: 1 /100
NRBC BLD AUTO-RTO: 2 /100
NRBC BLD AUTO-RTO: 20 /100
NRBC BLD AUTO-RTO: 31 /100
NRBC BLD AUTO-RTO: 4 /100
NRBC BLD AUTO-RTO: 5 /100
NRBC BLD MANUAL-RTO: 10 %
NRBC BLD MANUAL-RTO: 11 %
NRBC BLD MANUAL-RTO: 18 %
NRBC BLD MANUAL-RTO: 22 %
NRBC BLD MANUAL-RTO: 23 %
NRBC BLD MANUAL-RTO: 7 %
NT-PROBNP SERPL-MCNC: 5860 PG/ML (ref 0–900)
NT-PROBNP SERPL-MCNC: ABNORMAL PG/ML (ref 0–900)
OXA (DETECTED/NOT DETECTED): NOT DETECTED
OXA (DETECTED/NOT DETECTED): NOT DETECTED
P AXIS - MUSE: 4 DEGREES
P AXIS - MUSE: 42 DEGREES
P AXIS - MUSE: 66 DEGREES
PATH REPORT.COMMENTS IMP SPEC: NORMAL
PATH REPORT.FINAL DX SPEC: NORMAL
PATH REPORT.FINAL DX SPEC: NORMAL
PATH REPORT.GROSS SPEC: NORMAL
PATH REPORT.MICROSCOPIC SPEC OTHER STN: NORMAL
PATH REPORT.RELEVANT HX SPEC: NORMAL
PATH REPORT.RELEVANT HX SPEC: NORMAL
PCO2 BLDV: 42 MM HG (ref 40–50)
PCO2 BLDV: 49 MM HG (ref 40–50)
PH BLDV: 7.42 [PH] (ref 7.32–7.43)
PH BLDV: 7.43 [PH] (ref 7.32–7.43)
PH UR STRIP: 5 [PH] (ref 5–7)
PH UR STRIP: 5.5 [PH] (ref 5–7)
PH UR STRIP: 6 [PH] (ref 5–7)
PHOSPHATE SERPL-MCNC: 1.7 MG/DL (ref 2.5–4.5)
PHOSPHATE SERPL-MCNC: 3.2 MG/DL (ref 2.5–4.5)
PHOSPHATE SERPL-MCNC: 3.7 MG/DL (ref 2.5–4.5)
PLAT MORPH BLD: ABNORMAL
PLATELET # BLD AUTO: 102 10E3/UL (ref 150–450)
PLATELET # BLD AUTO: 102 10E3/UL (ref 150–450)
PLATELET # BLD AUTO: 107 10E3/UL (ref 150–450)
PLATELET # BLD AUTO: 108 10E3/UL (ref 150–450)
PLATELET # BLD AUTO: 112 10E3/UL (ref 150–450)
PLATELET # BLD AUTO: 128 10E3/UL (ref 150–450)
PLATELET # BLD AUTO: 141 10E3/UL (ref 150–450)
PLATELET # BLD AUTO: 143 10E3/UL (ref 150–450)
PLATELET # BLD AUTO: 146 10E3/UL (ref 150–450)
PLATELET # BLD AUTO: 156 10E3/UL (ref 150–450)
PLATELET # BLD AUTO: 158 10E3/UL (ref 150–450)
PLATELET # BLD AUTO: 159 10E3/UL (ref 150–450)
PLATELET # BLD AUTO: 168 10E3/UL (ref 150–450)
PLATELET # BLD AUTO: 174 10E3/UL (ref 150–450)
PLATELET # BLD AUTO: 197 10E3/UL (ref 150–450)
PLATELET # BLD AUTO: 197 10E3/UL (ref 150–450)
PLATELET # BLD AUTO: 204 10E3/UL (ref 150–450)
PLATELET # BLD AUTO: 224 10E3/UL (ref 150–450)
PLATELET # BLD AUTO: 77 10E3/UL (ref 150–450)
PLATELET # BLD AUTO: 84 10E3/UL (ref 150–450)
PLATELET # BLD AUTO: 84 10E3/UL (ref 150–450)
PLATELET # BLD AUTO: 90 10E3/UL (ref 150–450)
PLATELET # BLD AUTO: 95 10E3/UL (ref 150–450)
PLATELET # BLD AUTO: 95 10E3/UL (ref 150–450)
PLATELET # BLD AUTO: 98 10E3/UL (ref 150–450)
PO2 BLDV: 16 MM HG (ref 25–47)
PO2 BLDV: 17 MM HG (ref 25–47)
POLYCHROMASIA BLD QL SMEAR: SLIGHT
POTASSIUM BLD-SCNC: 4.3 MMOL/L (ref 3.4–5.3)
POTASSIUM BLD-SCNC: 5 MMOL/L (ref 3.4–5.3)
POTASSIUM BLD-SCNC: 5.2 MMOL/L (ref 3.4–5.3)
POTASSIUM SERPL-SCNC: 3.2 MMOL/L (ref 3.4–5.3)
POTASSIUM SERPL-SCNC: 3.3 MMOL/L (ref 3.4–5.3)
POTASSIUM SERPL-SCNC: 3.4 MMOL/L (ref 3.4–5.3)
POTASSIUM SERPL-SCNC: 3.5 MMOL/L (ref 3.4–5.3)
POTASSIUM SERPL-SCNC: 3.8 MMOL/L (ref 3.4–5.3)
POTASSIUM SERPL-SCNC: 3.9 MMOL/L (ref 3.4–5.3)
POTASSIUM SERPL-SCNC: 4 MMOL/L (ref 3.4–5.3)
POTASSIUM SERPL-SCNC: 4.1 MMOL/L (ref 3.4–5.3)
POTASSIUM SERPL-SCNC: 4.2 MMOL/L (ref 3.4–5.3)
POTASSIUM SERPL-SCNC: 4.2 MMOL/L (ref 3.4–5.3)
POTASSIUM SERPL-SCNC: 4.5 MMOL/L (ref 3.4–5.3)
POTASSIUM SERPL-SCNC: 4.6 MMOL/L (ref 3.4–5.3)
POTASSIUM SERPL-SCNC: 4.8 MMOL/L (ref 3.4–5.3)
PR INTERVAL - MUSE: 138 MS
PR INTERVAL - MUSE: 150 MS
PR INTERVAL - MUSE: 160 MS
PROCALCITONIN SERPL IA-MCNC: 1.27 NG/ML
PROT FLD-MCNC: 1.4 G/DL
PROT SERPL-MCNC: 5.5 G/DL (ref 6.4–8.3)
PROT SERPL-MCNC: 6.1 G/DL (ref 6.4–8.3)
PROT SERPL-MCNC: 6.1 G/DL (ref 6.4–8.3)
PROT SERPL-MCNC: 6.3 G/DL (ref 6.4–8.3)
PROT SERPL-MCNC: 6.4 G/DL (ref 6.4–8.3)
PROT SERPL-MCNC: 6.5 G/DL (ref 6.4–8.3)
PROT SERPL-MCNC: 6.6 G/DL (ref 6.4–8.3)
PROT SERPL-MCNC: 6.8 G/DL (ref 6.4–8.3)
PROT SERPL-MCNC: 6.8 G/DL (ref 6.4–8.3)
PROT SERPL-MCNC: 7 G/DL (ref 6.4–8.3)
PROT SERPL-MCNC: 7.1 G/DL (ref 6.4–8.3)
PROT SERPL-MCNC: 7.2 G/DL (ref 6.4–8.3)
PROT SERPL-MCNC: 7.2 G/DL (ref 6.8–8.8)
PROT SERPL-MCNC: 7.3 G/DL (ref 6.4–8.3)
PROT SERPL-MCNC: 7.3 G/DL (ref 6.8–8.8)
PROT SERPL-MCNC: 7.5 G/DL (ref 6.4–8.3)
PROT SERPL-MCNC: 7.5 G/DL (ref 6.4–8.3)
PROT SERPL-MCNC: 8.1 G/DL (ref 6.8–8.8)
PROTEIN BODY FLUID SOURCE: NORMAL
PROTEUS SPECIES: NOT DETECTED
PROTEUS SPECIES: NOT DETECTED
PSEUDOMONAS AERUGINOSA: NOT DETECTED
PSEUDOMONAS AERUGINOSA: NOT DETECTED
QRS DURATION - MUSE: 88 MS
QRS DURATION - MUSE: 92 MS
QRS DURATION - MUSE: 94 MS
QT - MUSE: 354 MS
QT - MUSE: 364 MS
QT - MUSE: 444 MS
QTC - MUSE: 465 MS
QTC - MUSE: 481 MS
QTC - MUSE: 492 MS
R AXIS - MUSE: 47 DEGREES
R AXIS - MUSE: 75 DEGREES
R AXIS - MUSE: 79 DEGREES
RBC # BLD AUTO: 2.06 10E6/UL (ref 4.4–5.9)
RBC # BLD AUTO: 2.11 10E6/UL (ref 4.4–5.9)
RBC # BLD AUTO: 2.16 10E6/UL (ref 4.4–5.9)
RBC # BLD AUTO: 2.25 10E6/UL (ref 4.4–5.9)
RBC # BLD AUTO: 2.27 10E6/UL (ref 4.4–5.9)
RBC # BLD AUTO: 2.3 10E6/UL (ref 4.4–5.9)
RBC # BLD AUTO: 2.32 10E6/UL (ref 4.4–5.9)
RBC # BLD AUTO: 2.33 10E6/UL (ref 4.4–5.9)
RBC # BLD AUTO: 2.34 10E6/UL (ref 4.4–5.9)
RBC # BLD AUTO: 2.34 10E6/UL (ref 4.4–5.9)
RBC # BLD AUTO: 2.35 10E6/UL (ref 4.4–5.9)
RBC # BLD AUTO: 2.38 10E6/UL (ref 4.4–5.9)
RBC # BLD AUTO: 2.44 10E6/UL (ref 4.4–5.9)
RBC # BLD AUTO: 2.45 10E6/UL (ref 4.4–5.9)
RBC # BLD AUTO: 2.45 10E6/UL (ref 4.4–5.9)
RBC # BLD AUTO: 2.47 10E6/UL (ref 4.4–5.9)
RBC # BLD AUTO: 2.58 10E6/UL (ref 4.4–5.9)
RBC # BLD AUTO: 2.65 10E6/UL (ref 4.4–5.9)
RBC # BLD AUTO: 2.7 10E6/UL (ref 4.4–5.9)
RBC # BLD AUTO: 2.71 10E6/UL (ref 4.4–5.9)
RBC # BLD AUTO: 2.79 10E6/UL (ref 4.4–5.9)
RBC # BLD AUTO: 2.81 10E6/UL (ref 4.4–5.9)
RBC # BLD AUTO: 2.88 10E6/UL (ref 4.4–5.9)
RBC # BLD AUTO: 2.9 10E6/UL (ref 4.4–5.9)
RBC # BLD AUTO: 2.94 10E6/UL (ref 4.4–5.9)
RBC # FLD: 3000 /UL
RBC #/AREA URNS AUTO: ABNORMAL /HPF
RBC MORPH BLD: ABNORMAL
RBC URINE: 10 /HPF
RBC URINE: 36 /HPF
RBC URINE: 4 /HPF
RBC URINE: 7 /HPF
RETICS # AUTO: 0.09 10E6/UL (ref 0.03–0.1)
RETICS/RBC NFR AUTO: 3.9 % (ref 0.5–2)
RSV RNA SPEC NAA+PROBE: NEGATIVE
RSV RNA SPEC NAA+PROBE: NEGATIVE
SAO2 % BLDV: 22 % (ref 94–100)
SAO2 % BLDV: 24 % (ref 94–100)
SARS-COV-2 RNA RESP QL NAA+PROBE: NEGATIVE
SARS-COV-2 RNA RESP QL NAA+PROBE: NEGATIVE
SODIUM SERPL-SCNC: 130 MMOL/L (ref 136–145)
SODIUM SERPL-SCNC: 132 MMOL/L (ref 136–145)
SODIUM SERPL-SCNC: 132 MMOL/L (ref 136–145)
SODIUM SERPL-SCNC: 133 MMOL/L (ref 136–145)
SODIUM SERPL-SCNC: 134 MMOL/L (ref 133–144)
SODIUM SERPL-SCNC: 134 MMOL/L (ref 136–145)
SODIUM SERPL-SCNC: 135 MMOL/L (ref 136–145)
SODIUM SERPL-SCNC: 136 MMOL/L (ref 133–144)
SODIUM SERPL-SCNC: 136 MMOL/L (ref 136–145)
SODIUM SERPL-SCNC: 137 MMOL/L (ref 136–145)
SODIUM SERPL-SCNC: 138 MMOL/L (ref 133–144)
SODIUM SERPL-SCNC: 139 MMOL/L (ref 136–145)
SODIUM SERPL-SCNC: 140 MMOL/L (ref 136–145)
SODIUM SERPL-SCNC: 140 MMOL/L (ref 136–145)
SP GR UR STRIP: 1.02 (ref 1–1.03)
SP GR UR STRIP: 1.03 (ref 1–1.03)
SP GR UR STRIP: 1.03 (ref 1–1.03)
SPECIMEN EXPIRATION DATE: NORMAL
SPECIMEN EXPIRATION DATE: NORMAL
SQUAMOUS #/AREA URNS AUTO: ABNORMAL /LPF
SQUAMOUS EPITHELIAL: <1 /HPF
SQUAMOUS EPITHELIAL: <1 /HPF
STAPHYLOCOCCUS AUREUS: NOT DETECTED
STAPHYLOCOCCUS AUREUS: NOT DETECTED
STAPHYLOCOCCUS EPIDERMIDIS: NOT DETECTED
STAPHYLOCOCCUS EPIDERMIDIS: NOT DETECTED
STAPHYLOCOCCUS LUGDUNENSIS: NOT DETECTED
STAPHYLOCOCCUS LUGDUNENSIS: NOT DETECTED
STAPHYLOCOCCUS SPECIES: DETECTED
STAPHYLOCOCCUS SPECIES: NOT DETECTED
STREPTOCOCCUS AGALACTIAE: NOT DETECTED
STREPTOCOCCUS AGALACTIAE: NOT DETECTED
STREPTOCOCCUS ANGINOSUS GROUP: NOT DETECTED
STREPTOCOCCUS ANGINOSUS GROUP: NOT DETECTED
STREPTOCOCCUS PNEUMONIAE: NOT DETECTED
STREPTOCOCCUS PNEUMONIAE: NOT DETECTED
STREPTOCOCCUS PYOGENES: NOT DETECTED
STREPTOCOCCUS PYOGENES: NOT DETECTED
STREPTOCOCCUS SPECIES: NOT DETECTED
STREPTOCOCCUS SPECIES: NOT DETECTED
SYSTOLIC BLOOD PRESSURE - MUSE: NORMAL MMHG
T AXIS - MUSE: -65 DEGREES
T AXIS - MUSE: 53 DEGREES
T AXIS - MUSE: 86 DEGREES
T4 FREE SERPL-MCNC: 0.77 NG/DL (ref 0.76–1.46)
T4 FREE SERPL-MCNC: 0.86 NG/DL (ref 0.9–1.7)
T4 FREE SERPL-MCNC: 0.93 NG/DL (ref 0.9–1.7)
T4 FREE SERPL-MCNC: 1.05 NG/DL (ref 0.9–1.7)
T4 FREE SERPL-MCNC: 1.16 NG/DL (ref 0.9–1.7)
T4 FREE SERPL-MCNC: 1.35 NG/DL (ref 0.9–1.7)
TARGETS BLD QL SMEAR: ABNORMAL
TARGETS BLD QL SMEAR: SLIGHT
TARGETS BLD QL SMEAR: SLIGHT
TRANSITIONAL EPI: 1 /HPF
TRANSITIONAL EPI: <1 /HPF
TRANSITIONAL EPI: <1 /HPF
TROPONIN T SERPL HS-MCNC: 13 NG/L
TROPONIN T SERPL HS-MCNC: 14 NG/L
TROPONIN T SERPL HS-MCNC: 34 NG/L
TROPONIN T SERPL HS-MCNC: 35 NG/L
TROPONIN T SERPL HS-MCNC: 35 NG/L
TROPONIN T SERPL HS-MCNC: 50 NG/L
TROPONIN T SERPL HS-MCNC: 50 NG/L
TSH SERPL DL<=0.005 MIU/L-ACNC: 2.93 UIU/ML (ref 0.3–4.2)
TSH SERPL DL<=0.005 MIU/L-ACNC: 4.42 UIU/ML (ref 0.3–4.2)
TSH SERPL DL<=0.005 MIU/L-ACNC: 5.18 MU/L (ref 0.4–4)
TSH SERPL DL<=0.005 MIU/L-ACNC: 5.25 UIU/ML (ref 0.3–4.2)
TSH SERPL DL<=0.005 MIU/L-ACNC: 6.3 UIU/ML (ref 0.3–4.2)
TSH SERPL DL<=0.005 MIU/L-ACNC: 6.82 UIU/ML (ref 0.3–4.2)
TSH SERPL DL<=0.005 MIU/L-ACNC: 7.46 UIU/ML (ref 0.3–4.2)
UNIT ABO/RH: NORMAL
UNIT NUMBER: NORMAL
UNIT STATUS: NORMAL
UNIT TYPE ISBT: 5100
UPPER EUS: NORMAL
UROBILINOGEN UR STRIP-ACNC: 2 E.U./DL
UROBILINOGEN UR STRIP-MCNC: 3 MG/DL
UROBILINOGEN UR STRIP-MCNC: 4 MG/DL
UROBILINOGEN UR STRIP-MCNC: 6 MG/DL
UROBILINOGEN UR STRIP-MCNC: 6 MG/DL
VENTRICULAR RATE- MUSE: 110 BPM
VENTRICULAR RATE- MUSE: 111 BPM
VENTRICULAR RATE- MUSE: 66 BPM
VIM: NOT DETECTED
VIM: NOT DETECTED
VIT B12 SERPL-MCNC: 818 PG/ML (ref 232–1245)
WBC # BLD AUTO: 10.5 10E3/UL (ref 4–11)
WBC # BLD AUTO: 11 10E3/UL (ref 4–11)
WBC # BLD AUTO: 13.2 10E3/UL (ref 4–11)
WBC # BLD AUTO: 14.9 10E3/UL (ref 4–11)
WBC # BLD AUTO: 15 10E3/UL (ref 4–11)
WBC # BLD AUTO: 4.9 10E3/UL (ref 4–11)
WBC # BLD AUTO: 5.3 10E3/UL (ref 4–11)
WBC # BLD AUTO: 5.5 10E3/UL (ref 4–11)
WBC # BLD AUTO: 5.6 10E3/UL (ref 4–11)
WBC # BLD AUTO: 5.7 10E3/UL (ref 4–11)
WBC # BLD AUTO: 6 10E3/UL (ref 4–11)
WBC # BLD AUTO: 6.1 10E3/UL (ref 4–11)
WBC # BLD AUTO: 6.4 10E3/UL (ref 4–11)
WBC # BLD AUTO: 6.4 10E3/UL (ref 4–11)
WBC # BLD AUTO: 6.7 10E3/UL (ref 4–11)
WBC # BLD AUTO: 6.8 10E3/UL (ref 4–11)
WBC # BLD AUTO: 7.7 10E3/UL (ref 4–11)
WBC # BLD AUTO: 8.1 10E3/UL (ref 4–11)
WBC # BLD AUTO: 8.1 10E3/UL (ref 4–11)
WBC # BLD AUTO: 8.5 10E3/UL (ref 4–11)
WBC # BLD AUTO: 8.6 10E3/UL (ref 4–11)
WBC # BLD AUTO: 8.7 10E3/UL (ref 4–11)
WBC # BLD AUTO: 9 10E3/UL (ref 4–11)
WBC # BLD AUTO: 9.1 10E3/UL (ref 4–11)
WBC # BLD AUTO: 9.2 10E3/UL (ref 4–11)
WBC # FLD AUTO: 162 /UL
WBC #/AREA URNS AUTO: ABNORMAL /HPF
WBC URINE: 12 /HPF
WBC URINE: 20 /HPF
WBC URINE: 6 /HPF
WBC URINE: 6 /HPF

## 2023-01-01 PROCEDURE — 91313 COVID-19 BIVALENT 18+ (MODERNA): CPT

## 2023-01-01 PROCEDURE — 999N000127 HC STATISTIC PERIPHERAL IV START W US GUIDANCE

## 2023-01-01 PROCEDURE — 99214 OFFICE O/P EST MOD 30 MIN: CPT | Performed by: INTERNAL MEDICINE

## 2023-01-01 PROCEDURE — 99285 EMERGENCY DEPT VISIT HI MDM: CPT | Mod: 25 | Performed by: EMERGENCY MEDICINE

## 2023-01-01 PROCEDURE — 83605 ASSAY OF LACTIC ACID: CPT | Performed by: NURSE PRACTITIONER

## 2023-01-01 PROCEDURE — 82803 BLOOD GASES ANY COMBINATION: CPT

## 2023-01-01 PROCEDURE — C1726 CATH, BAL DIL, NON-VASCULAR: HCPCS | Performed by: INTERNAL MEDICINE

## 2023-01-01 PROCEDURE — 999N000040 HC STATISTIC CONSULT NO CHARGE VASC ACCESS

## 2023-01-01 PROCEDURE — 36415 COLL VENOUS BLD VENIPUNCTURE: CPT

## 2023-01-01 PROCEDURE — 99233 SBSQ HOSP IP/OBS HIGH 50: CPT | Performed by: STUDENT IN AN ORGANIZED HEALTH CARE EDUCATION/TRAINING PROGRAM

## 2023-01-01 PROCEDURE — 36415 COLL VENOUS BLD VENIPUNCTURE: CPT | Performed by: INTERNAL MEDICINE

## 2023-01-01 PROCEDURE — G1010 CDSM STANSON: HCPCS

## 2023-01-01 PROCEDURE — 97530 THERAPEUTIC ACTIVITIES: CPT | Mod: GP

## 2023-01-01 PROCEDURE — 87077 CULTURE AEROBIC IDENTIFY: CPT | Performed by: STUDENT IN AN ORGANIZED HEALTH CARE EDUCATION/TRAINING PROGRAM

## 2023-01-01 PROCEDURE — 250N000011 HC RX IP 250 OP 636: Mod: JZ | Performed by: STUDENT IN AN ORGANIZED HEALTH CARE EDUCATION/TRAINING PROGRAM

## 2023-01-01 PROCEDURE — 71046 X-RAY EXAM CHEST 2 VIEWS: CPT

## 2023-01-01 PROCEDURE — 84439 ASSAY OF FREE THYROXINE: CPT

## 2023-01-01 PROCEDURE — 84439 ASSAY OF FREE THYROXINE: CPT | Performed by: INTERNAL MEDICINE

## 2023-01-01 PROCEDURE — 87149 DNA/RNA DIRECT PROBE: CPT | Performed by: STUDENT IN AN ORGANIZED HEALTH CARE EDUCATION/TRAINING PROGRAM

## 2023-01-01 PROCEDURE — 76705 ECHO EXAM OF ABDOMEN: CPT

## 2023-01-01 PROCEDURE — 87086 URINE CULTURE/COLONY COUNT: CPT | Performed by: EMERGENCY MEDICINE

## 2023-01-01 PROCEDURE — 82607 VITAMIN B-12: CPT | Performed by: INTERNAL MEDICINE

## 2023-01-01 PROCEDURE — 272N000454 HC KIT, 3FR SV SINGLE LUMEN POWERPICC

## 2023-01-01 PROCEDURE — 86140 C-REACTIVE PROTEIN: CPT | Performed by: PHYSICIAN ASSISTANT

## 2023-01-01 PROCEDURE — 0W993ZZ DRAINAGE OF RIGHT PLEURAL CAVITY, PERCUTANEOUS APPROACH: ICD-10-PCS | Performed by: RADIOLOGY

## 2023-01-01 PROCEDURE — 99215 OFFICE O/P EST HI 40 MIN: CPT | Mod: VID | Performed by: INTERNAL MEDICINE

## 2023-01-01 PROCEDURE — 250N000013 HC RX MED GY IP 250 OP 250 PS 637: Performed by: PHYSICIAN ASSISTANT

## 2023-01-01 PROCEDURE — 85025 COMPLETE CBC W/AUTO DIFF WBC: CPT | Performed by: NURSE PRACTITIONER

## 2023-01-01 PROCEDURE — 120N000003 HC R&B IMCU UMMC

## 2023-01-01 PROCEDURE — 86900 BLOOD TYPING SEROLOGIC ABO: CPT | Performed by: PHYSICIAN ASSISTANT

## 2023-01-01 PROCEDURE — 250N000011 HC RX IP 250 OP 636: Mod: JZ | Performed by: INTERNAL MEDICINE

## 2023-01-01 PROCEDURE — 99222 1ST HOSP IP/OBS MODERATE 55: CPT | Mod: AI | Performed by: INTERNAL MEDICINE

## 2023-01-01 PROCEDURE — 97530 THERAPEUTIC ACTIVITIES: CPT | Mod: GP | Performed by: STUDENT IN AN ORGANIZED HEALTH CARE EDUCATION/TRAINING PROGRAM

## 2023-01-01 PROCEDURE — 272N000502 HC NEEDLE CR3

## 2023-01-01 PROCEDURE — 250N000009 HC RX 250: Performed by: INTERNAL MEDICINE

## 2023-01-01 PROCEDURE — 36415 COLL VENOUS BLD VENIPUNCTURE: CPT | Performed by: STUDENT IN AN ORGANIZED HEALTH CARE EDUCATION/TRAINING PROGRAM

## 2023-01-01 PROCEDURE — 81001 URINALYSIS AUTO W/SCOPE: CPT | Performed by: NURSE PRACTITIONER

## 2023-01-01 PROCEDURE — 84443 ASSAY THYROID STIM HORMONE: CPT | Mod: VID | Performed by: FAMILY MEDICINE

## 2023-01-01 PROCEDURE — C1876 STENT, NON-COA/NON-COV W/DEL: HCPCS | Performed by: INTERNAL MEDICINE

## 2023-01-01 PROCEDURE — 80053 COMPREHEN METABOLIC PANEL: CPT | Performed by: INTERNAL MEDICINE

## 2023-01-01 PROCEDURE — 82248 BILIRUBIN DIRECT: CPT | Performed by: INTERNAL MEDICINE

## 2023-01-01 PROCEDURE — 36569 INSJ PICC 5 YR+ W/O IMAGING: CPT

## 2023-01-01 PROCEDURE — 83605 ASSAY OF LACTIC ACID: CPT | Performed by: INTERNAL MEDICINE

## 2023-01-01 PROCEDURE — 87077 CULTURE AEROBIC IDENTIFY: CPT | Performed by: INTERNAL MEDICINE

## 2023-01-01 PROCEDURE — 999N000248 HC STATISTIC IV INSERT WITH US BY RN

## 2023-01-01 PROCEDURE — 250N000011 HC RX IP 250 OP 636: Mod: JZ | Performed by: NURSE PRACTITIONER

## 2023-01-01 PROCEDURE — 83880 ASSAY OF NATRIURETIC PEPTIDE: CPT | Performed by: NURSE PRACTITIONER

## 2023-01-01 PROCEDURE — C1769 GUIDE WIRE: HCPCS | Performed by: INTERNAL MEDICINE

## 2023-01-01 PROCEDURE — 250N000013 HC RX MED GY IP 250 OP 250 PS 637

## 2023-01-01 PROCEDURE — 250N000009 HC RX 250: Performed by: RADIOLOGY

## 2023-01-01 PROCEDURE — 85025 COMPLETE CBC W/AUTO DIFF WBC: CPT

## 2023-01-01 PROCEDURE — 86140 C-REACTIVE PROTEIN: CPT | Performed by: INTERNAL MEDICINE

## 2023-01-01 PROCEDURE — 86140 C-REACTIVE PROTEIN: CPT | Mod: VID | Performed by: FAMILY MEDICINE

## 2023-01-01 PROCEDURE — 999N000128 HC STATISTIC PERIPHERAL IV START W/O US GUIDANCE

## 2023-01-01 PROCEDURE — 83880 ASSAY OF NATRIURETIC PEPTIDE: CPT | Performed by: EMERGENCY MEDICINE

## 2023-01-01 PROCEDURE — 0FC58ZZ EXTIRPATION OF MATTER FROM RIGHT HEPATIC DUCT, VIA NATURAL OR ARTIFICIAL OPENING ENDOSCOPIC: ICD-10-PCS | Performed by: INTERNAL MEDICINE

## 2023-01-01 PROCEDURE — 85027 COMPLETE CBC AUTOMATED: CPT | Performed by: STUDENT IN AN ORGANIZED HEALTH CARE EDUCATION/TRAINING PROGRAM

## 2023-01-01 PROCEDURE — 82248 BILIRUBIN DIRECT: CPT | Performed by: PHYSICIAN ASSISTANT

## 2023-01-01 PROCEDURE — 85018 HEMOGLOBIN: CPT | Performed by: STUDENT IN AN ORGANIZED HEALTH CARE EDUCATION/TRAINING PROGRAM

## 2023-01-01 PROCEDURE — 250N000013 HC RX MED GY IP 250 OP 250 PS 637: Performed by: STUDENT IN AN ORGANIZED HEALTH CARE EDUCATION/TRAINING PROGRAM

## 2023-01-01 PROCEDURE — 99207 PR NO BILLABLE SERVICE THIS VISIT: CPT | Performed by: RADIOLOGY

## 2023-01-01 PROCEDURE — 250N000011 HC RX IP 250 OP 636: Performed by: INTERNAL MEDICINE

## 2023-01-01 PROCEDURE — 93306 TTE W/DOPPLER COMPLETE: CPT

## 2023-01-01 PROCEDURE — 86316 IMMUNOASSAY TUMOR OTHER: CPT | Mod: 90

## 2023-01-01 PROCEDURE — 84100 ASSAY OF PHOSPHORUS: CPT | Performed by: INTERNAL MEDICINE

## 2023-01-01 PROCEDURE — 85027 COMPLETE CBC AUTOMATED: CPT | Performed by: PHYSICIAN ASSISTANT

## 2023-01-01 PROCEDURE — P9059 PLASMA, FRZ BETWEEN 8-24HOUR: HCPCS

## 2023-01-01 PROCEDURE — 93005 ELECTROCARDIOGRAM TRACING: CPT | Performed by: EMERGENCY MEDICINE

## 2023-01-01 PROCEDURE — 214N000001 HC R&B CCU UMMC

## 2023-01-01 PROCEDURE — 255N000002 HC RX 255 OP 636: Mod: JZ | Performed by: STUDENT IN AN ORGANIZED HEALTH CARE EDUCATION/TRAINING PROGRAM

## 2023-01-01 PROCEDURE — 999N000285 HC STATISTIC VASC ACCESS LAB DRAW WITH PIV START

## 2023-01-01 PROCEDURE — 120N000002 HC R&B MED SURG/OB UMMC

## 2023-01-01 PROCEDURE — 85027 COMPLETE CBC AUTOMATED: CPT | Performed by: INTERNAL MEDICINE

## 2023-01-01 PROCEDURE — 999N000141 HC STATISTIC PRE-PROCEDURE NURSING ASSESSMENT: Performed by: INTERNAL MEDICINE

## 2023-01-01 PROCEDURE — 84155 ASSAY OF PROTEIN SERUM: CPT | Performed by: NURSE PRACTITIONER

## 2023-01-01 PROCEDURE — 80053 COMPREHEN METABOLIC PANEL: CPT

## 2023-01-01 PROCEDURE — 82150 ASSAY OF AMYLASE: CPT | Performed by: INTERNAL MEDICINE

## 2023-01-01 PROCEDURE — 99223 1ST HOSP IP/OBS HIGH 75: CPT | Performed by: INTERNAL MEDICINE

## 2023-01-01 PROCEDURE — 0FC68ZZ EXTIRPATION OF MATTER FROM LEFT HEPATIC DUCT, VIA NATURAL OR ARTIFICIAL OPENING ENDOSCOPIC: ICD-10-PCS | Performed by: INTERNAL MEDICINE

## 2023-01-01 PROCEDURE — 99239 HOSP IP/OBS DSCHRG MGMT >30: CPT | Performed by: STUDENT IN AN ORGANIZED HEALTH CARE EDUCATION/TRAINING PROGRAM

## 2023-01-01 PROCEDURE — 83605 ASSAY OF LACTIC ACID: CPT | Performed by: STUDENT IN AN ORGANIZED HEALTH CARE EDUCATION/TRAINING PROGRAM

## 2023-01-01 PROCEDURE — 85007 BL SMEAR W/DIFF WBC COUNT: CPT | Performed by: INTERNAL MEDICINE

## 2023-01-01 PROCEDURE — G1010 CDSM STANSON: HCPCS | Performed by: RADIOLOGY

## 2023-01-01 PROCEDURE — 80053 COMPREHEN METABOLIC PANEL: CPT | Performed by: STUDENT IN AN ORGANIZED HEALTH CARE EDUCATION/TRAINING PROGRAM

## 2023-01-01 PROCEDURE — 36415 COLL VENOUS BLD VENIPUNCTURE: CPT | Performed by: NURSE PRACTITIONER

## 2023-01-01 PROCEDURE — 250N000011 HC RX IP 250 OP 636: Performed by: ANESTHESIOLOGY

## 2023-01-01 PROCEDURE — 370N000017 HC ANESTHESIA TECHNICAL FEE, PER MIN: Performed by: INTERNAL MEDICINE

## 2023-01-01 PROCEDURE — 86316 IMMUNOASSAY TUMOR OTHER: CPT | Mod: 90 | Performed by: INTERNAL MEDICINE

## 2023-01-01 PROCEDURE — 93010 ELECTROCARDIOGRAM REPORT: CPT | Performed by: NURSE PRACTITIONER

## 2023-01-01 PROCEDURE — 85018 HEMOGLOBIN: CPT | Performed by: PHYSICIAN ASSISTANT

## 2023-01-01 PROCEDURE — 258N000003 HC RX IP 258 OP 636: Performed by: NURSE ANESTHETIST, CERTIFIED REGISTERED

## 2023-01-01 PROCEDURE — 84484 ASSAY OF TROPONIN QUANT: CPT | Performed by: PHYSICIAN ASSISTANT

## 2023-01-01 PROCEDURE — 258N000003 HC RX IP 258 OP 636: Performed by: INTERNAL MEDICINE

## 2023-01-01 PROCEDURE — 86316 IMMUNOASSAY TUMOR OTHER: CPT | Performed by: INTERNAL MEDICINE

## 2023-01-01 PROCEDURE — 93005 ELECTROCARDIOGRAM TRACING: CPT | Performed by: INTERNAL MEDICINE

## 2023-01-01 PROCEDURE — 250N000011 HC RX IP 250 OP 636: Performed by: NURSE ANESTHETIST, CERTIFIED REGISTERED

## 2023-01-01 PROCEDURE — 99214 OFFICE O/P EST MOD 30 MIN: CPT | Mod: VID | Performed by: INTERNAL MEDICINE

## 2023-01-01 PROCEDURE — 99285 EMERGENCY DEPT VISIT HI MDM: CPT | Mod: 25 | Performed by: NURSE PRACTITIONER

## 2023-01-01 PROCEDURE — 99203 OFFICE O/P NEW LOW 30 MIN: CPT | Mod: 25 | Performed by: FAMILY MEDICINE

## 2023-01-01 PROCEDURE — 85027 COMPLETE CBC AUTOMATED: CPT | Performed by: EMERGENCY MEDICINE

## 2023-01-01 PROCEDURE — 82140 ASSAY OF AMMONIA: CPT | Mod: VID | Performed by: FAMILY MEDICINE

## 2023-01-01 PROCEDURE — 84443 ASSAY THYROID STIM HORMONE: CPT | Performed by: PHYSICIAN ASSISTANT

## 2023-01-01 PROCEDURE — 82248 BILIRUBIN DIRECT: CPT

## 2023-01-01 PROCEDURE — 83690 ASSAY OF LIPASE: CPT | Performed by: INTERNAL MEDICINE

## 2023-01-01 PROCEDURE — 84484 ASSAY OF TROPONIN QUANT: CPT | Performed by: STUDENT IN AN ORGANIZED HEALTH CARE EDUCATION/TRAINING PROGRAM

## 2023-01-01 PROCEDURE — 36415 COLL VENOUS BLD VENIPUNCTURE: CPT | Performed by: EMERGENCY MEDICINE

## 2023-01-01 PROCEDURE — 93005 ELECTROCARDIOGRAM TRACING: CPT | Performed by: NURSE PRACTITIONER

## 2023-01-01 PROCEDURE — 99417 PROLNG OP E/M EACH 15 MIN: CPT | Performed by: INTERNAL MEDICINE

## 2023-01-01 PROCEDURE — 85045 AUTOMATED RETICULOCYTE COUNT: CPT | Performed by: INTERNAL MEDICINE

## 2023-01-01 PROCEDURE — 250N000009 HC RX 250: Performed by: ANESTHESIOLOGY

## 2023-01-01 PROCEDURE — 99239 HOSP IP/OBS DSCHRG MGMT >30: CPT | Performed by: INTERNAL MEDICINE

## 2023-01-01 PROCEDURE — 83690 ASSAY OF LIPASE: CPT | Performed by: NURSE PRACTITIONER

## 2023-01-01 PROCEDURE — 74177 CT ABD & PELVIS W/CONTRAST: CPT | Mod: MG

## 2023-01-01 PROCEDURE — 85027 COMPLETE CBC AUTOMATED: CPT | Mod: VID | Performed by: FAMILY MEDICINE

## 2023-01-01 PROCEDURE — 84157 ASSAY OF PROTEIN OTHER: CPT | Performed by: INTERNAL MEDICINE

## 2023-01-01 PROCEDURE — 84132 ASSAY OF SERUM POTASSIUM: CPT | Performed by: STUDENT IN AN ORGANIZED HEALTH CARE EDUCATION/TRAINING PROGRAM

## 2023-01-01 PROCEDURE — 99232 SBSQ HOSP IP/OBS MODERATE 35: CPT | Performed by: INTERNAL MEDICINE

## 2023-01-01 PROCEDURE — 250N000011 HC RX IP 250 OP 636: Mod: JZ | Performed by: EMERGENCY MEDICINE

## 2023-01-01 PROCEDURE — 99214 OFFICE O/P EST MOD 30 MIN: CPT | Mod: 95 | Performed by: FAMILY MEDICINE

## 2023-01-01 PROCEDURE — 99233 SBSQ HOSP IP/OBS HIGH 50: CPT | Performed by: INTERNAL MEDICINE

## 2023-01-01 PROCEDURE — 83735 ASSAY OF MAGNESIUM: CPT | Performed by: NURSE PRACTITIONER

## 2023-01-01 PROCEDURE — 250N000009 HC RX 250

## 2023-01-01 PROCEDURE — 272N000001 HC OR GENERAL SUPPLY STERILE: Performed by: INTERNAL MEDICINE

## 2023-01-01 PROCEDURE — 85025 COMPLETE CBC W/AUTO DIFF WBC: CPT | Performed by: INTERNAL MEDICINE

## 2023-01-01 PROCEDURE — 76705 ECHO EXAM OF ABDOMEN: CPT | Mod: 26 | Performed by: RADIOLOGY

## 2023-01-01 PROCEDURE — 255N000002 HC RX 255 OP 636: Mod: JZ | Performed by: INTERNAL MEDICINE

## 2023-01-01 PROCEDURE — 76705 ECHO EXAM OF ABDOMEN: CPT | Mod: 26 | Performed by: STUDENT IN AN ORGANIZED HEALTH CARE EDUCATION/TRAINING PROGRAM

## 2023-01-01 PROCEDURE — 258N000003 HC RX IP 258 OP 636: Performed by: ANESTHESIOLOGY

## 2023-01-01 PROCEDURE — 99207 PR NO BILLABLE SERVICE THIS VISIT: CPT | Performed by: PEDIATRICS

## 2023-01-01 PROCEDURE — 36592 COLLECT BLOOD FROM PICC: CPT | Performed by: NURSE PRACTITIONER

## 2023-01-01 PROCEDURE — 80053 COMPREHEN METABOLIC PANEL: CPT | Performed by: PHYSICIAN ASSISTANT

## 2023-01-01 PROCEDURE — 250N000013 HC RX MED GY IP 250 OP 250 PS 637: Performed by: INTERNAL MEDICINE

## 2023-01-01 PROCEDURE — 84439 ASSAY OF FREE THYROXINE: CPT | Performed by: FAMILY MEDICINE

## 2023-01-01 PROCEDURE — 92015 DETERMINE REFRACTIVE STATE: CPT | Mod: GY | Performed by: STUDENT IN AN ORGANIZED HEALTH CARE EDUCATION/TRAINING PROGRAM

## 2023-01-01 PROCEDURE — 99000 SPECIMEN HANDLING OFFICE-LAB: CPT

## 2023-01-01 PROCEDURE — 74177 CT ABD & PELVIS W/CONTRAST: CPT | Mod: 26 | Performed by: RADIOLOGY

## 2023-01-01 PROCEDURE — 70553 MRI BRAIN STEM W/O & W/DYE: CPT | Mod: MG

## 2023-01-01 PROCEDURE — 99204 OFFICE O/P NEW MOD 45 MIN: CPT | Mod: 25 | Performed by: NURSE PRACTITIONER

## 2023-01-01 PROCEDURE — 83615 LACTATE (LD) (LDH) ENZYME: CPT | Performed by: INTERNAL MEDICINE

## 2023-01-01 PROCEDURE — 710N000010 HC RECOVERY PHASE 1, LEVEL 2, PER MIN: Performed by: INTERNAL MEDICINE

## 2023-01-01 PROCEDURE — 85060 BLOOD SMEAR INTERPRETATION: CPT | Performed by: STUDENT IN AN ORGANIZED HEALTH CARE EDUCATION/TRAINING PROGRAM

## 2023-01-01 PROCEDURE — 36416 COLLJ CAPILLARY BLOOD SPEC: CPT | Performed by: STUDENT IN AN ORGANIZED HEALTH CARE EDUCATION/TRAINING PROGRAM

## 2023-01-01 PROCEDURE — 82533 TOTAL CORTISOL: CPT | Performed by: STUDENT IN AN ORGANIZED HEALTH CARE EDUCATION/TRAINING PROGRAM

## 2023-01-01 PROCEDURE — G1010 CDSM STANSON: HCPCS | Mod: GC | Performed by: STUDENT IN AN ORGANIZED HEALTH CARE EDUCATION/TRAINING PROGRAM

## 2023-01-01 PROCEDURE — A9585 GADOBUTROL INJECTION: HCPCS | Mod: JZ | Performed by: STUDENT IN AN ORGANIZED HEALTH CARE EDUCATION/TRAINING PROGRAM

## 2023-01-01 PROCEDURE — 89051 BODY FLUID CELL COUNT: CPT | Performed by: INTERNAL MEDICINE

## 2023-01-01 PROCEDURE — 710N000012 HC RECOVERY PHASE 2, PER MINUTE: Performed by: INTERNAL MEDICINE

## 2023-01-01 PROCEDURE — 81001 URINALYSIS AUTO W/SCOPE: CPT | Performed by: STUDENT IN AN ORGANIZED HEALTH CARE EDUCATION/TRAINING PROGRAM

## 2023-01-01 PROCEDURE — 85610 PROTHROMBIN TIME: CPT | Performed by: INTERNAL MEDICINE

## 2023-01-01 PROCEDURE — 250N000011 HC RX IP 250 OP 636

## 2023-01-01 PROCEDURE — 99207 PR APP CREDIT; MD BILLING SHARED VISIT: CPT | Performed by: STUDENT IN AN ORGANIZED HEALTH CARE EDUCATION/TRAINING PROGRAM

## 2023-01-01 PROCEDURE — 255N000002 HC RX 255 OP 636: Performed by: INTERNAL MEDICINE

## 2023-01-01 PROCEDURE — 83690 ASSAY OF LIPASE: CPT | Performed by: EMERGENCY MEDICINE

## 2023-01-01 PROCEDURE — 999N000179 XR SURGERY CARM FLUORO LESS THAN 5 MIN W STILLS: Mod: TC

## 2023-01-01 PROCEDURE — 250N000011 HC RX IP 250 OP 636: Performed by: EMERGENCY MEDICINE

## 2023-01-01 PROCEDURE — 93971 EXTREMITY STUDY: CPT | Mod: 26 | Performed by: RADIOLOGY

## 2023-01-01 PROCEDURE — 82140 ASSAY OF AMMONIA: CPT | Performed by: EMERGENCY MEDICINE

## 2023-01-01 PROCEDURE — 85027 COMPLETE CBC AUTOMATED: CPT

## 2023-01-01 PROCEDURE — 258N000003 HC RX IP 258 OP 636: Performed by: STUDENT IN AN ORGANIZED HEALTH CARE EDUCATION/TRAINING PROGRAM

## 2023-01-01 PROCEDURE — 17000 DESTRUCT PREMALG LESION: CPT | Performed by: NURSE PRACTITIONER

## 2023-01-01 PROCEDURE — 250N000009 HC RX 250: Performed by: NURSE ANESTHETIST, CERTIFIED REGISTERED

## 2023-01-01 PROCEDURE — 99214 OFFICE O/P EST MOD 30 MIN: CPT | Mod: VID | Performed by: PHYSICIAN ASSISTANT

## 2023-01-01 PROCEDURE — 97161 PT EVAL LOW COMPLEX 20 MIN: CPT | Mod: GP

## 2023-01-01 PROCEDURE — 87040 BLOOD CULTURE FOR BACTERIA: CPT | Performed by: NURSE PRACTITIONER

## 2023-01-01 PROCEDURE — 85007 BL SMEAR W/DIFF WBC COUNT: CPT | Performed by: EMERGENCY MEDICINE

## 2023-01-01 PROCEDURE — 87149 DNA/RNA DIRECT PROBE: CPT | Performed by: INTERNAL MEDICINE

## 2023-01-01 PROCEDURE — 258N000003 HC RX IP 258 OP 636: Performed by: EMERGENCY MEDICINE

## 2023-01-01 PROCEDURE — 93971 EXTREMITY STUDY: CPT | Mod: RT

## 2023-01-01 PROCEDURE — 93306 TTE W/DOPPLER COMPLETE: CPT | Mod: 26 | Performed by: INTERNAL MEDICINE

## 2023-01-01 PROCEDURE — 99232 SBSQ HOSP IP/OBS MODERATE 35: CPT | Performed by: STUDENT IN AN ORGANIZED HEALTH CARE EDUCATION/TRAINING PROGRAM

## 2023-01-01 PROCEDURE — 83735 ASSAY OF MAGNESIUM: CPT | Performed by: STUDENT IN AN ORGANIZED HEALTH CARE EDUCATION/TRAINING PROGRAM

## 2023-01-01 PROCEDURE — 83550 IRON BINDING TEST: CPT | Performed by: INTERNAL MEDICINE

## 2023-01-01 PROCEDURE — 80053 COMPREHEN METABOLIC PANEL: CPT | Performed by: EMERGENCY MEDICINE

## 2023-01-01 PROCEDURE — 99205 OFFICE O/P NEW HI 60 MIN: CPT | Mod: VID | Performed by: NURSE PRACTITIONER

## 2023-01-01 PROCEDURE — G1010 CDSM STANSON: HCPCS | Mod: GC | Performed by: RADIOLOGY

## 2023-01-01 PROCEDURE — 999N000181 XR SURGERY CARM FLUORO GREATER THAN 5 MIN W STILLS: Mod: TC

## 2023-01-01 PROCEDURE — 99222 1ST HOSP IP/OBS MODERATE 55: CPT | Mod: GC | Performed by: INTERNAL MEDICINE

## 2023-01-01 PROCEDURE — 82728 ASSAY OF FERRITIN: CPT | Performed by: INTERNAL MEDICINE

## 2023-01-01 PROCEDURE — 85007 BL SMEAR W/DIFF WBC COUNT: CPT | Mod: VID | Performed by: FAMILY MEDICINE

## 2023-01-01 PROCEDURE — 83550 IRON BINDING TEST: CPT | Performed by: STUDENT IN AN ORGANIZED HEALTH CARE EDUCATION/TRAINING PROGRAM

## 2023-01-01 PROCEDURE — 87070 CULTURE OTHR SPECIMN AEROBIC: CPT | Performed by: INTERNAL MEDICINE

## 2023-01-01 PROCEDURE — 84100 ASSAY OF PHOSPHORUS: CPT | Performed by: STUDENT IN AN ORGANIZED HEALTH CARE EDUCATION/TRAINING PROGRAM

## 2023-01-01 PROCEDURE — 250N000025 HC SEVOFLURANE, PER MIN: Performed by: INTERNAL MEDICINE

## 2023-01-01 PROCEDURE — 80050 GENERAL HEALTH PANEL: CPT

## 2023-01-01 PROCEDURE — 99285 EMERGENCY DEPT VISIT HI MDM: CPT | Mod: 25 | Performed by: INTERNAL MEDICINE

## 2023-01-01 PROCEDURE — 99215 OFFICE O/P EST HI 40 MIN: CPT | Mod: 95 | Performed by: PHYSICIAN ASSISTANT

## 2023-01-01 PROCEDURE — 84484 ASSAY OF TROPONIN QUANT: CPT | Performed by: NURSE PRACTITIONER

## 2023-01-01 PROCEDURE — 83735 ASSAY OF MAGNESIUM: CPT | Performed by: PHYSICIAN ASSISTANT

## 2023-01-01 PROCEDURE — 360N000083 HC SURGERY LEVEL 3 W/ FLUORO, PER MIN: Performed by: INTERNAL MEDICINE

## 2023-01-01 PROCEDURE — 97116 GAIT TRAINING THERAPY: CPT | Mod: GP | Performed by: STUDENT IN AN ORGANIZED HEALTH CARE EDUCATION/TRAINING PROGRAM

## 2023-01-01 PROCEDURE — 84484 ASSAY OF TROPONIN QUANT: CPT | Performed by: EMERGENCY MEDICINE

## 2023-01-01 PROCEDURE — 360N000082 HC SURGERY LEVEL 2 W/ FLUORO, PER MIN: Performed by: INTERNAL MEDICINE

## 2023-01-01 PROCEDURE — 99221 1ST HOSP IP/OBS SF/LOW 40: CPT | Mod: GC | Performed by: STUDENT IN AN ORGANIZED HEALTH CARE EDUCATION/TRAINING PROGRAM

## 2023-01-01 PROCEDURE — 258N000003 HC RX IP 258 OP 636

## 2023-01-01 PROCEDURE — 85007 BL SMEAR W/DIFF WBC COUNT: CPT | Performed by: NURSE PRACTITIONER

## 2023-01-01 PROCEDURE — 32555 ASPIRATE PLEURA W/ IMAGING: CPT

## 2023-01-01 PROCEDURE — 99205 OFFICE O/P NEW HI 60 MIN: CPT | Mod: 95 | Performed by: NURSE PRACTITIONER

## 2023-01-01 PROCEDURE — 84100 ASSAY OF PHOSPHORUS: CPT | Performed by: PHYSICIAN ASSISTANT

## 2023-01-01 PROCEDURE — 84484 ASSAY OF TROPONIN QUANT: CPT

## 2023-01-01 PROCEDURE — 83735 ASSAY OF MAGNESIUM: CPT | Performed by: EMERGENCY MEDICINE

## 2023-01-01 PROCEDURE — 86850 RBC ANTIBODY SCREEN: CPT | Performed by: INTERNAL MEDICINE

## 2023-01-01 PROCEDURE — 84443 ASSAY THYROID STIM HORMONE: CPT | Performed by: STUDENT IN AN ORGANIZED HEALTH CARE EDUCATION/TRAINING PROGRAM

## 2023-01-01 PROCEDURE — 92004 COMPRE OPH EXAM NEW PT 1/>: CPT | Performed by: STUDENT IN AN ORGANIZED HEALTH CARE EDUCATION/TRAINING PROGRAM

## 2023-01-01 PROCEDURE — 96365 THER/PROPH/DIAG IV INF INIT: CPT | Performed by: EMERGENCY MEDICINE

## 2023-01-01 PROCEDURE — 250N000009 HC RX 250: Performed by: STUDENT IN AN ORGANIZED HEALTH CARE EDUCATION/TRAINING PROGRAM

## 2023-01-01 PROCEDURE — 0134A COVID-19 BIVALENT 18+ (MODERNA): CPT

## 2023-01-01 PROCEDURE — 71260 CT THORAX DX C+: CPT | Mod: 26 | Performed by: RADIOLOGY

## 2023-01-01 PROCEDURE — 36415 COLL VENOUS BLD VENIPUNCTURE: CPT | Performed by: PEDIATRICS

## 2023-01-01 PROCEDURE — 84145 PROCALCITONIN (PCT): CPT | Performed by: PHYSICIAN ASSISTANT

## 2023-01-01 PROCEDURE — 87086 URINE CULTURE/COLONY COUNT: CPT | Performed by: NURSE PRACTITIONER

## 2023-01-01 PROCEDURE — 83605 ASSAY OF LACTIC ACID: CPT

## 2023-01-01 PROCEDURE — 87075 CULTR BACTERIA EXCEPT BLOOD: CPT | Performed by: INTERNAL MEDICINE

## 2023-01-01 PROCEDURE — 99000 SPECIMEN HANDLING OFFICE-LAB: CPT | Performed by: INTERNAL MEDICINE

## 2023-01-01 PROCEDURE — 85007 BL SMEAR W/DIFF WBC COUNT: CPT

## 2023-01-01 PROCEDURE — 70553 MRI BRAIN STEM W/O & W/DYE: CPT | Mod: 26 | Performed by: RADIOLOGY

## 2023-01-01 PROCEDURE — 272N000276 HC DEVICE 3FR SECURACATH

## 2023-01-01 PROCEDURE — 87040 BLOOD CULTURE FOR BACTERIA: CPT | Performed by: STUDENT IN AN ORGANIZED HEALTH CARE EDUCATION/TRAINING PROGRAM

## 2023-01-01 PROCEDURE — 99213 OFFICE O/P EST LOW 20 MIN: CPT | Mod: 95 | Performed by: INTERNAL MEDICINE

## 2023-01-01 PROCEDURE — 36415 COLL VENOUS BLD VENIPUNCTURE: CPT | Performed by: PHYSICIAN ASSISTANT

## 2023-01-01 PROCEDURE — 87637 SARSCOV2&INF A&B&RSV AMP PRB: CPT | Performed by: NURSE PRACTITIONER

## 2023-01-01 PROCEDURE — 86901 BLOOD TYPING SEROLOGIC RH(D): CPT | Performed by: INTERNAL MEDICINE

## 2023-01-01 PROCEDURE — 96366 THER/PROPH/DIAG IV INF ADDON: CPT | Performed by: EMERGENCY MEDICINE

## 2023-01-01 PROCEDURE — 97116 GAIT TRAINING THERAPY: CPT | Mod: GP

## 2023-01-01 PROCEDURE — 81001 URINALYSIS AUTO W/SCOPE: CPT | Performed by: EMERGENCY MEDICINE

## 2023-01-01 PROCEDURE — 82565 ASSAY OF CREATININE: CPT

## 2023-01-01 PROCEDURE — G0463 HOSPITAL OUTPT CLINIC VISIT: HCPCS | Mod: GT | Performed by: SOCIAL WORKER

## 2023-01-01 PROCEDURE — 250N000009 HC RX 250: Performed by: PHYSICIAN ASSISTANT

## 2023-01-01 PROCEDURE — 999N000111 HC STATISTIC OT IP EVAL DEFER

## 2023-01-01 PROCEDURE — 32555 ASPIRATE PLEURA W/ IMAGING: CPT | Mod: 50 | Performed by: RADIOLOGY

## 2023-01-01 PROCEDURE — 87077 CULTURE AEROBIC IDENTIFY: CPT | Performed by: EMERGENCY MEDICINE

## 2023-01-01 PROCEDURE — 87637 SARSCOV2&INF A&B&RSV AMP PRB: CPT | Performed by: EMERGENCY MEDICINE

## 2023-01-01 PROCEDURE — 99215 OFFICE O/P EST HI 40 MIN: CPT | Performed by: PHYSICIAN ASSISTANT

## 2023-01-01 PROCEDURE — 80053 COMPREHEN METABOLIC PANEL: CPT | Mod: VID | Performed by: FAMILY MEDICINE

## 2023-01-01 PROCEDURE — 36416 COLLJ CAPILLARY BLOOD SPEC: CPT | Performed by: INTERNAL MEDICINE

## 2023-01-01 PROCEDURE — 86923 COMPATIBILITY TEST ELECTRIC: CPT | Performed by: INTERNAL MEDICINE

## 2023-01-01 PROCEDURE — 97110 THERAPEUTIC EXERCISES: CPT | Mod: GP

## 2023-01-01 PROCEDURE — G0463 HOSPITAL OUTPT CLINIC VISIT: HCPCS | Performed by: PHYSICIAN ASSISTANT

## 2023-01-01 PROCEDURE — 88305 TISSUE EXAM BY PATHOLOGIST: CPT | Mod: 26 | Performed by: PATHOLOGY

## 2023-01-01 PROCEDURE — 0W9B3ZZ DRAINAGE OF LEFT PLEURAL CAVITY, PERCUTANEOUS APPROACH: ICD-10-PCS | Performed by: RADIOLOGY

## 2023-01-01 PROCEDURE — 80050 GENERAL HEALTH PANEL: CPT | Performed by: INTERNAL MEDICINE

## 2023-01-01 PROCEDURE — 0FC98ZZ EXTIRPATION OF MATTER FROM COMMON BILE DUCT, VIA NATURAL OR ARTIFICIAL OPENING ENDOSCOPIC: ICD-10-PCS | Performed by: INTERNAL MEDICINE

## 2023-01-01 PROCEDURE — 84439 ASSAY OF FREE THYROXINE: CPT | Performed by: STUDENT IN AN ORGANIZED HEALTH CARE EDUCATION/TRAINING PROGRAM

## 2023-01-01 PROCEDURE — 99207 PR NO CHARGE NURSE ONLY: CPT

## 2023-01-01 PROCEDURE — 87040 BLOOD CULTURE FOR BACTERIA: CPT | Performed by: INTERNAL MEDICINE

## 2023-01-01 PROCEDURE — 82945 GLUCOSE OTHER FLUID: CPT | Performed by: INTERNAL MEDICINE

## 2023-01-01 PROCEDURE — 99223 1ST HOSP IP/OBS HIGH 75: CPT | Performed by: CLINICAL NURSE SPECIALIST

## 2023-01-01 PROCEDURE — 83605 ASSAY OF LACTIC ACID: CPT | Performed by: EMERGENCY MEDICINE

## 2023-01-01 PROCEDURE — 87086 URINE CULTURE/COLONY COUNT: CPT | Performed by: STUDENT IN AN ORGANIZED HEALTH CARE EDUCATION/TRAINING PROGRAM

## 2023-01-01 PROCEDURE — P9040 RBC LEUKOREDUCED IRRADIATED: HCPCS | Performed by: INTERNAL MEDICINE

## 2023-01-01 PROCEDURE — 99223 1ST HOSP IP/OBS HIGH 75: CPT | Mod: AI | Performed by: STUDENT IN AN ORGANIZED HEALTH CARE EDUCATION/TRAINING PROGRAM

## 2023-01-01 PROCEDURE — 85027 COMPLETE CBC AUTOMATED: CPT | Performed by: NURSE PRACTITIONER

## 2023-01-01 PROCEDURE — C1725 CATH, TRANSLUMIN NON-LASER: HCPCS | Performed by: INTERNAL MEDICINE

## 2023-01-01 PROCEDURE — 99215 OFFICE O/P EST HI 40 MIN: CPT | Mod: 95 | Performed by: INTERNAL MEDICINE

## 2023-01-01 PROCEDURE — 87205 SMEAR GRAM STAIN: CPT | Performed by: INTERNAL MEDICINE

## 2023-01-01 PROCEDURE — 71275 CT ANGIOGRAPHY CHEST: CPT | Mod: 26 | Performed by: STUDENT IN AN ORGANIZED HEALTH CARE EDUCATION/TRAINING PROGRAM

## 2023-01-01 PROCEDURE — 96374 THER/PROPH/DIAG INJ IV PUSH: CPT | Performed by: NURSE PRACTITIONER

## 2023-01-01 PROCEDURE — 87149 DNA/RNA DIRECT PROBE: CPT | Performed by: EMERGENCY MEDICINE

## 2023-01-01 PROCEDURE — 88112 CYTOPATH CELL ENHANCE TECH: CPT | Mod: 26 | Performed by: PATHOLOGY

## 2023-01-01 PROCEDURE — 84443 ASSAY THYROID STIM HORMONE: CPT

## 2023-01-01 PROCEDURE — 83605 ASSAY OF LACTIC ACID: CPT | Performed by: PEDIATRICS

## 2023-01-01 PROCEDURE — 73030 X-RAY EXAM OF SHOULDER: CPT | Mod: TC | Performed by: RADIOLOGY

## 2023-01-01 PROCEDURE — 81001 URINALYSIS AUTO W/SCOPE: CPT | Mod: VID | Performed by: FAMILY MEDICINE

## 2023-01-01 PROCEDURE — 93010 ELECTROCARDIOGRAM REPORT: CPT | Performed by: EMERGENCY MEDICINE

## 2023-01-01 PROCEDURE — 93010 ELECTROCARDIOGRAM REPORT: CPT | Performed by: INTERNAL MEDICINE

## 2023-01-01 PROCEDURE — 36415 COLL VENOUS BLD VENIPUNCTURE: CPT | Mod: VID | Performed by: FAMILY MEDICINE

## 2023-01-01 PROCEDURE — 99223 1ST HOSP IP/OBS HIGH 75: CPT | Mod: AI | Performed by: PHYSICIAN ASSISTANT

## 2023-01-01 PROCEDURE — 85610 PROTHROMBIN TIME: CPT | Performed by: EMERGENCY MEDICINE

## 2023-01-01 PROCEDURE — 71046 X-RAY EXAM CHEST 2 VIEWS: CPT | Mod: 26 | Performed by: RADIOLOGY

## 2023-01-01 PROCEDURE — 85652 RBC SED RATE AUTOMATED: CPT | Performed by: INTERNAL MEDICINE

## 2023-01-01 PROCEDURE — 82746 ASSAY OF FOLIC ACID SERUM: CPT | Performed by: INTERNAL MEDICINE

## 2023-01-01 PROCEDURE — 20611 DRAIN/INJ JOINT/BURSA W/US: CPT | Mod: RT | Performed by: FAMILY MEDICINE

## 2023-01-01 PROCEDURE — 88305 TISSUE EXAM BY PATHOLOGIST: CPT | Mod: TC | Performed by: INTERNAL MEDICINE

## 2023-01-01 PROCEDURE — 99207 PR DROP WITH A PROCEDURE: CPT | Mod: 25 | Performed by: SOCIAL WORKER

## 2023-01-01 DEVICE — STENT BILIARY SELF-EXPAND ZILVER 635 10MMX6CM: Type: IMPLANTABLE DEVICE | Site: DUODENUM | Status: FUNCTIONAL

## 2023-01-01 RX ORDER — ACETAMINOPHEN 500 MG
500-1000 TABLET ORAL EVERY 8 HOURS PRN
COMMUNITY

## 2023-01-01 RX ORDER — CIPROFLOXACIN 500 MG/1
500 TABLET, FILM COATED ORAL EVERY 12 HOURS
Qty: 60 TABLET | Refills: 0 | Status: SHIPPED | OUTPATIENT
Start: 2023-01-01

## 2023-01-01 RX ORDER — HYDROMORPHONE HYDROCHLORIDE 1 MG/ML
0.4 INJECTION, SOLUTION INTRAMUSCULAR; INTRAVENOUS; SUBCUTANEOUS EVERY 5 MIN PRN
Status: DISCONTINUED | OUTPATIENT
Start: 2023-01-01 | End: 2023-01-01 | Stop reason: HOSPADM

## 2023-01-01 RX ORDER — CAPECITABINE 150 MG/1
150 TABLET, FILM COATED ORAL 2 TIMES DAILY
Qty: 28 TABLET | Refills: 0 | Status: SHIPPED | OUTPATIENT
Start: 2023-01-01 | End: 2023-01-01

## 2023-01-01 RX ORDER — FENTANYL CITRATE 50 UG/ML
INJECTION, SOLUTION INTRAMUSCULAR; INTRAVENOUS PRN
Status: DISCONTINUED | OUTPATIENT
Start: 2023-01-01 | End: 2023-01-01

## 2023-01-01 RX ORDER — CALCIUM CARBONATE 750 MG/1
750 TABLET, CHEWABLE ORAL AT BEDTIME
COMMUNITY

## 2023-01-01 RX ORDER — TEMOZOLOMIDE 100 MG/1
200 CAPSULE ORAL AT BEDTIME
Qty: 20 CAPSULE | Refills: 0 | Status: SHIPPED | OUTPATIENT
Start: 2023-01-01 | End: 2023-01-01

## 2023-01-01 RX ORDER — POLYETHYLENE GLYCOL 3350
1 POWDER (GRAM) MISCELLANEOUS AT BEDTIME
Status: DISCONTINUED | OUTPATIENT
Start: 2023-01-01 | End: 2023-01-01

## 2023-01-01 RX ORDER — LOVASTATIN 20 MG
40 TABLET ORAL AT BEDTIME
Status: DISCONTINUED | OUTPATIENT
Start: 2023-01-01 | End: 2023-01-01 | Stop reason: ALTCHOICE

## 2023-01-01 RX ORDER — VENLAFAXINE HYDROCHLORIDE 75 MG/1
75 CAPSULE, EXTENDED RELEASE ORAL EVERY MORNING
Status: DISCONTINUED | OUTPATIENT
Start: 2023-01-01 | End: 2023-01-01

## 2023-01-01 RX ORDER — MAGNESIUM HYDROXIDE/ALUMINUM HYDROXICE/SIMETHICONE 120; 1200; 1200 MG/30ML; MG/30ML; MG/30ML
15 SUSPENSION ORAL
Status: DISCONTINUED | OUTPATIENT
Start: 2023-01-01 | End: 2023-01-01 | Stop reason: HOSPADM

## 2023-01-01 RX ORDER — ONDANSETRON 4 MG/1
4 TABLET, ORALLY DISINTEGRATING ORAL EVERY 6 HOURS PRN
Status: DISCONTINUED | OUTPATIENT
Start: 2023-01-01 | End: 2023-01-01 | Stop reason: HOSPADM

## 2023-01-01 RX ORDER — METOPROLOL SUCCINATE 25 MG/1
12.5 TABLET, EXTENDED RELEASE ORAL 2 TIMES DAILY
COMMUNITY
Start: 2023-01-01

## 2023-01-01 RX ORDER — ONDANSETRON 2 MG/ML
INJECTION INTRAMUSCULAR; INTRAVENOUS PRN
Status: DISCONTINUED | OUTPATIENT
Start: 2023-01-01 | End: 2023-01-01

## 2023-01-01 RX ORDER — FUROSEMIDE 20 MG
20 TABLET ORAL EVERY MORNING
COMMUNITY
Start: 2023-01-01 | End: 2023-01-01

## 2023-01-01 RX ORDER — DEXAMETHASONE SODIUM PHOSPHATE 4 MG/ML
INJECTION, SOLUTION INTRA-ARTICULAR; INTRALESIONAL; INTRAMUSCULAR; INTRAVENOUS; SOFT TISSUE PRN
Status: DISCONTINUED | OUTPATIENT
Start: 2023-01-01 | End: 2023-01-01

## 2023-01-01 RX ORDER — SODIUM CHLORIDE, SODIUM LACTATE, POTASSIUM CHLORIDE, CALCIUM CHLORIDE 600; 310; 30; 20 MG/100ML; MG/100ML; MG/100ML; MG/100ML
INJECTION, SOLUTION INTRAVENOUS CONTINUOUS
Status: DISCONTINUED | OUTPATIENT
Start: 2023-01-01 | End: 2023-01-01 | Stop reason: HOSPADM

## 2023-01-01 RX ORDER — CEFTRIAXONE 2 G/1
2 INJECTION, POWDER, FOR SOLUTION INTRAMUSCULAR; INTRAVENOUS EVERY 24 HOURS
Status: DISCONTINUED | OUTPATIENT
Start: 2023-01-01 | End: 2023-01-01

## 2023-01-01 RX ORDER — ACETAMINOPHEN 500 MG
500-1000 TABLET ORAL EVERY 8 HOURS PRN
Status: DISCONTINUED | OUTPATIENT
Start: 2023-01-01 | End: 2023-01-01 | Stop reason: HOSPADM

## 2023-01-01 RX ORDER — SODIUM CHLORIDE, SODIUM LACTATE, POTASSIUM CHLORIDE, CALCIUM CHLORIDE 600; 310; 30; 20 MG/100ML; MG/100ML; MG/100ML; MG/100ML
INJECTION, SOLUTION INTRAVENOUS CONTINUOUS PRN
Status: DISCONTINUED | OUTPATIENT
Start: 2023-01-01 | End: 2023-01-01

## 2023-01-01 RX ORDER — PIPERACILLIN SODIUM, TAZOBACTAM SODIUM 4; .5 G/20ML; G/20ML
4.5 INJECTION, POWDER, LYOPHILIZED, FOR SOLUTION INTRAVENOUS EVERY 6 HOURS
Status: DISCONTINUED | OUTPATIENT
Start: 2023-01-01 | End: 2023-01-01

## 2023-01-01 RX ORDER — ONDANSETRON 8 MG/1
8 TABLET, ORALLY DISINTEGRATING ORAL EVERY 8 HOURS PRN
Qty: 30 TABLET | Refills: 1 | Status: SHIPPED | OUTPATIENT
Start: 2023-01-01

## 2023-01-01 RX ORDER — CAPECITABINE 500 MG/1
625 TABLET, FILM COATED ORAL 2 TIMES DAILY
Qty: 56 TABLET | Refills: 0 | Status: SHIPPED | OUTPATIENT
Start: 2023-01-01 | End: 2023-01-01

## 2023-01-01 RX ORDER — ROPIVACAINE HYDROCHLORIDE 5 MG/ML
4 INJECTION, SOLUTION EPIDURAL; INFILTRATION; PERINEURAL
Status: DISCONTINUED | OUTPATIENT
Start: 2023-01-01 | End: 2023-01-01

## 2023-01-01 RX ORDER — LIDOCAINE 40 MG/G
CREAM TOPICAL
Status: DISCONTINUED | OUTPATIENT
Start: 2023-01-01 | End: 2023-01-01 | Stop reason: HOSPADM

## 2023-01-01 RX ORDER — FLUDROCORTISONE ACETATE 0.1 MG/1
0.2 TABLET ORAL DAILY
Qty: 60 TABLET | Refills: 1 | Status: SHIPPED | OUTPATIENT
Start: 2023-01-01 | End: 2023-01-01

## 2023-01-01 RX ORDER — MEPERIDINE HYDROCHLORIDE 25 MG/ML
12.5 INJECTION INTRAMUSCULAR; INTRAVENOUS; SUBCUTANEOUS EVERY 5 MIN PRN
Status: DISCONTINUED | OUTPATIENT
Start: 2023-01-01 | End: 2023-01-01 | Stop reason: HOSPADM

## 2023-01-01 RX ORDER — POLYETHYLENE GLYCOL 3350 17 G/17G
17 POWDER, FOR SOLUTION ORAL DAILY
Status: DISCONTINUED | OUTPATIENT
Start: 2023-01-01 | End: 2023-01-01 | Stop reason: HOSPADM

## 2023-01-01 RX ORDER — PHYTONADIONE 5 MG/1
5 TABLET ORAL DAILY
Status: ACTIVE | OUTPATIENT
Start: 2023-01-01 | End: 2023-01-01

## 2023-01-01 RX ORDER — POTASSIUM CHLORIDE 1.5 G/1.58G
40 POWDER, FOR SOLUTION ORAL ONCE
Status: COMPLETED | OUTPATIENT
Start: 2023-01-01 | End: 2023-01-01

## 2023-01-01 RX ORDER — LEVOTHYROXINE SODIUM 112 UG/1
112 TABLET ORAL
Status: DISCONTINUED | OUTPATIENT
Start: 2023-01-01 | End: 2023-01-01 | Stop reason: HOSPADM

## 2023-01-01 RX ORDER — ATORVASTATIN CALCIUM 10 MG/1
10 TABLET, FILM COATED ORAL EVERY EVENING
Status: DISCONTINUED | OUTPATIENT
Start: 2023-01-01 | End: 2023-01-01 | Stop reason: HOSPADM

## 2023-01-01 RX ORDER — HYDROXYZINE HYDROCHLORIDE 25 MG/1
25 TABLET, FILM COATED ORAL 3 TIMES DAILY PRN
Status: DISCONTINUED | OUTPATIENT
Start: 2023-01-01 | End: 2023-01-01 | Stop reason: HOSPADM

## 2023-01-01 RX ORDER — PIPERACILLIN SODIUM, TAZOBACTAM SODIUM 3; .375 G/15ML; G/15ML
3.38 INJECTION, POWDER, LYOPHILIZED, FOR SOLUTION INTRAVENOUS EVERY 6 HOURS
Status: DISCONTINUED | OUTPATIENT
Start: 2023-01-01 | End: 2023-01-01

## 2023-01-01 RX ORDER — PROPOFOL 10 MG/ML
INJECTION, EMULSION INTRAVENOUS PRN
Status: DISCONTINUED | OUTPATIENT
Start: 2023-01-01 | End: 2023-01-01

## 2023-01-01 RX ORDER — LEVOTHYROXINE SODIUM 100 UG/1
100 TABLET ORAL DAILY
Qty: 30 TABLET | Refills: 3 | Status: SHIPPED | OUTPATIENT
Start: 2023-01-01 | End: 2023-01-01

## 2023-01-01 RX ORDER — HYDROXYZINE HYDROCHLORIDE 25 MG/1
25 TABLET, FILM COATED ORAL 3 TIMES DAILY PRN
Qty: 60 TABLET | Refills: 1 | Status: SHIPPED | OUTPATIENT
Start: 2023-01-01 | End: 2023-01-01

## 2023-01-01 RX ORDER — ONDANSETRON 4 MG/1
4 TABLET, ORALLY DISINTEGRATING ORAL EVERY 30 MIN PRN
Status: DISCONTINUED | OUTPATIENT
Start: 2023-01-01 | End: 2023-01-01 | Stop reason: HOSPADM

## 2023-01-01 RX ORDER — BETAMETHASONE SODIUM PHOSPHATE AND BETAMETHASONE ACETATE 3; 3 MG/ML; MG/ML
6 INJECTION, SUSPENSION INTRA-ARTICULAR; INTRALESIONAL; INTRAMUSCULAR; SOFT TISSUE
Status: DISCONTINUED | OUTPATIENT
Start: 2023-01-01 | End: 2023-01-01

## 2023-01-01 RX ORDER — LEVOTHYROXINE SODIUM 112 UG/1
112 TABLET ORAL DAILY
Status: DISCONTINUED | OUTPATIENT
Start: 2023-01-01 | End: 2023-01-01 | Stop reason: HOSPADM

## 2023-01-01 RX ORDER — LEVOTHYROXINE SODIUM 112 UG/1
112 TABLET ORAL DAILY
Qty: 90 TABLET | Refills: 0 | Status: SHIPPED | OUTPATIENT
Start: 2023-01-01 | End: 2023-01-01

## 2023-01-01 RX ORDER — FLUDROCORTISONE ACETATE 0.1 MG/1
0.2 TABLET ORAL DAILY
Status: DISCONTINUED | OUTPATIENT
Start: 2023-01-01 | End: 2023-01-01 | Stop reason: HOSPADM

## 2023-01-01 RX ORDER — CHLORAL HYDRATE 500 MG
1 CAPSULE ORAL DAILY
COMMUNITY
End: 2023-01-01

## 2023-01-01 RX ORDER — METOPROLOL SUCCINATE 25 MG/1
37.5 TABLET, EXTENDED RELEASE ORAL EVERY MORNING
Status: ON HOLD | COMMUNITY
Start: 2022-04-25 | End: 2023-01-01

## 2023-01-01 RX ORDER — LEVOFLOXACIN 5 MG/ML
INJECTION, SOLUTION INTRAVENOUS PRN
Status: DISCONTINUED | OUTPATIENT
Start: 2023-01-01 | End: 2023-01-01

## 2023-01-01 RX ORDER — LIDOCAINE HYDROCHLORIDE 20 MG/ML
INJECTION, SOLUTION INFILTRATION; PERINEURAL PRN
Status: DISCONTINUED | OUTPATIENT
Start: 2023-01-01 | End: 2023-01-01

## 2023-01-01 RX ORDER — CALCIUM CARBONATE 750 MG/1
750 TABLET, CHEWABLE ORAL AT BEDTIME
Status: DISCONTINUED | OUTPATIENT
Start: 2023-01-01 | End: 2023-01-01 | Stop reason: HOSPADM

## 2023-01-01 RX ORDER — POLYETHYLENE GLYCOL 3350
1 POWDER (GRAM) MISCELLANEOUS PRN
COMMUNITY

## 2023-01-01 RX ORDER — INDOMETHACIN 50 MG/1
SUPPOSITORY RECTAL PRN
Status: DISCONTINUED | OUTPATIENT
Start: 2023-01-01 | End: 2023-01-01 | Stop reason: HOSPADM

## 2023-01-01 RX ORDER — POLYETHYLENE GLYCOL 3350 17 G/17G
17 POWDER, FOR SOLUTION ORAL DAILY PRN
Status: DISCONTINUED | OUTPATIENT
Start: 2023-01-01 | End: 2023-01-01 | Stop reason: HOSPADM

## 2023-01-01 RX ORDER — FENTANYL CITRATE 50 UG/ML
25 INJECTION, SOLUTION INTRAMUSCULAR; INTRAVENOUS EVERY 5 MIN PRN
Status: DISCONTINUED | OUTPATIENT
Start: 2023-01-01 | End: 2023-01-01 | Stop reason: HOSPADM

## 2023-01-01 RX ORDER — FUROSEMIDE 10 MG/ML
40 INJECTION INTRAMUSCULAR; INTRAVENOUS ONCE
Status: COMPLETED | OUTPATIENT
Start: 2023-01-01 | End: 2023-01-01

## 2023-01-01 RX ORDER — LOSARTAN POTASSIUM 25 MG/1
12.5 TABLET ORAL EVERY MORNING
Status: ON HOLD | COMMUNITY
Start: 2023-01-01 | End: 2023-01-01

## 2023-01-01 RX ORDER — FUROSEMIDE 20 MG
20 TABLET ORAL PRN
COMMUNITY
Start: 2023-01-01

## 2023-01-01 RX ORDER — OXYBUTYNIN CHLORIDE 5 MG/1
5 TABLET, EXTENDED RELEASE ORAL DAILY
Qty: 30 TABLET | Refills: 0 | Status: SHIPPED | OUTPATIENT
Start: 2023-01-01 | End: 2023-01-01

## 2023-01-01 RX ORDER — SPIRONOLACTONE 25 MG/1
12.5 TABLET ORAL DAILY
Status: ON HOLD | COMMUNITY
Start: 2023-01-01 | End: 2023-01-01

## 2023-01-01 RX ORDER — SODIUM CHLORIDE 9 MG/ML
INJECTION, SOLUTION INTRAVENOUS
Status: DISPENSED
Start: 2023-01-01 | End: 2023-01-01

## 2023-01-01 RX ORDER — AMOXICILLIN 250 MG
2 CAPSULE ORAL 2 TIMES DAILY PRN
Status: DISCONTINUED | OUTPATIENT
Start: 2023-01-01 | End: 2023-01-01 | Stop reason: HOSPADM

## 2023-01-01 RX ORDER — IOPAMIDOL 755 MG/ML
90 INJECTION, SOLUTION INTRAVASCULAR ONCE
Status: COMPLETED | OUTPATIENT
Start: 2023-01-01 | End: 2023-01-01

## 2023-01-01 RX ORDER — SODIUM CHLORIDE 9 MG/ML
INJECTION, SOLUTION INTRAVENOUS
Status: COMPLETED
Start: 2023-01-01 | End: 2023-01-01

## 2023-01-01 RX ORDER — NALOXONE HYDROCHLORIDE 0.4 MG/ML
0.4 INJECTION, SOLUTION INTRAMUSCULAR; INTRAVENOUS; SUBCUTANEOUS
Status: DISCONTINUED | OUTPATIENT
Start: 2023-01-01 | End: 2023-01-01 | Stop reason: HOSPADM

## 2023-01-01 RX ORDER — FLUDROCORTISONE ACETATE 0.1 MG/1
0.1 TABLET ORAL ONCE
Status: COMPLETED | OUTPATIENT
Start: 2023-01-01 | End: 2023-01-01

## 2023-01-01 RX ORDER — CIPROFLOXACIN 500 MG/1
500 TABLET, FILM COATED ORAL EVERY 12 HOURS
Qty: 11 TABLET | Refills: 0 | Status: SHIPPED | OUTPATIENT
Start: 2023-01-01 | End: 2023-01-01

## 2023-01-01 RX ORDER — HALOPERIDOL 5 MG/ML
1 INJECTION INTRAMUSCULAR
Status: DISCONTINUED | OUTPATIENT
Start: 2023-01-01 | End: 2023-01-01 | Stop reason: HOSPADM

## 2023-01-01 RX ORDER — EMOLLIENT BASE
CREAM (GRAM) TOPICAL DAILY PRN
COMMUNITY

## 2023-01-01 RX ORDER — VENLAFAXINE HYDROCHLORIDE 150 MG/1
150 CAPSULE, EXTENDED RELEASE ORAL EVERY MORNING
COMMUNITY

## 2023-01-01 RX ORDER — NALOXONE HYDROCHLORIDE 0.4 MG/ML
0.2 INJECTION, SOLUTION INTRAMUSCULAR; INTRAVENOUS; SUBCUTANEOUS
Status: DISCONTINUED | OUTPATIENT
Start: 2023-01-01 | End: 2023-01-01 | Stop reason: HOSPADM

## 2023-01-01 RX ORDER — HYDROMORPHONE HCL IN WATER/PF 6 MG/30 ML
0.4 PATIENT CONTROLLED ANALGESIA SYRINGE INTRAVENOUS EVERY 5 MIN PRN
Status: DISCONTINUED | OUTPATIENT
Start: 2023-01-01 | End: 2023-01-01 | Stop reason: HOSPADM

## 2023-01-01 RX ORDER — CALCIUM CARBONATE 500 MG/1
1000 TABLET, CHEWABLE ORAL EVERY EVENING
Status: DISCONTINUED | OUTPATIENT
Start: 2023-01-01 | End: 2023-01-01 | Stop reason: HOSPADM

## 2023-01-01 RX ORDER — CALCIUM CARBONATE 750 MG/1
750 TABLET, CHEWABLE ORAL AT BEDTIME
Status: DISCONTINUED | OUTPATIENT
Start: 2023-01-01 | End: 2023-01-01

## 2023-01-01 RX ORDER — LIDOCAINE 40 MG/G
CREAM TOPICAL
Status: DISCONTINUED | OUTPATIENT
Start: 2023-01-01 | End: 2023-01-01

## 2023-01-01 RX ORDER — FENTANYL CITRATE 50 UG/ML
50 INJECTION, SOLUTION INTRAMUSCULAR; INTRAVENOUS EVERY 5 MIN PRN
Status: DISCONTINUED | OUTPATIENT
Start: 2023-01-01 | End: 2023-01-01 | Stop reason: HOSPADM

## 2023-01-01 RX ORDER — EPINEPHRINE 0.3 MG/.3ML
0.3 INJECTION SUBCUTANEOUS PRN
Qty: 2 EACH | Refills: 0 | Status: SHIPPED | OUTPATIENT
Start: 2023-01-01

## 2023-01-01 RX ORDER — LEVOTHYROXINE SODIUM 112 UG/1
112 TABLET ORAL DAILY
Qty: 90 TABLET | Refills: 0 | Status: SHIPPED | OUTPATIENT
Start: 2023-01-01

## 2023-01-01 RX ORDER — AMOXICILLIN 875 MG
875 TABLET ORAL 2 TIMES DAILY
Qty: 14 TABLET | Refills: 0 | Status: SHIPPED | OUTPATIENT
Start: 2023-01-01 | End: 2023-01-01

## 2023-01-01 RX ORDER — ONDANSETRON 2 MG/ML
4 INJECTION INTRAMUSCULAR; INTRAVENOUS EVERY 6 HOURS PRN
Status: DISCONTINUED | OUTPATIENT
Start: 2023-01-01 | End: 2023-01-01 | Stop reason: HOSPADM

## 2023-01-01 RX ORDER — LANOLIN ALCOHOL/MO/W.PET/CERES
3 CREAM (GRAM) TOPICAL
Status: DISCONTINUED | OUTPATIENT
Start: 2023-01-01 | End: 2023-01-01 | Stop reason: HOSPADM

## 2023-01-01 RX ORDER — POLYETHYLENE GLYCOL 3350 17 G/17G
17 POWDER, FOR SOLUTION ORAL AT BEDTIME
Status: DISCONTINUED | OUTPATIENT
Start: 2023-01-01 | End: 2023-01-01 | Stop reason: HOSPADM

## 2023-01-01 RX ORDER — IOPAMIDOL 510 MG/ML
INJECTION, SOLUTION INTRAVASCULAR PRN
Status: DISCONTINUED | OUTPATIENT
Start: 2023-01-01 | End: 2023-01-01 | Stop reason: HOSPADM

## 2023-01-01 RX ORDER — IOPAMIDOL 755 MG/ML
95 INJECTION, SOLUTION INTRAVASCULAR ONCE
Status: COMPLETED | OUTPATIENT
Start: 2023-01-01 | End: 2023-01-01

## 2023-01-01 RX ORDER — LEVOFLOXACIN 500 MG/1
500 TABLET, FILM COATED ORAL DAILY
Status: ACTIVE | OUTPATIENT
Start: 2023-01-01 | End: 2023-01-01

## 2023-01-01 RX ORDER — SODIUM CHLORIDE 9 MG/ML
INJECTION, SOLUTION INTRAVENOUS CONTINUOUS
Status: ACTIVE | OUTPATIENT
Start: 2023-01-01 | End: 2023-01-01

## 2023-01-01 RX ORDER — VITAMIN B COMPLEX
50 TABLET ORAL EVERY EVENING
Status: DISCONTINUED | OUTPATIENT
Start: 2023-01-01 | End: 2023-01-01 | Stop reason: HOSPADM

## 2023-01-01 RX ORDER — DEXAMETHASONE SODIUM PHOSPHATE 4 MG/ML
4 INJECTION, SOLUTION INTRA-ARTICULAR; INTRALESIONAL; INTRAMUSCULAR; INTRAVENOUS; SOFT TISSUE
Status: DISCONTINUED | OUTPATIENT
Start: 2023-01-01 | End: 2023-01-01 | Stop reason: HOSPADM

## 2023-01-01 RX ORDER — CIPROFLOXACIN 500 MG/1
500 TABLET, FILM COATED ORAL EVERY 12 HOURS SCHEDULED
Status: DISCONTINUED | OUTPATIENT
Start: 2023-01-01 | End: 2023-01-01 | Stop reason: HOSPADM

## 2023-01-01 RX ORDER — ONDANSETRON 2 MG/ML
4 INJECTION INTRAMUSCULAR; INTRAVENOUS EVERY 30 MIN PRN
Status: DISCONTINUED | OUTPATIENT
Start: 2023-01-01 | End: 2023-01-01 | Stop reason: HOSPADM

## 2023-01-01 RX ORDER — DAPAGLIFLOZIN 10 MG/1
1 TABLET, FILM COATED ORAL DAILY
COMMUNITY
Start: 2023-01-01

## 2023-01-01 RX ORDER — HYDROMORPHONE HYDROCHLORIDE 1 MG/ML
0.2 INJECTION, SOLUTION INTRAMUSCULAR; INTRAVENOUS; SUBCUTANEOUS EVERY 5 MIN PRN
Status: DISCONTINUED | OUTPATIENT
Start: 2023-01-01 | End: 2023-01-01 | Stop reason: HOSPADM

## 2023-01-01 RX ORDER — INDOMETHACIN 50 MG/1
100 SUPPOSITORY RECTAL
Status: DISCONTINUED | OUTPATIENT
Start: 2023-01-01 | End: 2023-01-01 | Stop reason: HOSPADM

## 2023-01-01 RX ORDER — DAPAGLIFLOZIN 10 MG/1
1 TABLET, FILM COATED ORAL
COMMUNITY
Start: 2023-01-01 | End: 2023-01-01

## 2023-01-01 RX ORDER — OXYCODONE HYDROCHLORIDE 5 MG/1
5 TABLET ORAL
Status: DISCONTINUED | OUTPATIENT
Start: 2023-01-01 | End: 2023-01-01 | Stop reason: HOSPADM

## 2023-01-01 RX ORDER — OXYCODONE HYDROCHLORIDE 10 MG/1
10 TABLET ORAL
Status: DISCONTINUED | OUTPATIENT
Start: 2023-01-01 | End: 2023-01-01 | Stop reason: HOSPADM

## 2023-01-01 RX ORDER — CHOLECALCIFEROL (VITAMIN D3) 50 MCG
1 TABLET ORAL DAILY
COMMUNITY

## 2023-01-01 RX ORDER — HYDRALAZINE HYDROCHLORIDE 20 MG/ML
2.5-5 INJECTION INTRAMUSCULAR; INTRAVENOUS EVERY 10 MIN PRN
Status: DISCONTINUED | OUTPATIENT
Start: 2023-01-01 | End: 2023-01-01 | Stop reason: HOSPADM

## 2023-01-01 RX ORDER — IOPAMIDOL 755 MG/ML
60 INJECTION, SOLUTION INTRAVASCULAR ONCE
Status: COMPLETED | OUTPATIENT
Start: 2023-01-01 | End: 2023-01-01

## 2023-01-01 RX ORDER — VENLAFAXINE HYDROCHLORIDE 75 MG/1
225 CAPSULE, EXTENDED RELEASE ORAL EVERY MORNING
Status: DISCONTINUED | OUTPATIENT
Start: 2023-01-01 | End: 2023-01-01 | Stop reason: HOSPADM

## 2023-01-01 RX ORDER — GADOBUTROL 604.72 MG/ML
7.5 INJECTION INTRAVENOUS ONCE
Status: COMPLETED | OUTPATIENT
Start: 2023-01-01 | End: 2023-01-01

## 2023-01-01 RX ORDER — ONDANSETRON 8 MG/1
8 TABLET, ORALLY DISINTEGRATING ORAL
COMMUNITY
Start: 2021-08-12 | End: 2023-01-01

## 2023-01-01 RX ORDER — VITAMIN B COMPLEX
50 TABLET ORAL DAILY
Status: DISCONTINUED | OUTPATIENT
Start: 2023-01-01 | End: 2023-01-01 | Stop reason: HOSPADM

## 2023-01-01 RX ORDER — VENLAFAXINE HYDROCHLORIDE 75 MG/1
75 CAPSULE, EXTENDED RELEASE ORAL EVERY MORNING
COMMUNITY

## 2023-01-01 RX ORDER — LABETALOL HYDROCHLORIDE 5 MG/ML
10 INJECTION, SOLUTION INTRAVENOUS
Status: DISCONTINUED | OUTPATIENT
Start: 2023-01-01 | End: 2023-01-01 | Stop reason: HOSPADM

## 2023-01-01 RX ORDER — LABETALOL HYDROCHLORIDE 5 MG/ML
5-10 INJECTION, SOLUTION INTRAVENOUS
Status: DISCONTINUED | OUTPATIENT
Start: 2023-01-01 | End: 2023-01-01 | Stop reason: HOSPADM

## 2023-01-01 RX ORDER — BETAMETHASONE DIPROPIONATE 0.5 MG/G
OINTMENT, AUGMENTED TOPICAL 2 TIMES DAILY
Qty: 50 G | Refills: 1 | Status: SHIPPED | OUTPATIENT
Start: 2023-01-01 | End: 2023-01-01

## 2023-01-01 RX ORDER — HYDROMORPHONE HCL IN WATER/PF 6 MG/30 ML
0.2 PATIENT CONTROLLED ANALGESIA SYRINGE INTRAVENOUS EVERY 5 MIN PRN
Status: DISCONTINUED | OUTPATIENT
Start: 2023-01-01 | End: 2023-01-01 | Stop reason: HOSPADM

## 2023-01-01 RX ORDER — ATORVASTATIN CALCIUM 10 MG/1
10 TABLET, FILM COATED ORAL AT BEDTIME
Status: DISCONTINUED | OUTPATIENT
Start: 2023-01-01 | End: 2023-01-01 | Stop reason: HOSPADM

## 2023-01-01 RX ORDER — VENLAFAXINE HYDROCHLORIDE 75 MG/1
225 CAPSULE, EXTENDED RELEASE ORAL DAILY
Status: DISCONTINUED | OUTPATIENT
Start: 2023-01-01 | End: 2023-01-01 | Stop reason: HOSPADM

## 2023-01-01 RX ORDER — DAPAGLIFLOZIN 10 MG/1
10 TABLET, FILM COATED ORAL DAILY
Status: DISCONTINUED | OUTPATIENT
Start: 2023-01-01 | End: 2023-01-01 | Stop reason: HOSPADM

## 2023-01-01 RX ORDER — PIPERACILLIN SODIUM, TAZOBACTAM SODIUM 3; .375 G/15ML; G/15ML
3.38 INJECTION, POWDER, LYOPHILIZED, FOR SOLUTION INTRAVENOUS ONCE
Status: COMPLETED | OUTPATIENT
Start: 2023-01-01 | End: 2023-01-01

## 2023-01-01 RX ORDER — IOPAMIDOL 755 MG/ML
99 INJECTION, SOLUTION INTRAVASCULAR ONCE
Status: COMPLETED | OUTPATIENT
Start: 2023-01-01 | End: 2023-01-01

## 2023-01-01 RX ORDER — ALBUTEROL SULFATE 0.83 MG/ML
2.5 SOLUTION RESPIRATORY (INHALATION) EVERY 4 HOURS PRN
Status: DISCONTINUED | OUTPATIENT
Start: 2023-01-01 | End: 2023-01-01 | Stop reason: HOSPADM

## 2023-01-01 RX ORDER — SODIUM CHLORIDE, SODIUM LACTATE, POTASSIUM CHLORIDE, CALCIUM CHLORIDE 600; 310; 30; 20 MG/100ML; MG/100ML; MG/100ML; MG/100ML
INJECTION, SOLUTION INTRAVENOUS
Status: DISPENSED
Start: 2023-01-01 | End: 2023-01-01

## 2023-01-01 RX ORDER — LOVASTATIN 20 MG
40 TABLET ORAL AT BEDTIME
Status: DISCONTINUED | OUTPATIENT
Start: 2023-01-01 | End: 2023-01-01 | Stop reason: HOSPADM

## 2023-01-01 RX ORDER — POTASSIUM CHLORIDE 750 MG/1
20 TABLET, EXTENDED RELEASE ORAL DAILY
Status: ON HOLD | COMMUNITY
Start: 2023-01-01 | End: 2023-01-01

## 2023-01-01 RX ORDER — DAPAGLIFLOZIN 10 MG/1
10 TABLET, FILM COATED ORAL DAILY
Status: DISCONTINUED | OUTPATIENT
Start: 2023-01-01 | End: 2023-01-01

## 2023-01-01 RX ORDER — FLUDROCORTISONE ACETATE 0.1 MG/1
0.1 TABLET ORAL DAILY
Status: DISCONTINUED | OUTPATIENT
Start: 2023-01-01 | End: 2023-01-01

## 2023-01-01 RX ADMIN — HYDROMORPHONE HYDROCHLORIDE 0.5 MG: 1 INJECTION, SOLUTION INTRAMUSCULAR; INTRAVENOUS; SUBCUTANEOUS at 18:32

## 2023-01-01 RX ADMIN — POLYETHYLENE GLYCOL 3350 17 G: 17 POWDER, FOR SOLUTION ORAL at 18:33

## 2023-01-01 RX ADMIN — APIXABAN 5 MG: 5 TABLET, FILM COATED ORAL at 19:42

## 2023-01-01 RX ADMIN — ACETAMINOPHEN 1000 MG: 500 TABLET ORAL at 18:02

## 2023-01-01 RX ADMIN — PROPOFOL 140 MG: 10 INJECTION, EMULSION INTRAVENOUS at 13:50

## 2023-01-01 RX ADMIN — LOVASTATIN 40 MG: 20 TABLET ORAL at 23:18

## 2023-01-01 RX ADMIN — PIPERACILLIN AND TAZOBACTAM 3.38 G: 3; .375 INJECTION, POWDER, LYOPHILIZED, FOR SOLUTION INTRAVENOUS at 23:03

## 2023-01-01 RX ADMIN — SUGAMMADEX 200 MG: 100 INJECTION, SOLUTION INTRAVENOUS at 18:46

## 2023-01-01 RX ADMIN — PANCRELIPASE 2 CAPSULE: 60000; 12000; 38000 CAPSULE, DELAYED RELEASE PELLETS ORAL at 10:48

## 2023-01-01 RX ADMIN — VENLAFAXINE HYDROCHLORIDE 225 MG: 75 CAPSULE, EXTENDED RELEASE ORAL at 08:27

## 2023-01-01 RX ADMIN — PIPERACILLIN AND TAZOBACTAM 3.38 G: 3; .375 INJECTION, POWDER, LYOPHILIZED, FOR SOLUTION INTRAVENOUS at 05:21

## 2023-01-01 RX ADMIN — LEVOTHYROXINE SODIUM 112 MCG: 0.11 TABLET ORAL at 10:22

## 2023-01-01 RX ADMIN — FENTANYL CITRATE 50 MCG: 50 INJECTION, SOLUTION INTRAMUSCULAR; INTRAVENOUS at 15:26

## 2023-01-01 RX ADMIN — PANCRELIPASE 2 CAPSULE: 60000; 12000; 38000 CAPSULE, DELAYED RELEASE PELLETS ORAL at 08:36

## 2023-01-01 RX ADMIN — Medication 50 MCG: at 08:49

## 2023-01-01 RX ADMIN — PANCRELIPASE 3 CAPSULE: 60000; 12000; 38000 CAPSULE, DELAYED RELEASE PELLETS ORAL at 08:44

## 2023-01-01 RX ADMIN — CIPROFLOXACIN 500 MG: 500 TABLET ORAL at 08:33

## 2023-01-01 RX ADMIN — Medication 50 MCG: at 08:44

## 2023-01-01 RX ADMIN — VENLAFAXINE HYDROCHLORIDE 225 MG: 75 CAPSULE, EXTENDED RELEASE ORAL at 08:50

## 2023-01-01 RX ADMIN — PIPERACILLIN SODIUM AND TAZOBACTAM SODIUM 4.5 G: 4; .5 INJECTION, POWDER, LYOPHILIZED, FOR SOLUTION INTRAVENOUS at 22:06

## 2023-01-01 RX ADMIN — Medication 12.5 MG: at 19:47

## 2023-01-01 RX ADMIN — Medication 12.5 MG: at 19:57

## 2023-01-01 RX ADMIN — POTASSIUM CHLORIDE 40 MEQ: 1.5 POWDER, FOR SOLUTION ORAL at 05:48

## 2023-01-01 RX ADMIN — VENLAFAXINE HYDROCHLORIDE 225 MG: 150 CAPSULE, EXTENDED RELEASE ORAL at 10:05

## 2023-01-01 RX ADMIN — FLUDROCORTISONE ACETATE 0.2 MG: 0.1 TABLET ORAL at 09:30

## 2023-01-01 RX ADMIN — Medication 12.5 MG: at 08:27

## 2023-01-01 RX ADMIN — SODIUM CHLORIDE: 9 INJECTION, SOLUTION INTRAVENOUS at 23:09

## 2023-01-01 RX ADMIN — DAPAGLIFLOZIN 10 MG: 10 TABLET, FILM COATED ORAL at 08:50

## 2023-01-01 RX ADMIN — PIPERACILLIN AND TAZOBACTAM 3.38 G: 3; .375 INJECTION, POWDER, LYOPHILIZED, FOR SOLUTION INTRAVENOUS at 16:08

## 2023-01-01 RX ADMIN — CALCIUM CARBONATE 750 MG: 750 TABLET ORAL at 23:56

## 2023-01-01 RX ADMIN — PHENYLEPHRINE HYDROCHLORIDE 100 MCG: 10 INJECTION INTRAVENOUS at 08:24

## 2023-01-01 RX ADMIN — APIXABAN 5 MG: 5 TABLET, FILM COATED ORAL at 09:31

## 2023-01-01 RX ADMIN — CALCIUM CARBONATE 750 MG: 750 TABLET, CHEWABLE ORAL at 20:55

## 2023-01-01 RX ADMIN — CALCIUM CARBONATE 1000 MG: 500 TABLET, CHEWABLE ORAL at 19:24

## 2023-01-01 RX ADMIN — PIPERACILLIN SODIUM AND TAZOBACTAM SODIUM 4.5 G: 4; .5 INJECTION, POWDER, LYOPHILIZED, FOR SOLUTION INTRAVENOUS at 22:41

## 2023-01-01 RX ADMIN — VENLAFAXINE HYDROCHLORIDE 225 MG: 150 CAPSULE, EXTENDED RELEASE ORAL at 08:44

## 2023-01-01 RX ADMIN — LIDOCAINE HYDROCHLORIDE 60 MG: 20 INJECTION, SOLUTION INFILTRATION; PERINEURAL at 08:19

## 2023-01-01 RX ADMIN — SODIUM CHLORIDE, POTASSIUM CHLORIDE, SODIUM LACTATE AND CALCIUM CHLORIDE: 600; 310; 30; 20 INJECTION, SOLUTION INTRAVENOUS at 08:13

## 2023-01-01 RX ADMIN — IOPAMIDOL 99 ML: 755 INJECTION, SOLUTION INTRAVENOUS at 15:17

## 2023-01-01 RX ADMIN — Medication 50 MCG: at 09:03

## 2023-01-01 RX ADMIN — VENLAFAXINE HYDROCHLORIDE 225 MG: 150 CAPSULE, EXTENDED RELEASE ORAL at 09:31

## 2023-01-01 RX ADMIN — Medication 12.5 MG: at 20:52

## 2023-01-01 RX ADMIN — IOPAMIDOL 60 ML: 755 INJECTION, SOLUTION INTRAVENOUS at 16:27

## 2023-01-01 RX ADMIN — POTASSIUM & SODIUM PHOSPHATES POWDER PACK 280-160-250 MG 2 PACKET: 280-160-250 PACK at 16:21

## 2023-01-01 RX ADMIN — PANCRELIPASE 2 CAPSULE: 60000; 12000; 38000 CAPSULE, DELAYED RELEASE PELLETS ORAL at 08:34

## 2023-01-01 RX ADMIN — GADOBUTROL 7.5 ML: 604.72 INJECTION INTRAVENOUS at 13:49

## 2023-01-01 RX ADMIN — VENLAFAXINE HYDROCHLORIDE 225 MG: 75 CAPSULE, EXTENDED RELEASE ORAL at 08:33

## 2023-01-01 RX ADMIN — LEVOTHYROXINE SODIUM 112 MCG: 0.11 TABLET ORAL at 08:12

## 2023-01-01 RX ADMIN — PIPERACILLIN SODIUM AND TAZOBACTAM SODIUM 4.5 G: 4; .5 INJECTION, POWDER, LYOPHILIZED, FOR SOLUTION INTRAVENOUS at 10:43

## 2023-01-01 RX ADMIN — ONDANSETRON 4 MG: 2 INJECTION INTRAMUSCULAR; INTRAVENOUS at 09:02

## 2023-01-01 RX ADMIN — NOREPINEPHRINE BITARTRATE 6.4 MCG: 1 INJECTION, SOLUTION, CONCENTRATE INTRAVENOUS at 08:45

## 2023-01-01 RX ADMIN — PANCRELIPASE 3 CAPSULE: 60000; 12000; 38000 CAPSULE, DELAYED RELEASE PELLETS ORAL at 10:19

## 2023-01-01 RX ADMIN — LIDOCAINE HYDROCHLORIDE 100 MG: 20 INJECTION, SOLUTION INFILTRATION; PERINEURAL at 17:49

## 2023-01-01 RX ADMIN — PANCRELIPASE 2 CAPSULE: 60000; 12000; 38000 CAPSULE, DELAYED RELEASE PELLETS ORAL at 12:59

## 2023-01-01 RX ADMIN — PANCRELIPASE 1 CAPSULE: 60000; 12000; 38000 CAPSULE, DELAYED RELEASE PELLETS ORAL at 18:16

## 2023-01-01 RX ADMIN — Medication 3 MG: at 20:50

## 2023-01-01 RX ADMIN — CALCIUM CARBONATE 1000 MG: 500 TABLET, CHEWABLE ORAL at 20:50

## 2023-01-01 RX ADMIN — SODIUM CHLORIDE 1000 ML: 9 INJECTION, SOLUTION INTRAVENOUS at 16:23

## 2023-01-01 RX ADMIN — CIPROFLOXACIN 500 MG: 500 TABLET ORAL at 19:37

## 2023-01-01 RX ADMIN — CALCIUM CARBONATE 1000 MG: 500 TABLET, CHEWABLE ORAL at 19:42

## 2023-01-01 RX ADMIN — ROPIVACAINE HYDROCHLORIDE 4 ML: 5 INJECTION, SOLUTION EPIDURAL; INFILTRATION; PERINEURAL at 10:30

## 2023-01-01 RX ADMIN — PANCRELIPASE 3 CAPSULE: 60000; 12000; 38000 CAPSULE, DELAYED RELEASE PELLETS ORAL at 12:39

## 2023-01-01 RX ADMIN — VENLAFAXINE HYDROCHLORIDE 225 MG: 150 CAPSULE, EXTENDED RELEASE ORAL at 08:12

## 2023-01-01 RX ADMIN — LEVOTHYROXINE SODIUM 112 MCG: 0.11 TABLET ORAL at 09:02

## 2023-01-01 RX ADMIN — CALCIUM CARBONATE 1000 MG: 500 TABLET, CHEWABLE ORAL at 23:14

## 2023-01-01 RX ADMIN — CEFTRIAXONE SODIUM 2 G: 2 INJECTION, POWDER, FOR SOLUTION INTRAMUSCULAR; INTRAVENOUS at 18:14

## 2023-01-01 RX ADMIN — Medication 3 MG: at 23:15

## 2023-01-01 RX ADMIN — FENTANYL CITRATE 50 MCG: 50 INJECTION, SOLUTION INTRAMUSCULAR; INTRAVENOUS at 08:19

## 2023-01-01 RX ADMIN — PROPOFOL 100 MG: 10 INJECTION, EMULSION INTRAVENOUS at 17:49

## 2023-01-01 RX ADMIN — FUROSEMIDE 40 MG: 10 INJECTION, SOLUTION INTRAVENOUS at 17:12

## 2023-01-01 RX ADMIN — Medication 12.5 MG: at 19:37

## 2023-01-01 RX ADMIN — LOVASTATIN 40 MG: 20 TABLET ORAL at 21:11

## 2023-01-01 RX ADMIN — SODIUM CHLORIDE, POTASSIUM CHLORIDE, SODIUM LACTATE AND CALCIUM CHLORIDE: 600; 310; 30; 20 INJECTION, SOLUTION INTRAVENOUS at 13:42

## 2023-01-01 RX ADMIN — PIPERACILLIN SODIUM AND TAZOBACTAM SODIUM 4.5 G: 4; .5 INJECTION, POWDER, LYOPHILIZED, FOR SOLUTION INTRAVENOUS at 15:42

## 2023-01-01 RX ADMIN — ATORVASTATIN CALCIUM 10 MG: 10 TABLET, FILM COATED ORAL at 19:47

## 2023-01-01 RX ADMIN — CALCIUM CARBONATE 750 MG: 750 TABLET, CHEWABLE ORAL at 22:05

## 2023-01-01 RX ADMIN — PIPERACILLIN SODIUM AND TAZOBACTAM SODIUM 4.5 G: 4; .5 INJECTION, POWDER, LYOPHILIZED, FOR SOLUTION INTRAVENOUS at 10:22

## 2023-01-01 RX ADMIN — CALCIUM CARBONATE 750 MG: 750 TABLET, CHEWABLE ORAL at 22:52

## 2023-01-01 RX ADMIN — LEVOTHYROXINE SODIUM 112 MCG: 0.11 TABLET ORAL at 09:31

## 2023-01-01 RX ADMIN — LEVOTHYROXINE SODIUM 112 MCG: 0.11 TABLET ORAL at 08:33

## 2023-01-01 RX ADMIN — PROPOFOL 80 MG: 10 INJECTION, EMULSION INTRAVENOUS at 08:19

## 2023-01-01 RX ADMIN — APIXABAN 5 MG: 5 TABLET, FILM COATED ORAL at 08:12

## 2023-01-01 RX ADMIN — APIXABAN 5 MG: 5 TABLET, FILM COATED ORAL at 08:44

## 2023-01-01 RX ADMIN — SODIUM CHLORIDE 1000 ML: 9 INJECTION, SOLUTION INTRAVENOUS at 11:28

## 2023-01-01 RX ADMIN — ATORVASTATIN CALCIUM 10 MG: 10 TABLET, FILM COATED ORAL at 19:37

## 2023-01-01 RX ADMIN — PIPERACILLIN AND TAZOBACTAM 3.38 G: 3; .375 INJECTION, POWDER, LYOPHILIZED, FOR SOLUTION INTRAVENOUS at 10:48

## 2023-01-01 RX ADMIN — ONDANSETRON 4 MG: 2 INJECTION INTRAMUSCULAR; INTRAVENOUS at 16:27

## 2023-01-01 RX ADMIN — LIDOCAINE HYDROCHLORIDE ANHYDROUS 1 ML: 10 INJECTION, SOLUTION INFILTRATION at 10:46

## 2023-01-01 RX ADMIN — PANCRELIPASE 3 CAPSULE: 60000; 12000; 38000 CAPSULE, DELAYED RELEASE PELLETS ORAL at 08:11

## 2023-01-01 RX ADMIN — LOVASTATIN 40 MG: 20 TABLET ORAL at 21:00

## 2023-01-01 RX ADMIN — FLUDROCORTISONE ACETATE 0.1 MG: 0.1 TABLET ORAL at 10:29

## 2023-01-01 RX ADMIN — Medication 12.5 MG: at 07:43

## 2023-01-01 RX ADMIN — ATORVASTATIN CALCIUM 10 MG: 10 TABLET, FILM COATED ORAL at 23:56

## 2023-01-01 RX ADMIN — LEVOTHYROXINE SODIUM 112 MCG: 0.11 TABLET ORAL at 08:50

## 2023-01-01 RX ADMIN — Medication 50 MG: at 13:50

## 2023-01-01 RX ADMIN — SODIUM CHLORIDE, POTASSIUM CHLORIDE, SODIUM LACTATE AND CALCIUM CHLORIDE 500 ML: 600; 310; 30; 20 INJECTION, SOLUTION INTRAVENOUS at 09:39

## 2023-01-01 RX ADMIN — Medication 50 MCG: at 20:13

## 2023-01-01 RX ADMIN — SODIUM CHLORIDE, POTASSIUM CHLORIDE, SODIUM LACTATE AND CALCIUM CHLORIDE: 600; 310; 30; 20 INJECTION, SOLUTION INTRAVENOUS at 17:45

## 2023-01-01 RX ADMIN — ACETAMINOPHEN 500 MG: 500 TABLET ORAL at 01:31

## 2023-01-01 RX ADMIN — PANCRELIPASE 3 CAPSULE: 60000; 12000; 38000 CAPSULE, DELAYED RELEASE PELLETS ORAL at 12:08

## 2023-01-01 RX ADMIN — ACETAMINOPHEN 1000 MG: 500 TABLET ORAL at 19:56

## 2023-01-01 RX ADMIN — PANCRELIPASE 2 CAPSULE: 60000; 12000; 38000 CAPSULE, DELAYED RELEASE PELLETS ORAL at 08:27

## 2023-01-01 RX ADMIN — SODIUM CHLORIDE, POTASSIUM CHLORIDE, SODIUM LACTATE AND CALCIUM CHLORIDE 500 ML: 600; 310; 30; 20 INJECTION, SOLUTION INTRAVENOUS at 09:49

## 2023-01-01 RX ADMIN — Medication 50 MCG: at 19:47

## 2023-01-01 RX ADMIN — PHENYLEPHRINE HYDROCHLORIDE 150 MCG: 10 INJECTION INTRAVENOUS at 08:38

## 2023-01-01 RX ADMIN — VENLAFAXINE HYDROCHLORIDE 225 MG: 75 CAPSULE, EXTENDED RELEASE ORAL at 07:44

## 2023-01-01 RX ADMIN — Medication 12.5 MG: at 08:33

## 2023-01-01 RX ADMIN — DAPAGLIFLOZIN 10 MG: 10 TABLET, FILM COATED ORAL at 09:02

## 2023-01-01 RX ADMIN — FENTANYL CITRATE 50 MCG: 50 INJECTION, SOLUTION INTRAMUSCULAR; INTRAVENOUS at 13:50

## 2023-01-01 RX ADMIN — PANCRELIPASE 3 CAPSULE: 60000; 12000; 38000 CAPSULE, DELAYED RELEASE PELLETS ORAL at 07:45

## 2023-01-01 RX ADMIN — PANCRELIPASE 3 CAPSULE: 60000; 12000; 38000 CAPSULE, DELAYED RELEASE PELLETS ORAL at 09:32

## 2023-01-01 RX ADMIN — PANCRELIPASE 3 CAPSULE: 60000; 12000; 38000 CAPSULE, DELAYED RELEASE PELLETS ORAL at 14:11

## 2023-01-01 RX ADMIN — PHENYLEPHRINE HYDROCHLORIDE 150 MCG: 10 INJECTION INTRAVENOUS at 08:31

## 2023-01-01 RX ADMIN — DEXAMETHASONE SODIUM PHOSPHATE 4 MG: 4 INJECTION, SOLUTION INTRA-ARTICULAR; INTRALESIONAL; INTRAMUSCULAR; INTRAVENOUS; SOFT TISSUE at 14:06

## 2023-01-01 RX ADMIN — Medication 12.5 MG: at 20:18

## 2023-01-01 RX ADMIN — Medication 50 MG: at 17:51

## 2023-01-01 RX ADMIN — PANCRELIPASE 3 CAPSULE: 60000; 12000; 38000 CAPSULE, DELAYED RELEASE PELLETS ORAL at 17:09

## 2023-01-01 RX ADMIN — PANCRELIPASE 3 CAPSULE: 60000; 12000; 38000 CAPSULE, DELAYED RELEASE PELLETS ORAL at 10:48

## 2023-01-01 RX ADMIN — LOVASTATIN 40 MG: 20 TABLET ORAL at 22:51

## 2023-01-01 RX ADMIN — PIPERACILLIN AND TAZOBACTAM 3.38 G: 3; .375 INJECTION, POWDER, LYOPHILIZED, FOR SOLUTION INTRAVENOUS at 17:18

## 2023-01-01 RX ADMIN — SUGAMMADEX 200 MG: 100 INJECTION, SOLUTION INTRAVENOUS at 16:27

## 2023-01-01 RX ADMIN — NOREPINEPHRINE BITARTRATE 6.4 MCG: 1 INJECTION, SOLUTION, CONCENTRATE INTRAVENOUS at 08:41

## 2023-01-01 RX ADMIN — PIPERACILLIN AND TAZOBACTAM 3.38 G: 3; .375 INJECTION, POWDER, LYOPHILIZED, FOR SOLUTION INTRAVENOUS at 18:15

## 2023-01-01 RX ADMIN — POLYETHYLENE GLYCOL 3350 17 G: 17 POWDER, FOR SOLUTION ORAL at 20:18

## 2023-01-01 RX ADMIN — PROPOFOL 40 MG: 10 INJECTION, EMULSION INTRAVENOUS at 16:14

## 2023-01-01 RX ADMIN — SODIUM CHLORIDE 500 ML: 9 INJECTION, SOLUTION INTRAVENOUS at 22:53

## 2023-01-01 RX ADMIN — PIPERACILLIN AND TAZOBACTAM 3.38 G: 3; .375 INJECTION, POWDER, LYOPHILIZED, FOR SOLUTION INTRAVENOUS at 11:17

## 2023-01-01 RX ADMIN — ATORVASTATIN CALCIUM 10 MG: 10 TABLET, FILM COATED ORAL at 22:02

## 2023-01-01 RX ADMIN — Medication 50 MCG: at 20:52

## 2023-01-01 RX ADMIN — FENTANYL CITRATE 100 MCG: 50 INJECTION, SOLUTION INTRAMUSCULAR; INTRAVENOUS at 17:49

## 2023-01-01 RX ADMIN — PANCRELIPASE 3 CAPSULE: 60000; 12000; 38000 CAPSULE, DELAYED RELEASE PELLETS ORAL at 14:02

## 2023-01-01 RX ADMIN — LEVOFLOXACIN 500 MG: 5 INJECTION, SOLUTION INTRAVENOUS at 13:39

## 2023-01-01 RX ADMIN — DEXAMETHASONE SODIUM PHOSPHATE 8 MG: 4 INJECTION, SOLUTION INTRA-ARTICULAR; INTRALESIONAL; INTRAMUSCULAR; INTRAVENOUS; SOFT TISSUE at 18:19

## 2023-01-01 RX ADMIN — LEVOTHYROXINE SODIUM 112 MCG: 112 TABLET ORAL at 07:44

## 2023-01-01 RX ADMIN — PROPOFOL 40 MG: 10 INJECTION, EMULSION INTRAVENOUS at 08:20

## 2023-01-01 RX ADMIN — PANCRELIPASE 2 CAPSULE: 60000; 12000; 38000 CAPSULE, DELAYED RELEASE PELLETS ORAL at 14:18

## 2023-01-01 RX ADMIN — APIXABAN 5 MG: 5 TABLET, FILM COATED ORAL at 20:50

## 2023-01-01 RX ADMIN — IOPAMIDOL 95 ML: 755 INJECTION, SOLUTION INTRAVENOUS at 15:13

## 2023-01-01 RX ADMIN — ATORVASTATIN CALCIUM 10 MG: 10 TABLET, FILM COATED ORAL at 20:18

## 2023-01-01 RX ADMIN — PANCRELIPASE 3 CAPSULE: 60000; 12000; 38000 CAPSULE, DELAYED RELEASE PELLETS ORAL at 19:29

## 2023-01-01 RX ADMIN — POTASSIUM & SODIUM PHOSPHATES POWDER PACK 280-160-250 MG 2 PACKET: 280-160-250 PACK at 10:55

## 2023-01-01 RX ADMIN — ONDANSETRON 4 MG: 2 INJECTION INTRAMUSCULAR; INTRAVENOUS at 18:39

## 2023-01-01 RX ADMIN — LEVOTHYROXINE SODIUM 112 MCG: 112 TABLET ORAL at 08:27

## 2023-01-01 RX ADMIN — ATORVASTATIN CALCIUM 10 MG: 10 TABLET, FILM COATED ORAL at 22:16

## 2023-01-01 RX ADMIN — SENNOSIDES AND DOCUSATE SODIUM 2 TABLET: 50; 8.6 TABLET ORAL at 10:48

## 2023-01-01 RX ADMIN — FLUDROCORTISONE ACETATE 0.1 MG: 0.1 TABLET ORAL at 14:02

## 2023-01-01 RX ADMIN — POLYETHYLENE GLYCOL 3350 17 G: 17 POWDER, FOR SOLUTION ORAL at 22:15

## 2023-01-01 RX ADMIN — APIXABAN 5 MG: 5 TABLET, FILM COATED ORAL at 23:14

## 2023-01-01 RX ADMIN — LEVOFLOXACIN 500 MG: 5 INJECTION, SOLUTION INTRAVENOUS at 17:45

## 2023-01-01 RX ADMIN — PIPERACILLIN SODIUM AND TAZOBACTAM SODIUM 4.5 G: 4; .5 INJECTION, POWDER, LYOPHILIZED, FOR SOLUTION INTRAVENOUS at 11:03

## 2023-01-01 RX ADMIN — ACETAMINOPHEN 1000 MG: 500 TABLET ORAL at 20:01

## 2023-01-01 RX ADMIN — SODIUM CHLORIDE: 9 INJECTION, SOLUTION INTRAVENOUS at 15:37

## 2023-01-01 RX ADMIN — CALCIUM CARBONATE 750 MG: 750 TABLET ORAL at 22:16

## 2023-01-01 RX ADMIN — LEVOTHYROXINE SODIUM 112 MCG: 112 TABLET ORAL at 08:33

## 2023-01-01 RX ADMIN — Medication 50 MCG: at 08:33

## 2023-01-01 RX ADMIN — ATORVASTATIN CALCIUM 10 MG: 10 TABLET, FILM COATED ORAL at 20:51

## 2023-01-01 RX ADMIN — PANCRELIPASE 2 CAPSULE: 60000; 12000; 38000 CAPSULE, DELAYED RELEASE PELLETS ORAL at 09:15

## 2023-01-01 RX ADMIN — Medication 12.5 MG: at 19:51

## 2023-01-01 RX ADMIN — LEVOTHYROXINE SODIUM 112 MCG: 0.11 TABLET ORAL at 08:45

## 2023-01-01 RX ADMIN — Medication 50 MCG: at 19:37

## 2023-01-01 RX ADMIN — PANCRELIPASE 3 CAPSULE: 60000; 12000; 38000 CAPSULE, DELAYED RELEASE PELLETS ORAL at 12:20

## 2023-01-01 RX ADMIN — PIPERACILLIN AND TAZOBACTAM 3.38 G: 3; .375 INJECTION, POWDER, LYOPHILIZED, FOR SOLUTION INTRAVENOUS at 00:07

## 2023-01-01 RX ADMIN — Medication 12.5 MG: at 09:08

## 2023-01-01 RX ADMIN — FLUDROCORTISONE ACETATE 0.1 MG: 0.1 TABLET ORAL at 08:12

## 2023-01-01 RX ADMIN — IOPAMIDOL 90 ML: 755 INJECTION, SOLUTION INTRAVENOUS at 16:34

## 2023-01-01 RX ADMIN — VENLAFAXINE HYDROCHLORIDE 225 MG: 75 CAPSULE, EXTENDED RELEASE ORAL at 09:03

## 2023-01-01 RX ADMIN — BETAMETHASONE SODIUM PHOSPHATE AND BETAMETHASONE ACETATE 6 MG: 3; 3 INJECTION, SUSPENSION INTRA-ARTICULAR; INTRALESIONAL; INTRAMUSCULAR; SOFT TISSUE at 10:30

## 2023-01-01 RX ADMIN — POLYETHYLENE GLYCOL 3350 17 G: 17 POWDER, FOR SOLUTION ORAL at 23:56

## 2023-01-01 RX ADMIN — LEVOTHYROXINE SODIUM 112 MCG: 112 TABLET ORAL at 09:08

## 2023-01-01 RX ADMIN — LIDOCAINE HYDROCHLORIDE 3 ML: 10 INJECTION, SOLUTION EPIDURAL; INFILTRATION; INTRACAUDAL; PERINEURAL at 15:35

## 2023-01-01 RX ADMIN — PIPERACILLIN AND TAZOBACTAM 3.38 G: 3; .375 INJECTION, POWDER, LYOPHILIZED, FOR SOLUTION INTRAVENOUS at 06:23

## 2023-01-01 RX ADMIN — Medication 50 MG: at 08:20

## 2023-01-01 RX ADMIN — Medication 12.5 MG: at 09:03

## 2023-01-01 RX ADMIN — POLYETHYLENE GLYCOL 3350 17 G: 17 POWDER, FOR SOLUTION ORAL at 10:48

## 2023-01-01 RX ADMIN — Medication 50 MCG: at 09:31

## 2023-01-01 RX ADMIN — PANCRELIPASE 2 CAPSULE: 60000; 12000; 38000 CAPSULE, DELAYED RELEASE PELLETS ORAL at 15:37

## 2023-01-01 RX ADMIN — POTASSIUM & SODIUM PHOSPHATES POWDER PACK 280-160-250 MG 2 PACKET: 280-160-250 PACK at 07:43

## 2023-01-01 RX ADMIN — VENLAFAXINE HYDROCHLORIDE 225 MG: 75 CAPSULE, EXTENDED RELEASE ORAL at 09:08

## 2023-01-01 RX ADMIN — DAPAGLIFLOZIN 10 MG: 10 TABLET, FILM COATED ORAL at 08:33

## 2023-01-01 RX ADMIN — APIXABAN 5 MG: 5 TABLET, FILM COATED ORAL at 10:04

## 2023-01-01 RX ADMIN — PANCRELIPASE 3 CAPSULE: 60000; 12000; 38000 CAPSULE, DELAYED RELEASE PELLETS ORAL at 17:23

## 2023-01-01 RX ADMIN — PIPERACILLIN SODIUM AND TAZOBACTAM SODIUM 4.5 G: 4; .5 INJECTION, POWDER, LYOPHILIZED, FOR SOLUTION INTRAVENOUS at 16:21

## 2023-01-01 RX ADMIN — PANCRELIPASE 3 CAPSULE: 60000; 12000; 38000 CAPSULE, DELAYED RELEASE PELLETS ORAL at 17:27

## 2023-01-01 RX ADMIN — Medication 50 MCG: at 08:12

## 2023-01-01 RX ADMIN — PANCRELIPASE 3 CAPSULE: 60000; 12000; 38000 CAPSULE, DELAYED RELEASE PELLETS ORAL at 15:36

## 2023-01-01 RX ADMIN — PIPERACILLIN AND TAZOBACTAM 3.38 G: 3; .375 INJECTION, POWDER, LYOPHILIZED, FOR SOLUTION INTRAVENOUS at 18:37

## 2023-01-01 RX ADMIN — PANCRELIPASE 3 CAPSULE: 60000; 12000; 38000 CAPSULE, DELAYED RELEASE PELLETS ORAL at 18:33

## 2023-01-01 RX ADMIN — PIPERACILLIN SODIUM AND TAZOBACTAM SODIUM 4.5 G: 4; .5 INJECTION, POWDER, LYOPHILIZED, FOR SOLUTION INTRAVENOUS at 04:44

## 2023-01-01 RX ADMIN — APIXABAN 5 MG: 5 TABLET, FILM COATED ORAL at 19:24

## 2023-01-01 RX ADMIN — CALCIUM CARBONATE 750 MG: 750 TABLET ORAL at 20:23

## 2023-01-01 RX ADMIN — Medication 50 MCG: at 10:04

## 2023-01-01 RX ADMIN — LIDOCAINE HYDROCHLORIDE 2 G: 10 INJECTION, SOLUTION EPIDURAL; INFILTRATION; INTRACAUDAL; PERINEURAL at 05:32

## 2023-01-01 RX ADMIN — SUGAMMADEX 200 MG: 100 INJECTION, SOLUTION INTRAVENOUS at 09:05

## 2023-01-01 RX ADMIN — PANCRELIPASE 2 CAPSULE: 60000; 12000; 38000 CAPSULE, DELAYED RELEASE PELLETS ORAL at 18:35

## 2023-01-01 RX ADMIN — Medication 12.5 MG: at 08:50

## 2023-01-01 RX ADMIN — SODIUM CHLORIDE, PRESERVATIVE FREE 75 ML: 5 INJECTION INTRAVENOUS at 15:17

## 2023-01-01 RX ADMIN — PIPERACILLIN AND TAZOBACTAM 3.38 G: 3; .375 INJECTION, POWDER, LYOPHILIZED, FOR SOLUTION INTRAVENOUS at 11:44

## 2023-01-01 RX ADMIN — LIDOCAINE HYDROCHLORIDE 80 MG: 20 INJECTION, SOLUTION INFILTRATION; PERINEURAL at 13:50

## 2023-01-01 ASSESSMENT — ACTIVITIES OF DAILY LIVING (ADL)
EQUIPMENT_CURRENTLY_USED_AT_HOME: WALKER, ROLLING
ADLS_ACUITY_SCORE: 35
ADLS_ACUITY_SCORE: 34
ADLS_ACUITY_SCORE: 29
WALKING_OR_CLIMBING_STAIRS_DIFFICULTY: NO
TRANSFERRING: 1-->ASSISTANCE (EQUIPMENT/PERSON) NEEDED (NOT DEVELOPMENTALLY APPROPRIATE)
ADLS_ACUITY_SCORE: 35
ADLS_ACUITY_SCORE: 28
ADLS_ACUITY_SCORE: 35
ADLS_ACUITY_SCORE: 34
ADLS_ACUITY_SCORE: 35
ADLS_ACUITY_SCORE: 27
DIFFICULTY_EATING/SWALLOWING: NO
ADLS_ACUITY_SCORE: 34
ADLS_ACUITY_SCORE: 29
DEPENDENT_IADLS:: CLEANING;COOKING;LAUNDRY;MEDICATION MANAGEMENT;TRANSPORTATION
ADLS_ACUITY_SCORE: 27
VISION_MANAGEMENT: GLASSES
WALKING_OR_CLIMBING_STAIRS_DIFFICULTY: YES
ADLS_ACUITY_SCORE: 34
ADLS_ACUITY_SCORE: 28
ADLS_ACUITY_SCORE: 35
WALKING_OR_CLIMBING_STAIRS: AMBULATION DIFFICULTY, REQUIRES EQUIPMENT
ADLS_ACUITY_SCORE: 34
ADLS_ACUITY_SCORE: 35
ADLS_ACUITY_SCORE: 34
ADLS_ACUITY_SCORE: 27
ADLS_ACUITY_SCORE: 30
ADLS_ACUITY_SCORE: 25
ADLS_ACUITY_SCORE: 29
ADLS_ACUITY_SCORE: 30
FALL_HISTORY_WITHIN_LAST_SIX_MONTHS: YES
ADLS_ACUITY_SCORE: 34
ADLS_ACUITY_SCORE: 34
ADLS_ACUITY_SCORE: 27
ADLS_ACUITY_SCORE: 35
ADLS_ACUITY_SCORE: 34
ADLS_ACUITY_SCORE: 35
ADLS_ACUITY_SCORE: 27
ADLS_ACUITY_SCORE: 35
ADLS_ACUITY_SCORE: 34
WEAR_GLASSES_OR_BLIND: YES
CONCENTRATING,_REMEMBERING_OR_MAKING_DECISIONS_DIFFICULTY: YES
ADLS_ACUITY_SCORE: 34
ADLS_ACUITY_SCORE: 34
ADLS_ACUITY_SCORE: 27
ADLS_ACUITY_SCORE: 27
ADLS_ACUITY_SCORE: 28
ADLS_ACUITY_SCORE: 29
ADLS_ACUITY_SCORE: 34
ADLS_ACUITY_SCORE: 27
ADLS_ACUITY_SCORE: 35
ADLS_ACUITY_SCORE: 27
ADLS_ACUITY_SCORE: 35
CONCENTRATING,_REMEMBERING_OR_MAKING_DECISIONS_DIFFICULTY: NO
ADLS_ACUITY_SCORE: 29
TOILETING_ISSUES: NO
ADLS_ACUITY_SCORE: 27
ADLS_ACUITY_SCORE: 35
ADLS_ACUITY_SCORE: 29
ADLS_ACUITY_SCORE: 34
ADLS_ACUITY_SCORE: 27
ADLS_ACUITY_SCORE: 34
ADLS_ACUITY_SCORE: 27
ADLS_ACUITY_SCORE: 30
ADLS_ACUITY_SCORE: 27
ADLS_ACUITY_SCORE: 27
TOILETING_ISSUES: NO
ADLS_ACUITY_SCORE: 34
ADLS_ACUITY_SCORE: 34
ADLS_ACUITY_SCORE: 33
ADLS_ACUITY_SCORE: 29
ADLS_ACUITY_SCORE: 30
ADLS_ACUITY_SCORE: 34
ADLS_ACUITY_SCORE: 34
WEAR_GLASSES_OR_BLIND: YES
ADLS_ACUITY_SCORE: 30
ADLS_ACUITY_SCORE: 34
ADLS_ACUITY_SCORE: 27
ADLS_ACUITY_SCORE: 35
ADLS_ACUITY_SCORE: 29
ADLS_ACUITY_SCORE: 27
DRESSING/BATHING_DIFFICULTY: YES
ADLS_ACUITY_SCORE: 28
ADLS_ACUITY_SCORE: 34
ADLS_ACUITY_SCORE: 34
ADLS_ACUITY_SCORE: 35
ADLS_ACUITY_SCORE: 34
ADLS_ACUITY_SCORE: 34
BATHING: 1-->ASSISTANCE NEEDED
ADLS_ACUITY_SCORE: 28
ADLS_ACUITY_SCORE: 35
ADLS_ACUITY_SCORE: 35
ADLS_ACUITY_SCORE: 34
ADLS_ACUITY_SCORE: 27
ADLS_ACUITY_SCORE: 29
ADLS_ACUITY_SCORE: 30
ADLS_ACUITY_SCORE: 27
DEPENDENT_IADLS:: CLEANING;COOKING;LAUNDRY;MEDICATION MANAGEMENT;TRANSPORTATION
ADLS_ACUITY_SCORE: 35
ADLS_ACUITY_SCORE: 34
ADLS_ACUITY_SCORE: 27
ADLS_ACUITY_SCORE: 34
ADLS_ACUITY_SCORE: 27
ADLS_ACUITY_SCORE: 35
CHANGE_IN_FUNCTIONAL_STATUS_SINCE_ONSET_OF_CURRENT_ILLNESS/INJURY: YES
ADLS_ACUITY_SCORE: 34
ADLS_ACUITY_SCORE: 34
VISION_MANAGEMENT: GLASSES
ADLS_ACUITY_SCORE: 29
DRESS: 1-->ASSISTANCE (EQUIPMENT/PERSON) NEEDED
ADLS_ACUITY_SCORE: 30
DRESSING/BATHING: BATHING DIFFICULTY, REQUIRES EQUIPMENT
ADLS_ACUITY_SCORE: 34
ADLS_ACUITY_SCORE: 30
ADLS_ACUITY_SCORE: 35
ADLS_ACUITY_SCORE: 34
ADLS_ACUITY_SCORE: 34
ADLS_ACUITY_SCORE: 25
ADLS_ACUITY_SCORE: 34
ADLS_ACUITY_SCORE: 27
ADLS_ACUITY_SCORE: 28
ADLS_ACUITY_SCORE: 34
ADLS_ACUITY_SCORE: 27
ADLS_ACUITY_SCORE: 35
ADLS_ACUITY_SCORE: 30
ADLS_ACUITY_SCORE: 28
DRESSING/BATHING_DIFFICULTY: NO
ADLS_ACUITY_SCORE: 34
ADLS_ACUITY_SCORE: 29
ADLS_ACUITY_SCORE: 34
ADLS_ACUITY_SCORE: 35
ADLS_ACUITY_SCORE: 27
ADLS_ACUITY_SCORE: 35
DOING_ERRANDS_INDEPENDENTLY_DIFFICULTY: YES
DOING_ERRANDS_INDEPENDENTLY_DIFFICULTY: YES
ADLS_ACUITY_SCORE: 35
ADLS_ACUITY_SCORE: 27
ADLS_ACUITY_SCORE: 35
ADLS_ACUITY_SCORE: 35
ADLS_ACUITY_SCORE: 27
DIFFICULTY_EATING/SWALLOWING: NO
ADLS_ACUITY_SCORE: 34
ADLS_ACUITY_SCORE: 34
NUMBER_OF_TIMES_PATIENT_HAS_FALLEN_WITHIN_LAST_SIX_MONTHS: 3
ADLS_ACUITY_SCORE: 27
ADLS_ACUITY_SCORE: 35
ADLS_ACUITY_SCORE: 35
ADLS_ACUITY_SCORE: 34
ADLS_ACUITY_SCORE: 35
ADLS_ACUITY_SCORE: 27
ADLS_ACUITY_SCORE: 35
ADLS_ACUITY_SCORE: 35
ADLS_ACUITY_SCORE: 28
ADLS_ACUITY_SCORE: 25
ADLS_ACUITY_SCORE: 29
ADLS_ACUITY_SCORE: 34
DRESS: 1-->ASSISTANCE (EQUIPMENT/PERSON) NEEDED (NOT DEVELOPMENTALLY APPROPRIATE)
ADLS_ACUITY_SCORE: 34
ADLS_ACUITY_SCORE: 35
ADLS_ACUITY_SCORE: 34
ADLS_ACUITY_SCORE: 35
ADLS_ACUITY_SCORE: 30
ADLS_ACUITY_SCORE: 34
ADLS_ACUITY_SCORE: 28
ADLS_ACUITY_SCORE: 34
TRANSFERRING: 1-->ASSISTANCE (EQUIPMENT/PERSON) NEEDED
ADLS_ACUITY_SCORE: 35
ADLS_ACUITY_SCORE: 35
ADLS_ACUITY_SCORE: 29
ADLS_ACUITY_SCORE: 30
ADLS_ACUITY_SCORE: 35
ADLS_ACUITY_SCORE: 35
ADLS_ACUITY_SCORE: 34
ADLS_ACUITY_SCORE: 34

## 2023-01-01 ASSESSMENT — CONF VISUAL FIELD
OD_INFERIOR_TEMPORAL_RESTRICTION: 0
OS_INFERIOR_NASAL_RESTRICTION: 0
OD_SUPERIOR_TEMPORAL_RESTRICTION: 0
OS_NORMAL: 1
OD_INFERIOR_NASAL_RESTRICTION: 0
OD_SUPERIOR_NASAL_RESTRICTION: 0
OS_SUPERIOR_NASAL_RESTRICTION: 0
OS_INFERIOR_TEMPORAL_RESTRICTION: 0
OD_NORMAL: 1
OS_SUPERIOR_TEMPORAL_RESTRICTION: 0

## 2023-01-01 ASSESSMENT — REFRACTION_WEARINGRX
OS_CYLINDER: +1.25
OS_SPHERE: -2.00
SPECS_TYPE: PAL
OS_AXIS: 130
OD_SPHERE: -2.00
OD_AXIS: 020
OD_CYLINDER: +3.75
OD_ADD: +2.50
OS_ADD: +2.50

## 2023-01-01 ASSESSMENT — ENCOUNTER SYMPTOMS
ABDOMINAL PAIN: 0
DYSRHYTHMIAS: 1
HEADACHES: 0
APPETITE CHANGE: 0
DYSURIA: 1
CONFUSION: 1
DYSRHYTHMIAS: 1
BACK PAIN: 1
DIFFICULTY URINATING: 0
ARTHRALGIAS: 0
AGITATION: 0
ABDOMINAL DISTENTION: 0
DYSRHYTHMIAS: 1

## 2023-01-01 ASSESSMENT — PATIENT HEALTH QUESTIONNAIRE - PHQ9
SUM OF ALL RESPONSES TO PHQ QUESTIONS 1-9: 5
SUM OF ALL RESPONSES TO PHQ QUESTIONS 1-9: 5
10. IF YOU CHECKED OFF ANY PROBLEMS, HOW DIFFICULT HAVE THESE PROBLEMS MADE IT FOR YOU TO DO YOUR WORK, TAKE CARE OF THINGS AT HOME, OR GET ALONG WITH OTHER PEOPLE: VERY DIFFICULT
SUM OF ALL RESPONSES TO PHQ QUESTIONS 1-9: 14
10. IF YOU CHECKED OFF ANY PROBLEMS, HOW DIFFICULT HAVE THESE PROBLEMS MADE IT FOR YOU TO DO YOUR WORK, TAKE CARE OF THINGS AT HOME, OR GET ALONG WITH OTHER PEOPLE: SOMEWHAT DIFFICULT
SUM OF ALL RESPONSES TO PHQ QUESTIONS 1-9: 14

## 2023-01-01 ASSESSMENT — PAIN SCALES - GENERAL
PAINLEVEL: NO PAIN (0)

## 2023-01-01 ASSESSMENT — VISUAL ACUITY
CORRECTION_TYPE: GLASSES
OD_CC+: -2
OS_CC: J1
OD_CC: 20/20
METHOD: SNELLEN - LINEAR
OS_CC: 20/20
OS_CC+: -2
OD_CC: J1-

## 2023-01-01 ASSESSMENT — TONOMETRY
IOP_METHOD: APPLANATION
OS_IOP_MMHG: 11
OD_IOP_MMHG: 13

## 2023-01-01 ASSESSMENT — CUP TO DISC RATIO
OD_RATIO: 0.3
OS_RATIO: 0.3

## 2023-01-01 ASSESSMENT — EXTERNAL EXAM - LEFT EYE: OS_EXAM: NORMAL

## 2023-01-01 ASSESSMENT — REFRACTION_MANIFEST
OD_AXIS: 023
OS_SPHERE: -2.00
OS_ADD: +2.50
OD_SPHERE: -2.00
OS_AXIS: 124
OD_ADD: +2.50
OD_CYLINDER: +3.75
OS_CYLINDER: +1.50

## 2023-01-01 ASSESSMENT — NEW YORK HEART ASSOCIATION (NYHA) CLASSIFICATION
NYHA FUNCTIONAL CLASS: II

## 2023-01-01 ASSESSMENT — LIFESTYLE VARIABLES
TOBACCO_USE: 0

## 2023-01-01 ASSESSMENT — SLIT LAMP EXAM - LIDS
COMMENTS: NORMAL
COMMENTS: NORMAL

## 2023-01-01 ASSESSMENT — EXTERNAL EXAM - RIGHT EYE: OD_EXAM: NORMAL

## 2023-01-04 NOTE — ORAL ONC MGMT
Oral Chemotherapy Monitoring Program    Subjective/Objective:  Tyrel Harrell is a 72 year old male contacted by phone for a follow-up visit for oral chemotherapy.  Tyrel returned our call regarding voicemail that was left last week. Reviewed lab results with Tyrel. Tyrel reports things overall are going well, although he does note an increase in fatigue. Tyrel takes a 1.5 hour nap every day after lunch and can't do anything too physical. Yesterday he was out refilling the bird feedings for 10 minutes and was very exhausted afterwards. Tyrel briefly mentioned that his rash (not new) is manageable as well as some lightheadedness that is also unchanged and manageable. Really only the fatigue is noted to have increased at this time - Tyrel will continue to monitor and let us know if this becomes more significant. Tyrel doesn't have any follow-up currently scheduled with Dr Parker but does have an appointment with Radiation Oncology at [place] next week - will pass that along to Dr Parker per Tyrel's request.    ORAL CHEMOTHERAPY 12/9/2022 12/23/2022 12/23/2022 12/30/2022 12/30/2022 1/4/2023 1/4/2023   Assessment Type Initial Follow up Refill Refill Lab Monitoring Lab Monitoring Incoming phone call;Monthly Follow up Incoming phone call;Monthly Follow up   Diagnosis Code Neuroendocrine Cancer Neuroendocrine Cancer Neuroendocrine Cancer Neuroendocrine Cancer Neuroendocrine Cancer Neuroendocrine Cancer Neuroendocrine Cancer   Providers Dr Keith Parker   [facility]   Drug Name Temodar (temozolomide) Xeloda (capecitabine) Temodar (temozolomide) Temodar (temozolomide) Xeloda (capecitabine) Xeloda (capecitabine) Temodar (temozolomide)   Dose 400 mg (No Data) 400 mg 400 mg (No Data) (No Data) 400 mg   Current Schedule Daily BID Daily Daily BID BID Daily   Cycle Details (No Data) 2 weeks on, 2 weeks off (No  "Data) (No Data) 2 weeks on, 2 weeks off 2 weeks on, 2 weeks off (No Data)   Start Date of Last Cycle - - - - - - -   Planned next cycle start date - - - - - - -   Doses missed in last 2 weeks 0 - - - - - -   Adherence Assessment Adherent - - - - - -   Adverse Effects Palmar-plantar Erythrodysethesia Syndrome - - - - - -   Palmar-plantar Erythrodysethesia syndrome[hand-foot syndrome] Grade 1 - - - - - -   Pharmacist Intervention(Palmar-plantar) No - - - - - -   Any new drug interactions? - - - - - - -   Is the dose as ordered appropriate for the patient? - - - - - - -       Last PHQ-2 Score on record:   PHQ-2 ( 1999 [employer]) 12/20/2022 12/20/2022   Q1: Little interest or pleasure in doing things 0 -   Q2: Feeling down, depressed or hopeless 0 -   PHQ-2 Score 0 -   Q1: Little interest or pleasure in doing things Not at all Not at all   Q2: Feeling down, depressed or hopeless Not at all Not at all   PHQ-2 Score 0 0       Vitals:  BP:   BP Readings from Last 1 Encounters:   12/20/22 (!) 83/47     Wt Readings from Last 1 Encounters:   12/20/22 79.8 kg (175 lb 14.4 oz)     Estimated body surface area is 1.96 meters squared as calculated from the following:    Height as of 12/20/22: 1.727 m (5' 8\").    Weight as of 12/20/22: 79.8 kg (175 lb 14.4 oz).    Labs:  _  Result Component Current Result Ref Range   Sodium 139 (12/30/2022) 136 - 145 mmol/L     _  Result Component Current Result Ref Range   Potassium 4.9 (12/30/2022) 3.4 - 5.3 mmol/L     _  Result Component Current Result Ref Range   Calcium 9.0 (12/30/2022) 8.8 - 10.2 mg/dL     No results found for Mag within last 30 days.     No results found for Phos within last 30 days.     _  Result Component Current Result Ref Range   Albumin 3.8 (12/30/2022) 3.5 - 5.2 g/dL     _  Result Component Current Result Ref Range   Urea Nitrogen 21.6 (12/30/2022) 8.0 - 23.0 mg/dL     _  Result Component Current Result Ref Range   Creatinine 1.24 (H) (12/30/2022) 0.67 - 1.17 mg/dL "     _  Result Component Current Result Ref Range   AST 28 (12/30/2022) 10 - 50 U/L     _  Result Component Current Result Ref Range   ALT 16 (12/30/2022) 10 - 50 U/L     _  Result Component Current Result Ref Range   Bilirubin Total 0.5 (12/30/2022) <=1.2 mg/dL     _  Result Component Current Result Ref Range   WBC Count 5.7 (12/30/2022) 4.0 - 11.0 10e3/uL     _  Result Component Current Result Ref Range   Hemoglobin 10.8 (L) (12/30/2022) 13.3 - 17.7 g/dL     _  Result Component Current Result Ref Range   Platelet Count 192 (12/30/2022) 150 - 450 10e3/uL     No results found for ANC within last 30 days.     _  Result Component Current Result Ref Range   Absolute Neutrophils 4.3 (12/30/2022) 1.6 - 8.3 10e3/uL          Assessment/Plan:  Continue capecitibine and temozolomide as planned, monitor fatigue and Tyrel will alert us if it increases.    Follow-Up:  Per CareEverywhere plan may change depending on RadOnc conversation at [place] - no followup with Dr Parker currently scheduled.    Maricel Arroyo, PharmD, BCPS  Oral Chemotherapy Monitoring Program  [facility]  [phone]

## 2023-01-10 NOTE — TELEPHONE ENCOUNTER
Medication:Ondansetron  Last prescribing provider:previous cancer provider  Last clinic visit date: 11/14/22 Dr. Parker  Recommendations for requested medication:for nausea  Any other pertinent information:routed to dr. Parker

## 2023-01-20 NOTE — TELEPHONE ENCOUNTER
Patient calling with questions about his Vitamin D3 supplement changes. Patient read the message from Dr. Cho in the lab results with instruction for the Vitamin D3 supplement. Patient verbalized understanding and agreement with the plan of care. Information only-No triage.     MAJOR BRUNSON RN      Reason for Disposition    Information only question and nurse able to answer    Additional Information    Negative: Nursing judgment    Negative: Nursing judgment    Negative: Nursing judgment    Negative: Nursing judgment    Protocols used: INFORMATION ONLY CALL - NO TRIAGE-A-OH

## 2023-01-24 NOTE — PROGRESS NOTES
[facility]: Cancer Care                                                                                          Received call from patient with questions regarding steroid shot he is scheduled for in shoulder tomorrow. Confirmed he is able to have injection with no concerns of interaction with chemotherapy.     Discussed with patient plan for follow-up with Dr Parker. Patient reports he had CT at [place] in December. We will request images and plan to have patient have follow-up visit with Dr Parker on 1/26.     Answered all questions to their stated satisfaction.      Halle Narvaez RN, BSN, OCN   RN Care Coordinator   [facility]

## 2023-01-24 NOTE — LETTER
1/24/2023         RE: Tyrel Harrell  [address]  [place] [address]        Dear Colleague,    Thank you for referring your patient, Tyrel Harrell, to the [facility]. Please see a copy of my visit note below.    ASSESSMENT & PLAN    Tyrel was seen today for pain.    Diagnoses and all orders for this visit:    Chronic right shoulder pain  -     Orthopedic  Referral      This issue is acute on chronic and Worsening.    # Right Rotator Cuff Irritation: Tyrel Harrell  was seen today for acute on chronic . Symptoms had been going on for several years, worsening over the past few months. He is undergoing chemotherapy for a neuroendocrine cancer. On examination there are positive findings of tenderness to palpation over the supraspinatus rotator cuff tendon, pain with overhead range of motion. Imaging findings showed no shoulder joint arthritis. Likely cause of patient's condition due to irritated rotator cuff tendon. Other possible conditions contributing to symptoms include shoulder arthritis but less likely given negative shoulder x-rays.  Counseled patient on nature of condition and treatment options.  Given this plan as below, follow-up 3-4 weeks if not improving     Image Findings: no right shoulder arthritis  Treatment: Activities as tolerated, home exercises given today  Medications/Injections: Limited tylenol/ibuprofen for pain for 1-2 weeks, Topical Voltaren gel, right subacromial bursa steroid injection  Follow-up: In one month if symptoms do not improve, sooner if worsening  Can consider further evaluation, shoulder joint steroid injection     Tiago Lester MD  [facility]    -----  Chief Complaint   Patient presents with     Right Shoulder - Pain       SUBJECTIVE  Tyrel Harrell is a/an 72 year old male who is seen in consultation at the request of  Carmen Cho M.D. for evaluation of  "right shoulder pain.     The patient is seen with their wife, Kacie.  The patient is Right handed    Onset: Several years(s) ago. Reports insidious onset without acute precipitating event. Reviewed PCP note  Location of Pain: right lateral shoulder  Worsened by: sleeping, shoulder flexion, lifting     Better with: nothing   Treatments tried: rest/activity avoidance  Associated symptoms: numbness, tingling (chemotherapy) lyphedema 32 years  Currently undergoing neuroendocrine cancer    Orthopedic/Surgical history: YES - previous symptoms 4 years ago, resolved with injection lasted 1-2 years   Social History/Occupation: Retired  Pain affecting sleep    No family history pertinent to patient's problem today.      REVIEW OF SYSTEMS:  Review of Systems  Constitutional, HEENT, cardiovascular, pulmonary, GI, , musculoskeletal, neuro, skin, endocrine and psych systems are negative, except as otherwise noted.    OBJECTIVE:  Ht 1.727 m (5' 8\")   BMI 26.75 kg/m     General: healthy, alert and in no distress  HEENT: no scleral icterus or conjunctival erythema  Skin: no suspicious lesions or rash. No jaundice.  CV: distal perfusion intact    Resp: normal respiratory effort without conversational dyspnea   Psych: normal mood and affect  Gait: normal steady gait with appropriate coordination and balance   Neuro: Normal light sensory exam of right upper extremity     Ortho Exam   RIGHT SHOULDER  Inspection:    no swelling, bruising, discoloration, or obvious deformity or asymmetry  Palpation:    Tender about the supraspinatus insertion. Remainder of bony and tendinous landmarks are nontender.  Active Range of Motion:     Abduction 1650, FF 1650, , IR L1.    Strength:    Scapular plane abduction 5/5, painful,  ER 5/5, IR 5/5, biceps 5/5, triceps 5/5  Special Tests:    Positive: supraspinatus (empty can), Dimple Figueroakins    Negative: Midway's, Speed's and Yergason's    CERVICAL SPINE  Inspection:    normal cervical lordosis " present, rounded shoulders, forward head posture  Palpation:    Nontender.  Range of Motion:     Flexion full    Extension full    Right side bend full    Left side bend full    Right rotation full    Left rotation full  Strength:    Full strength throughout all neck muscles  Special Tests:    Positive: None    Negative: Spurling's (bilateral)    RADIOLOGY:  I independently ordered, visualized and reviewed these images with the patient  Mild shoulder and AC joint arthritis      Review of external notes as documented elsewhere in note  Review of the result(s) of each unique test - right shoulder x-rays       Disclaimer: This note consists of symbols derived from keyboarding, dictation and/or voice recognition software. As a result, there may be errors in the script that have gone undetected. Please consider this when interpreting information found in this chart.    Large Joint Injection/Arthocentesis: R subacromial bursa    Date/Time: 1/24/2023 10:30 AM  Performed by: Tiago Lester MD  Authorized by: Tiago Lester MD     Indications:  Pain  Needle Size:  25 G  Guidance: ultrasound    Approach:  Lateral  Location:  Shoulder      Site:  R subacromial bursa  Medications:  6 mg betamethasone acet & sod phos 6 (3-3) MG/ML; 4 mL ropivacaine 5 MG/ML  Outcome:  Tolerated well, no immediate complications  Procedure discussed: discussed risks, benefits, and alternatives    Consent Given by:  Patient  Timeout: timeout called immediately prior to procedure    Prep: patient was prepped and draped in usual sterile fashion     Ultrasound images of procedure were permanently stored.               Again, thank you for allowing me to participate in the care of your patient.        Sincerely,        Tiago Lester MD

## 2023-01-24 NOTE — PATIENT INSTRUCTIONS
# Right Rotator Cuff Irritation: Tyrel Harrell  was seen today for acute on chronic . Symptoms had been going on for several years, worsening over the past few months. He is undergoing chemotherapy for a neuroendocrine cancer. On examination there are positive findings of tenderness to palpation over the supraspinatus rotator cuff tendon, pain with overhead range of motion. Imaging findings showed no shoulder joint arthritis. Likely cause of patient's condition due to irritated rotator cuff tendon. Other possible conditions contributing to symptoms include shoulder arthritis but less likely given negative shoulder x-rays.  Counseled patient on nature of condition and treatment options.  Given this plan as below, follow-up 3-4 weeks if not improving     Image Findings: no right shoulder arthritis  Treatment: Activities as tolerated, home exercises given today  Medications/Injections: Limited tylenol/ibuprofen for pain for 1-2 weeks, Topical Voltaren gel, right subacromial bursa steroid injection  Follow-up: In one month if symptoms do not improve, sooner if worsening  Can consider further evaluation, shoulder joint steroid injection    Please call [phone]   Ask for my team if you have any questions or concerns    If you have not yet received the influenza vaccine but would like to get one, please call  [phone] or you can schedule via [employer]    It was great seeing you today!    Tiago Lester MD, [facility] Injection Discharge Instructions    Procedure: right subacromial bursa steroid injection    You may shower, however avoid swimming, tub baths or hot tubs for 24 hours following your procedure  You may have a mild to moderate increase in pain for several days following the injection.  It may take up to 14 days for the steroid medication to start working although you may feel the effect as early as a few days after the procedure.  You may use ice packs for 10-15 minutes, 3 to 4 times a day  at the injection site for comfort  You may use anti-inflammatory medications (such as Ibuprofen or Aleve or Advil) or Tylenol for pain control if necessary  If you were fasting, you may resume your normal diet and medications after the procedure  If you have diabetes, check your blood sugar more frequently than usual as your blood sugar may be higher than normal for 10-14 days following a steroid injection. Contact your doctor who manages your diabetes if your blood sugar is higher than usual    If you experience any of the following, call [facility] @ [phone] or [phone]  -Fever over 100 degree F  -Swelling, bleeding, redness, drainage, warmth at the injection site  - New or worsening pain

## 2023-01-24 NOTE — PROGRESS NOTES
ASSESSMENT & PLAN    Tyrel was seen today for pain.    Diagnoses and all orders for this visit:    Chronic right shoulder pain  -     Orthopedic  Referral      This issue is acute on chronic and Worsening.    # Right Rotator Cuff Irritation: Tyrel Harrell  was seen today for acute on chronic . Symptoms had been going on for several years, worsening over the past few months. He is undergoing chemotherapy for a neuroendocrine cancer. On examination there are positive findings of tenderness to palpation over the supraspinatus rotator cuff tendon, pain with overhead range of motion. Imaging findings showed no shoulder joint arthritis. Likely cause of patient's condition due to irritated rotator cuff tendon. Other possible conditions contributing to symptoms include shoulder arthritis but less likely given negative shoulder x-rays.  Counseled patient on nature of condition and treatment options.  Given this plan as below, follow-up 3-4 weeks if not improving     Image Findings: no right shoulder arthritis  Treatment: Activities as tolerated, home exercises given today  Medications/Injections: Limited tylenol/ibuprofen for pain for 1-2 weeks, Topical Voltaren gel, right subacromial bursa steroid injection  Follow-up: In one month if symptoms do not improve, sooner if worsening  Can consider further evaluation, shoulder joint steroid injection     Tiago Lester MD  [facility]    -----  Chief Complaint   Patient presents with     Right Shoulder - Pain       SUBJECTIVE  Tyrel Harrell is a/an 72 year old male who is seen in consultation at the request of  Carmen Cho M.D. for evaluation of right shoulder pain.     The patient is seen with their wife, Kacie.  The patient is Right handed    Onset: Several years(s) ago. Reports insidious onset without acute precipitating event. Reviewed PCP note  Location of Pain: right lateral shoulder  Worsened by:  "sleeping, shoulder flexion, lifting     Better with: nothing   Treatments tried: rest/activity avoidance  Associated symptoms: numbness, tingling (chemotherapy) lyphedema 32 years  Currently undergoing neuroendocrine cancer    Orthopedic/Surgical history: YES - previous symptoms 4 years ago, resolved with injection lasted 1-2 years   Social History/Occupation: Retired  Pain affecting sleep    No family history pertinent to patient's problem today.      REVIEW OF SYSTEMS:  Review of Systems  Constitutional, HEENT, cardiovascular, pulmonary, GI, , musculoskeletal, neuro, skin, endocrine and psych systems are negative, except as otherwise noted.    OBJECTIVE:  Ht 1.727 m (5' 8\")   BMI 26.75 kg/m     General: healthy, alert and in no distress  HEENT: no scleral icterus or conjunctival erythema  Skin: no suspicious lesions or rash. No jaundice.  CV: distal perfusion intact    Resp: normal respiratory effort without conversational dyspnea   Psych: normal mood and affect  Gait: normal steady gait with appropriate coordination and balance   Neuro: Normal light sensory exam of right upper extremity     Ortho Exam   RIGHT SHOULDER  Inspection:    no swelling, bruising, discoloration, or obvious deformity or asymmetry  Palpation:    Tender about the supraspinatus insertion. Remainder of bony and tendinous landmarks are nontender.  Active Range of Motion:     Abduction 1650, FF 1650, , IR L1.    Strength:    Scapular plane abduction 5/5, painful,  ER 5/5, IR 5/5, biceps 5/5, triceps 5/5  Special Tests:    Positive: supraspinatus (empty can), Vinicius Figueroa    Negative: Dickenson's, Speed's and Yergason's    CERVICAL SPINE  Inspection:    normal cervical lordosis present, rounded shoulders, forward head posture  Palpation:    Nontender.  Range of Motion:     Flexion full    Extension full    Right side bend full    Left side bend full    Right rotation full    Left rotation full  Strength:    Full strength throughout all " neck muscles  Special Tests:    Positive: None    Negative: Spurling's (bilateral)    RADIOLOGY:  I independently ordered, visualized and reviewed these images with the patient  Mild shoulder and AC joint arthritis      Review of external notes as documented elsewhere in note  Review of the result(s) of each unique test - right shoulder x-rays       Disclaimer: This note consists of symbols derived from keyboarding, dictation and/or voice recognition software. As a result, there may be errors in the script that have gone undetected. Please consider this when interpreting information found in this chart.    Large Joint Injection/Arthocentesis: R subacromial bursa    Date/Time: 1/24/2023 10:30 AM  Performed by: Tiago Lester MD  Authorized by: Tiago Lester MD     Indications:  Pain  Needle Size:  25 G  Guidance: ultrasound    Approach:  Lateral  Location:  Shoulder      Site:  R subacromial bursa  Medications:  6 mg betamethasone acet & sod phos 6 (3-3) MG/ML; 4 mL ropivacaine 5 MG/ML  Outcome:  Tolerated well, no immediate complications  Procedure discussed: discussed risks, benefits, and alternatives    Consent Given by:  Patient  Timeout: timeout called immediately prior to procedure    Prep: patient was prepped and draped in usual sterile fashion     Ultrasound images of procedure were permanently stored.

## 2023-01-26 NOTE — PROGRESS NOTES
Tyrel is a 72 year old who is being evaluated via a billable video visit.      How would you like to obtain your AVS? MyChart  If the video visit is dropped, the invitation should be resent by: Send to e-mail at: [email or web address]  Will anyone else be joining your video visit? No    Patient confirms medications and allergies are accurate via patients echeck in completion, and or denies any changes since last reviewed/verified.     CHARLES Feliciano/LPN      Video-Visit Details    Type of service:  Video Visit   Video Start Time: 9:00  Video End Time:10:00    Originating Location (pt. 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      He is 72 years old with a distant history of cured Hodgkin's disease who was found little over a year and a half ago to have a high-grade but well differentiated neuroendocrine tumor in his pancreas with extensive liver metastases.  Biopsy of the primary tumor showed a Ki-67 labeling index of 60%.  Dotatate scanning showed minimal to no uptake in his tumor either in the pancreas or liver, though there was a separate small area of intense uptake in the tail of his pancreas.  He started treatment with CAPTEM in August 2021 with a good response but then had a treatment interruption as he had increasing problems with his heart.  After repair of his mitral valve disease he was able to go back on treatment in the summer 2022.  In September his liver metastases were shown to be smaller but his primary tumor was somewhat larger, but his treatment in the interval between May and September had been interrupted due to other complications.  He is continued on with the CAPTEM and had another staging scan at [facility] in mid December.  He tells me overall he feels about the same.  He still has significant  fatigue which tends to be better on the weeks he is not taking his chemotherapy.  He gets mild peeling and cracking of his hands and feet that is not a major symptomatic issue for him and does not seem to be getting progressively worse.  He continues to have an exercise limitation and can go about 2-3 blocks or up 1 flight of stairs and then needs to stop due to fatigue and/or dyspnea.  He does not think that differs depending on where he is in a cycle of treatment.    GENERAL: Healthy, alert and no distress  EYES: Eyes grossly normal to inspection.  No discharge or erythema, or obvious scleral/conjunctival abnormalities.  RESP: No audible wheeze, cough, or visible cyanosis.  No visible retractions or increased work of breathing.    SKIN: Visible skin clear. No significant rash, abnormal pigmentation or lesions.  NEURO: Cranial nerves grossly intact.  Mentation and speech appropriate for age.  PSYCH: Mentation appears normal, affect normal/bright, judgement and insight intact, normal speech and appearance well-groomed.    Is December CT scan is not directly available for my review but by report the largest dimension of his pancreatic primary went from 5.7 to 6.4 cm in his liver metastases and the small nodule in the tail of his pancreas were stable.  I personally reviewed his prior scans and my measurement on the September CT of the maximal dimension of his pancreatic primary was 6.3 cm.  He has normal electrolytes and renal function.  His bilirubin and liver enzymes are unremarkable.  He has stable mild macrocytic anemia as expected with his chemotherapy and otherwise normal blood counts.    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  At this point I would consider his disease stable and it is not clear to me that he in fact is had a significant increase in his primary tumor but I need to obtain his outside scans for my direct review.       My recommendation to him at this point is to continue on with his current therapy without change.  He has met with the radiation oncologist and had a recommendation to receive radiotherapy (?protons) to the primary tumor.  I discussed difficulty in knowing if that would provide him any significant benefit.  We discussed that if it is next evaluation he has more clear-cut evidence of progression it would be reasonable to consider other systemic therapies, but the rarity of his tumor type means there is no clear standard.  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  There are certainly other chemotherapies and growth factor inhibitors that might be considered in the setting but little data to guide us on the best choice.  We discussed the fact that while he has a seemingly high-grade tumor its behavior has been somewhat indolent and I would not have much enthusiasm for pursuing more aggressive chemo regimens like we would use for a poorly differentiated tumor.  We also discussed the possibility of giving consideration to a treatment free interval to try and maximize his quality of life.  He expressed a clear desire to be as aggressive as possible with treatment while acknowledging that he did want a pursue something simply for the sake of pursuing it.  I deferred getting into too many specifics until we get to the point where we need to make a choice.  We will proceed with his next planned dose of CAPTEM without adjustment and I will be available for further discussion with him about next steps once he has had his next response evaluation.    Total time by me on the date of service inclusive of the above long discussion, review of outside imaging and records, coordination of care, ordering and documentation was greater than 75 minutes.

## 2023-01-26 NOTE — LETTER
1/26/2023         RE: Tyrel Harrell  [address]  [place] [address]        Dear Colleague,    Thank you for referring your patient, Tyrel Harrell, to the [facility]. Please see a copy of my visit note below.      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      He is 72 years old with a distant history of cured Hodgkin's disease who was found little over a year and a half ago to have a high-grade but well differentiated neuroendocrine tumor in his pancreas with extensive liver metastases.  Biopsy of the primary tumor showed a Ki-67 labeling index of 60%.  Dotatate scanning showed minimal to no uptake in his tumor either in the pancreas or liver, though there was a separate small area of intense uptake in the tail of his pancreas.  He started treatment with CAPTEM in August 2021 with a good response but then had a treatment interruption as he had increasing problems with his heart.  After repair of his mitral valve disease he was able to go back on treatment in the summer 2022.  In September his liver metastases were shown to be smaller but his primary tumor was somewhat larger, but his treatment in the interval between May and September had been interrupted due to other complications.  He is continued on with the CAPTEM and had another staging scan at [facility] in mid December.  He tells me overall he feels about the same.  He still has significant fatigue which tends to be better on the weeks he is not taking his chemotherapy.  He gets mild peeling and cracking of his hands and feet that is not a major symptomatic issue for him and does not seem to be getting progressively worse.  He continues to have an exercise limitation and can go about 2-3 blocks or up 1 flight of stairs and then needs to stop due to fatigue and/or dyspnea.  He does not think that differs depending on where he is in  a cycle of treatment.    GENERAL: Healthy, alert and no distress  EYES: Eyes grossly normal to inspection.  No discharge or erythema, or obvious scleral/conjunctival abnormalities.  RESP: No audible wheeze, cough, or visible cyanosis.  No visible retractions or increased work of breathing.    SKIN: Visible skin clear. No significant rash, abnormal pigmentation or lesions.  NEURO: Cranial nerves grossly intact.  Mentation and speech appropriate for age.  PSYCH: Mentation appears normal, affect normal/bright, judgement and insight intact, normal speech and appearance well-groomed.    Is December CT scan is not directly available for my review but by report the largest dimension of his pancreatic primary went from 5.7 to 6.4 cm in his liver metastases and the small nodule in the tail of his pancreas were stable.  I personally reviewed his prior scans and my measurement on the September CT of the maximal dimension of his pancreatic primary was 6.3 cm.  He has normal electrolytes and renal function.  His bilirubin and liver enzymes are unremarkable.  He has stable mild macrocytic anemia as expected with his chemotherapy and otherwise normal blood counts.    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  At this point I would consider his disease stable and it is not clear to me that he in fact is had a significant increase in his primary tumor but I need to obtain his outside scans for my direct review.      My recommendation to him at this point is to continue on with his current therapy without change.  He has met with the radiation oncologist and had a recommendation to receive radiotherapy (?protons) to the primary tumor.  I discussed difficulty in knowing if that would provide him any significant benefit.  We discussed that if it is next evaluation he has more clear-cut evidence of progression it would be reasonable to consider other systemic  therapies, but the rarity of his tumor type means there is no clear standard.  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  There are certainly other chemotherapies and growth factor inhibitors that might be considered in the setting but little data to guide us on the best choice.  We discussed the fact that while he has a seemingly high-grade tumor its behavior has been somewhat indolent and I would not have much enthusiasm for pursuing more aggressive chemo regimens like we would use for a poorly differentiated tumor.  We also discussed the possibility of giving consideration to a treatment free interval to try and maximize his quality of life.  He expressed a clear desire to be as aggressive as possible with treatment while acknowledging that he did want a pursue something simply for the sake of pursuing it.  I deferred getting into too many specifics until we get to the point where we need to make a choice.  We will proceed with his next planned dose of CAPTEM without adjustment and I will be available for further discussion with him about next steps once he has had his next response evaluation.    Total time by me on the date of service inclusive of the above long discussion, review of outside imaging and records, coordination of care, ordering and documentation was greater than 75 minutes.      Again, thank you for allowing me to participate in the care of your patient.        Sincerely,        Bradly Parker MD

## 2023-03-13 NOTE — PROGRESS NOTES
[facility]: Cancer Care                                                                                          Received call from patient stating he has recently had mitral valve clip. Cardiologist started patient on Farxiga and he would like to make sure on concerns with Dr Parker. RNCC reviewed with bismark Rowland for patient to proceed. No concerns.     Placed return call to patient. Reviewed above, patient voiced understanding with no additional questions at this time.            Halle Narvaez RN, BSN, OCN   RN Care Coordinator   [facility]

## 2023-03-30 PROBLEM — J84.9 INTERSTITIAL LUNG DISEASE (H): Status: ACTIVE | Noted: 2022-01-01

## 2023-03-30 NOTE — PROGRESS NOTES
Virtual Visit Details    Type of service:  Video Visit   Video Start Time: 10:00  Video End Time:10;30    Originating Location (pt. 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    He is 72 years old and previously cured of Hodgkin's disease who was found in the last couple of years to have a high-grade but well differentiated neuroendocrine tumor in his pancreas with extensive liver metastases.  His Ki-67 labeling index was 60%.  He had minimal to no uptake on dotatate scanning either in the pancreatic primary or liver although there was a separate small area of uptake in the tail of his pancreas.  He started CAPTEM 8/2021 with a good response but then had a treatment interruption as he was dealing with repair of his mitral valve.  He resumed treatment in the summer 2022 with improvement in his liver metastases, and continues on the same therapy.    He tells me he is feeling quite well and has been able to travel and enjoy himself.  He feels like the side effects are stable and relatively minor.  He has minimal peeling on his palms with a little bit of tenderness.  He notes he has chronic clear drainage from his nose.  He rarely has some minor nosebleeds.  He always has some minor brain fog but feels like that is stable.  His appetite and weight are good.  He has significant fatigue and sleeps about 10 hours at night and takes 1 or 2-hour nap each day, but that is also stable.    GENERAL: Healthy, alert and no distress  EYES: Eyes grossly normal to inspection.  No discharge or erythema, or obvious scleral/conjunctival abnormalities.  RESP: No audible wheeze, cough, or visible cyanosis.  No visible retractions or increased work of breathing.    SKIN: Visible skin clear. No significant rash, abnormal pigmentation or  lesions.  NEURO: Cranial nerves grossly intact.  Mentation and speech appropriate for age.  PSYCH: Mentation appears normal, affect normal/bright, judgement and insight intact, normal speech and appearance well-groomed.    I personally reviewed his CT scan done last month at the [facility] that shows all his sites of disease to be stable.    His current lab work here shows normal electrolytes and renal function.  He has mild elevation of his transaminases and alkaline phosphatase with normal bilirubin and albumin.  He has mild macrocytic anemia and otherwise unremarkable blood counts.  His chromogranin A level is up slightly at 202.    Assessment/plan: Metastatic high-grade well-differentiated neuroendocrine tumor of the pancreas.  His disease is stable on his current regimen of CAPTEM which he is tolerating with a reasonable level of toxicity I recommend he continue on with the same therapy and reassessing his disease status again in

## 2023-03-30 NOTE — NURSING NOTE
Is the patient currently in the state of [place]? YES    Visit mode:VIDEO    If the visit is dropped, the patient can be reconnected by: VIDEO VISIT: Text to cell phone: [phone]    Will anyone else be joining the visit? NO      How would you like to obtain your AVS? MyChart    Are changes needed to the allergy or medication list? NO    Reason for visit: return    Kenisha SOTO

## 2023-03-30 NOTE — LETTER
3/30/2023         RE: Tyrel Harrell  [address]  [place] [address]        Dear Colleague,    Thank you for referring your patient, Tyrel Harrell, to the [facility]. Please see a copy of my visit note below.    Virtual Visit Details    Type of service:  Video Visit   Video Start Time: 10:00  Video End Time:10;30    Originating Location (pt. 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    He is 72 years old and previously cured of Hodgkin's disease who was found in the last couple of years to have a high-grade but well differentiated neuroendocrine tumor in his pancreas with extensive liver metastases.  His Ki-67 labeling index was 60%.  He had minimal to no uptake on dotatate scanning either in the pancreatic primary or liver although there was a separate small area of uptake in the tail of his pancreas.  He started CAPTEM 8/2021 with a good response but then had a treatment interruption as he was dealing with repair of his mitral valve.  He resumed treatment in the summer 2022 with improvement in his liver metastases, and continues on the same therapy.    He tells me he is feeling quite well and has been able to travel and enjoy himself.  He feels like the side effects are stable and relatively minor.  He has minimal peeling on his palms with a little bit of tenderness.  He notes he has chronic clear drainage from his nose.  He rarely has some minor nosebleeds.  He always has some minor brain fog but feels like that is stable.  His appetite and weight are good.  He has significant fatigue and sleeps about 10 hours at night and takes 1 or 2-hour nap each day, but that is also stable.    GENERAL: Healthy, alert and no distress  EYES: Eyes grossly normal to inspection.  No  discharge or erythema, or obvious scleral/conjunctival abnormalities.  RESP: No audible wheeze, cough, or visible cyanosis.  No visible retractions or increased work of breathing.    SKIN: Visible skin clear. No significant rash, abnormal pigmentation or lesions.  NEURO: Cranial nerves grossly intact.  Mentation and speech appropriate for age.  PSYCH: Mentation appears normal, affect normal/bright, judgement and insight intact, normal speech and appearance well-groomed.    I personally reviewed his CT scan done last month at the [facility] that shows all his sites of disease to be stable.    His current lab work here shows normal electrolytes and renal function.  He has mild elevation of his transaminases and alkaline phosphatase with normal bilirubin and albumin.  He has mild macrocytic anemia and otherwise unremarkable blood counts.  His chromogranin A level is up slightly at 202.    Assessment/plan: Metastatic high-grade well-differentiated neuroendocrine tumor of the pancreas.  His disease is stable on his current regimen of CAPTEM which he is tolerating with a reasonable level of toxicity I recommend he continue on with the same therapy and reassessing his disease status again in      Again, thank you for allowing me to participate in the care of your patient.        Sincerely,        Bradly Parker MD

## 2023-03-30 NOTE — LETTER
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    He is 72 years old and previously cured of Hodgkin's disease who was found in the last couple of years to have a high-grade but well differentiated neuroendocrine tumor in his pancreas with extensive liver metastases.  His Ki-67 labeling index was 60%.  He had minimal to no uptake on dotatate scanning either in the pancreatic primary or liver although there was a separate small area of uptake in the tail of his pancreas.  He started CAPTEM 8/2021 with a good response but then had a treatment interruption as he was dealing with repair of his mitral valve.  He resumed treatment in the summer 2022 with improvement in his liver metastases, and continues on the same therapy.    He tells me he is feeling quite well and has been able to travel and enjoy himself.  He feels like the side effects are stable and relatively minor.  He has minimal peeling on his palms with a little bit of tenderness.  He notes he has chronic clear drainage from his nose.  He rarely has some minor nosebleeds.  He always has some minor brain fog but feels like that is stable.  His appetite and weight are good.  He has significant fatigue and sleeps about 10 hours at night and takes 1 or 2-hour nap each day, but that is also stable.    GENERAL: Healthy, alert and no distress  EYES: Eyes grossly normal to inspection.  No discharge or erythema, or obvious scleral/conjunctival abnormalities.  RESP: No audible wheeze, cough, or visible cyanosis.  No visible retractions or increased work of breathing.    SKIN: Visible skin clear. No significant rash, abnormal pigmentation or lesions.  NEURO: Cranial nerves grossly intact.  Mentation and speech appropriate for age.  PSYCH: Mentation appears normal, affect normal/bright, judgement and  insight intact, normal speech and appearance well-groomed.    I personally reviewed his CT scan done last month at the [facility] that shows all his sites of disease to be stable.    His current lab work here shows normal electrolytes and renal function.  He has mild elevation of his transaminases and alkaline phosphatase with normal bilirubin and albumin.  He has mild macrocytic anemia and otherwise unremarkable blood counts.  His chromogranin A level is up slightly at 202.    Assessment/plan: Metastatic high-grade well-differentiated neuroendocrine tumor of the pancreas.  His disease is stable on his current regimen of CAPTEM which he is tolerating with a reasonable level of toxicity I recommend he continue on with the same therapy and reassessing his disease status again in        Bradly Parker MD

## 2023-03-31 NOTE — PATIENT INSTRUCTIONS
For the feet,  UREA 40% CREAM and use nightly to try to exfoliate the dead and thickened skin off them. You can easily find this on amaz[employer].     Hand dermatitis.   -I have prescribed a steroid ointment called betamethasone augmented formula  0.05% ointment to use twice daily until resolved and then as needed for flares.    -at night, soak hands in water, apply steroid ointment to rashy areas, and a thick layer of petroleum jelly/Vaseline to the rest of the hands. Cover with cotton gloves overnight during sleep.   -reapply another time during the day when you can keep on for 1+ hours without washing hands  -wear gloves when washing dishes (currently cotton lined gloves or use your cotton gloves underneath your rubber gloves as a barrier), using cleaning supplies, or when hands would otherwise be immersed in soapy water.   -each time you wash your hands you should apply a moisturizing cream immediately afterwards. Examples include Cetaphil cream, Cera Ve Cream, Vanicream or vaseline  -Use a very mild soap such as dove bar soap or even better would be vanicream bar soap. Cut a bar into slices so you can have one in each area that your wash your hands so that she did not to come in contact with any harsh soaps that may be very drying to the hands or cause an allergy.  -The very mindful about anything this coming in contact with your hands that may cause an allergy.  Do not put any moisturizers or chemicals or products on your hands other than things listed above in case you are allergic to something that you are putting on your hands.  Try to avoid contact with rubbers and latex in case this is a problem as well.

## 2023-03-31 NOTE — LETTER
3/31/2023         RE: Tyrel Harrell  [address]  [facility] [address]        Dear Colleague,    Thank you for referring your patient, Tyrel Harrell, to the [facility]. Please see a copy of my visit note below.    [facility] Dermatology Note  Encounter Date: Mar 31, 2023  Office Visit     Reviewed patients past medical history and pertinent chart review prior to patients visit today.     Dermatology Problem List:  History of AK's. Unsure if he has had NMSC. No history of melanoma  Unsure about family history of skin cancer    ____________________________________________    Assessment & Plan:     # Actinic keratosis, vertex scalp. Premalignant nature discussed with patient. Treatment options discussed with patient today including no treatment, topical treatment, and cryotherapy. Patient elects to treat visible lesions today with cryotherapy. After verbal consent and discussion of risks and benefits including but no limited to dyspigmentation/scar, blister, and pain. A total of 1 actinic keratoses were treated with 1-2mm freeze border for 2 cycle with liquid nitrogen. Post cryotherapy instructions were provided.     #Recurrent rash on his palms and soles when he is doing chemotherapy.  -Given betamethasone augmented formula  0.05% ointment to use BID until resolved and then BID PRN for flares.  Councled about use and side effects of thinning of the skin, striae, erythema, and worsening of rash.   Not for use in places other than hands or feet.   -apply steroid ointment at night to rashy areas, and petroleum jelly to the rest of the hands. Cover with cotton gloves overnight during sleep.   -reapply another time during the day when patient can keep on for 1+ hours without washing hands  -wear gloves when washing dishes, using cleaning supplies, or when hands would otherwise be immersed in soapy water.   -each time patient washes hands they should apply a  moisturizing cream immediately afterwards. Examples include Cetaphil cream, Cera Ve Cream or Vanicream.     -For the feet try urea 40% cream nightly to exfoliate some of the thickened and callused skin    # Benign skin findings including: seborrheic keratoses, cherry angioma, lentigines and benign nevi.   - No further intervention required. Patient to report changes.   - Patient reassured of the benign nature of these lesions.    #Signs and Symptoms of non-melanoma skin cancer and ABCDEs of melanoma reviewed with patient. Patient encouraged to perform monthly self skin exams and educated on how to perform them. UV precautions reviewed with patient. Patient was asked about new or changing moles/lesions on body.     #Reviewed Sunscreen: Apply 20 minutes prior to going outdoors and reapply every two hours, when wet or sweating. We recommend using an SPF 30 or higher, and to use one that is water resistant.       Follow-up:  1 years for follow up full body skin exam, prn for new or changing lesions or new concerns    Daniela Last, APRN CNP on 3/31/2023 at 9:01 AM    ____________________________________________    CC: Skin Check ([facility])    HPI:  Mr. Tyrel Harrell is a(n) 72 year old male who presents today as a new patient for a full body skin cancer screening. Patient has concerns today about rash that recurs due to chemotherapy on hands and feet, it causes redness, scaling, and pain.  He also has noticed a recurrent dry rough patch on the top of the scalp.    Patient is otherwise feeling well, without additional skin concerns.     Physical Exam:  Vitals: There were no vitals taken for this visit.  SKIN: Total skin excluding the genitalia areas was performed. The exam included the head/face, neck, both arms, chest, back, abdomen, both legs, digits, mons pubis, buttock and nails.   -Pink scaly papule on the vertex scalp  -hyperkeratosis of palms and feet, no fissures or erythema today  -several 1-2mm red dome  shaped symmetric papules scattered on the trunk  -multiple tan/brown flat round macules and raised papules scattered throughout trunk, extremities and head. No worrisome features for malignancy noted on examination.  -scattered tan, homogenous macules scattered on sun exposed areas of trunk, extremities and face.   -scattered waxy, stuck on tan/brown papules and patches on the trunk     - No other lesions of concern on areas examined.     Medications:  Current Outpatient Medications   Medication     Acetaminophen 100 MG/10ML SOSY     apixaban ANTICOAGULANT (ELIQUIS) 5 MG tablet     calcium carbonate 500 mg, elemental, 1250 (500 Ca) MG tablet chewable     capecitabine (XELODA) 150 MG tablet     capecitabine (XELODA) 500 MG tablet     dapagliflozin (FARXIGA) 10 MG TABS tablet     ELIQUIS ANTICOAGULANT 5 MG tablet     FARXIGA 10 MG TABS tablet     furosemide (LASIX) 20 MG tablet     ketoconazole (NIZORAL) 2 % external cream     levothyroxine (SYNTHROID/LEVOTHROID) 100 MCG tablet     lipase-protease-amylase (CREON 12) 48379-52439-62519 units CPEP     losartan (COZAAR) 25 MG tablet     lovastatin (MEVACOR) 40 MG tablet     melatonin 3 MG tablet     metoprolol succinate ER (TOPROL XL) 25 MG 24 hr tablet     Multiple Vitamin (MULTIVITAMIN) per tablet     ondansetron (ZOFRAN ODT) 8 MG ODT tab     ondansetron (ZOFRAN ODT) 8 MG ODT tab     ondansetron (ZOFRAN) 8 MG tablet     prochlorperazine (COMPAZINE) 10 MG tablet     Psyllium (HYDROCIL) 95 % PACK     spironolactone (ALDACTONE) 25 MG tablet     temozolomide (TEMODAR) 100 MG capsule     venlafaxine (EFFEXOR XR) 150 MG 24 hr capsule     venlafaxine (EFFEXOR XR) 75 MG 24 hr capsule     Current Facility-Administered Medications   Medication     betamethasone acet & sod phos (CELESTONE) injection 6 mg     ropivacaine (NAROPIN) injection 4 mL      Past Medical History:   Patient Active Problem List   Diagnosis     Lymphoma in remission (H)     Pulmonary embolism (H)      Adenomatous polyp of colon     Ascending aorta dilation (H)     Anemia in other chronic diseases classified elsewhere     Asplenia     BCC (basal cell carcinoma)     Combined forms of age-related cataract of both eyes     HFrEF (heart failure with reduced ejection fraction) (H)     Visual hallucinations     Generalized anxiety disorder     History of Hodgkin's lymphoma     Hyperlipidemia     Impaired glucose tolerance     Interstitial lung disease (H)     Lymphedema of extremity     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    CC Referred Self, MD  No address on file on close of this encounter.      Again, thank you for allowing me to participate in the care of your patient.        Sincerely,        FADY Brennan CNP

## 2023-03-31 NOTE — PROGRESS NOTES
[facility] Dermatology Note  Encounter Date: Mar 31, 2023  Office Visit     Reviewed patients past medical history and pertinent chart review prior to patients visit today.     Dermatology Problem List:  History of AK's. Unsure if he has had NMSC. No history of melanoma  Unsure about family history of skin cancer    ____________________________________________    Assessment & Plan:     # Actinic keratosis, vertex scalp. Premalignant nature discussed with patient. Treatment options discussed with patient today including no treatment, topical treatment, and cryotherapy. Patient elects to treat visible lesions today with cryotherapy. After verbal consent and discussion of risks and benefits including but no limited to dyspigmentation/scar, blister, and pain. A total of 1 actinic keratoses were treated with 1-2mm freeze border for 2 cycle with liquid nitrogen. Post cryotherapy instructions were provided.     #Recurrent rash on his palms and soles when he is doing chemotherapy.  -Given betamethasone augmented formula  0.05% ointment to use BID until resolved and then BID PRN for flares.  Councled about use and side effects of thinning of the skin, striae, erythema, and worsening of rash.   Not for use in places other than hands or feet.   -apply steroid ointment at night to rashy areas, and petroleum jelly to the rest of the hands. Cover with cotton gloves overnight during sleep.   -reapply another time during the day when patient can keep on for 1+ hours without washing hands  -wear gloves when washing dishes, using cleaning supplies, or when hands would otherwise be immersed in soapy water.   -each time patient washes hands they should apply a moisturizing cream immediately afterwards. Examples include Cetaphil cream, Cera Ve Cream or Vanicream.     -For the feet try urea 40% cream nightly to exfoliate some of the thickened and callused skin    # Benign skin findings including: seborrheic keratoses,  cherry angioma, lentigines and benign nevi.   - No further intervention required. Patient to report changes.   - Patient reassured of the benign nature of these lesions.    #Signs and Symptoms of non-melanoma skin cancer and ABCDEs of melanoma reviewed with patient. Patient encouraged to perform monthly self skin exams and educated on how to perform them. UV precautions reviewed with patient. Patient was asked about new or changing moles/lesions on body.     #Reviewed Sunscreen: Apply 20 minutes prior to going outdoors and reapply every two hours, when wet or sweating. We recommend using an SPF 30 or higher, and to use one that is water resistant.       Follow-up:  1 years for follow up full body skin exam, prn for new or changing lesions or new concerns    Daniela Last, APRN CNP on 3/31/2023 at 9:01 AM    ____________________________________________    CC: [place]    HPI:  Mr. Tyrel Harrell is a(n) 72 year old male who presents today as a new patient for a full body skin cancer screening. Patient has concerns today about rash that recurs due to chemotherapy on hands and feet, it causes redness, scaling, and pain.  He also has noticed a recurrent dry rough patch on the top of the scalp.    Patient is otherwise feeling well, without additional skin concerns.     Physical Exam:  Vitals: There were no vitals taken for this visit.  SKIN: Total skin excluding the genitalia areas was performed. The exam included the head/face, neck, both arms, chest, back, abdomen, both legs, digits, mons pubis, buttock and nails.   -Pink scaly papule on the vertex scalp  -hyperkeratosis of palms and feet, no fissures or erythema today  -several 1-2mm red dome shaped symmetric papules scattered on the trunk  -multiple tan/brown flat round macules and raised papules scattered throughout trunk, extremities and head. No worrisome features for malignancy noted on examination.  -scattered tan, homogenous macules scattered  on sun exposed areas of trunk, extremities and face.   -scattered waxy, stuck on tan/brown papules and patches on the trunk     - No other lesions of concern on areas examined.     Medications:  Current Outpatient Medications   Medication     Acetaminophen 100 MG/10ML SOSY     apixaban ANTICOAGULANT (ELIQUIS) 5 MG tablet     calcium carbonate 500 mg, elemental, 1250 (500 Ca) MG tablet chewable     capecitabine (XELODA) 150 MG tablet     capecitabine (XELODA) 500 MG tablet     dapagliflozin (FARXIGA) 10 MG TABS tablet     ELIQUIS ANTICOAGULANT 5 MG tablet     FARXIGA 10 MG TABS tablet     furosemide (LASIX) 20 MG tablet     ketoconazole (NIZORAL) 2 % external cream     levothyroxine (SYNTHROID/LEVOTHROID) 100 MCG tablet     lipase-protease-amylase (CREON 12) 92321-22254-06323 units CPEP     losartan (COZAAR) 25 MG tablet     lovastatin (MEVACOR) 40 MG tablet     melatonin 3 MG tablet     metoprolol succinate ER (TOPROL XL) 25 MG 24 hr tablet     Multiple Vitamin (MULTIVITAMIN) per tablet     ondansetron (ZOFRAN ODT) 8 MG ODT tab     ondansetron (ZOFRAN ODT) 8 MG ODT tab     ondansetron (ZOFRAN) 8 MG tablet     prochlorperazine (COMPAZINE) 10 MG tablet     Psyllium (HYDROCIL) 95 % PACK     spironolactone (ALDACTONE) 25 MG tablet     temozolomide (TEMODAR) 100 MG capsule     venlafaxine (EFFEXOR XR) 150 MG 24 hr capsule     venlafaxine (EFFEXOR XR) 75 MG 24 hr capsule     Current Facility-Administered Medications   Medication     betamethasone acet & sod phos (CELESTONE) injection 6 mg     ropivacaine (NAROPIN) injection 4 mL      Past Medical History:   Patient Active Problem List   Diagnosis     Lymphoma in remission (H)     Pulmonary embolism (H)     Adenomatous polyp of colon     Ascending aorta dilation (H)     Anemia in other chronic diseases classified elsewhere     Asplenia     BCC (basal cell carcinoma)     Combined forms of age-related cataract of both eyes     HFrEF (heart failure with reduced ejection  fraction) (H)     Visual hallucinations     Generalized anxiety disorder     History of Hodgkin's lymphoma     Hyperlipidemia     Impaired glucose tolerance     Interstitial lung disease (H)     Lymphedema of extremity     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    CC Referred Self, MD  No address on file on close of this encounter.

## 2023-04-10 NOTE — PROGRESS NOTES
"[facility]: Cancer Care                                                                                          Received call from patient stating he tested positive for Covid-19 yesterday. States he is asymptomatic, \"other than my normal cancer and chemo side effects.\" Patient currently on his \"off\" week for chemo cycle.      RNCC reviewed with Dr Parker. Recommends follow-up with covid clinic for possible prescription of Paxlovid. Discussed with patient, who voiced understanding. Number to clinic provided. He will call to schedule.      Halle Narvaez RN, BSN, OCN   RN Care Coordinator   [facility]    "

## 2023-04-11 NOTE — PROGRESS NOTES
[facility]: Cancer Care                                                                                          Received call from patient with question regarding cream he was given by dermatologist. He states hands and feet are feeling well at this time. He reports minimal peeling and redness at this time. He is currentl using Vanicream with good success. He received betamethasone augmeted formula from derm. Since hands and feet are in good condition at this time he will continue with Vanicream and wait on using betamethasone for now.     He has no other questions or concerns at this time.      Halle Narvaez RN, BSN, OCN   RN Care Coordinator   [facility]

## 2023-04-17 NOTE — ORAL ONC MGMT
Oral Chemotherapy Monitoring Program    Subjective/Objective:  Tyrel Harrell is a 72 year old male contacted by phone for a follow-up visit for oral chemotherapy.  Spoke to Kacie (wife) and Tyrel who confirm Tyrel is taking the appropriate dose of capecitabine 1150mg BID for 14 days on and 14 days off for each 28-day cycle and temozolomide 400mg once daily on days 10 to 14 for each 28-day cycle. Denies new or worsening side effects, missed doses, and recent hospital or ED visits. Denies new medication. Confirmed next 28-day cycle starts Sunday 4/23. Sent inTaskdoer to schedule next lab appointment.         2/16/2023    11:08 AM 2/27/2023     4:00 PM 2/27/2023     4:23 PM 3/16/2023    12:00 PM 3/16/2023    12:16 PM 4/17/2023     2:00 PM 4/17/2023     2:26 PM   ORAL CHEMOTHERAPY   Assessment Type Refill Lab Monitoring Lab Monitoring Refill Refill Monthly Follow up Monthly Follow up   Diagnosis Code  Pancreatic Cancer Pancreatic Cancer Pancreatic Cancer Pancreatic Cancer Pancreatic Cancer Pancreatic Cancer   Providers Dr Keith Parker   [facility]   Drug Name Xeloda (capecitabine) Xeloda (capecitabine) Temodar (temozolomide) Temodar (temozolomide) Xeloda (capecitabine) Xeloda (capecitabine) Temodar (temozolomide)   Dose   400 mg 400 mg   400 mg   Current Schedule BID BID Daily Daily BID BID QHS   Cycle Details 2 weeks on, 2 weeks off 2 weeks on, 2 weeks off  Other 2 weeks on, 2 weeks off 2 weeks on, 2 weeks off Other   Planned next cycle start date      4/23/2023 4/23/2023   Doses missed in last 2 weeks      0 0   Adherence Assessment      Adherent Adherent   Adverse Effects      No AE identified during assessment No AE identified during assessment   Any new drug interactions?      No No   Is the dose as ordered appropriate for the patient?      Yes Yes   Since the last time we talked, have you been  "hospitalized or used the emergency room?      No No       Last PHQ-2 Score on record:       12/20/2022     8:47 AM   PHQ-2 ( 1999 [employer])   Q1: Little interest or pleasure in doing things 0   Q2: Feeling down, depressed or hopeless 0   PHQ-2 Score 0   Q1: Little interest or pleasure in doing things Not at all    Not at all   Q2: Feeling down, depressed or hopeless Not at all    Not at all   PHQ-2 Score 0    0       Vitals:  BP:   BP Readings from Last 1 Encounters:   01/24/23 (!) 87/52     Wt Readings from Last 1 Encounters:   12/20/22 79.8 kg (175 lb 14.4 oz)     Estimated body surface area is 1.96 meters squared as calculated from the following:    Height as of 1/24/23: 1.727 m (5' 8\").    Weight as of 12/20/22: 79.8 kg (175 lb 14.4 oz).    Labs:  _  Result Component Current Result Ref Range   Sodium 136 (3/28/2023) 133 - 144 mmol/L     _  Result Component Current Result Ref Range   Potassium 5.2 (3/28/2023) 3.4 - 5.3 mmol/L     _  Result Component Current Result Ref Range   Calcium 9.1 (3/28/2023) 8.5 - 10.1 mg/dL     Result Component Current Result Ref Range   Albumin 3.7 (3/28/2023) 3.4 - 5.0 g/dL   _  Result Component Current Result Ref Range   Urea Nitrogen 32 (H) (3/28/2023) 7 - 30 mg/dL   _  Result Component Current Result Ref Range   Creatinine 1.12 (3/28/2023) 0.66 - 1.25 mg/dL   _  Result Component Current Result Ref Range   AST 85 (H) (3/28/2023) 0 - 45 U/L   _  Result Component Current Result Ref Range   ALT 93 (H) (3/28/2023) 0 - 70 U/L   _  Result Component Current Result Ref Range   Bilirubin Total 0.7 (3/28/2023) 0.2 - 1.3 mg/dL   _  Result Component Current Result Ref Range   WBC Count 6.4 (3/28/2023) 4.0 - 11.0 10e3/uL     Result Component Current Result Ref Range   Hemoglobin 10.8 (L) (3/28/2023) 13.3 - 17.7 g/dL   _  Result Component Current Result Ref Range   Platelet Count 204 (3/28/2023) 150 - 450 10e3/uL   _  Result Component Current Result Ref Range   Absolute Neutrophils 5.0 (3/28/2023) " 1.6 - 8.3 10e3/uL      Assessment/Plan:  Denies new or worse side effects. Continue plan of care. Cycle starts 4/23.     Follow-Up:  Sent inbasket to get labs prior to next cycle start.     Refill Due:  [facility] will deliver temozolomide and capecitabine refill 4/20    Paul Adams, PharmD  Hematology/Oncology Clinical Pharmacist  [facility]  [facility]  [phone]

## 2023-04-19 NOTE — TELEPHONE ENCOUNTER
Oral Chemotherapy Monitoring Program  Lab Follow Up    Reviewed lab results from 4/18/23 prior to cycle start on 4/23/23.        2/27/2023     4:23 PM 3/16/2023    12:00 PM 3/16/2023    12:16 PM 4/17/2023     2:00 PM 4/17/2023     2:26 PM 4/19/2023    10:00 AM 4/19/2023    10:56 AM   ORAL CHEMOTHERAPY   Assessment Type Lab Monitoring Refill Refill Monthly Follow up Monthly Follow up Left Voicemail;Lab Monitoring Left Voicemail;Lab Monitoring   Diagnosis Code Pancreatic Cancer Pancreatic Cancer Pancreatic Cancer Pancreatic Cancer Pancreatic Cancer Pancreatic Cancer Pancreatic Cancer   Providers Dr Keith Parker   [facility]   Drug Name Temodar (temozolomide) Temodar (temozolomide) Xeloda (capecitabine) Xeloda (capecitabine) Temodar (temozolomide) Xeloda (capecitabine) Temodar (temozolomide)   Dose 400 mg 400 mg   400 mg  400 mg   Current Schedule Daily Daily BID BID QHS BID QHS   Cycle Details  Other 2 weeks on, 2 weeks off 2 weeks on, 2 weeks off Other 2 weeks on, 2 weeks off Other   Planned next cycle start date    4/23/2023 4/23/2023 4/23/2023 4/23/2023   Doses missed in last 2 weeks    0 0     Adherence Assessment    Adherent Adherent     Adverse Effects    No AE identified during assessment No AE identified during assessment     Any new drug interactions?    No No     Is the dose as ordered appropriate for the patient?    Yes Yes     Since the last time we talked, have you been hospitalized or used the emergency room?    No No         Labs:  _  Result Component Current Result Ref Range   Sodium 134 (4/18/2023) 133 - 144 mmol/L     _  Result Component Current Result Ref Range   Potassium 5.0 (4/18/2023) 3.4 - 5.3 mmol/L     _  Result Component Current Result Ref Range   Calcium 8.9 (4/18/2023) 8.5 - 10.1 mg/dL     No results found for Mag within last 30 days.     No results found for Phos  within last 30 days.     _  Result Component Current Result Ref Range   Albumin 3.4 (4/18/2023) 3.4 - 5.0 g/dL     _  Result Component Current Result Ref Range   Urea Nitrogen 31 (H) (4/18/2023) 7 - 30 mg/dL     _  Result Component Current Result Ref Range   Creatinine 1.16 (4/18/2023) 0.66 - 1.25 mg/dL     _  Result Component Current Result Ref Range   AST 34 (4/18/2023) 0 - 45 U/L     _  Result Component Current Result Ref Range   ALT 21 (4/18/2023) 0 - 70 U/L     _  Result Component Current Result Ref Range   Bilirubin Total 0.3 (4/18/2023) 0.2 - 1.3 mg/dL     _  Result Component Current Result Ref Range   WBC Count 6.8 (4/18/2023) 4.0 - 11.0 10e3/uL     _  Result Component Current Result Ref Range   Hemoglobin 10.4 (L) (4/18/2023) 13.3 - 17.7 g/dL     _  Result Component Current Result Ref Range   Platelet Count 197 (4/18/2023) 150 - 450 10e3/uL     No results found for ANC within last 30 days.     _  Result Component Current Result Ref Range   Absolute Neutrophils 4.9 (4/18/2023) 1.6 - 8.3 10e3/uL        Assessment & Plan:  Results show no concerning abnormalities. Plan to start next cycle as scheduled on 4/23/23 for Xeloda and Temodar.     Follow-Up:  5/15: monthly assessment      Flora Bailey, PharmD, BCACP  Hematology/Oncology Clinical Pharmacist  [facility]  [phone]

## 2023-05-12 NOTE — ORAL ONC MGMT
Oral Chemotherapy Monitoring Program  Lab Follow Up    Reviewed lab results from 5/11/23.        2/27/2023     4:23 PM 3/16/2023    12:00 PM 3/16/2023    12:16 PM 4/17/2023     2:00 PM 4/17/2023     2:26 PM 4/19/2023    10:00 AM 4/19/2023    10:56 AM   ORAL CHEMOTHERAPY   Assessment Type Lab Monitoring Refill Refill Monthly Follow up Monthly Follow up Left Voicemail;Lab Monitoring Left Voicemail;Lab Monitoring   Diagnosis Code Pancreatic Cancer Pancreatic Cancer Pancreatic Cancer Pancreatic Cancer Pancreatic Cancer Pancreatic Cancer Pancreatic Cancer   Providers Dr Keith Parker   [facility]   Is this patient followed by the [facility] OC team?    Yes Yes Yes Yes   Drug Name Temodar (temozolomide) Temodar (temozolomide) Xeloda (capecitabine) Xeloda (capecitabine) Temodar (temozolomide) Xeloda (capecitabine) Temodar (temozolomide)   Dose 400 mg 400 mg   400 mg  400 mg   Current Schedule Daily Daily BID BID QHS BID QHS   Cycle Details  Other 2 weeks on, 2 weeks off 2 weeks on, 2 weeks off Other 2 weeks on, 2 weeks off Other   Planned next cycle start date    4/23/2023 4/23/2023 4/23/2023 4/23/2023   Doses missed in last 2 weeks    0 0     Adherence Assessment    Adherent Adherent     Adverse Effects    No AE identified during assessment No AE identified during assessment     Any new drug interactions?    No No     Is the dose as ordered appropriate for the patient?    Yes Yes     Since the last time we talked, have you been hospitalized or used the emergency room?    No No         Labs:  _  Result Component Current Result Ref Range   Sodium 137 (5/11/2023) 136 - 145 mmol/L     _  Result Component Current Result Ref Range   Potassium 4.6 (5/11/2023) 3.4 - 5.3 mmol/L     _  Result Component Current Result Ref Range   Calcium 8.8 (5/11/2023) 8.8 - 10.2 mg/dL     No results found for Mag within last 30  days.     No results found for Phos within last 30 days.     _  Result Component Current Result Ref Range   Albumin 3.7 (5/11/2023) 3.5 - 5.2 g/dL     _  Result Component Current Result Ref Range   Urea Nitrogen 22.8 (5/11/2023) 8.0 - 23.0 mg/dL     _  Result Component Current Result Ref Range   Creatinine 1.21 (H) (5/11/2023) 0.67 - 1.17 mg/dL     _  Result Component Current Result Ref Range   AST 24 (5/11/2023) 10 - 50 U/L     _  Result Component Current Result Ref Range   ALT 13 (5/11/2023) 10 - 50 U/L     _  Result Component Current Result Ref Range   Bilirubin Total 0.4 (5/11/2023) <=1.2 mg/dL     _  Result Component Current Result Ref Range   WBC Count 5.3 (5/11/2023) 4.0 - 11.0 10e3/uL     _  Result Component Current Result Ref Range   Hemoglobin 9.0 (L) (5/11/2023) 13.3 - 17.7 g/dL     _  Result Component Current Result Ref Range   Platelet Count 156 (5/11/2023) 150 - 450 10e3/uL     No results found for ANC within last 30 days.     _  Result Component Current Result Ref Range   Absolute Neutrophils 3.9 (5/11/2023) 1.6 - 8.3 10e3/uL        Assessment & Plan:  Results are concerning for Hg 9 (G2 anemia). This does not warrant a dose change to temozolomide or capecitabine at this time, but will continue to monitor.    Questions answered to patient's satisfaction.    Follow-Up:  Tyrel is following up with Dr Parker on Monday 5/15, and has an appointment at [place] for scans on 5/22. He would like to hold off on refilling his capecitabine and temozolomide until after he talks to Dr Parker.    Will call back next week to assess need for refills.    Maxine Gonzalez, PharmD, BCOP  Hematology/Oncology Clinical Pharmacist  [facility]  [facility]  [phone]

## 2023-05-16 NOTE — TELEPHONE ENCOUNTER
Called patient and informed him and his wife of the results/message below. Patient is going to have his labs drawn when he goes in for his oncology lab appointment. He had no further questions at this time.

## 2023-05-16 NOTE — TELEPHONE ENCOUNTER
----- Message from Carmen Cho MD sent at 5/16/2023  6:26 AM CDT -----  Please call patient with this message and instructions - didn't view my message on [employer]. Thanks!  --    Your thyroid function is still a bit lower than we want. We will increase your synthroid again to 112 mcg daily and then plan to repeat the levels again in 6-8 weeks (the order is there, you will just need a lab appt). I sent the new prescription of synthroid to your pharmacy.

## 2023-05-25 NOTE — NURSING NOTE
Is the patient currently in the state of [place]? YES    Visit mode:VIDEO    If the visit is dropped, the patient can be reconnected by: VIDEO VISIT: Send to e-mail at: [email or web address]    Will anyone else be joining the visit? Yes       How would you like to obtain your AVS? MyChart    Are changes needed to the allergy or medication list? NO    Reason for visit: Video Visit (Follow Up )      Domonique Titus

## 2023-05-25 NOTE — LETTER
5/25/2023         RE: Tyrel Harrell  [address]  [place] [address]        Dear Colleague,    Thank you for referring your patient, Tyrel Harrell, to the [facility]. Please see a copy of my visit note below.    Virtual Visit Details    Type of service:  Video Visit   Video Start Time: 9:30  Video End Time:10:00    Originating Location (pt. 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    He is 72 years old with previously cured Hodgkin's disease with a grade 3 well-differentiated endocrine tumor in the head of his pancreas with extensive liver metastasis.  His Ki-67 labeling index is 60%, and his cancer has very little if any dotatate uptake.  He has been on CAPTEM since August 2021 with an interruption during repair of a mitral valve last summer.  He returns today for his next planned response assessment.    He tells me overall he continues to do quite well.  While he definitely fatigues easily that seems relatively stable to him and has been able to be out gardening and play with his grandchildren.  He is eating moderately well but is added boost to his diet as his weight is gone down a couple of pounds.  He had a little bit more trouble this past cycle with hand-foot toxicity and is delayed starting his next cycle.  Mostly this is redness and tenderness and seem to be more prominent on the backs of his hands this cycle than usual.  He has not noticed any jaundice or change in the color of his urine.  He feels like he is doing well from a cardiac perspective.    On exam via the video link he appears well.  His hands indeed are fairly red and he is a bandage covering his pinky where he has a small fissure but otherwise there is no blistering or cracking.    I  personally reviewed his imaging done down at [place] and went over the results with him.  The pancreatic head primary is very slightly larger and his liver metastases do not appear any different.  He has mild but definitely increased biliary dilatation particular in the right lobe of his liver.    Labs done here to 2 weeks ago showed normal bilirubin and liver enzymes but his labs a couple days ago done at [facility] show a still normal bilirubin but with a marked elevation of his alkaline phosphatase and ALT.  His creatinine is marginally elevated at 1.2 and his electrolytes are normal.  He has anemia with marked macrocytosis and a normal white count and platelet count.    Assessment/plan:  1.  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  His disease is stable to marginally progressed on his current CAPTEM and for now would like to continue on with treatment.  He will restart his therapy in a couple of days which will have given him about 3 weeks off this cycle.  We decided for now to leave the dosing the same and see how he does with the little bit extra time off but if his hands and feet are as bad as this past cycle the next cycle will need to cut the dose of capecitabine a little bit.  Some of this may be due to his more recent increase in sun exposure particularly the worsening redness on the backs of his hands.  We will plan on another response assessment after couple more months of therapy.  2.  Biliary obstruction.  At present this has not been significant enough to raise his bilirubin but is restarting to see biliary dilatation and changes in his enzymes think it is time to at least discuss with gastroenterology placement of his stent.  We will go ahead and get an MRCP to help clearly delineate the anatomy there and have him visit with gastroenterology to make a decision about stenting now versus waiting.  I discussed with him that if he becomes frankly  jaundiced we would definitely proceed with stenting.  I told him if he becomes jaundiced and febrile that it would be an emergency and we should hear about that immediately.  3.  Chronic heart failure.  This is quite well controlled at present in fact his recent echo this week shows improvement in his ejection fraction.  4.  History of Hodgkin's disease.    Total time by me on the date of service inclusive of the above visit review of outside records and images, ordering, coordination of care and documentation was greater than 75 minutes.      Again, thank you for allowing me to participate in the care of your patient.        Sincerely,        Bradly Parker MD

## 2023-05-25 NOTE — PROGRESS NOTES
Virtual Visit Details    Type of service:  Video Visit   Video Start Time: 9:30  Video End Time:10:00    Originating Location (pt. 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    He is 72 years old with previously cured Hodgkin's disease with a grade 3 well-differentiated endocrine tumor in the head of his pancreas with extensive liver metastasis.  His Ki-67 labeling index is 60%, and his cancer has very little if any dotatate uptake.  He has been on CAPTEM since August 2021 with an interruption during repair of a mitral valve last summer.  He returns today for his next planned response assessment.    He tells me overall he continues to do quite well.  While he definitely fatigues easily that seems relatively stable to him and has been able to be out gardening and play with his grandchildren.  He is eating moderately well but is added boost to his diet as his weight is gone down a couple of pounds.  He had a little bit more trouble this past cycle with hand-foot toxicity and is delayed starting his next cycle.  Mostly this is redness and tenderness and seem to be more prominent on the backs of his hands this cycle than usual.  He has not noticed any jaundice or change in the color of his urine.  He feels like he is doing well from a cardiac perspective.    On exam via the video link he appears well.  His hands indeed are fairly red and he is a bandage covering his pinky where he has a small fissure but otherwise there is no blistering or cracking.    I personally reviewed his imaging done down at [place] and went over the results with him.  The pancreatic head primary is very slightly larger and his liver metastases do not appear any different.  He has mild but definitely increased biliary dilatation particular in the  right lobe of his liver.    Labs done here to 2 weeks ago showed normal bilirubin and liver enzymes but his labs a couple days ago done at [facility] show a still normal bilirubin but with a marked elevation of his alkaline phosphatase and ALT.  His creatinine is marginally elevated at 1.2 and his electrolytes are normal.  He has anemia with marked macrocytosis and a normal white count and platelet count.    Assessment/plan:  1.  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  His disease is stable to marginally progressed on his current CAPTEM and for now would like to continue on with treatment.  He will restart his therapy in a couple of days which will have given him about 3 weeks off this cycle.  We decided for now to leave the dosing the same and see how he does with the little bit extra time off but if his hands and feet are as bad as this past cycle the next cycle will need to cut the dose of capecitabine a little bit.  Some of this may be due to his more recent increase in sun exposure particularly the worsening redness on the backs of his hands.  We will plan on another response assessment after couple more months of therapy.  2.  Biliary obstruction.  At present this has not been significant enough to raise his bilirubin but is restarting to see biliary dilatation and changes in his enzymes think it is time to at least discuss with gastroenterology placement of his stent.  We will go ahead and get an MRCP to help clearly delineate the anatomy there and have him visit with gastroenterology to make a decision about stenting now versus waiting.  I discussed with him that if he becomes frankly jaundiced we would definitely proceed with stenting.  I told him if he becomes jaundiced and febrile that it would be an emergency and we should hear about that immediately.  3.  Chronic heart failure.  This is quite well controlled at present in fact his recent echo this  week shows improvement in his ejection fraction.  4.  History of Hodgkin's disease.    Total time by me on the date of service inclusive of the above visit review of outside records and images, ordering, coordination of care and documentation was greater than 75 minutes.

## 2023-05-26 NOTE — ORAL ONC MGMT
Oral Chemotherapy Monitoring Program     Placed call in follow up of oral chemotherapy for assessment and refill. Left message requesting call back. No medication names mentioned.     Paul Adams, PharmD  Hematology/Oncology Clinical Pharmacist  [facility]  [facility]

## 2023-05-31 NOTE — TELEPHONE ENCOUNTER
[facility]     Referring provider: Bradly Parker MD   [facility]     Referred to: [facility]     Provider Requested: NA     Referral Received: 5/25/23     Records received: EPIC     Images received: PACS    Evaluation for: C25.4 (ICD-10-CM) - Malignant neoplasm of endocrine pancreas (H) C7B.8 (ICD-10-CM) - Secondary malignant neuroendocrine tumor of liver (H)      Clinical History (per RN review): MRI scheduled 6/5/23.  Virtual visit Dr. Parker 5/25:  Assessment/plan:  1.  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  His disease is stable to marginally progressed on his current CAPTEM and for now would like to continue on with treatment.  He will restart his therapy in a couple of days which will have given him about 3 weeks off this cycle.  We decided for now to leave the dosing the same and see how he does with the little bit extra time off but if his hands and feet are as bad as this past cycle the next cycle will need to cut the dose of capecitabine a little bit.  Some of this may be due to his more recent increase in sun exposure particularly the worsening redness on the backs of his hands.  We will plan on another response assessment after couple more months of therapy.  2.  Biliary obstruction.  At present this has not been significant enough to raise his bilirubin but is restarting to see biliary dilatation and changes in his enzymes think it is time to at least discuss with gastroenterology placement of his stent.  We will go ahead and get an MRCP to help clearly delineate the anatomy there and have him visit with gastroenterology to make a decision about stenting now versus waiting.  I discussed with him that if he becomes frankly jaundiced we would definitely proceed with stenting.  I told him if he becomes jaundiced and febrile that it would be an emergency and we should hear about that immediately.  3.   Chronic heart failure.  This is quite well controlled at present in fact his recent echo this week shows improvement in his ejection fraction.  4.  History of Hodgkin's disease.    5/31/23      CT ABDOMEN PELVIS WITH IV CONTRAST 5/22/23:  Impression  1. Interval slight increase in size of the soft tissue mass involving the pancreatic head, neck, and   uncinate process, consistent with known pancreatic neuroendocrine tumor.   2. Interval slight decrease in size of a few hepatic metastases. No definite new hepatic lesions.   3. Interval decrease in size of bilateral pleural effusions.    CT ABDOMEN PELVIS WITH IV CONTRAST 2/27/23:  Impression  1. Stable soft tissue involving the the pancreatic head, neck and uncinate process, consistent with   known pancreatic neuroendocrine tumor.   2. Unchanged hepatic metastases.   3. Small bilateral pleural effusions.      MD review date: 6/1/23 Dr. Tatyana TORRES Decision for clinic consultation/Orders:     Please schedule ERCP with me maybe next Wednesday. This will end up needing one regardless of MRCP      Referral updates/Patient contacted: 6/2/23

## 2023-06-02 NOTE — TELEPHONE ENCOUNTER
FUTURE VISIT INFORMATION      SURGERY INFORMATION:    Surgery date:  on ERCP to figure out bile duct blockage, at [facility], Dr. Ceballos in GI    RECORDS REQUESTED FROM:       Primary Care Provider: [facility]    Pertinent Medical History: Ascending aorta Dilation, HFrEF, Paroxysmal atrial fibrillation    Most recent EKG+ Tracin22- [facility]    Most recent ECHO: 23    Most recent Cardiac Stress Test: 23- [facility]

## 2023-06-02 NOTE — PROGRESS NOTES
Following review of referral, per Dr. Joe: Please schedule ERCP with me maybe next Wednesday. This will end up needing one regardless of MRCP     Please assist in scheduling:     Procedure/Imaging/Clinic: ERCP with stenting  Physician: Tatyana  Timing: Next available  Scope time: Provider average  Anesthesia: General  Dx: Elevated LFTs; biliary obstruction  Tier:2  Location: [facility]  Patient communication letter header:ERCP (Endoscopic Retrograde Cholangiopancreatography)     Comments: Potential for 6/7  Called to discuss with patient. Explained they will need a , someone to stay with them for 24 hours and should stay in town for 24 hours (within 45 min of Hospital) post procedure.    Patient reports dark urine and itching for about a month. Woke last night with night sweats. Denies fevers; will continue to monitor. Denies yellowing of skin or eyes.     Patient will need a pre-op physical within 30 days of procedure. If outside  Health system, will need physical faxed to number [phone]   If you do not get a preop physical, your procedure could be cancelled, patient voiced understanding*    Preop Plan: PAC visit    Med Review    Blood thinner -  Eliquis; 2 day hold to begin 6/5/23.   Diabetic - Farxiga; 3 day hold to begin 6/4/23.    Patient Education r/t procedure: MyChart    Does patient have any history of gastric bypass/gastric surgery/altered panc/bili anatomy? None    A pre-op nurse will call 1-2 days prior to the procedure.    NPO/Prep: No solid food 8 hours prior to arrival at the hospital. Clear liquids okay until one hour prior to arrival.     Verbalized understanding of all instructions. All questions answered. Clinic contact and scheduling numbers verified for future questions/concerns.    Mis Blank RN, BSN  Care Coordinator  [facility]

## 2023-06-02 NOTE — PROGRESS NOTES
Preoperative Assessment Center Medication History Note  Medication history completed on June 2, 2023 by this writer prior to patient's PAC appointment. See Epic admission navigator for prior to admission medications. Operating room staff will still need to confirm medications and last dose information on day of surgery.     Medication history interview sources  Patient and CareEverywhere/SureScripts via phone    Changes made to PTA medication list    Added: vanicream,     Deleted: duplicate eliquis. Duplicate farxiga, duplicate zofran, lasix, ketoconazole cream,     Changed: venlafaxine, vitamin D, tylenol, creon,     Allergies reviewed with patient and updates made in EHR: yes    - Blood thinner -  Eliquis; 2 day hold to begin 6/5/23.   - Diabetic - Farxiga; 3 day hold to begin 6/4/23.  - Capecitabine/temozolamide - per Dr. Tatyana SHELDON to remain on these for the ERCP.     -- No recent (within 30 days) course of antibiotics  -- No recent (within 30 days) course of systemic steroids  -- Reports being on blood thinning medications - eliquis    -- Declines being on any other prescription or over-the-counter medications    Prior to Admission medications    Medication Sig Last Dose Taking? Auth Provider Long Term End Date   acetaminophen (TYLENOL) 500 MG tablet Take 500-1,000 mg by mouth every 8 hours as needed for mild pain Taking Yes Unknown, Entered By History     apixaban ANTICOAGULANT (ELIQUIS) 5 MG tablet Take 1 tablet by mouth 2 times daily Taking Yes Reported, Patient     augmented betamethasone dipropionate (DIPROLENE-AF) 0.05 % external ointment Apply topically 2 times daily To rash on hands and feet when flared  Patient taking differently: Apply topically 2 times daily as needed To rash on hands and feet when flared Taking Yes Daniela Last APRN CNP     calcium carbonate antacid (TUMS ULTRA 1000) 1000 MG CHEW Take 1,000 mg by mouth every evening Taking Yes Unknown, Entered By History      capecitabine (XELODA) 150 MG tablet Take 1 tablet (150 mg) by mouth 2 times daily for 14 days Taking Yes Bradly Parker MD Yes 6/4/23   capecitabine (XELODA) 500 MG tablet Take 2 tablets (1,000 mg) by mouth 2 times daily for 14 days Take with 150 mg tablet for total dose of 1,150 mg. Take for 14 days, then off for 14 days. Take within 30 mins after meal. Taking Yes Bradly Parker MD Yes 6/4/23   dapagliflozin (FARXIGA) 10 MG TABS tablet Take 1 tablet by mouth daily Taking Yes Reported, Patient     emollient (VANICREAM) external cream Apply topically as needed for other Taking Yes Unknown, Entered By History     fish oil-omega-3 fatty acids 1000 MG capsule Take 1 g by mouth daily Taking Yes Unknown, Entered By History     levothyroxine (SYNTHROID/LEVOTHROID) 112 MCG tablet Take 1 tablet (112 mcg) by mouth daily Taking Yes Carmen Cho MD Yes    lipase-protease-amylase (CREON 12) 76583-06277-42139 units CPEP Take 3 capsules by mouth 3 times daily (with meals)  Patient taking differently: Take 2-3 capsules by mouth 3 times daily (with meals) Taking Yes Carmen Cho MD  12/20/23   losartan (COZAAR) 25 MG tablet Take 12.5 mg by mouth every morning Taking Yes Reported, Patient Yes    lovastatin (MEVACOR) 40 MG tablet Take 1 tablet (40 mg) by mouth At Bedtime Taking Yes Carmen Cho MD Yes    melatonin 3 MG tablet Take 3 mg by mouth nightly as needed for sleep Taking Yes Reported, Patient     metoprolol succinate ER (TOPROL XL) 25 MG 24 hr tablet Take 37.5 mg by mouth every morning Taking Yes Reported, Patient Yes    Multiple Vitamin (MULTIVITAMIN) per tablet Take 1 tablet by mouth 2 times daily Taking Yes Reported, Patient     ondansetron (ZOFRAN ODT) 8 MG ODT tab Take 1 tablet (8 mg) by mouth every 8 hours as needed for nausea Taking Yes Bradly Parker MD     prochlorperazine (COMPAZINE) 10 MG tablet Take 10 mg by mouth Taking Yes Reported, Patient      Psyllium (HYDROCIL) 95 % PACK Take 1 packet by mouth daily as needed Taking Yes Reported, Patient     spironolactone (ALDACTONE) 25 MG tablet Take 12.5 mg by mouth daily Taking Yes Reported, Patient Yes    venlafaxine (EFFEXOR XR) 150 MG 24 hr capsule Take 1 capsule (150 mg) by mouth daily  Patient taking differently: Take 150 mg by mouth daily Also takes 75 mg for total of 225 mg every morning. Taking Yes Carmen Cho MD Yes    venlafaxine (EFFEXOR XR) 75 MG 24 hr capsule Take 1 capsule (75 mg) by mouth daily  Patient taking differently: Take 75 mg by mouth daily Also takes 150 mg for total of 225 mg every morning. Taking Yes Carmen Cho MD Yes    vitamin D3 (CHOLECALCIFEROL) 50 mcg (2000 units) tablet Take 1 tablet by mouth daily Taking Yes Unknown, Entered By History     temozolomide (TEMODAR) 100 MG capsule Take 4 capsules (400 mg) by mouth At Bedtime for 5 days Take on empty stomach, Days 10 thru 14. Take Zofran 30-60 min before Temodar.  Patient not taking: Reported on 6/2/2023 Not Taking  Bradly Parker MD Yes 5/26/23   temozolomide (TEMODAR) 100 MG capsule Take 4 capsules (400 mg) by mouth At Bedtime for 5 days Take on empty stomach, Days 10 thru 14. Take Zofran 30-60 min before Temodar.   Bradly Parker MD Yes 4/28/23   temozolomide (TEMODAR) 100 MG capsule Take 4 capsules (400 mg) by mouth At Bedtime for 5 days Take on empty stomach, Days 10 thru 14. Take Zofran 30-60 min before Temodar.   Bradly Parker MD Yes 3/31/23          Medication History Completed By: Raffy Hawkins [facility] 6/2/2023 12:58 PM

## 2023-06-02 NOTE — PROGRESS NOTES
Anticoagulation Note - Preoperative Assessment Center (PAC) Pharmacist     Patient was interviewed on June 2, 2023 prior to scheduled PAC clinic appointment. The purpose of this note is to document the perioperative anticoagulation plan outlined by the providers caring for Tyrel Harrell.     Current Regimen  Anticoagulation Regimen as of June 2, 2023: apixaban (ELIQUIS) 5 mg by mouth twice daily     Indication: subsegmental PE > 3 mo ago  Prescriber:  Dr. Quan      Creatinine   Date Value Ref Range Status   05/31/2023 1.33 (H) 0.67 - 1.17 mg/dL Final   05/24/2016 0.93 0.66 - 1.25 mg/dL Final   CrCl ~55 mL/min     Perioperative plan  Tyrel Harrell is scheduled for ENDOSCOPIC RETROGRADE CHOLANGIOPANCREATOGRAPHY, WITH stenting on 6/7/23 with Dr. Joe and the perioperative anticoagulation plan outlined by GI team is hold eliquis x2 days pre op. Last dose to be on 6/4/23 PM.     Resumption of anticoagulation after procedure will be based on surgery team assessment of bleeding risks and complications.  This plan may require re-assessment and modification by his primary team in the perioperative setting depending on patients clinical situation.        Raffy Hawkins [facility]  June 2, 2023  12:58 PM

## 2023-06-02 NOTE — PROGRESS NOTES
Procedure/Imaging/Clinic: ERCP with stenting  Physician: Tatyana  Timing: Next available  Scope time: Provider average  Anesthesia: General  Dx: Elevated LFTs; biliary obstruction  Tier:2  Location: [facility]  Patient communication letter header:ERCP (Endoscopic Retrograde Cholangiopancreatography)

## 2023-06-05 NOTE — PROGRESS NOTES
William is a 72 year old who is being evaluated via a billable video visit.      How would you like to obtain your AVS? Jaisonhart      Subjective   William is a 72 year old, presenting for the following health issues:  Pre-Op Exam    HPI           Review of Systems       Physical Exam       OSIRIS Brewster LPN

## 2023-06-05 NOTE — H&P
Pre-Operative H & P     CC:  Preoperative exam to assess for increased cardiopulmonary risk while undergoing surgery and anesthesia.    Date of Encounter: 6/5/2023  Primary Care Physician:  Carmen Cho     Reason for visit:   Encounter Diagnoses   Name Primary?     Recurrent major depressive disorder, in partial remission (H)      Preop examination Yes     Elevated LFTs      Biliary obstruction      Neuroendocrine tumor of pancreas        HPI  Tyrel Harrell is a 72 year old male who presents for pre-operative H & P in preparation for  Procedure Information     Case: [id number] Date/Time: 06/07/23 1725    Procedure: ENDOSCOPIC RETROGRADE CHOLANGIOPANCREATOGRAPHY, WITH stenting (Mouth)    Anesthesia type: General    Diagnosis:       Elevated LFTs [R79.89]      Biliary obstruction [K83.1]    Pre-op diagnosis:       Elevated LFTs [R79.89]      Biliary obstruction [K83.1]    Location: [facility]  / [facility]    Providers: Guru Alexander Joe MD          Tyrel Harrell had previously cured Hodgkin's disease (1988)  with more recent diagnosis of 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 He is followed by Dr. Parker in hematology & oncology with last visit on 5/25/2023. Records indicate stable to marginally progressed disease on his current CAPTEM. He has biliary obstruction with recent elevation in his liver enzymes. The above procedure has been scheduled.      The patient and his wife, Kacie, present to the PAC virtually today with comorbid conditions including NICM/HFrEF, PAF, MR s/p Melani Clip (4/2022), HLD, pulmonary embolism, anxiety/depression, Hodgkin s lymphoma s/p chemoradiation therapy (1988- [facility]), splenectomy, RUE lymphedema, chronic right shoulder pain, h/o interstitial Lung disease, pancreatic insufficiency, hypothyroidism, anemia and thrombocytopenia.     History is obtained from the patient and patient's  spouse and chart review    Hx of abnormal bleeding or anti-platelet use: Eliquis      Past Medical History  Past Medical History:   Diagnosis Date     Anticoagulated      Anxiety      Chronic kidney disease      Chronic right shoulder pain      Depression      Difficult intravenous access     NEED UTRASOUND TO FIND VEIN     History of pulmonary embolism      Hodgkin lymphoma, nodular sclerosis (H) 1988    s/p radiation     Hypothyroidism      Lymphedema of right upper extremity      Neuroendocrine tumor of pancreas     with liver metastasis     Pancreatic insufficiency        Past Surgical History  Past Surgical History:   Procedure Laterality Date     CARDIAC SURGERY  04/22/2022    s/p MitraClip       Prior to Admission Medications  Current Outpatient Medications   Medication Sig Dispense Refill     acetaminophen (TYLENOL) 500 MG tablet Take 500-1,000 mg by mouth every 8 hours as needed for mild pain       apixaban ANTICOAGULANT (ELIQUIS) 5 MG tablet Take 1 tablet by mouth 2 times daily       augmented betamethasone dipropionate (DIPROLENE-AF) 0.05 % external ointment Apply topically 2 times daily To rash on hands and feet when flared (Patient taking differently: Apply topically 2 times daily as needed To rash on hands and feet when flared) 50 g 1     calcium carbonate antacid (TUMS ULTRA 1000) 1000 MG CHEW Take 1,000 mg by mouth every evening       capecitabine (XELODA) 150 MG tablet Take 1 tablet (150 mg) by mouth 2 times daily for 14 days 28 tablet 0     capecitabine (XELODA) 500 MG tablet Take 2 tablets (1,000 mg) by mouth 2 times daily for 14 days Take with 150 mg tablet for total dose of 1,150 mg. Take for 14 days, then off for 14 days. Take within 30 mins after meal. 56 tablet 0     dapagliflozin (FARXIGA) 10 MG TABS tablet Take 1 tablet by mouth daily       emollient (VANICREAM) external cream Apply topically as needed for other       fish oil-omega-3 fatty acids 1000 MG capsule Take 1 g by mouth daily        levothyroxine (SYNTHROID/LEVOTHROID) 112 MCG tablet Take 1 tablet (112 mcg) by mouth daily 90 tablet 0     lipase-protease-amylase (CREON 12) 91058-87462-61090 units CPEP Take 3 capsules by mouth 3 times daily (with meals) (Patient taking differently: Take 2-3 capsules by mouth 3 times daily (with meals)) 810 capsule 3     losartan (COZAAR) 25 MG tablet Take 12.5 mg by mouth every morning       lovastatin (MEVACOR) 40 MG tablet Take 1 tablet (40 mg) by mouth At Bedtime 90 tablet 3     melatonin 3 MG tablet Take 3 mg by mouth nightly as needed for sleep       metoprolol succinate ER (TOPROL XL) 25 MG 24 hr tablet Take 37.5 mg by mouth every morning       Multiple Vitamin (MULTIVITAMIN) per tablet Take 1 tablet by mouth 2 times daily       ondansetron (ZOFRAN ODT) 8 MG ODT tab Take 1 tablet (8 mg) by mouth every 8 hours as needed for nausea 30 tablet 1     prochlorperazine (COMPAZINE) 10 MG tablet Take 10 mg by mouth       Psyllium (HYDROCIL) 95 % PACK Take 1 packet by mouth daily as needed       spironolactone (ALDACTONE) 25 MG tablet Take 12.5 mg by mouth daily       venlafaxine (EFFEXOR XR) 150 MG 24 hr capsule Take 1 capsule (150 mg) by mouth daily (Patient taking differently: Take 150 mg by mouth daily Also takes 75 mg for total of 225 mg every morning.) 90 capsule 3     venlafaxine (EFFEXOR XR) 75 MG 24 hr capsule Take 1 capsule (75 mg) by mouth daily (Patient taking differently: Take 75 mg by mouth daily Also takes 150 mg for total of 225 mg every morning.) 90 capsule 3     vitamin D3 (CHOLECALCIFEROL) 50 mcg (2000 units) tablet Take 1 tablet by mouth daily       temozolomide (TEMODAR) 100 MG capsule Take 4 capsules (400 mg) by mouth At Bedtime for 5 days Take on empty stomach, Days 10 thru 14. Take Zofran 30-60 min before Temodar. (Patient not taking: Reported on 6/2/2023) 20 capsule 0     temozolomide (TEMODAR) 100 MG capsule Take 4 capsules (400 mg) by mouth At Bedtime for 5 days Take on empty stomach,  Days 10 thru 14. Take Zofran 30-60 min before Temodar. 20 capsule 0     temozolomide (TEMODAR) 100 MG capsule Take 4 capsules (400 mg) by mouth At Bedtime for 5 days Take on empty stomach, Days 10 thru 14. Take Zofran 30-60 min before Temodar. 20 capsule 0       Allergies  Allergies   Allergen Reactions     Bee Venom Hives and Other (See Comments)     Other reaction(s): Other (see comments)  Not honey bees. Unknown bee  Per pt  Per pt  Per pt  Per pt       Simvastatin Muscle Pain (Myalgia) and Other (See Comments)     Other reaction(s): Muscle Aches, Other (see comments)  Patient reports muscle stiffness  Patient reports muscle stiffness  myalgias  myalgias       Vinblastine Other (See Comments)     Other reaction(s): Other (see comments)  Patient reported paralyzation of colon  Per pt  Patient reported paralyzation of colon  Per pt       Vincristine Other (See Comments)     Patient reports paralyzation of his colon  Patient reports paralyzation of his colon  Patient reports paralyzation of his colon  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        Social History  Social History     Socioeconomic History     Marital status:      Spouse name: Not on file     Number of children: Not on file     Years of education: Not on file     Highest education level: Not on file   Occupational History     Not on file   Tobacco Use     Smoking status: Never     Passive exposure: Never     Smokeless tobacco: Never   Vaping Use     Vaping status: Never Used   Substance and Sexual Activity     Alcohol use: Yes     Comment: 1 beer a month     Drug use: Never     Sexual activity: Not on file   Other Topics Concern     Not on file   Social History Narrative     Not on file     Social Determinants of Health     Financial Resource Strain: Not on file   Food Insecurity: Not on file   Transportation Needs: Not on file   Physical Activity: Not on file   Stress: Not on file    Social Connections: Not on file   Intimate Partner Violence: Not on file   Housing Stability: Not on file       Family History  No family history on file.    Review of Systems  The complete review of systems is negative other than noted in the HPI or here.   Anesthesia Evaluation   Pt has had prior anesthetic. Type: MAC and General.    No history of anesthetic complications       ROS/MED HX  ENT/Pulmonary: Comment: H/O ILD, denies symptoms    H/o Pleural effusion without residual symptoms   (-) tobacco use   Neurologic:  - neg neurologic ROS     Cardiovascular: Comment: Denies cardiac symptoms including chest pain, SOB, palpitations, syncope, ALLEN, orthopnea, or PND.    (+) Dyslipidemia -----Taking blood thinners CHF etiology: NICM Last EF: 55-60% NYHA classification: II. dysrhythmias (PAF), valvular problems/murmurs type: MR s/p MitraClip using 2 clips 4/22/22. Previous cardiac testing     METS/Exercise Tolerance: 4 - Raking leaves, gardening Comment: Able to walk 1/3 mile    Hematologic:     (+) History of blood clots (Treated with Eliquis), pt is not anticoagulated, anemia,  (-) history of blood transfusion   Musculoskeletal:       GI/Hepatic: Comment: S/p splenectomy 1988    (+) appendicitis (s/p appendectomy), liver disease (liver mets.),  (-) GERD (now in remssion)   Renal/Genitourinary:     (+) renal disease, type: CRI, BPH (Nocturia),     Endo:     (+) thyroid problem, hypothyroidism,     Psychiatric/Substance Use:     (+) psychiatric history (Doing well with medicaton and therapist.) anxiety and depression  (-) alcohol abuse history and chronic opioid use history   Infectious Disease:  - neg infectious disease ROS     Malignancy:   (+) Malignancy, History of Lymphoma/Leukemia, Other and Skin.Skin CA Remission status post Surgery.  Lymph CA Remission status post Radiation and Chemo.  Other CA metastatic neuroendocrine tumor of pancreas with mets to liver. Active status post.    Other:  - neg other ROS     (+) , H/O Chronic Pain (Right shoulder pain ),        Virtual visit -  No vitals were obtained    Physical Exam  Constitutional: Awake, alert, cooperative, no apparent distress, and appears stated age.  Eyes: Pupils equal  HENT: Normocephalic  Respiratory: non labored breathing   Neurologic: Awake, alert, oriented to name, place and time.   Neuropsychiatric: Calm, cooperative. Normal affect.      Prior Labs/Diagnostic Studies   All labs and imaging personally reviewed    Latest Reference Range & Units 05/31/23 15:16   Sodium 136 - 145 mmol/L 137   Potassium 3.4 - 5.3 mmol/L 4.5   Chloride 98 - 107 mmol/L 104   Carbon Dioxide (CO2) 22 - 29 mmol/L 22   Urea Nitrogen 8.0 - 23.0 mg/dL 20.1   Creatinine 0.67 - 1.17 mg/dL 1.33 (H)   GFR Estimate >60 mL/min/1.73m2 57 (L)   Calcium 8.8 - 10.2 mg/dL 9.0   Anion Gap 7 - 15 mmol/L 11   Albumin 3.5 - 5.2 g/dL 3.7   Protein Total 6.4 - 8.3 g/dL 6.8   Alkaline Phosphatase 40 - 129 U/L 651 (H)   ALT 10 - 50 U/L 161 (H)   AST 10 - 50 U/L 132 (H)   Bilirubin Total <=1.2 mg/dL 2.6 (H)   Chromogranin A 0 - 103 ng/mL 272 (H)   Glucose 70 - 99 mg/dL 133 (H)   WBC 4.0 - 11.0 10e3/uL 4.9   Hemoglobin 13.3 - 17.7 g/dL 9.9 (L)   Hematocrit 40.0 - 53.0 % 28.3 (L)   Platelet Count 150 - 450 10e3/uL 168   RBC Count 4.40 - 5.90 10e6/uL 2.47 (L)   MCV 78 - 100 fL 115 (H)   MCH 26.5 - 33.0 pg 40.1 (H)   MCHC 31.5 - 36.5 g/dL 35.0   RDW 10.0 - 15.0 % 19.6 (H)   % Neutrophils % 68   % Lymphocytes % 9   % Monocytes % 20   % Eosinophils % 2   % Basophils % 1   Absolute Basophils 0.0 - 0.2 10e3/uL 0.0   Absolute Eosinophils 0.0 - 0.7 10e3/uL 0.1   Absolute Immature Granulocytes <=0.4 10e3/uL 0.0   Absolute Lymphocytes 0.8 - 5.3 10e3/uL 0.4 (L)   Absolute Monocytes 0.0 - 1.3 10e3/uL 1.0   % Immature Granulocytes % 1   Absolute Neutrophils 1.6 - 8.3 10e3/uL 3.4   (H): Data is abnormally high  (L): Data is abnormally low    PROCEDURES  CT Abd/Pelvis CE 5/22/2023  Impression    1. Interval slight  increase in size of the soft tissue mass involving the pancreatic head, neck, and uncinate process, consistent with known pancreatic neuroendocrine tumor.   2. Interval slight decrease in size of a few hepatic metastases. No definite new hepatic lesions.   3. Interval decrease in size of bilateral pleural effusions.    ECHO - CE 4/2023    Final Impressions:    1. Normal LV size, normal wall thickness, normal global systolic function with an estimated EF of 55 - 60%.    2. Posterior wall and basal inferior segment are abnormal.    3. Mildly enlarged left atrium.    4. The aortic valve is trileaflet and sclerotic, no stenosis and moderate regurgitation.    5. The mitral valve is repair with MitraClip repaired using 2 MitraClips, mild mitral regurgitation.    6. Mitral stenosis with moderately increased mean gradient of 7.3 mmHg at a heart rate of 86.    7. Grade 2 pattern of LV diastolic filling.    8. Color Doppler suggests a left to right atrial level shunt.     Cardia MR 10/2022  FINAL IMPRESSION    ==========================================================================================================     ** Comparison is made to CMR from 04/20/2022 **   1. Patient is s/p 2 XTW clips for severe MR        A. Mild residual mitral regurgitation with regurgitant volume 5 ml and fraction 7 %.   2. Globally reduced left ventricular systolic function, LVEF 41% with improved LV volumes (LVEDVi = 95   ml/m2 compared to LVEDVi = 111 ml/m2 on 4/2022)   3. Mildly reduced right ventricular function RVEF 47%.   4. Trileaflet aortic valve with mild regurgitation. Calculated regurgitant fraction is 14%.   5. Nonspecific midwall replacement non-ischemic fibrosis in the basal anteroseptum.   6. No evidence of infiltrative cardiomyopathy.       EKG 6/9/2022  Sinus tachycardia   Otherwise normal ECG   When compared with ECG of 27-APR-2022 13:26,   No significant change was found   Confirmed by Toni Pal MD ([id number]) on 6/13/2022  4:19:16 PM     JUN 3/29/2022    Final Impressions:    1. Normal left ventricular size, mildly decreased global systolic function with an estimated EF of 40%.    2. Right ventricular cavity size is normal, global systolic RV function is mildly reduced.    3. The aortic valve is sclerotic and trileaflet, no stenosis and mild to moderate regurgitation.    4. The mitral valve is normal in structure with moderate to severe secondary mitral regurgitation (EROA 0.33 cm2, MR vol 52 ml).    5. No evidence of thrombus present in the left atrial appendage.    6. PFO present with left to right shunt.    7. No pericardial effusion.     The patient's records and results personally reviewed by this provider.     Outside records reviewed from: Care Everywhere      Assessment  Tyrel Harrell is a 72 year old male seen as a PAC referral for risk assessment and optimization for anesthesia.    Plan/Recommendations  Pt will be optimized for the proposed procedure.  See below for details on the assessment, risk, and preoperative recommendations    NEUROLOGY  - No history of TIA, CVA or seizure    -Post Op delirium risk factors:  High co-morbid index    ENT  - No current airway concerns.  Will need to be reassessed day of surgery.  Mallampati: Unable to assess  TM: Unable to assess    CARDIAC  - NICM/HFrEF with h/o MR s/p Melani Clip X2 (4/2022)    ~ EF has normalized on recent echocardiogram with excellent procedural results and reduction of mitral regurgitation from severe to trivial   ~ only symptoms is fatigue that he attributes to his chemotherapy.    ~ follows with Dr. Sanchez at [facility] w/ optimization of GDMT   ~ medically managed with losartan, metoprolol, spironolactone and Farxiga.    ~ Farxiga; 3 day hold to begin 6/4/23   ~ see cardiac testing above    - Known PFO on JUN 3/2022   ~ PFO present with left to right shunt    -  PAF   ~ Eliquis and Metoprolol    ~ most recent EKG and echo appears to be in sinus    -  "HLD   ~ managed with statin    - METS (Metabolic Equivalents)  Patient performs 4 or more METS exercise without symptoms            Total Score: 0       - RCRI-Low risk: Class 2 0.9% complication rate            Total Score: 1    RCRI: CHF        PULMONARY  - h/o ILD without current symptoms    - treated for pleural effusion without evidence of recurrence    - REBA Low Risk  *This score is based on incomplete data *           Total Score: 2*    REBA: Hypertension    REBA: Over 50 ys old        Criteria with incomplete data:    REBA: Male      - Denies asthma or inhaler use  - Tobacco History    History   Smoking Status     Never   Smokeless Tobacco     Never       GI  - Known biliary obstruction now with elevating LFTs.   ~ above procedure scheduled   ~ understands to present to ED if increasing jaundice and fever    * Reports he went for his MRCP this morning, but unable to get an IV started as needs Ultra Sound.  Working with Mis Blank RN, care coordinator for advanced endoscopy for either the [facility] or other facility that will use doppler to find vein. Instructed to continue to work on getting this set up so he ready for above procedure on Wednesday, 6/7/2023.    - Pancreas insufficiency    - managed with Creon    - PONV Low Risk  Total Score: 1           1 AN PONV: Patient is not a current smoker        /RENAL  - CKD  Creatinine   Date Value Ref Range Status   05/31/2023 1.33 (H) 0.67 - 1.17 mg/dL Final   05/24/2016 0.93 0.66 - 1.25 mg/dL Final       - BPH    ~ reports nocturia symptoms >2X/night    ENDOCRINE    - BMI: Estimated body mass index is 24.93 kg/m  as calculated from the following:    Height as of 5/25/23: 1.74 m (5' 8.5\").    Weight as of 5/25/23: 75.5 kg (166 lb 6.4 oz).  Healthy Weight (BMI 18.5-24.9)     - Thyroid disorder   ~ Continue home replacement while hospitalized.    HEME/ONC  - High-grade well-differentiated neuroendocrine tumor of the pancreas with liver metastasis.     ~ follows with " Dr. Parker   ~ treating with chemotherapy   ~ Capecitabine/temozolamide - per Dr. Tatyana SHELDON to remain on these for the ERCP.     - history of Hodgkin's disease s/p chemoradiation therapy at [facility] (1988)   ~ s/p splenectomy (1988)    - VTE High Risk 3%            Total Score: 11    VTE: Greater than 59 yrs old    VTE: Male    VTE: Pt history of VTE    VTE: Current cancer      - Coagulopathy second to Apixaban (Eliquis)   ~ perioperative anticoagulation plan outlined by GI team is hold eliquis x2 days pre op. Last dose to be on 6/4/23 PM.       - H/o anemia and thrombocytopenia   ~ Hgb 9.9 and Plts 168    - Denies h/o blood transfusion    MSK  - Chronic right shoulder pain   ~ consideration for careful positioning    PSYCH  - Depression and Anxiety   ~ stable on Venlafaxine    OTHER  - Extremely difficult stick - needs ultrasound     Different anesthesia methods/types have been discussed with the patient, but they are aware that the final plan will be decided by the assigned anesthesia provider on the date of service.  Patient was discussed with Dr Escobar    The patient is optimized for their procedure. AVS with information on surgery time/arrival time, meds and NPO status given by nursing staff. No further diagnostic testing indicated.    Please refer to the physical examination documented by the anesthesiologist in the anesthesia record on the day of surgery.    Video-Visit Details    Type of service:  Video Visit    Provider received verbal consent for a Video Visit from the patient? Yes   Video Start Time: 10:30   Video End Time:10:55    Originating Location (pt. Location): Home  Distant Location (provider location):  Off-site  Mode of Communication:  Video Conference via [employer]  On the day of service:     Prep time: 20 minutes  Visit time: 25 minutes  Documentation time: 25 minutes  ------------------------------------------  Total time: 70 minutes      FADY Flower CNP  Preoperative Assessment  [facility]  Clinic and Surgery Center  Phone: [phone]  Fax: [phone]

## 2023-06-05 NOTE — PATIENT INSTRUCTIONS
Preparing for Your Surgery      Name:  Tyrel Harrell   MRN:  [id number]   :  1950   Today's Date:  2023       Arriving for surgery:  Surgery date:  23  Arrival time:  3:25 pm    Please come to:     Please come to:      [facility] [address]  [address] [place]  [address]      The [facility] Patient /Visitor Ramp is located at [address]. Patients and visitors who self-park will receive the reduced hospital parking rate. If the Patient /Visitor Ramp is full, please follow the signs to the  parking located at the main hospital entrance.     parking is available ( 24 hours/ 7 days a week)    Discounted parking pass options are available for patients and visitors. They can be purchased at the modulR desk at the main hospital entrance.    -    Stop at the security desk and they will direct surgery patients to the 3rd floor Surgery Waiting Room. [phone]     -  If you are in need of directions, wheelchair or escort please stop at the Information/security desk in the lobby.       What can I eat or drink?  -  You may eat and drink normally up to 8 hours prior to arrival time. (Until 7:25 am)  -  You may have clear liquids until 2 hours prior to arrival time. (Until 1:25 pm)    Examples of clear liquids:  Water  Clear broth  Juices (apple, white grape, white cranberry  and cider) without pulp  Noncarbonated, powder based beverages  (lemonade and Fan-Aid)  Sodas (Sprite, 7-Up, ginger ale and seltzer)  Coffee or tea (without milk or cream)  Gatorade    -  No Alcohol or cannabis products for at least 24 hours before surgery.     Which medicines can I take?  Hold Aspirin for 7 days before surgery.   Hold Multivitamins for 7 days before surgery.  Hold Supplements for 7 days before surgery. (Fish Oil-Omega 3 Fatty Acids)  Hold Ibuprofen (Advil, Motrin) for 3 days before surgery--unless otherwise  directed by surgeon.  Hold Naproxen (Aleve) for 4 days before surgery.     Hold Dapagliflozin (Farxiga) for 3 days prior to surgery. Last dose should of been 6-3-23.     Hold Apixaban (Eliquis) for 2 days prior to surgery. Last dose should of been 6-4-23, PM dose.    -  DO NOT take these medications the day of surgery:  Losartan (Cozaar)  Spironolactone (Aldactone)  Lipase-Protease-Amylase (Creon)  Psyllium (Hydrocil)  Vitamin D3      -  PLEASE TAKE these medications the day of surgery:  Levothyroxine (Synthroid)  Metoprolol (Toprol XL)  Venlafaxine (Effexor XR)  Acetaminophen (Tylenol) if needed  Ondansetron (Zofran) if needed    How do I prepare myself?  - Please take 2 showers (one the night prior to surgery and one the morning of surgery) using Scrubcare or Hibiclens soap.    Use this soap only from the neck to your toes.     Leave the soap on your skin for one minute--then rinse thoroughly.      You may use your own shampoo and conditioner. No other hair products.   - Please remove all jewelry and body piercings.  - No lotions, deodorants or fragrance.  - Bring your ID and insurance card.    -If you have a Deep Brain Stimulator, Spinal Cord Stimulator, or any Neuro Stimulator device---you must bring the remote control to the hospital.      ALL PATIENTS GOING HOME THE SAME DAY OF SURGERY ARE REQUIRED TO HAVE A RESPONSIBLE ADULT TO DRIVE AND BE IN ATTENDANCE WITH THEM FOR 24 HOURS FOLLOWING SURGERY.    Covid testing policy as of 12/06/2022  Your surgeon will notify and schedule you for a COVID test if one is needed before surgery--please direct any questions or COVID symptoms to your surgeon      Questions or Concerns:    - For any questions regarding the day of surgery or your hospital stay, please contact the Pre Admission Nursing Office at [phone].       - If you have health changes between today and your surgery, please call your surgeon.       - For questions after surgery, please call your surgeons  office.           Current Visitor Guidelines     Visiting hours: 8 a.m. to 8:30 p.m.     Patients confirmed or suspected to have symptoms of COVID 19 or flu:     No visitors allowed for adult patients.   Children (under age 18) can have 1 named visitor.     People who are sick or showing symptoms of COVID 19 or flu:    Are not allowed to visit patients--we can only make exceptions in special situations.       Please follow these guidelines for your visit:          Please maintain social distance          Masking is optional--however at times you may be asked to wear a mask for the safety of yourself and others     Clean your hands with alcohol hand . Do this when you arrive at and leave the building and patient room,    And again after you touch your mask or anything in the room.     Go directly to and from the room you are visiting.     Stay in the patient s room during your visit. Limit going to other places in the hospital as much as possible     Leave bags and jackets at home or in the car.     For everyone s health, please don t come and go during your visit. That includes for smoking   during your visit.

## 2023-06-05 NOTE — PROGRESS NOTES
Patient contacted clinic to report that his MRCP was not completed as ordered due to inability to obtain IV access. Plan was to complete imaging prior to ERCP on 6/7.  Discussed with Dr. Joe who states: We can skip the MRCP. I think he is getting obstructed and will need an ERCP regardless.    Message also sent to ordering provider Dr. Parker.     Discussed with patient. Will update recommendations based on Dr. Parker's response as necessary.     Patient continues to feel well. Denies fevers. He states that his son (who is an YVONNE) has noted some jaundice, but it is very difficult for patient or his wife to notice. Reviewed indications to seek emergent care. Patient articulates an understanding.     Mis Blank RN Care Coordinator

## 2023-06-07 NOTE — BRIEF OP NOTE
[facility] Brief Operative Note    Pre-operative diagnosis: Elevated LFTs [R79.89]  Biliary obstruction [K83.1]   Post-operative diagnosis * No post-op diagnosis entered *   Procedure: Procedure(s):  Esophagoscopy, gastroscopy, duodenoscopy (EGD), combined   Surgeon: Guru Tatyana MD   Assistants(s):    Estimated blood loss: None    Specimens: None   Findings:      Large periampullary mass completely obscuring visualization of biliary orifice    Brief attempts were made but wire was going through tumor    Will need to consider EUS rendezvous or biliary drainage      Recommendations    PProcedure aborted and will be rescheduled after imaging

## 2023-06-07 NOTE — ANESTHESIA PREPROCEDURE EVALUATION
Anesthesia Pre-Procedure Evaluation    Patient: Tyrel Harrell   MRN: [id number] : 1950        Procedure : Procedure(s):  ENDOSCOPIC RETROGRADE CHOLANGIOPANCREATOGRAPHY, WITH stenting          Past Medical History:   Diagnosis Date     Anemia      Anticoagulated      Anxiety      Biliary obstruction      Chronic kidney disease      Chronic right shoulder pain      Depression      Difficult intravenous access     NEED UTRASOUND TO FIND VEIN     H/O thrombocytopenia      History of pulmonary embolism      Hodgkin lymphoma, nodular sclerosis (H)     s/p radiation     Hypothyroidism      ILD (interstitial lung disease) (H)      Lymphedema of right upper extremity      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 Unknown bee  Per pt  Per pt  Per pt  Per pt       Simvastatin Muscle Pain (Myalgia) and Other (See Comments)     Other reaction(s): Muscle Aches, Other (see comments)  Patient reports muscle stiffness  Patient reports muscle stiffness  myalgias  myalgias       Vinblastine Other (See Comments)     Other reaction(s): Other (see comments)  Patient reported paralyzation of colon  Per pt  Patient reported paralyzation of colon  Per pt       Vincristine Other (See Comments)     Patient reports paralyzation of his colon  Patient reports paralyzation of his colon  Patient reports paralyzation of his colon  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       Social History     Tobacco Use     Smoking status: Never     Passive exposure: Never     Smokeless tobacco: Never   Vaping Use     Vaping status:  Never Used   Substance Use Topics     Alcohol use: Yes     Comment: 1 beer a month      Wt Readings from Last 1 Encounters:   06/07/23 77.2 kg (170 lb 3.1 oz)        Anesthesia Evaluation   Pt has had prior anesthetic.     No history of anesthetic complications       ROS/MED HX  ENT/Pulmonary:       Neurologic:  - neg neurologic ROS     Cardiovascular: Comment: Ascending Ao dilation    HF from chemo with EF as low as 40%  Severe MR s/p claire clip x2 4/2022    TTE 4/2023:  LVEF 55-60%  Posterior and basal inferior abnormality  G2DD  RV normal  MV s/p mitraclip x2  Mild MR  Gradient 7.3mmHg @HR 86  Small L->R atrial shunt on color doppler    (+) Dyslipidemia -----    METS/Exercise Tolerance: 3 - Able to walk 1-2 blocks without stopping    Hematologic:       Musculoskeletal:       GI/Hepatic: Comment: Pancreatic cancer -> biliary obstruction      Renal/Genitourinary:       Endo:       Psychiatric/Substance Use:       Infectious Disease:       Malignancy: Comment: Hodgkin's lymphoma s/p chemoradiation      Other:            Physical Exam    Airway        Mallampati: II   TM distance: > 3 FB   Neck ROM: full   Mouth opening: > 3 cm    Respiratory Devices and Support         Dental     Comment: Patient reports no loose or chipped teeth        Cardiovascular          Rhythm and rate: regular and normal     Pulmonary           breath sounds clear to auscultation           OUTSIDE LABS:  CBC:   Lab Results   Component Value Date    WBC 6.7 06/07/2023    WBC 4.9 05/31/2023    HGB 9.2 (L) 06/07/2023    HGB 9.9 (L) 05/31/2023    HCT 26.3 (L) 06/07/2023    HCT 28.3 (L) 05/31/2023     06/07/2023     05/31/2023     BMP:   Lab Results   Component Value Date     06/07/2023     05/31/2023    POTASSIUM 4.2 06/07/2023    POTASSIUM 4.5 05/31/2023    CHLORIDE 102 06/07/2023    CHLORIDE 104 05/31/2023    CO2 22 06/07/2023    CO2 22 05/31/2023    BUN 23.6 (H) 06/07/2023    BUN 20.1 05/31/2023    CR 1.08 06/07/2023     CR 1.33 (H) 05/31/2023     (H) 06/07/2023     (H) 05/31/2023     COAGS:   Lab Results   Component Value Date    INR 1.33 (H) 06/07/2023     POC: No results found for: BGM, HCG, HCGS  HEPATIC:   Lab Results   Component Value Date    ALBUMIN 3.6 06/07/2023    PROTTOTAL 7.3 06/07/2023    ALT 85 (H) 06/07/2023    AST 64 (H) 06/07/2023    ALKPHOS 554 (H) 06/07/2023    BILITOTAL 8.8 (H) 06/07/2023     OTHER:   Lab Results   Component Value Date    CARLOS 9.0 06/07/2023    PHOS 3.6 04/05/2006    LIPASE 7 (L) 06/07/2023    AMYLASE 45 06/07/2023    TSH 6.82 (H) 05/11/2023    T4 0.86 (L) 05/11/2023    CRP <2.9 05/24/2016    SED 12 05/24/2016       Anesthesia Plan    ASA Status:  3   NPO Status:  NPO Appropriate    Anesthesia Type: General.     - Airway: ETT              Consents    Anesthesia Plan(s) and associated risks, benefits, and realistic alternatives discussed. Questions answered and patient/representative(s) expressed understanding.    - Discussed:     - Discussed with:  Patient      - Extended Intubation/Ventilatory Support Discussed: No.      - Patient is DNR/DNI Status: No    Use of blood products discussed: No .     Postoperative Care            Comments:                Roberto Fraser MD

## 2023-06-07 NOTE — ANESTHESIA PROCEDURE NOTES
Airway       Patient location during procedure: OR       Procedure Start/Stop Times: 6/7/2023 5:52 PM  Staff -        CRNA: Lionel Jackson APRN CRNA       Performed By: CRNA  Consent for Airway        Urgency: elective  Indications and Patient Condition       Indications for airway management: prashant-procedural       Induction type:intravenous       Mask difficulty assessment: 1 - vent by mask    Final Airway Details       Final airway type: endotracheal airway       Successful airway: ETT - single  Endotracheal Airway Details        ETT size (mm): 7.5       Cuffed: yes       Successful intubation technique: direct laryngoscopy       DL Blade Type: MAC 4       Grade View of Cords: 1       Adjucts: stylet       Position: Right       Measured from: gums/teeth       Secured at (cm): 24       Bite block used: None    Post intubation assessment        Placement verified by: capnometry, equal breath sounds and chest rise        Number of attempts at approach: 1       Number of other approaches attempted: 0       Secured with: other (comment) (tube tamer)       Ease of procedure: easy       Dentition: Intact and Unchanged    Medication(s) Administered   Medication Administration Time: 6/7/2023 5:52 PM

## 2023-06-08 NOTE — ANESTHESIA CARE TRANSFER NOTE
Patient: Tyrel Harrell    Procedure: Procedure(s):  Esophagoscopy, gastroscopy, duodenoscopy (EGD), combined       Diagnosis: Elevated LFTs [R79.89]  Biliary obstruction [K83.1]  Diagnosis Additional Information: No value filed.    Anesthesia Type:   General     Note:    Oropharynx: oropharynx clear of all foreign objects and spontaneously breathing  Level of Consciousness: awake and drowsy  Oxygen Supplementation: nasal cannula  Level of Supplemental Oxygen (L/min / FiO2): 2  Independent Airway: airway patency satisfactory and stable  Dentition: dentition unchanged  Vital Signs Stable: post-procedure vital signs reviewed and stable  Report to RN Given: handoff report given  Patient transferred to: PACU    Handoff Report: Identifed the Patient, Identified the Reponsible Provider, Reviewed the pertinent medical history, Discussed the surgical course, Reviewed Intra-OP anesthesia mangement and issues during anesthesia, Set expectations for post-procedure period and Allowed opportunity for questions and acknowledgement of understanding      Vitals:  Vitals Value Taken Time   /57 06/07/23 1859   Temp     Pulse 88 06/07/23 1900   Resp 13 06/07/23 1900   SpO2 100 % 06/07/23 1900   Vitals shown include unvalidated device data.    Electronically Signed By: FADY Urias CRNA  June 7, 2023  7:01 PM

## 2023-06-08 NOTE — OR NURSING
LOPEZ Torres to put in sign out.    [Benefits of weight loss discussed] : Benefits of weight loss discussed [Encouraged to increase physical activity] : Encouraged to increase physical activity

## 2023-06-08 NOTE — PROGRESS NOTES
Follow up: Post ERCP on 23 with Dr. Joe.      Post procedure recommendations:     I called and reviewed yesterday's procedure. He has a periampullary mass which interfered with biliary cannulation. Will need EUS guided antegrade stenting or biliary rendezvous access      This will be scheduled on Monday     He has to hold his Eliquis and start Vitamin K      He may need urgent ERCP if he is exhibiting signs of biliary tract obstruction and may need to come to [facility] ED     I discussed this pt, wife and his son who is a PA and the oncall ERCP provider Dr Ulloa    Patient states:     Orders placed:   Please assist in scheduling:     Procedure/Imaging/Clinic: EUS and ERCP with possible biliary rendezvous  Physician: Tatyana  Timin23  Scope time: 120 minutes  Anesthesia: General  Dx: Elevated LFTs; Biliary obstruction  Tier:2  Location: [facility]  Patient communication letter header:EUS (Endoscopic Ultrasound) and ERCP (Endoscopic Retrograde Cholangiopancreatography)    Vitamin K 5 mg PO x4 days (prior to procedure)    Comments: Procedure scheduled for 23.   Pre-op 23  Anticoagulation: Eliquis on hold per post procedure recommendations.   Diabetic: Farxiga; 3 day hold to begin 23.        Mis Blank, RN Care Coordinator

## 2023-06-08 NOTE — PROGRESS NOTES
[facility]: Cancer Care                                                                                          Received call from patient and wife. States ERCP yesterday was not successful and patient will be having alternative procedure on Monday, 6/12. Patient has started chemo cycle and is currently on day 8. They question if he should hold chemo for procedure. RNCC reviewed with Dr Parker. Patient should not stop in middle of cycle, counts likely not drop for another week. Reviewed with patient and wife. The voiced understanding and had no additional questions at this time.          Halle Narvaez RN, BSN, OCN   RN Care Coordinator   [facility]

## 2023-06-08 NOTE — BRIEF OP NOTE
I called and reviewed yesterday's procedure. He has a periampullary mass which interfered with biliary cannulation. Will need EUS guided antegrade stenting or biliary rendezvous access     This will be scheduled on Mionday    He has to hold his Eliquis and start Vitamin K     He may need urgent ERCP if he is exhibiting signs of biliary tract obstruction and may need to come to [facility] ED    I discussed this pt, wife and his son who is a PA and the oncall ERCP provider Dr Ulloa

## 2023-06-08 NOTE — DISCHARGE INSTRUCTIONS
Same-Day Surgery   Adult Discharge Orders & Instructions   For 24 hours after surgery  Get plenty of rest.  A responsible adult must stay with you for at least 24 hours after you leave the hospital.   Do not drive or use heavy equipment.  If you have weakness or tingling, don't drive or use heavy equipment until this feeling goes away.  Do not drink alcohol.  Avoid strenuous or risky activities.  Ask for help when climbing stairs.   You may feel lightheaded.  IF so, sit for a few minutes before standing.  Have someone help you get up.   If you have nausea (feel sick to your stomach): Drink only clear liquids such as apple juice, ginger ale, broth or 7-Up.  Rest may also help.  Be sure to drink enough fluids.  Move to a regular diet as you feel able.  You may have a slight fever. Call the doctor if your fever is over 100 F (37.7 C) (taken under the tongue) or lasts longer than 24 hours.  You may have a dry mouth, a sore throat, muscle aches or trouble sleeping.  These should go away after 24 hours.  Do not make important or legal decisions.   Call your doctor for any of the followin.  Signs of infection (fever, growing tenderness at the surgery site, a large amount of drainage or bleeding, severe pain, foul-smelling drainage, redness, swelling).    2. It has been over 8 to 10 hours since surgery and you are still not able to urinate (pass water).    3.  Headache for over 24 hours.    To contact a doctor, call Dr. Guru Joe @ [phone] (GI Clinic) or:  '   [phone] and ask for the resident on call for Gastroenterology (answered 24 hours a day)  '   Emergency Department: [facility]: [phone]

## 2023-06-08 NOTE — PROGRESS NOTES
Please assist in scheduling:     Procedure/Imaging/Clinic: EUS and ERCP with possible biliary rendezvous  Physician: Tatyana  Timin23  Scope time: 120 minutes  Anesthesia: General  Dx: Elevated LFTs; Biliary obstruction  Tier:2  Location: [facility]  Patient communication letter header:EUS (Endoscopic Ultrasound) and ERCP (Endoscopic Retrograde Cholangiopancreatography)

## 2023-06-08 NOTE — ANESTHESIA POSTPROCEDURE EVALUATION
Patient: Tyrel Harrell    Procedure: Procedure(s):  Esophagoscopy, gastroscopy, duodenoscopy (EGD), combined       Anesthesia Type:  General    Note:  Disposition: Outpatient   Postop Pain Control: Uneventful            Sign Out: Well controlled pain   PONV: No   Neuro/Psych: Uneventful            Sign Out: Acceptable/Baseline neuro status   Airway/Respiratory: Uneventful            Sign Out: Acceptable/Baseline resp. status   CV/Hemodynamics: Uneventful            Sign Out: Acceptable CV status; No obvious hypovolemia; No obvious fluid overload   Other NRE: NONE   DID A NON-ROUTINE EVENT OCCUR? No           Last vitals:  Vitals Value Taken Time   /71 06/07/23 2013   Temp 36.7  C (98  F) 06/07/23 1935   Pulse 95 06/07/23 2013   Resp 18 06/07/23 2013   SpO2 99 % 06/07/23 2013   Vitals shown include unvalidated device data.    Electronically Signed By: Romero Torres MD  June 7, 2023  8:22 PM

## 2023-06-12 NOTE — OR NURSING
Vascular notified pt. SENTHIL Harrell. Pt will require US PIV placement. Are you available? Thanks. Ezequiel *[phone] or [phone]

## 2023-06-12 NOTE — ANESTHESIA POSTPROCEDURE EVALUATION
Patient: Tyrel Harrell    Procedure: Procedure(s):  ENDOSCOPIC ULTRASOUND, ESOPHAGOSCOPY / UPPER GASTROINTESTINAL TRACT (GI)  ENDOSCOPIC RETROGRADE CHOLANGIOPANCREATOGRAPHY with dilation and hepaticduodenostomy,  stent placement       Anesthesia Type:  General    Note:  Disposition: Outpatient   Postop Pain Control: Uneventful            Sign Out: Well controlled pain   PONV: No   Neuro/Psych: Uneventful            Sign Out: Acceptable/Baseline neuro status   Airway/Respiratory: Uneventful            Sign Out: Acceptable/Baseline resp. status   CV/Hemodynamics: Uneventful            Sign Out: Acceptable CV status; No obvious hypovolemia; No obvious fluid overload   Other NRE: NONE   DID A NON-ROUTINE EVENT OCCUR? No           Last vitals:  Vitals Value Taken Time   BP     Temp     Pulse     Resp     SpO2 100 % 06/12/23 1646   Vitals shown include unvalidated device data.    Electronically Signed By: Zack Hyman MD  June 12, 2023  4:47 PM

## 2023-06-12 NOTE — ANESTHESIA PROCEDURE NOTES
Airway       Patient location during procedure: OR       Procedure Start/Stop Times: 6/12/2023 1:50 PM  Staff -        Anesthesiologist:  Zack Hyman MD       CRNA: Kam Burden APRN CRNA       Performed By: CRNA  Consent for Airway        Urgency: elective  Indications and Patient Condition       Indications for airway management: prashant-procedural       Induction type:intravenous       Mask difficulty assessment: 1 - vent by mask    Final Airway Details       Final airway type: endotracheal airway       Successful airway: Oral and ETT - single  Endotracheal Airway Details        ETT size (mm): 7.5       Cuffed: yes       Successful intubation technique: direct laryngoscopy       DL Blade Type: MAC 3       Grade View of Cords: 1       Adjucts: stylet       Position: Right       Measured from: gums/teeth       Secured at (cm): 23       Bite block used: None    Post intubation assessment        Placement verified by: capnometry, equal breath sounds and chest rise        Number of attempts at approach: 1       Number of other approaches attempted: 0       Secured with: silk tape       Ease of procedure: easy       Dentition: Intact    Medication(s) Administered   Medication Administration Time: 6/12/2023 1:50 PM

## 2023-06-12 NOTE — OR NURSING
Vascular access notified pt. DENISE Harrell Pt needs US for lab draws. Are you available? Thanks. Ezequiel *[phone] or [phone]

## 2023-06-12 NOTE — BRIEF OP NOTE
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

## 2023-06-12 NOTE — OR NURSING
Paged Dr. Joe re: instruction based off his operative note. Particularly if he's going to place orders for Vitamin K and instruction on when to resume Eliquis.     Per OR nurse, Dr. Joe does not want patient to take Vit K. He spoke with family regarding resumption of Eliquis. He will stop by to speak with the patient prior to discharge.     Camilo Ochoa RN on 6/12/2023 at 5:41 PM

## 2023-06-12 NOTE — PROGRESS NOTES
SPIRITUAL HEALTH SERVICES  SPIRITUAL ASSESSMENT Progress Note  [facility] ([place]) 3C     REFERRAL SOURCE: Pre surgical    Pre surgery visit with pt William Harrell.  Provided support and prayer.      PLAN: SHS will remain available     Ginger Gomez  Pager: [phone]

## 2023-06-12 NOTE — DISCHARGE INSTRUCTIONS
[facility], [place]  Same-Day Surgery ERCP Procedure   Adult Discharge Orders & Instructions     You had a procedure known as an Endoscopic Retrograde Cholangiopancreatography (ERCP). Your healthcare provider performed the ERCP to look at your bile or pancreatic ducts, and to locate and/or treat blockages (dilation, stenting, removal, etc.) in these ducts. Often biopsies, small samples of tissue, are taken to help diagnose and/or classify stages of disease growth. This procedure is used to diagnose diseases of the pancreas, bile ducts, pancreatic duct, liver, and gallbladder.     After your procedure   Make sure to clarify with your healthcare provider any diet restrictions (For example, clear liquid, low fat, no caffeine, etc.)   Do NOT take aspirin containing medications or any other blood-thinning medicines (anticoagulants) until your healthcare provider says it's OK.   You MAY be prescribed antibiotics, depending on what was done and/or found during your EUS, make sure to take antibiotics as prescribed by your healthcare provider    For 24 hours after surgery  Get plenty of rest.  A responsible adult must stay with you for at least 24 hours after you leave the hospital.   Do not drive or use heavy equipment.  If you have weakness or tingling, don't drive or use heavy equipment until this feeling goes away.  Do not drink alcohol.  Avoid strenuous or risky activities (gym, yoga, cycling, etc.).  Ask for help when climbing stairs.   You may feel lightheaded.  IF so, sit for a few minutes before standing.  Have someone help you get up.   If you have nausea (feel sick to your stomach): Drink only clear liquids such as apple juice, ginger ale, broth or 7-Up.  Rest may also help.  Be sure to drink enough fluids.  Move to a regular diet as you feel able.  If you feel bloated or have too much gas, use a heating pad on your belly to help reduce the discomfort. This should help you feel better.    You may have a slight fever. This is normal for the first 24 hours.   You may have a dry mouth, a sore throat, muscle aches or trouble sleeping.  These are normal and will go away after 24 hours. A sore throat is most common. Use lozenges or gargle with salt water to ease the discomfort.   Do not make important or legal decisions.      Call your doctor for any of the following:  Chest pain, and/or shortness of breath  Abdominal  pain, bloating or cramping that has not improved or does not respond to pain reliving medications (Tylenol or narcotics if prescribed)   Difficulty swallowing or feeling as though food or liquids are stuck in your throat   Sore throat lasting more than 2 days or pain that has worsened over time   Black or tarry stools   Nausea and/or vomiting that is not resolving or has not responded to anti-nausea medications prescribed to you   It has been over 8 to 10 hours since surgery and you are still not able to urinate (pass water)   Headache for over 24 hours   Fever over 100.5 F (38 C) lasting more than 24 hours after the procedure   Signs of jaundice or blockage (fever, chills, abdominal pain, yellowing of the whites of your eyes, yellowing of your skin, and/or passing darker than normal urine)     To contact a doctor, call:   [ ] __Dr. Guru Joe @ [phone] (GI Clinic) or [phone]_ (Monday thru Friday 8:00am to 4:30pm)   [ ] [phone] and ask for the __ gastroenterology ___ resident on call (answered 24 hours a day)   [ ] Emergency Department: [facility]: [phone]     Take it easy when you get home.  Remember, same day surgery DOES NOT MEAN SAME DAY RECOVERY!  Healing is a gradual process.  You will need some time to recover - you may be more tired than you realize at first.  Rest and relax for at least the first 24 hours at home.  You'll feel better and heal faster if you take good care of yourself.   [facility], [place]  Same-Day  Surgery   Adult Discharge Orders & Instructions   For 24 hours after surgery  Get plenty of rest.  A responsible adult must stay with you for at least 24 hours after you leave the hospital.   Do not drive or use heavy equipment.  If you have weakness or tingling, don't drive or use heavy equipment until this feeling goes away.  Do not drink alcohol.  Avoid strenuous or risky activities.  Ask for help when climbing stairs.   You may feel lightheaded.  IF so, sit for a few minutes before standing.  Have someone help you get up.   If you have nausea (feel sick to your stomach): Drink only clear liquids such as apple juice, ginger ale, broth or 7-Up.  Rest may also help.  Be sure to drink enough fluids.  Move to a regular diet as you feel able.  You may have a slight fever. Call the doctor if your fever is over 100 F (37.7 C) (taken under the tongue) or lasts longer than 24 hours.  You may have a dry mouth, a sore throat, muscle aches or trouble sleeping.  These should go away after 24 hours.  Do not make important or legal decisions.   Call your doctor for any of the followin.  Signs of infection (fever, growing tenderness at the surgery site, a large amount of drainage or bleeding, severe pain, foul-smelling drainage, redness, swelling).    2. It has been over 8 to 10 hours since surgery and you are still not able to urinate (pass water).    3.  Headache for over 24 hours.    To contact a doctor, call Dr. Guru Joe @ [phone] (GI Clinic) or [phone] or:  '   [phone] and ask for the resident on call for GI (answered 24 hours a day)  '   Emergency Department:  [facility]: [phone]       (TTY for hearing impaired: [phone])

## 2023-06-12 NOTE — ANESTHESIA PREPROCEDURE EVALUATION
Anesthesia Pre-Procedure Evaluation    Patient: Tyrel Harrell   MRN: [id number] : 1950        Procedure : Procedure(s):  ENDOSCOPIC ULTRASOUND, ESOPHAGOSCOPY / UPPER GASTROINTESTINAL TRACT (GI)  ENDOSCOPIC RETROGRADE CHOLANGIOPANCREATOGRAPHY possible biliary rendezvous          Past Medical History:   Diagnosis Date     Anemia      Anticoagulated      Anxiety      Biliary obstruction      Chronic kidney disease      Chronic right shoulder pain      Depression      Difficult intravenous access     NEED UTRASOUND TO FIND VEIN     H/O thrombocytopenia      History of pulmonary embolism      Hodgkin lymphoma, nodular sclerosis (H)     s/p radiation     Hypothyroidism      ILD (interstitial lung disease) (H)      Lymphedema of right upper extremity      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 Unknown bee  Per pt  Per pt  Per pt  Per pt       Simvastatin Muscle Pain (Myalgia) and Other (See Comments)     Other reaction(s): Muscle Aches, Other (see comments)  Patient reports muscle stiffness  Patient reports muscle stiffness  myalgias  myalgias       Vinblastine Other (See Comments)     Other reaction(s): Other (see comments)  Patient reported paralyzation of colon  Per pt  Patient reported paralyzation of colon  Per pt       Vincristine Other (See Comments)     Patient reports paralyzation of his colon  Patient reports paralyzation of his  colon  Patient reports paralyzation of his colon  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       Social History     Tobacco Use     Smoking status: Never     Passive exposure: Never     Smokeless tobacco: Never   Vaping Use     Vaping status: Never Used   Substance Use Topics     Alcohol use: Yes     Comment: 1 beer a month      Wt Readings from Last 1 Encounters:   06/07/23 77.2 kg (170 lb 3.1 oz)        Anesthesia Evaluation   Pt has had prior anesthetic. Type: MAC and General.    No history of anesthetic complications       ROS/MED HX  ENT/Pulmonary: Comment: H/O ILD, denies symptoms    H/o Pleural effusion without residual symptoms   (-) tobacco use   Neurologic:  - neg neurologic ROS     Cardiovascular: Comment: Denies cardiac symptoms including chest pain, SOB, palpitations, syncope, ALLEN, orthopnea, or PND.    (+) Dyslipidemia -----Taking blood thinners CHF etiology: NICM Last EF: 55-60% NYHA classification: II. dysrhythmias (PAF), a-fib, valvular problems/murmurs type: MR s/p MitraClip using 2 clips 4/22/22. Previous cardiac testing     METS/Exercise Tolerance: 4 - Raking leaves, gardening Comment: Able to walk 1/3 mile    Hematologic:     (+) History of blood clots (Treated with Eliquis), pt is not anticoagulated, anemia,  (-) history of blood transfusion   Musculoskeletal: Comment: Lymphedema of right arm      GI/Hepatic: Comment: S/p splenectomy 1988    (+) appendicitis (s/p appendectomy), liver disease (liver mets.),  (-) GERD (now in remssion)   Renal/Genitourinary:     (+) renal disease, type: CRI, BPH (Nocturia),     Endo:     (+) thyroid problem, hypothyroidism,     Psychiatric/Substance Use:     (+) psychiatric history (Doing well with medicaton and therapist.) anxiety and depression  (-) alcohol abuse history and chronic opioid use history   Infectious Disease:  - neg infectious disease ROS     Malignancy:   (+) Malignancy, History of  Lymphoma/Leukemia, Other and Skin.Skin CA Remission status post Surgery.  Lymph CA Remission status post Radiation and Chemo.  Other CA metastatic neuroendocrine tumor of pancreas with mets to liver. Active status post.    Other:  - neg other ROS    (+) , H/O Chronic Pain (Right shoulder pain ),        Physical Exam    Airway        Mallampati: II   TM distance: > 3 FB   Neck ROM: full   Mouth opening: > 3 cm    Respiratory Devices and Support         Dental       (+) Completely normal teeth      Cardiovascular          Rhythm and rate: irregular     Pulmonary   pulmonary exam normal                OUTSIDE LABS:  CBC:   Lab Results   Component Value Date    WBC 6.7 06/07/2023    WBC 4.9 05/31/2023    HGB 9.2 (L) 06/07/2023    HGB 9.9 (L) 05/31/2023    HCT 26.3 (L) 06/07/2023    HCT 28.3 (L) 05/31/2023     06/07/2023     05/31/2023     BMP:   Lab Results   Component Value Date     06/07/2023     05/31/2023    POTASSIUM 4.2 06/07/2023    POTASSIUM 4.5 05/31/2023    CHLORIDE 102 06/07/2023    CHLORIDE 104 05/31/2023    CO2 22 06/07/2023    CO2 22 05/31/2023    BUN 23.6 (H) 06/07/2023    BUN 20.1 05/31/2023    CR 1.08 06/07/2023    CR 1.33 (H) 05/31/2023     (H) 06/07/2023     (H) 05/31/2023     COAGS:   Lab Results   Component Value Date    INR 1.33 (H) 06/07/2023     POC: No results found for: BGM, HCG, HCGS  HEPATIC:   Lab Results   Component Value Date    ALBUMIN 3.6 06/07/2023    PROTTOTAL 7.3 06/07/2023    ALT 85 (H) 06/07/2023    AST 64 (H) 06/07/2023    ALKPHOS 554 (H) 06/07/2023    BILITOTAL 8.8 (H) 06/07/2023     OTHER:   Lab Results   Component Value Date    CARLOS 9.0 06/07/2023    PHOS 3.6 04/05/2006    LIPASE 7 (L) 06/07/2023    AMYLASE 45 06/07/2023    TSH 6.82 (H) 05/11/2023    T4 0.86 (L) 05/11/2023    CRP <2.9 05/24/2016    SED 12 05/24/2016       Anesthesia Plan    ASA Status:  3   NPO Status:  NPO Appropriate    Anesthesia Type: General.     - Airway: ETT    Induction: Intravenous.   Maintenance: Balanced.        Consents    Anesthesia Plan(s) and associated risks, benefits, and realistic alternatives discussed. Questions answered and patient/representative(s) expressed understanding.     - Discussed: Risks, Benefits and Alternatives for BOTH SEDATION and the PROCEDURE were discussed     - Discussed with:  Patient      - Extended Intubation/Ventilatory Support Discussed: No.      - Patient is DNR/DNI Status: No    Use of blood products discussed: No .     Postoperative Care    Pain management: IV analgesics.   PONV prophylaxis: Ondansetron (or other 5HT-3), Dexamethasone or Solumedrol     Comments:                Zack Hyman MD

## 2023-06-12 NOTE — ANESTHESIA CARE TRANSFER NOTE
Patient: Tyrel Harrell    Procedure: Procedure(s):  ENDOSCOPIC ULTRASOUND, ESOPHAGOSCOPY / UPPER GASTROINTESTINAL TRACT (GI)  ENDOSCOPIC RETROGRADE CHOLANGIOPANCREATOGRAPHY with dilation and hepaticduodenostomy,  stent placement       Diagnosis: Elevated LFTs [R79.89]  Biliary obstruction [K83.1]  Diagnosis Additional Information: No value filed.    Anesthesia Type:   General     Note:    Oropharynx: oropharynx clear of all foreign objects and spontaneously breathing  Level of Consciousness: awake  Oxygen Supplementation: nasal cannula  Level of Supplemental Oxygen (L/min / FiO2): 2  Independent Airway: airway patency satisfactory and stable    Vital Signs Stable: post-procedure vital signs reviewed and stable  Report to RN Given: handoff report given  Patient transferred to: PACU    Handoff Report: Identifed the Patient, Identified the Reponsible Provider, Reviewed the pertinent medical history, Discussed the surgical course, Reviewed Intra-OP anesthesia mangement and issues during anesthesia, Set expectations for post-procedure period and Allowed opportunity for questions and acknowledgement of understanding      Vitals:  Vitals Value Taken Time   /60 06/12/23 1646   Temp     Pulse 75 06/12/23 1654   Resp 12 06/12/23 1655   SpO2 99 % 06/12/23 1655   Vitals shown include unvalidated device data.    Electronically Signed By: FADY Bro CRNA  June 12, 2023  4:56 PM

## 2023-06-13 NOTE — PROGRESS NOTES
Per Dr. Joe s/p EUS/ERCP 6/12/23: Please prescribe levaquin 500 mg PO daily for 1 week starting tomorrow.     Called patient to discuss. Instructed to hold TUMS while taking Levaquin due to BPA stating that calcium may decrease effectiveness of antibiotic.     Patient denies pain or nausea this morning. Feeling a little groggy. Advised to advance diet slowly as he is able. Discussed that I would be in touch with further recommendations from Dr. Joe as they become available.     Mis Blank, RN Care Coordinator

## 2023-06-15 NOTE — PROGRESS NOTES
"Follow up: Post EUS on 6/12/23 with Dr. Joe.      Post procedure recommendations:   - Observe patient in same day observation unit for ongoing care.   - Strict abstinence from Eliquis for 1 week. To reconsider if anti-coagulation is really indicated   -Will recommend levaquin 500 mg PO daily for 5 days   - Discused with patient, family including wife and son in detail. To monitor LFTs. If LFTs are not normalizing after 1 week, it could mean the stent has not fully expanded or there is potential upstream   obstruction. Will consider further imaging including MRCP to explore this and possible repeat ERCP through  the choledochoduodenostomy   - Also warned patient of possible cholecystitis if the Zilver stent obstructs the cystic duct and also tumor progressively growing and causing a gastric outlet obstruction   - Patient and family very appreciative of our work  today                                      Patient states: William reports feeling a little bit better. Urine is lightening and itching is improving. Fatigued. \"Nothing is worse.\" Denies fevers. Eating. Reports good home vital signs.     Orders placed:   Hepatic panel due (6/19)    Reviewed post procedure recommendations and discussed symptoms to report to our clinic. Patient articulated understanding.     Clinic contact and scheduling numbers verified for future questions/concerns.     Mis Blank, RN Care Coordinator      "

## 2023-06-20 PROBLEM — L29.9 ITCHING: Status: ACTIVE | Noted: 2023-01-01

## 2023-06-20 PROBLEM — Z86.711 HISTORY OF PULMONARY EMBOLISM: Status: ACTIVE | Noted: 2023-01-01

## 2023-06-20 PROBLEM — F33.41 RECURRENT MAJOR DEPRESSIVE DISORDER, IN PARTIAL REMISSION (H): Status: ACTIVE | Noted: 2022-01-01

## 2023-06-20 PROBLEM — I26.99 PULMONARY EMBOLISM (H): Status: RESOLVED | Noted: 2022-01-31 | Resolved: 2023-01-01

## 2023-06-20 NOTE — ASSESSMENT & PLAN NOTE
We increased Synthroid to 112 mcg 5/11/2023 for TSH 6.82, FT4 0.86.   - Repeat thyroid studies today

## 2023-06-20 NOTE — ASSESSMENT & PLAN NOTE
Likely 2/2 biliary obstruction and elevated LFTs.   - Trial PRN hydroxyzine  - They will continue PRN benadryl cream, Eucerin cream, chalomine cream

## 2023-06-20 NOTE — ASSESSMENT & PLAN NOTE
Patient had a PE with diagnosis of his neuroendocrine carcinoma.  He will continue Eliquis indefinitely given his metastatic cancer.

## 2023-06-20 NOTE — ASSESSMENT & PLAN NOTE
Mood is lower recently in the setting of biliary obstruction.  Overall, he does feel his mood is okay.  We will continue current regimen of venlafaxine 225 mg daily.

## 2023-06-20 NOTE — TELEPHONE ENCOUNTER
Oral Chemotherapy Monitoring Program     Placed call to Tyrel Harrell in follow up of Xeloda and Temodar oral chemotherapy.     Patient plans to speak to RNCC regarding delaying of cycle start by 1 week due to recovery from recent procedure.  Requests call back at end of the week.     Lien Crawford, PharmD  Oral Chemotherapy Monitoring Program  [facility]  [phone]  June 20, 2023

## 2023-06-20 NOTE — ASSESSMENT & PLAN NOTE
Continues to follow with cardiology at [facility], notes reviewed.  Recent echo on 5/11/2023 showed EF improved to 50 to 55%.  He is euvolemic on exam today with blood pressure of 102/58.  -Continue current GDMT with metoprolol XL 3 7.5 mg daily, spironolactone 12.5 mg daily and losartan 12.5 mg daily  -Cardiologist is considering starting SGLT2 pending insurance coverage

## 2023-06-20 NOTE — ASSESSMENT & PLAN NOTE
Continues to follow closely with oncology.  Unfortunately, tumor has been causing biliary obstruction in the last month and he underwent ERCP and stent placement on 6/12/2023.  He will have repeat labs today to check on bilirubin and LFTs.  We will discuss further with oncology pending lab results what plan of action will be.  He is quite tearful thinking about the future today.  As he is continuing to pursue active treatment, hospice is not appropriate at this time.  They will let me know when/if they would like to make this transition and I am happy to help with referral, etc.

## 2023-06-20 NOTE — PROGRESS NOTES
Assessment & Plan   Problem List Items Addressed This Visit        Nervous and Auditory    Itching     Likely 2/2 biliary obstruction and elevated LFTs.   - Trial PRN hydroxyzine  - They will continue PRN benadryl cream, Eucerin cream, chalomine cream         Relevant Medications    hydrOXYzine (ATARAX) 25 MG tablet       Respiratory    Interstitial lung disease (H)     Chart h/o ILD. Some ground glass noted on chest CT in 2022. Asymptomatic today. No PFTs on file.             Digestive    Pancreatic insufficiency     2/2 pancreatic neuroendocrine carcinoma.  Stable with current dosing of Creon.         Secondary malignant neuroendocrine tumor of liver (H)     Continues to follow closely with oncology.  Unfortunately, tumor has been causing biliary obstruction in the last month and he underwent ERCP and stent placement on 6/12/2023.  He will have repeat labs today to check on bilirubin and LFTs.  We will discuss further with oncology pending lab results what plan of action will be.  He is quite tearful thinking about the future today.  As he is continuing to pursue active treatment, hospice is not appropriate at this time.  They will let me know when/if they would like to make this transition and I am happy to help with referral, etc.         Relevant Medications    hydrOXYzine (ATARAX) 25 MG tablet       Endocrine    Hyperlipidemia     Well-controlled with lovastatin 40 mg daily.         Malignant neoplasm of endocrine pancreas (H)    Acquired hypothyroidism - Primary     We increased Synthroid to 112 mcg 5/11/2023 for TSH 6.82, FT4 0.86.   - Repeat thyroid studies today         Relevant Orders    TSH with free T4 reflex       Circulatory    HFrEF (heart failure with reduced ejection fraction) (H)     Continues to follow with cardiology at [facility], notes reviewed.  Recent echo on 5/11/2023 showed EF improved to 50 to 55%.  He is euvolemic on exam today with blood pressure of 102/58.  -Continue current GDMT with  "metoprolol XL 3 7.5 mg daily, spironolactone 12.5 mg daily and losartan 12.5 mg daily  -Cardiologist is considering starting SGLT2 pending insurance coverage            Hematologic    Thrombocytopenia (H)       Behavioral    Recurrent major depressive disorder, in partial remission (H)     Mood is lower recently in the setting of biliary obstruction.  Overall, he does feel his mood is okay.  We will continue current regimen of venlafaxine 225 mg daily.         Relevant Medications    hydrOXYzine (ATARAX) 25 MG tablet       Other    History of pulmonary embolism     Patient had a PE with diagnosis of his neuroendocrine carcinoma.  He will continue Eliquis indefinitely given his metastatic cancer.        Other Visit Diagnoses     Elevated LFTs        Relevant Orders    Bilirubin direct           Ordering of each unique test  Prescription drug management  No LOS data to display   Time spent by me doing chart review, history and exam, documentation and further activities per the note     MED REC REQUIRED  Post Medication Reconciliation Status:  Discharge medications reconciled and changed, see notes/orders    BMI:   Estimated body mass index is 24.39 kg/m  as calculated from the following:    Height as of 6/12/23: 1.74 m (5' 8.5\").    Weight as of this encounter: 73.8 kg (162 lb 12.8 oz).     FUTURE APPOINTMENTS:       - Follow-up visit in 3 months     Carmen Cho MD  [facility] AGAPITO Thomas is a 72 year old, presenting for the following health issues:  Hospital F/U        6/20/2023     9:05 AM   Additional Questions   Roomed by Tyrel WARREN CMA   Accompanied by Kacie HAZEL     Hospital Follow-up Visit:    Hospital/Nursing Home/IP Rehab Facility: [facility]  Date of Admission: 06/12/23  Date of Discharge: 06/12/23  Reason(s) for Admission: Endoscopic US    Was your hospitalization related to COVID-19? No   Problems taking " medications regularly:  None  Medication changes since discharge: None  Problems adhering to non-medication therapy:  None    Summary of hospitalization:  [facility] discharge summary reviewed  Diagnostic Tests/Treatments reviewed.  Follow up needed: Labs from surgery/onc will get drawn today  Other Healthcare Providers Involved in Patient s Care:         Specialist appointment - Oncology and surgery  Update since discharge: improved.       Plan of care communicated with patient and family     Pancreatic neuroendocrine cancer: Continues CAPTEM and overall disease has been stable to marginally progressed. However, at the end of May, marked increase in LFTs c/f biliary obstruction. Underwent ERCP on 6/7/23 and again on 6/12/23 with stent placement (was a difficult placement and eventually they had to traverse duodenum for placement.  Since the procedure he says his itching has gotten a little bit better.  Wife thinks his skin is a little bit less yellowed.  They are anxious for repeat hepatic function panel today.  William is tearful today, feel like with this latest progression may be signaling the beginning of the end.  Given overall stability of his disease otherwise, not ready to stop chemo but thinking about where the next year will go when hospice would be appropriate.    HFrEF: EF improved to 50-55% on recent TTE 5/11/23. Metoprolol XL 37.5, spironolactone 12.5, losartan 12.5.  BP today is 102/58.    HLD: lovastatin 40     PE: eliquis indefinitely, resumed yesterday after ERCP.    Hypothyroidism: TSH 6.82, FT4 0.86 (5/11/23). Synthroid increased to 112 mcg daily.     MDD: Venlafaxine 225 mg daily     Review of Systems   Constitutional, HEENT, cardiovascular, pulmonary, GI, , musculoskeletal, neuro, skin, endocrine and psych systems are negative, except as otherwise noted.      Objective    /58 (BP Location: Left arm, Patient Position: Sitting, Cuff Size: Adult Regular)   Pulse 71   Resp  16   Wt 73.8 kg (162 lb 12.8 oz)   SpO2 100%   BMI 24.39 kg/m    Body mass index is 24.39 kg/m .  Physical Exam   GENERAL: chronically ill appearing, alert but tearful  EYES: Scleral icterus noted, PERRL   HENT: nose and mouth without ulcers or lesions  RESP: breathing comfortably and speaking in full sentences on room air with no respiratory distress or coughing  CV: warm and well perfused  ABDOMEN: soft, nontender  SKIN: Diffuse jaundice, excoriations on upper back   NEURO: No focal deficits, mentation intact and speech normal  PSYCH: mentation appears normal, affect normal but mood is low    Results for orders placed or performed in visit on 06/20/23 (from the past 24 hour(s))   CBC with platelets differential    Narrative    The following orders were created for panel order CBC with platelets differential.  Procedure                               Abnormality         Status                     ---------                               -----------         ------                     CBC with platelets and d...[[id number]]                      In process                   Please view results for these tests on the individual orders.

## 2023-06-21 NOTE — PROGRESS NOTES
" Current Eye Medications:  Artificial tears both eyes as needed.        Subjective:  Comprehensive Eye Exam.  Patient complains of generalized decreasing vision in each eye, especially when reading.  Distance vision appears about the same.  He wakes with mucous in each eye.     Last eye exam:  1 year ago in [place] - he was told he had cataracts.    He is undergoing oral chemotherapy for pancreatic neuroendocrine carcinoma    No history of eye injuries or surgery.   No family history of glaucoma.       Objective:  See Ophthalmology Exam.       Assessment:  Tyrel Harrell is a 72 year old male who presents with:   Encounter Diagnoses   Name Primary?     Combined forms of age-related cataract of both eyes Yes     Dry eye syndrome, bilateral      Myopia of both eyes with regular astigmatism and presbyopia      Plan:  Continue artificial tears up to four times a day as needed (Refresh Optive or Systane Balance. Avoid \"get the red out\" drops).     Could use a gel tear at bedtime and/or up to four times a day (like Refresh Liquigel or GenTeal Gel Tears)     Glasses prescription given - optional to update    Marco Antonio Sims MD  [phone]      "

## 2023-06-21 NOTE — RESULT ENCOUNTER NOTE
Results from liver function test show progressive downtrend.  This suggests that stent appears to be draining adequately.  Will recommend rechecking liver function test in 2 weeks.  Please contact our clinic if there are further signs of biliary obstruction such as worsening jaundice, passage colored urine or itching

## 2023-06-21 NOTE — TELEPHONE ENCOUNTER
Per Dr. Joe after review of labs:  Results from liver function test show progressive downtrend.  This suggests that stent appears to be draining adequately.  Will recommend rechecking liver function test in 2 weeks.  Please contact our clinic if there are further signs of biliary obstruction such as worsening jaundice, passage colored urine or itching     Called patient and wife, reviewed plan for labs in 2 weeks and encouraged follow up as needed based on symptoms listed below. Lab orders placed, patient and wife understand follow up plan    ML

## 2023-06-21 NOTE — PATIENT INSTRUCTIONS
"Continue artificial tears up to four times a day as needed (Refresh Optive or Systane Balance. Avoid \"get the red out\" drops).     Could use a gel tear at bedtime and/or up to four times a day (like Refresh Liquigel or GenTeal Gel Tears)     Glasses prescription given - optional to update    Marco Antonio Sims MD  [phone]    "

## 2023-06-21 NOTE — LETTER
"    6/21/2023         RE: Tyrel Harrell  [address]  [facility] [address]        Dear Colleague,    Thank you for referring your patient, Tyrel Harrell, to the [facility]. Please see a copy of my visit note below.     Current Eye Medications:  Artificial tears both eyes as needed.        Subjective:  Comprehensive Eye Exam.  Patient complains of generalized decreasing vision in each eye, especially when reading.  Distance vision appears about the same.  He wakes with mucous in each eye.     Last eye exam:  1 year ago in [place] - he was told he had cataracts.    He is undergoing oral chemotherapy for pancreatic neuroendocrine carcinoma    No history of eye injuries or surgery.   No family history of glaucoma.       Objective:  See Ophthalmology Exam.       Assessment:  Tyrel Harrell is a 72 year old male who presents with:   Encounter Diagnoses   Name Primary?     Dry eye syndrome, bilateral Yes     Myopia of both eyes with regular astigmatism and presbyopia        Plan:  Continue artificial tears up to four times a day as needed (Refresh Optive or Systane Balance. Avoid \"get the red out\" drops).     Could use a gel tear at bedtime and/or up to four times a day (like Refresh Liquigel or GenTeal Gel Tears)     Glasses prescription given - optional to update    Marco Antonio Sims MD  [phone]          Again, thank you for allowing me to participate in the care of your patient.        Sincerely,        Marco Antonio Sims MD    "

## 2023-06-22 PROBLEM — R53.1 GENERALIZED WEAKNESS: Status: ACTIVE | Noted: 2023-01-01

## 2023-06-22 PROBLEM — R42 LIGHTHEADEDNESS: Status: ACTIVE | Noted: 2023-01-01

## 2023-06-22 PROBLEM — C7A.8 NEUROENDOCRINE CARCINOMA OF PANCREAS (H): Status: ACTIVE | Noted: 2023-01-01

## 2023-06-22 NOTE — TELEPHONE ENCOUNTER
FAX [facility]     Drug: HYDROXYZINE HCL 25 MG tablet    Pharmacy Comments: PA required or Alternative Medication

## 2023-06-22 NOTE — ED PROVIDER NOTES
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  Patient reports that he has been less jaundiced and had more of an appetite over the past week since his ERCP with stenting was completed.  He reports they had to change the procedure somewhat from within originally planned and put the stent into the duodenum in a different location, however he feels that this is working as he and his family notes he is also less jaundiced.  He reports that over the past couple of days he has felt generally more weak and has had several instances where while getting out of bed or walking suddenly develops shaking/tremors in his bilateral upper extremities and into his lower extremities at times, during this time he feels lightheaded and has bright white lights in his vision.  Occasionally he has tipped over or had to sit down/catch himself while falling.  He denies hitting his head or any trauma during these falls.  He reports this happened 1 time while walking down the stairs, another time while walking into a building for breakfast today, also for the last couple days has had (tried to get up bed in the morning.  Experienced 1 episode where he was trying to get the mail and dropped it, when he bent over to pick it up he felt his whole vision went white and he became very dizzy and almost fell over.  Reports this is happened  intermittently in the past but the increased frequency over the past couple of days is what is bringing him in today.    Denies any he denies any fever or chills, chest pain or shortness of breath, abdominal pain, nausea, vomiting or diarrhea.  He denies any URI or other illness symptoms.    Patient's spouse and son are present at the bedside.    Past Medical History  Past Medical History:   Diagnosis Date     Anemia      Anticoagulated      Anxiety      Biliary obstruction      Chronic kidney disease      Chronic right shoulder pain      Depression      Difficult intravenous access     NEED UTRASOUND TO FIND VEIN     H/O thrombocytopenia      History of pulmonary embolism      Hodgkin lymphoma, nodular sclerosis (H) 1988    s/p radiation     Hypothyroidism      ILD (interstitial lung disease) (H)      Lymphedema of right upper extremity      Neuroendocrine tumor of pancreas     with liver metastasis     NICM (nonischemic cardiomyopathy) (H)      Nonsenile cataract      Pancreatic insufficiency      Past Surgical History:   Procedure Laterality Date     CARDIAC SURGERY  04/22/2022    s/p MitraClip     ENDOSCOPIC RETROGRADE CHOLANGIOPANCREATOGRAM COMPLEX N/A 6/12/2023    Procedure: ENDOSCOPIC RETROGRADE CHOLANGIOPANCREATOGRAPHY with dilation and hepaticduodenostomy,  stent placement;  Surgeon: Guru Alexander Joe MD;  Location: [facility] OR     ENDOSCOPIC ULTRASOUND UPPER GASTROINTESTINAL TRACT (GI) N/A 6/12/2023    Procedure: ENDOSCOPIC ULTRASOUND, ESOPHAGOSCOPY / UPPER GASTROINTESTINAL TRACT (GI);  Surgeon: Guru Alexander Joe MD;  Location: [facility] OR     ESOPHAGOSCOPY, GASTROSCOPY, DUODENOSCOPY (EGD), COMBINED N/A 6/7/2023    Procedure: Esophagoscopy, gastroscopy, duodenoscopy (EGD), combined;  Surgeon: Guru Alexander Joe MD;  Location: [facility] OR     SPLENECTOMY  1988     acetaminophen (TYLENOL) 500 MG tablet  apixaban ANTICOAGULANT (ELIQUIS) 5 MG tablet  augmented  betamethasone dipropionate (DIPROLENE-AF) 0.05 % external ointment  calcium carbonate antacid (TUMS ULTRA 1000) 1000 MG CHEW  capecitabine (XELODA) 150 MG tablet  capecitabine (XELODA) 500 MG tablet  dapagliflozin (FARXIGA) 10 MG TABS tablet  emollient (VANICREAM) external cream  fish oil-omega-3 fatty acids 1000 MG capsule  hydrOXYzine (ATARAX) 25 MG tablet  levothyroxine (SYNTHROID/LEVOTHROID) 112 MCG tablet  lipase-protease-amylase (CREON 12) 08969-26891-95140 units CPEP  losartan (COZAAR) 25 MG tablet  lovastatin (MEVACOR) 40 MG tablet  melatonin 3 MG tablet  metoprolol succinate ER (TOPROL XL) 25 MG 24 hr tablet  Multiple Vitamin (MULTIVITAMIN) per tablet  ondansetron (ZOFRAN ODT) 8 MG ODT tab  prochlorperazine (COMPAZINE) 10 MG tablet  Psyllium (HYDROCIL) 95 % PACK  spironolactone (ALDACTONE) 25 MG tablet  temozolomide (TEMODAR) 100 MG capsule  temozolomide (TEMODAR) 100 MG capsule  temozolomide (TEMODAR) 100 MG capsule  temozolomide (TEMODAR) 100 MG capsule  venlafaxine (EFFEXOR XR) 150 MG 24 hr capsule  venlafaxine (EFFEXOR XR) 75 MG 24 hr capsule  vitamin D3 (CHOLECALCIFEROL) 50 mcg (2000 units) tablet      Allergies   Allergen Reactions     Bee Venom Hives and Other (See Comments)     Other reaction(s): Other (see comments)  Not honey bees. Unknown bee  Per pt  Per pt  Per pt  Per pt       Simvastatin Muscle Pain (Myalgia) and Other (See Comments)     Other reaction(s): Muscle Aches, Other (see comments)  Patient reports muscle stiffness  Patient reports muscle stiffness  myalgias  myalgias       Vinblastine Other (See Comments)     Other reaction(s): Other (see comments)  Patient reported paralyzation of colon  Per pt  Patient reported paralyzation of colon  Per pt       Vincristine Other (See Comments)     Patient reports paralyzation of his colon  Patient reports paralyzation of his colon  Patient reports paralyzation of his colon  Patient reports paralyzation of his colon       Lina-Kit Bee Sting       Nkda [No Known Drug Allergy]      Family History  No family history on file.  Social History   Social History     Tobacco Use     Smoking status: Never     Passive exposure: Never     Smokeless tobacco: Never   Vaping Use     Vaping Use: Never used   Substance Use Topics     Alcohol use: Yes     Comment: 1 beer a month     Drug use: Never         A medically appropriate review of systems was performed with pertinent positives and negatives noted in the HPI, and all other systems negative.    Physical Exam   BP: 127/56  Pulse: 68  Temp: 98  F (36.7  C)  Resp: 19  SpO2: 100 %  Physical Exam  Vitals and nursing note reviewed.   Constitutional:       Appearance: He is ill-appearing.   HENT:      Head: Normocephalic and atraumatic.   Eyes:      General: Scleral icterus present.   Cardiovascular:      Rate and Rhythm: Normal rate and regular rhythm.      Pulses: Normal pulses.      Heart sounds: Normal heart sounds.   Pulmonary:      Breath sounds: Normal breath sounds.   Abdominal:      General: Abdomen is flat. There is no distension.      Palpations: Abdomen is soft.      Tenderness: There is no abdominal tenderness. There is no guarding.   Musculoskeletal:      Right lower leg: No edema.      Left lower leg: No edema.   Skin:     General: Skin is warm and dry.      Capillary Refill: Capillary refill takes less than 2 seconds.      Coloration: Skin is jaundiced.   Neurological:      General: No focal deficit present.      Mental Status: He is alert and oriented to person, place, and time.   Psychiatric:         Mood and Affect: Mood normal.         Behavior: Behavior normal.         ED Course, Procedures, & Data      Procedures       ED Course Selections:        EKG Interpretation:      Interpreted by FADY Martin CNP  Time reviewed: 1300  Symptoms at time of EKG: generalized weakness   Rhythm: normal sinus   Rate: normal  Axis: normal  Ectopy: none  Conduction: normal  ST Segments/ T Waves: No ST-T wave  changes  Q Waves: none  Comparison to prior: No old EKG available    Clinical Impression: normal EKG       Results for orders placed or performed during the hospital encounter of 06/22/23   CT Abdomen Pelvis w Contrast     Status: None    Narrative    EXAMINATION: CT ABDOMEN PELVIS W CONTRAST, 6/22/2023 3:26 PM    TECHNIQUE:  Helical CT images from the lung bases through the  symphysis pubis were obtained with contrast.  Coronal reformatted  images were generated at a workstation for further assessment.    CONTRAST:  99 cc Isovue 370 IV.    COMPARISON: CT 5/22/2023    HISTORY: neuroendocrine cancer on pancrease obstructing bile flow,  recent stenting procedure evaluate for sandy v other cause of gen'l  weakness    FINDINGS:    Abdomen and pelvis:   Liver: Multiple hypodensities in the liver, including (all series 4):  - 1.5 x 1.3 cm hypodensity in the right hepatic lobe (image 49),  previously 1.4 x 1.0 cm.  - 1.2 x 1.1 cm hypodensity in the right hepatic lobe (image 74),  previously 1.1 x 1.1 cm.    Significantly improved intrahepatic biliary duct dilation compared to  prior CT. Pneumobilia in the left hepatic lobe is new and likely  related to CBD stent. Common bile duct stent with tip in the duodenum.    Gallbladder: Small air in the lumen of the gallbladder is likely  iatrogenic. No cholelithiasis or gallbladder wall thickening.  Spleen: Status post splenectomy. 5 mm soft tissue nodule in the left  upper quadrant (series 4, image 89) is similar to prior and possibly  represents residual splenic tissue.  Pancreas: Stable soft tissue mass of the pancreatic head, neck, and  uncinate process, measuring 4.1 x 3.2 x 7.2 cm (series 4, image 112  and series 6, image 38), previously 3.9 x 3.4 x 7.1 cm, consistent  with history of pancreatic neuroendocrine tumor. Unchanged central  hypodensity within the mass, likely area of necrosis. The pancreatic  body and tail are atrophic.   Adrenal glands: No adrenal  nodules.  Kidneys: Punctate 2 mm nonobstructing stone in the left kidney. No  hydronephrosis. Unchanged cortical thinning and mildly hypodense  parenchyma of the posterior left kidney, likely from prior renal  infarct. Bilateral renal cysts with the largest measuring 2.3 cm in  the left kidney.  Bladder / Pelvic organs: The urinary bladder is unremarkable. Coarse  calcifications of the prostate gland.  Bowel: No bowel wall thickening or evidence for obstruction. The  appendix is not definitively identified, however there are no  inflammatory changes in the right lower quadrant.  Lymph nodes: No retroperitoneal, mesenteric, or pelvic  lymphadenopathy.  Fluid: No free fluid within the abdomen.  Vessels: No infrarenal aortic aneurysm. Atherosclerotic calcification  of the abdominal aorta and iliac arteries.    Lung bases: Small bilateral pleural effusions. Mild bibasilar  atelectasis. Status post mitral valve repair.    Bones and soft tissues: No suspicious osseous lesions. Degenerative  changes of the spine. Degenerative stepwise retrolisthesis of L2 on  L3, L3 on L4, and L4 on L5.       Impression    IMPRESSION:   1.  No acute pathology in the abdomen/pelvis compared to CT from  5/22/2023.  2.  Stable size of 7.2 cm mass of the pancreatic head/neck, consistent  with history of neuroendocrine tumor. Significantly improved  intrahepatic duct dilation status post stenting of the common bile  duct.  3.  Multiple hepatic metastases are similar to prior CT from  5/22/2023.  4.  Small bilateral pleural effusions.  5.  Punctate 2 mm stone in the left kidney. No hydronephrosis.  6.  Stable cortical thinning and hypodensity of the parenchyma in the  superior pole of the left kidney, likely sequelae of prior renal  infarct.    I have personally reviewed the examination and initial interpretation  and I agree with the findings.    SERGEI CARRANZA MD         SYSTEM ID:  [id number]   Comprehensive metabolic panel     Status:  Abnormal   Result Value Ref Range    Sodium 133 (L) 136 - 145 mmol/L    Potassium 4.6 3.4 - 5.3 mmol/L    Chloride 99 98 - 107 mmol/L    Carbon Dioxide (CO2) 23 22 - 29 mmol/L    Anion Gap 11 7 - 15 mmol/L    Urea Nitrogen 18.7 8.0 - 23.0 mg/dL    Creatinine 0.98 0.67 - 1.17 mg/dL    Calcium 8.6 (L) 8.8 - 10.2 mg/dL    Glucose 89 70 - 99 mg/dL    Alkaline Phosphatase 331 (H) 40 - 129 U/L    AST      ALT 45 0 - 70 U/L    Protein Total 6.6 6.4 - 8.3 g/dL    Albumin 3.1 (L) 3.5 - 5.2 g/dL    Bilirubin Total 7.6 (H) <=1.2 mg/dL    GFR Estimate 82 >60 mL/min/1.73m2   Troponin T, High Sensitivity     Status: Normal   Result Value Ref Range    Troponin T, High Sensitivity 13 <=22 ng/L   Lactic acid whole blood     Status: Normal   Result Value Ref Range    Lactic Acid 1.5 0.7 - 2.0 mmol/L   Lipase     Status: Abnormal   Result Value Ref Range    Lipase 10 (L) 13 - 60 U/L   Magnesium     Status: Abnormal   Result Value Ref Range    Magnesium 2.5 (H) 1.7 - 2.3 mg/dL   UA with Microscopic reflex to Culture     Status: Abnormal    Specimen: Urine, Midstream   Result Value Ref Range    Color Urine Dark Yellow (A) Colorless, Straw, Light Yellow, Yellow    Appearance Urine Slightly Cloudy (A) Clear    Glucose Urine >=1000 (A) Negative mg/dL    Bilirubin Urine Small (A) Negative    Ketones Urine Negative Negative mg/dL    Specific Gravity Urine 1.024 1.003 - 1.035    Blood Urine Negative Negative    pH Urine 5.5 5.0 - 7.0    Protein Albumin Urine 30 (A) Negative mg/dL    Urobilinogen Urine 6.0 (A) Normal, 2.0 mg/dL    Nitrite Urine Negative Negative    Leukocyte Esterase Urine Negative Negative    Calcium Oxalate Crystals Urine Few (A) None Seen /HPF    RBC Urine 10 (H) <=2 /HPF    WBC Urine 12 (H) <=5 /HPF    Transitional Epithelials Urine 1 <=1 /HPF    Narrative    Urine Culture ordered based on laboratory criteria   Asymptomatic Influenza A/B, RSV, & SARS-CoV2 PCR (COVID-19) Nose     Status: Normal    Specimen: Nose; Swab    Result Value Ref Range    Influenza A PCR Negative Negative    Influenza B PCR Negative Negative    RSV PCR Negative Negative    SARS CoV2 PCR Negative Negative    Narrative    Testing was performed using the Xpert Xpress CoV2/Flu/RSV Assay on the Cepheid GeneXpert Instrument. This test should be ordered for the detection of SARS-CoV-2, influenza, and RSV viruses in individuals who meet clinical and/or epidemiological criteria. Test performance is unknown in asymptomatic patients. This test is for in vitro diagnostic use under the FDA EUA for laboratories certified under CLIA to perform high or moderate complexity testing. This test has not been FDA cleared or approved. A negative result does not rule out the presence of PCR inhibitors in the specimen or target RNA in concentration below the limit of detection for the assay. If only one viral target is positive but coinfection with multiple targets is suspected, the sample should be re-tested with another FDA cleared, approved, or authorized test, if coinfection would change clinical management. This test was validated by the [facility]. These laboratories are certified under the Clinical Laboratory Improvement Amendments of 1988 (CLIA-88) as qualified to perform high complexity laboratory testing.   CBC with platelets and differential     Status: Abnormal   Result Value Ref Range    WBC Count 9.0 4.0 - 11.0 10e3/uL    RBC Count 2.25 (L) 4.40 - 5.90 10e6/uL    Hemoglobin 8.8 (L) 13.3 - 17.7 g/dL    Hematocrit 24.2 (L) 40.0 - 53.0 %     (H) 78 - 100 fL    MCH 39.1 (H) 26.5 - 33.0 pg    MCHC 36.4 31.5 - 36.5 g/dL    RDW 25.1 (H) 10.0 - 15.0 %    Platelet Count 146 (L) 150 - 450 10e3/uL   Extra Tube     Status: None    Narrative    The following orders were created for panel order Extra Tube.  Procedure                               Abnormality         Status                     ---------                               -----------          ------                     Extra Blue Top Tube[[id number]]                              Final result               Extra Red Top Tube[[id number]]                               Final result                 Please view results for these tests on the individual orders.   Extra Blue Top Tube     Status: None   Result Value Ref Range    Hold Specimen JIC    Extra Red Top Tube     Status: None   Result Value Ref Range    Hold Specimen JIC    iStat Gases (lactate) venous, POCT     Status: Abnormal   Result Value Ref Range    Lactic Acid POCT 0.8 <=2.0 mmol/L    Bicarbonate Venous POCT 28 21 - 28 mmol/L    O2 Sat, Venous POCT 22 (L) 94 - 100 %    pCO2 Venous POCT 42 40 - 50 mm Hg    pH Venous POCT 7.43 7.32 - 7.43    pO2 Venous POCT 16 (L) 25 - 47 mm Hg   Creatinine POCT     Status: Normal   Result Value Ref Range    Creatinine POCT 1.0 0.7 - 1.3 mg/dL    GFR, ESTIMATED POCT >60 >60 mL/min/1.73m2   CRP inflammation     Status: Abnormal   Result Value Ref Range    CRP Inflammation 19.10 (H) <5.00 mg/L   Erythrocyte sedimentation rate auto     Status: Abnormal   Result Value Ref Range    Erythrocyte Sedimentation Rate 22 (H) 0 - 20 mm/hr   Manual Differential     Status: Abnormal   Result Value Ref Range    % Neutrophils 85 %    % Lymphocytes 1 %    % Monocytes 13 %    % Eosinophils 0 %    % Basophils 1 %    NRBCs per 100 WBC 10 (H) <=0 %    Absolute Neutrophils 7.7 1.6 - 8.3 10e3/uL    Absolute Lymphocytes 0.1 (L) 0.8 - 5.3 10e3/uL    Absolute Monocytes 1.2 0.0 - 1.3 10e3/uL    Absolute Eosinophils 0.0 0.0 - 0.7 10e3/uL    Absolute Basophils 0.1 0.0 - 0.2 10e3/uL    Absolute NRBCs 0.9 (H) <=0.0 10e3/uL    RBC Morphology Confirmed RBC Indices     Platelet Assessment  Automated Count Confirmed. Platelet morphology is normal.     Automated Count Confirmed. Platelet morphology is normal.    Jiménez-[place] Bodies Present (A) None Seen    Polychromasia Slight (A) None Seen    Target Cells Moderate (A) None Seen   EKG 12-lead, tracing  only     Status: None   Result Value Ref Range    Systolic Blood Pressure  mmHg    Diastolic Blood Pressure  mmHg    Ventricular Rate 66 BPM    Atrial Rate 66 BPM    ND Interval 160 ms    QRS Duration 92 ms     ms    QTc 465 ms    P Axis 66 degrees    R AXIS 79 degrees    T Axis 86 degrees    Interpretation ECG       Sinus rhythm  Normal ECG  Unconfirmed report - interpretation of this ECG is computer generated - see medical record for final interpretation  Reconfirmed by - EMERGENCY ROOM, PHYSICIAN ([id number]),  EVAN HERNANDEZ ([id number]) on 6/22/2023 2:14:10 PM     CBC with platelets differential     Status: Abnormal    Narrative    The following orders were created for panel order CBC with platelets differential.  Procedure                               Abnormality         Status                     ---------                               -----------         ------                     CBC with platelets and d...[[id number]]  Abnormal            Final result               Manual Differential[[id number]]          Abnormal            Final result                 Please view results for these tests on the individual orders.     Medications   pharmacy alert - intermittent dosing (has no administration in time range)   CT saline (75 mLs Intravenous $Given 6/22/23 1517)   iopamidol (ISOVUE-370) solution 99 mL (99 mLs Intravenous $Given 6/22/23 1517)   piperacillin-tazobactam (ZOSYN) 3.375 g vial to attach to  mL bag (0 g Intravenous Stopped 6/22/23 1705)     Labs Ordered and Resulted from Time of ED Arrival to Time of ED Departure   COMPREHENSIVE METABOLIC PANEL - Abnormal       Result Value    Sodium 133 (*)     Potassium 4.6      Chloride 99      Carbon Dioxide (CO2) 23      Anion Gap 11      Urea Nitrogen 18.7      Creatinine 0.98      Calcium 8.6 (*)     Glucose 89      Alkaline Phosphatase 331 (*)     AST        ALT 45      Protein Total 6.6      Albumin 3.1 (*)     Bilirubin Total 7.6 (*)     GFR Estimate  82     LIPASE - Abnormal    Lipase 10 (*)    MAGNESIUM - Abnormal    Magnesium 2.5 (*)    ROUTINE UA WITH MICROSCOPIC REFLEX TO CULTURE - Abnormal    Color Urine Dark Yellow (*)     Appearance Urine Slightly Cloudy (*)     Glucose Urine >=1000 (*)     Bilirubin Urine Small (*)     Ketones Urine Negative      Specific Gravity Urine 1.024      Blood Urine Negative      pH Urine 5.5      Protein Albumin Urine 30 (*)     Urobilinogen Urine 6.0 (*)     Nitrite Urine Negative      Leukocyte Esterase Urine Negative      Calcium Oxalate Crystals Urine Few (*)     RBC Urine 10 (*)     WBC Urine 12 (*)     Transitional Epithelials Urine 1     CBC WITH PLATELETS AND DIFFERENTIAL - Abnormal    WBC Count 9.0      RBC Count 2.25 (*)     Hemoglobin 8.8 (*)     Hematocrit 24.2 (*)      (*)     MCH 39.1 (*)     MCHC 36.4      RDW 25.1 (*)     Platelet Count 146 (*)    ISTAT GASES LACTATE VENOUS POCT - Abnormal    Lactic Acid POCT 0.8      Bicarbonate Venous POCT 28      O2 Sat, Venous POCT 22 (*)     pCO2 Venous POCT 42      pH Venous POCT 7.43      pO2 Venous POCT 16 (*)    CRP INFLAMMATION - Abnormal    CRP Inflammation 19.10 (*)    ERYTHROCYTE SEDIMENTATION RATE AUTO - Abnormal    Erythrocyte Sedimentation Rate 22 (*)    DIFFERENTIAL - Abnormal    % Neutrophils 85      % Lymphocytes 1      % Monocytes 13      % Eosinophils 0      % Basophils 1      NRBCs per 100 WBC 10 (*)     Absolute Neutrophils 7.7      Absolute Lymphocytes 0.1 (*)     Absolute Monocytes 1.2      Absolute Eosinophils 0.0      Absolute Basophils 0.1      Absolute NRBCs 0.9 (*)     RBC Morphology Confirmed RBC Indices      Platelet Assessment        Value: Automated Count Confirmed. Platelet morphology is normal.    Jiménez-[place] Bodies Present (*)     Polychromasia Slight (*)     Target Cells Moderate (*)    TROPONIN T, HIGH SENSITIVITY - Normal    Troponin T, High Sensitivity 13     LACTIC ACID WHOLE BLOOD - Normal    Lactic Acid 1.5     INFLUENZA A/B,  RSV, & SARS-COV2 PCR - Normal    Influenza A PCR Negative      Influenza B PCR Negative      RSV PCR Negative      SARS CoV2 PCR Negative     ISTAT CREATININE POCT - Normal    Creatinine POCT 1.0      GFR, ESTIMATED POCT >60     TROPONIN T, HIGH SENSITIVITY   ISTAT CREATININE POCT   BLOOD CULTURE   BLOOD CULTURE   URINE CULTURE     CT Abdomen Pelvis w Contrast   Final Result   IMPRESSION:    1.  No acute pathology in the abdomen/pelvis compared to CT from   5/22/2023.   2.  Stable size of 7.2 cm mass of the pancreatic head/neck, consistent   with history of neuroendocrine tumor. Significantly improved   intrahepatic duct dilation status post stenting of the common bile   duct.   3.  Multiple hepatic metastases are similar to prior CT from   5/22/2023.   4.  Small bilateral pleural effusions.   5.  Punctate 2 mm stone in the left kidney. No hydronephrosis.   6.  Stable cortical thinning and hypodensity of the parenchyma in the   superior pole of the left kidney, likely sequelae of prior renal   infarct.      I have personally reviewed the examination and initial interpretation   and I agree with the findings.      SERGEI CARRANZA MD            SYSTEM ID:  [id number]             Critical care was not performed.     Medical Decision Making  The patient's presentation was of high complexity (an acute health issue posing potential threat to life or bodily function).    The patient's evaluation involved:  review of external note(s) from 3+ sources (see separate area of note for details)  ordering and/or review of 3+ test(s) in this encounter (see separate area of note for details)  review of 3+ test result(s) ordered prior to this encounter (see separate area of note for details)  independent interpretation of testing performed by another health professional (CT)  discussion of management or test interpretation with another health professional (internal medicine)    The patient's management necessitated moderate risk  (prescription drug management including medications given in the ED) and high risk (a decision regarding hospitalization).      Assessment & Plan    Tyrel Harrell is a 72 year old male with a past medical history significant for neuroendocrine tumor of pancreas (on chemoradiation), biliary obstruction secondary to neuroendocrine tumor growth (s/p ERCP with stenting on 6/7/23),  lymphadenopathy of right upper extremity secondary to prior history of Hodgkin's lymphoma, PE (on Eliquis), CKD, cardiomyopathy, thrombocytopenia, hypothyroidism, ILD, anemia amongst others who presents to the emergency department for evaluation of generalized weakness.  Upon arrival to the emergency department patient is alert, he does not appear to be confused.  He is ill-appearing and jaundiced but does not appear toxic.  Differential includes infectious vs cardiovascular, he is also high risk for cholangitis.  Will obtain EKG, troponins, comprehensive labs including blood cultures, and send patient for CT of his abdomen pelvis.    EKG showed normal sinus rhythm with no ectopy, ST or T wave changes, no QT/QTc prolongation.  High-sensitivity troponin 13, 13.     I reviewed the labs which were significant for no leukocytosis, normal lactate.  Chronic stable anemia with hemoglobin of 8.8.  Mild hyponatremia 133, mild hypocalcemia 8.6.  Improving LFTs with an alk phos 331 and a T. bili of 7.6.  Normal lipase.  Normal renal function with a creatinine of 0.89.  No metabolic abnormalities.  CRP elevated at 19.10 with an elevated ESR of 22.  Urinalysis with greater than thousand glucose, small amount of bilirubin and urobilinogen, WBCs and RBCs as well as calcium oxalate. Negative for leukocytes or nitrites.  Influenza and RSV all negative.  Blood cultures pending.    High suspicion for infectious process given recent surgery, patient given dose of IV Zosyn in the emergency department.  Sent for CT abdomen pelvis.  I reviewed the CT images as  well as read the radiologist report which showed improvement in the appearance of the gallbladder stable size of the tumor located on the head of his pancreas unchanged multiple hepatic metastases.  Small bilateral pleural effusions.  Small nonobstructing 2 mm stone in the left kidney without hydronephrosis.    Plan will be to admit the patient to the hospital for ongoing IV antibiotics, work-up and assessment of his generalized weakness.  I discussed this with general medicine who accepted patient onto their service for ongoing work-up and management.    I have reviewed the nursing notes. I have reviewed the findings, diagnosis, plan with the patient and his family.    New Prescriptions    No medications on file       Final diagnoses:   Generalized weakness   Lightheadedness   Neuroendocrine carcinoma of pancreas (H)       FADY Martin CNP  [facility] EMERGENCY DEPARTMENT  6/22/2023     Joselin Buckner APRN CNP    --    ED Attending Physician Attestation    I Kirt Friedman MD, MD, cared for this patient with the Advanced Practice Provider (YVONNE). I have performed a history and physical examination of the patient independent of the YVONNE. I reviewed the YVONNE's documentation above and agree with the documented findings and plan of care. I personally provided a substantive portion of the care for this patient, including the complete Medical Decision Making. Please see the YVONNE's documentation for full details.    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 Exam and ED course notable for jaundice improving. After the completion of care in the emergency department, the patient was admitted to inpatient.    Critical Care & Procedures  Not applicable.        Medical Decision Making  The patient's presentation was  of moderate complexity (an acute illness with systemic symptoms).    The patient's evaluation involved:  review of 3+ test result(s) ordered prior to this encounter (see separate area of note for details)    The patient's management necessitated high risk (a decision regarding hospitalization).          Kirt Friedman MD, MD  Emergency Medicine    06/22/23 2043    --    ED Attending Physician Attestation    I Gerardo Glover MD, cared for this patient with the Advanced Practice Provider (YVONNE). I have performed a history and physical examination of the patient independent of the YVONNE. I reviewed the YVONNE's documentation above and agree with the documented findings and plan of care. I personally provided a substantive portion of the care for this patient, including the complete Medical Decision Making. Please see the YVONNE's documentation for full details.    Summary of HPI, PE, ED Course   Patient is a 72 year old male evaluated in the emergency department for generalized weakness.  Patient was initially seen with Dr. Friedman. Exam and ED course notable for stable course, concern for possible abdominal infection. Given abx. . After the completion of care in the emergency department, the patient was admitted to inpatient.      Gerardo Glover MD  Emergency Medicine        Gerardo Glover MD  06/22/23 [id number]       Kirt Friedman MD  06/23/23 8927

## 2023-06-22 NOTE — ED TRIAGE NOTES
BIBA for Generalized Malaise  loss of control to BLE, shaking legs, almost unable to walk, generalized weakness felt the past few days. On chemo and radiation for neuroendocrine disorder.  Vitally stable, BS 98, Right arm has lymphedema sleeve, limited extremity. No pain, no fever, just new onset limited mobility

## 2023-06-22 NOTE — ED NOTES
Bed: ED30  Expected date:   Expected time:   Means of arrival:   Comments:  [id number]: 72M general malaise, decreased motor function in legs

## 2023-06-23 NOTE — PLAN OF CARE
Occupational Therapy: Orders received. Chart reviewed and discussed with care team.? Occupational Therapy not indicated due to pt primarily limited by decreased activity tolerance, more appropriate for 1 discipline to follow at this time, with which PT will take lead.? Defer discharge recommendations to PT.? Will complete orders.

## 2023-06-23 NOTE — PROGRESS NOTES
"ED PT Eval     06/23/23 1500   Appointment Info   Signing Clinician's Name / Credentials (PT) Bird Mak, PT, DPT   Living Environment   People in Home spouse   Current Living Arrangements house   Home Accessibility stairs within home   Number of Stairs, Within Home, Primary other (see comments)  (split level)   Transportation Anticipated family or friend will provide   Living Environment Comments Patient and spouse live in split level home with handrails on stairs   Self-Care   Usual Activity Tolerance moderate   Current Activity Tolerance fair   Regular Exercise No   Equipment Currently Used at Home walker, rolling   Activity/Exercise/Self-Care Comment Patient endorses deconditioning since beginning chemo but is still IND. Spouse is strong supporter and able to assist as needed at home.   General Information   Onset of Illness/Injury or Date of Surgery 06/22/23   Referring Physician Alize Santiago MD   Patient/Family Therapy Goals Statement (PT) To return home safely   Pertinent History of Current Problem (include personal factors and/or comorbidities that impact the POC) Per EMR \"Tyrel Harrell is a 72 year old male admitted on 6/22/2023. 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 for light headedness and weakness since the morning of admission.\"   General Observations activity: ambualte with assist   Cognition   Cognitive Status Comments appropriate and intact throughout   Range of Motion (ROM)   Range of Motion ROM is WFL   Strength (Manual Muscle Testing)   Strength (Manual Muscle Testing) Deficits observed during functional mobility   Strength Comments grossly deconditioned   Bed Mobility   Bed Mobility supine-sit;sit-supine   Supine-Sit [place] (Bed Mobility) independent   Sit-Supine [place] (Bed Mobility) independent   Transfers   Transfers sit-stand " transfer   Sit-Stand Transfer   Sit-Stand [place] (Transfers) independent   Gait/Stairs (Locomotion)   [place] Level (Gait) modified independence   Assistive Device (Gait) other (see comments)  (WC)   Comment, (Gait/Stairs) Patient with preference for UE support for ambulation and demonstrated good gait speed and stability   Balance   Balance Comments IND sitting, IND unsupported stance, slight postural sway in stance   Clinical Impression   Criteria for Skilled Therapeutic Intervention Yes, treatment indicated   PT Diagnosis (PT) impaired functional mobility   Influenced by the following impairments decreased activity tolerance   Functional limitations due to impairments bed mobility, transfers, gait, stairs   Clinical Presentation (PT Evaluation Complexity) Stable/Uncomplicated   Clinical Presentation Rationale clinical judgement   Clinical Decision Making (Complexity) low complexity   Planned Therapy Interventions (PT) balance training;bed mobility training;gait training;ROM (range of motion);stair training;strengthening;stretching;transfer training   Risk & Benefits of therapy have been explained evaluation/treatment results reviewed;care plan/treatment goals reviewed;risks/benefits reviewed;current/potential barriers reviewed;participants voiced agreement with care plan;participants included;patient;spouse/significant other   PT Total Evaluation Time   PT Eval, Low Complexity Minutes (45940) 10   Physical Therapy Goals   PT Frequency 4x/week   PT Predicted Duration/Target Date for Goal Attainment 07/07/23   PT Goals Gait;Bed Mobility;Transfers;Stairs   PT: Bed Mobility Independent;Supine to/from sit;Goal Met   PT: Transfers Independent;Sit to/from stand   PT: Gait Independent;Greater than 200 feet   PT: Stairs Independent;10 stairs   Interventions   Interventions Quick Adds Gait Training;Therapeutic Activity   PT Discharge Planning   PT Plan progress to unsupported gait, watch BP   PT Discharge  Recommendation (DC Rec) home with assist;home with outpatient physical therapy   PT Rationale for DC Rec Patient slightly limited this session by orthostatic hypotension but still able to mobilize quite well. Recommend patient return home with OP PT and support from spouse when medically stable.   PT Brief overview of current status Supervision for hallway ambulation, can be provided by spouse.   Total Session Time   Total Session Time (sum of timed and untimed services) 10       Bird Mak, PT, DPT  Pager #[phone]

## 2023-06-23 NOTE — H&P
[facility]    History and Physical - Medicine Service, [facility] TEAM        Date of Admission:  6/22/2023    Assessment & Plan      Tyrel Harrell is a 72 year old male admitted on 6/22/2023. 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    Lightheadedness  Pt with 2 episodes of feeling light headedness on the am of the admission both while trying to go up the stairs. Hx not suggestive of TIA/stroke, arrhythmia, ENT related, vasovagal, infectious, or neurogenic. Pt hasnt had anything drink or eat since 6PM the evening prior to presentation (but this is not unusual for pt). Cardiac causes (given hx of afib, mild AS, significant MR, and heart failure, medication related (on metoprolol, losartan and spironolactone) and hypovolemia related orthostatic hypotension likely causes for this episode. Pt has no personal hx of light headedness, but says he has had shaky hands in the past few months especially on days during and following chemo (last chemo was 3 weeks back). Labs not concerning for infection (elevated inflammatory markers likely from chronic disease),  EKG without arrhythmias or ST/Tchanges. Prior echo without valvular stenosis. Physical exam was unremarkable with RRR, without murmur or elevated JVD, FND, or confusion.  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 Recent EUS guided choledochoduodenostomy and CBD stent placement. Currently no nausea, vomiting, abdominal pain or diarrhea. CT done at admission showing, \"Stable size of 7.2 cm mass of the pancreatic head/neck, consistent with history of neuroendocrine tumor. Significantly improved intrahepatic duct dilation status post stenting of the common bile duct.\"  - CMP in the am  - no acute concerns  - continue pto capecitabine     Pancreatic insufficiency (*)   Hypolalbuminemia  - Continue creon with meals and snacks  - continue pta vitamin supplements    Hyponatremia  Likely related to hypovolemia from last few hours of poor PO  - Na recheck in the am  - fluid as above    Depression   - continue pta venlafaxine    Small bilateral pleural effusions  Per imaging, pt has no recent SOB, cough or fevers    Hypothyroidism   - continue pto levothyroxine    Chronic anemia  Acute on Chronic thrombocytopenia  Hgb stable ~9; no recent evidence of bleeding. Also noted, mild drop in platelet count to 140s from baseline 160-200. Could be chemo related variations  - CBC daily    Pseudohypocalcemia  Corrects after albumin correction    Hx of Hodgkin's disease  Not active now       Diet: Regular Diet Adult    DVT Prophylaxis: DOAC  Craft Catheter: Not present  Fluids: mIVF as above  Lines: None     Cardiac Monitoring: None  Code Status:  Full: pt has living will; " would like to not to prolong life unnecessary if not sustainable    Clinically Significant Risk Factors Present on Admission              # Hypoalbuminemia: Lowest albumin = 3.1 g/dL at 6/22/2023  1:42 PM, will monitor as appropriate  # Drug Induced Coagulation Defect: home medication list includes an anticoagulant medication    # Hypertension: Home medication list includes antihypertensive(s)               Disposition Plan  Home after PT/OT     Expected Discharge Date: 06/24/2023                The patient's care was discussed with the MD NATHAN Hernandez MD  Medicine Service, [facility]  Securely message with [employer] (more info)  Text page via [facility] Paging/Directory   See signed in provider for up to date coverage information  ______________________________________________________________________    Chief Complaint   Light headedness and weakness/shakiness of extremities    History is obtained from the patient    History of Present Illness   Tyrel Harrell is a 72 year old male admitted on 6/22/2023. 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    Pt with 2 episodes of feeling light headed on the am of the admission. This was associated with shakiness of hands and feet and feeling tired. First episode happened while in a restaurant and second episode at home, both while trying to go up the stairs.     Pt was fully conscious and oriented throughout and did not fall; he was able to slowly support himself. No hx of recent fever, URI, palpitation, tinnitus, vertigo, confusion, bladder/bowel incontinence, recent diarrhea, melena, hematemesis, dysuria, cough, SOB, nausea, vomiting, sweating, or seizures. Has had usual PO intake yesterday,  although pt notably doesn't typically eat/drink after 6pm, No exposure to pain, straining for micturition or defecation prior to this episode. Hx also not suggestive positional related or head position changes. Pt has no hx of syncope, prior presyncope or aura or migraine.    ED course: vitally stable on room air; had basic labs, and CT abdomen; also received a dose of zosyn.     Past Medical History    Past Medical History:   Diagnosis Date     Anemia      Anticoagulated      Anxiety      Biliary obstruction      Chronic kidney disease      Chronic right shoulder pain      Depression      Difficult intravenous access     NEED UTRASOUND TO FIND VEIN     H/O thrombocytopenia      History of pulmonary embolism      Hodgkin lymphoma, nodular sclerosis (H) 1988    s/p radiation     Hypothyroidism      ILD (interstitial lung disease) (H)      Lymphedema of right upper extremity      Neuroendocrine tumor of pancreas     with liver metastasis     NICM (nonischemic cardiomyopathy) (H)      Nonsenile cataract      Pancreatic insufficiency        Past Surgical History   Past Surgical History:   Procedure Laterality Date     CARDIAC SURGERY  04/22/2022    s/p MitraClip     ENDOSCOPIC RETROGRADE CHOLANGIOPANCREATOGRAM COMPLEX N/A 6/12/2023    Procedure: ENDOSCOPIC RETROGRADE CHOLANGIOPANCREATOGRAPHY with dilation and hepaticduodenostomy,  stent placement;  Surgeon: Guru Alexander Joe MD;  Location: [facility] OR     ENDOSCOPIC ULTRASOUND UPPER GASTROINTESTINAL TRACT (GI) N/A 6/12/2023    Procedure: ENDOSCOPIC ULTRASOUND, ESOPHAGOSCOPY / UPPER GASTROINTESTINAL TRACT (GI);  Surgeon: Guru Alexander Joe MD;  Location: [facility] OR     ESOPHAGOSCOPY, GASTROSCOPY, DUODENOSCOPY (EGD), COMBINED N/A 6/7/2023    Procedure: Esophagoscopy, gastroscopy, duodenoscopy (EGD), combined;  Surgeon: Guru Alexander Joe MD;  Location: [facility] OR     SPLENECTOMY  1988       Prior to Admission Medications   Prior to  Admission Medications   Prescriptions Last Dose Informant Patient Reported? Taking?   Multiple Vitamin (MULTIVITAMIN) per tablet   Yes No   Sig: Take 1 tablet by mouth 2 times daily   Psyllium (HYDROCIL) 95 % PACK   Yes No   Sig: Take 1 packet by mouth daily as needed   acetaminophen (TYLENOL) 500 MG tablet   Yes No   Sig: Take 500-1,000 mg by mouth every 8 hours as needed for mild pain   apixaban ANTICOAGULANT (ELIQUIS) 5 MG tablet   Yes No   Sig: Take 1 tablet by mouth 2 times daily   augmented betamethasone dipropionate (DIPROLENE-AF) 0.05 % external ointment   No No   Sig: Apply topically 2 times daily To rash on hands and feet when flared   Patient taking differently: Apply topically 2 times daily as needed To rash on hands and feet when flared   calcium carbonate antacid (TUMS ULTRA 1000) 1000 MG CHEW   Yes No   Sig: Take 1,000 mg by mouth every evening   capecitabine (XELODA) 150 MG tablet   No No   Sig: Take 1 tablet (150 mg) by mouth 2 times daily for 14 days   capecitabine (XELODA) 500 MG tablet   No No   Sig: Take 2 tablets (1,000 mg) by mouth 2 times daily for 14 days Take with 150 mg tablet for total dose of 1,150 mg. Take for 14 days, then off for 14 days. Take within 30 mins after meal.   dapagliflozin (FARXIGA) 10 MG TABS tablet   Yes No   Sig: Take 1 tablet by mouth daily   emollient (VANICREAM) external cream   Yes No   Sig: Apply topically as needed for other   fish oil-omega-3 fatty acids 1000 MG capsule   Yes No   Sig: Take 1 g by mouth daily   hydrOXYzine (ATARAX) 25 MG tablet   No No   Sig: Take 1 tablet (25 mg) by mouth 3 times daily as needed for itching   levothyroxine (SYNTHROID/LEVOTHROID) 112 MCG tablet   No No   Sig: Take 1 tablet (112 mcg) by mouth daily   lipase-protease-amylase (CREON 12) 91770-97[address]-23632 units CPEP   No No   Sig: Take 3 capsules by mouth 3 times daily (with meals)   Patient taking differently: Take 2-3 capsules by mouth 3 times daily (with meals)   losartan  (COZAAR) 25 MG tablet   Yes No   Sig: Take 12.5 mg by mouth every morning   lovastatin (MEVACOR) 40 MG tablet   No No   Sig: Take 1 tablet (40 mg) by mouth At Bedtime   melatonin 3 MG tablet   Yes No   Sig: Take 3 mg by mouth nightly as needed for sleep   metoprolol succinate ER (TOPROL XL) 25 MG 24 hr tablet   Yes No   Sig: Take 37.5 mg by mouth every morning   ondansetron (ZOFRAN ODT) 8 MG ODT tab   No No   Sig: Take 1 tablet (8 mg) by mouth every 8 hours as needed for nausea   prochlorperazine (COMPAZINE) 10 MG tablet   Yes No   Sig: Take 10 mg by mouth   spironolactone (ALDACTONE) 25 MG tablet   Yes No   Sig: Take 12.5 mg by mouth daily   temozolomide (TEMODAR) 100 MG capsule   No No   Sig: Take 4 capsules (400 mg) by mouth At Bedtime for 5 days Take on empty stomach, Days 10 thru 14. Take Zofran 30-60 min before Temodar.   temozolomide (TEMODAR) 100 MG capsule   No No   Sig: Take 4 capsules (400 mg) by mouth At Bedtime for 5 days Take on empty stomach, Days 10 thru 14. Take Zofran 30-60 min before Temodar.   temozolomide (TEMODAR) 100 MG capsule   No No   Sig: Take 4 capsules (400 mg) by mouth At Bedtime for 5 days Take on empty stomach, Days 10 thru 14. Take Zofran 30-60 min before Temodar.   temozolomide (TEMODAR) 100 MG capsule   No No   Sig: Take 4 capsules (400 mg) by mouth At Bedtime for 5 days Take on empty stomach, Days 10 thru 14. Take Zofran 30-60 min before Temodar.   venlafaxine (EFFEXOR XR) 150 MG 24 hr capsule   No No   Sig: Take 1 capsule (150 mg) by mouth daily   Patient taking differently: Take 150 mg by mouth daily Also takes 75 mg for total of 225 mg every morning.   venlafaxine (EFFEXOR XR) 75 MG 24 hr capsule   No No   Sig: Take 1 capsule (75 mg) by mouth daily   Patient taking differently: Take 75 mg by mouth daily Also takes 150 mg for total of 225 mg every morning.   vitamin D3 (CHOLECALCIFEROL) 50 mcg (2000 units) tablet   Yes No   Sig: Take 1 tablet by mouth daily       Facility-Administered Medications Last Administration Doses Remaining   betamethasone acet & sod phos (CELESTONE) injection 6 mg 1/24/2023 10:30 AM            Physical Exam   Vital Signs: Temp: 98.1  F (36.7  C) Temp src: Oral BP: 137/63 Pulse: 78   Resp: 20 SpO2: 100 % O2 Device: None (Room air)    Weight: 0 lbs 0 oz    General: Pt NAD, lying down comfortably  HEENT: sclera mildly icteric; no nystagmus with eye movements  Card: RRR, normal S1 and S2, no murmurs; jvd not elevated  Lung: Clear; equal BS bilaterally  Abdomen:  soft and nontender, nondistended, no organomegaly, BS+  Neuro: Alert and oriented X 3; CNs II-XII grossly intact.; muscle strength 5/5 and intact sensory in upper and lower extremities;   Extremities: No pedal edema; lymphedema wrap in place in the R hand; both hands (R>L)purple      Medical Decision Making       MANAGEMENT DISCUSSED with the following over the past 24 hours: patient   NOTE(S)/MEDICAL RECORDS REVIEWED over the past 24 hours: ED and recent onc notes      Data     I have personally reviewed the following data over the past 24 hrs:    9.0  \   8.8 (L)   / 146 (L)     133 (L) 99 18.7 /  89   4.6 23 1.0 \       ALT: 45 AST: N/A AP: 331 (H) TBILI: 7.6 (H)   ALB: 3.1 (L) TOT PROTEIN: 6.6 LIPASE: 10 (L)       Trop: 13 BNP: N/A       Procal: N/A CRP: 19.10 (H) Lactic Acid: 0.8         Imaging results reviewed over the past 24 hrs:   Recent Results (from the past 24 hour(s))   CT Abdomen Pelvis w Contrast    Narrative    EXAMINATION: CT ABDOMEN PELVIS W CONTRAST, 6/22/2023 3:26 PM    TECHNIQUE:  Helical CT images from the lung bases through the  symphysis pubis were obtained with contrast.  Coronal reformatted  images were generated at a workstation for further assessment.    CONTRAST:  99 cc Isovue 370 IV.    COMPARISON: CT 5/22/2023    HISTORY: neuroendocrine cancer on pancrease obstructing bile flow,  recent stenting procedure evaluate for sandy v other cause of  gen'l  weakness    FINDINGS:    Abdomen and pelvis:   Liver: Multiple hypodensities in the liver, including (all series 4):  - 1.5 x 1.3 cm hypodensity in the right hepatic lobe (image 49),  previously 1.4 x 1.0 cm.  - 1.2 x 1.1 cm hypodensity in the right hepatic lobe (image 74),  previously 1.1 x 1.1 cm.    Significantly improved intrahepatic biliary duct dilation compared to  prior CT. Pneumobilia in the left hepatic lobe is new and likely  related to CBD stent. Common bile duct stent with tip in the duodenum.    Gallbladder: Small air in the lumen of the gallbladder is likely  iatrogenic. No cholelithiasis or gallbladder wall thickening.  Spleen: Status post splenectomy. 5 mm soft tissue nodule in the left  upper quadrant (series 4, image 89) is similar to prior and possibly  represents residual splenic tissue.  Pancreas: Stable soft tissue mass of the pancreatic head, neck, and  uncinate process, measuring 4.1 x 3.2 x 7.2 cm (series 4, image 112  and series 6, image 38), previously 3.9 x 3.4 x 7.1 cm, consistent  with history of pancreatic neuroendocrine tumor. Unchanged central  hypodensity within the mass, likely area of necrosis. The pancreatic  body and tail are atrophic.   Adrenal glands: No adrenal nodules.  Kidneys: Punctate 2 mm nonobstructing stone in the left kidney. No  hydronephrosis. Unchanged cortical thinning and mildly hypodense  parenchyma of the posterior left kidney, likely from prior renal  infarct. Bilateral renal cysts with the largest measuring 2.3 cm in  the left kidney.  Bladder / Pelvic organs: The urinary bladder is unremarkable. Coarse  calcifications of the prostate gland.  Bowel: No bowel wall thickening or evidence for obstruction. The  appendix is not definitively identified, however there are no  inflammatory changes in the right lower quadrant.  Lymph nodes: No retroperitoneal, mesenteric, or pelvic  lymphadenopathy.  Fluid: No free fluid within the abdomen.  Vessels: No  infrarenal aortic aneurysm. Atherosclerotic calcification  of the abdominal aorta and iliac arteries.    Lung bases: Small bilateral pleural effusions. Mild bibasilar  atelectasis. Status post mitral valve repair.    Bones and soft tissues: No suspicious osseous lesions. Degenerative  changes of the spine. Degenerative stepwise retrolisthesis of L2 on  L3, L3 on L4, and L4 on L5.       Impression    IMPRESSION:   1.  No acute pathology in the abdomen/pelvis compared to CT from  5/22/2023.  2.  Stable size of 7.2 cm mass of the pancreatic head/neck, consistent  with history of neuroendocrine tumor. Significantly improved  intrahepatic duct dilation status post stenting of the common bile  duct.  3.  Multiple hepatic metastases are similar to prior CT from  5/22/2023.  4.  Small bilateral pleural effusions.  5.  Punctate 2 mm stone in the left kidney. No hydronephrosis.  6.  Stable cortical thinning and hypodensity of the parenchyma in the  superior pole of the left kidney, likely sequelae of prior renal  infarct.    I have personally reviewed the examination and initial interpretation  and I agree with the findings.    SERGEI CARRANZA MD         SYSTEM ID:  [id number]

## 2023-06-23 NOTE — PROGRESS NOTES
[facility]    Medicine Progress Note - Hospitalist Service, GOLD TEAM 9    Date of Admission:  6/22/2023    Assessment & Plan      Tyrel Harrell is a 72 year old male admitted on 6/22/2023. 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    Orthostatic hypotension  Lightheadedness  Pt with 2 episodes of feeling light headedness on the am of the admission both while trying to go up the stairs. He also reports a more subacute history of episodes of lightheadness, shaking/tremor, weakness and controlled falls to ground over the past several months. He has never had any true syncope episodes. His orthostatic vital signs are positive (120/56 laying > 100/54 sitting > 91/52 standing) and he did not have appropriate increase in HR (stayed in 80-90s). TTE was obtained and showed EF 55-60% with mild/mod residual MR after his mitraclip placement. His orthostatic hypotension could be iatrogenic given the number of cardiac/BP meds that he is on (held on admission). He has also not been eating particularly well the last week so dehydration is also likely contributing. He does note these symptoms have increased since he has started on his chemo medications (capecitabine and temozolomide), which both have side effects including balance impairment and dizziness although does not appear they can cause autonomic dysfunction. TSH checked and wnl.  - Continue IVF today  - Monitor off antibiotics, low suspicion for infection  - Holding cardiac meds currently  - Likely will minimize/discontinue BB indefinitely  - Can consider starting other medications - losartan, spironolactone, dapagliflozin slowly  - Daily orthostatic vitals  - AM cortisol tmrw. Consider starting fluorinef pending result and ongoing  "orthostatic hypotension.  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 Recent EUS guided choledochoduodenostomy and CBD stent placement. Currently no nausea, vomiting, abdominal pain or diarrhea. CT done at admission showing, \"Stable size of 7.2 cm mass of the pancreatic head/neck, consistent with history of neuroendocrine tumor. Significantly improved intrahepatic duct dilation status post stenting of the common bile duct.\"  - CMP in the am  - no acute concerns  - currently on an off cycle of his oral chemo meds. Is supposed to restart in next 1-2 weeks.    Pancreatic insufficiency (*)   Hypolalbuminemia  - Continue creon with meals and snacks  - continue pta vitamin supplements    Hyponatremia  Likely related to hypovolemia from last few hours of poor PO  - fluid as above    Depression   - continue pta venlafaxine    Small bilateral pleural effusions  Per imaging, pt has no recent SOB, cough or fevers    Hypothyroidism   TSH checked and wnl.  - continue pto levothyroxine    Chronic anemia  Acute on Chronic thrombocytopenia  Hgb stable ~9; no recent evidence of bleeding. Also noted, mild drop in platelet count to 140s from baseline 160-200. Could be chemo related variations  - CBC daily    Pseudohypocalcemia  Corrects after albumin correction    Hx of Hodgkin's disease  Not active now       Diet: Regular Diet Adult    DVT Prophylaxis: DOAC  Craft Catheter: Not present  Lines: None     Cardiac Monitoring: " ACTIVE order. Indication: Tachyarrhythmias, acute (48 hours)  Code Status: Full Code      Clinically Significant Risk Factors Present on Admission              # Hypoalbuminemia: Lowest albumin = 3.1 g/dL at 6/23/2023  6:35 AM, will monitor as appropriate  # Drug Induced Coagulation Defect: home medication list includes an anticoagulant medication    # Hypertension: Home medication list includes antihypertensive(s)               Disposition Plan      Expected Discharge Date: 06/24/2023                  Daniela Bojorquez MD  Hospitalist Service, GOLD TEAM 9  [facility]  Securely message with [employer] (more info)  Text page via [facility] Paging/Directory   See signed in provider for up to date coverage information  ______________________________________________________________________    Interval History   No acute events overnight. Feels tired this morning and generally weak. Denies SOB or chest pain. No palpitations. Had pt sit up at bedside and he did report some lightheadedness.    Physical Exam   Vital Signs: Temp: 98.4  F (36.9  C) Temp src: Oral BP: 106/66 Pulse: 84   Resp: 20 SpO2: 96 % O2 Device: None (Room air)    Weight: 0 lbs 0 oz    General Appearance: NAD. Lying comfortably in bed.  Respiratory: CTAB. No increased WOB.  Cardiovascular: RRR. No m/r/g  GI: Soft. Non-tender. Non-distended.  Skin: No rash.  Other: AOx4. Moving all extremities. Normal affect.    Medical Decision Making       50 MINUTES SPENT BY ME on the date of service doing chart review, history, exam, documentation & further activities per the note.      Data     I have personally reviewed the following data over the past 24 hrs:    9.2  \   9.3 (L)   / 158     133 (L) 100 16.4 /  108 (H)   4.0 23 0.98 \       ALT: 44 AST: 72 (H) AP: 319 (H) TBILI: 6.9 (H)   ALB: 3.1 (L) TOT PROTEIN: 6.3 (L) LIPASE: N/A       Trop: 14 BNP: N/A       TSH: 5.25 (H) T4: 0.93 A1C: N/A       Procal: N/A CRP: N/A Lactic  Acid: N/A         Imaging results reviewed over the past 24 hrs:   Recent Results (from the past 24 hour(s))   CT Abdomen Pelvis w Contrast    Narrative    EXAMINATION: CT ABDOMEN PELVIS W CONTRAST, 6/22/2023 3:26 PM    TECHNIQUE:  Helical CT images from the lung bases through the  symphysis pubis were obtained with contrast.  Coronal reformatted  images were generated at a workstation for further assessment.    CONTRAST:  99 cc Isovue 370 IV.    COMPARISON: CT 5/22/2023    HISTORY: neuroendocrine cancer on pancrease obstructing bile flow,  recent stenting procedure evaluate for sandy v other cause of gen'l  weakness    FINDINGS:    Abdomen and pelvis:   Liver: Multiple hypodensities in the liver, including (all series 4):  - 1.5 x 1.3 cm hypodensity in the right hepatic lobe (image 49),  previously 1.4 x 1.0 cm.  - 1.2 x 1.1 cm hypodensity in the right hepatic lobe (image 74),  previously 1.1 x 1.1 cm.    Significantly improved intrahepatic biliary duct dilation compared to  prior CT. Pneumobilia in the left hepatic lobe is new and likely  related to CBD stent. Common bile duct stent with tip in the duodenum.    Gallbladder: Small air in the lumen of the gallbladder is likely  iatrogenic. No cholelithiasis or gallbladder wall thickening.  Spleen: Status post splenectomy. 5 mm soft tissue nodule in the left  upper quadrant (series 4, image 89) is similar to prior and possibly  represents residual splenic tissue.  Pancreas: Stable soft tissue mass of the pancreatic head, neck, and  uncinate process, measuring 4.1 x 3.2 x 7.2 cm (series 4, image 112  and series 6, image 38), previously 3.9 x 3.4 x 7.1 cm, consistent  with history of pancreatic neuroendocrine tumor. Unchanged central  hypodensity within the mass, likely area of necrosis. The pancreatic  body and tail are atrophic.   Adrenal glands: No adrenal nodules.  Kidneys: Punctate 2 mm nonobstructing stone in the left kidney. No  hydronephrosis. Unchanged  cortical thinning and mildly hypodense  parenchyma of the posterior left kidney, likely from prior renal  infarct. Bilateral renal cysts with the largest measuring 2.3 cm in  the left kidney.  Bladder / Pelvic organs: The urinary bladder is unremarkable. Coarse  calcifications of the prostate gland.  Bowel: No bowel wall thickening or evidence for obstruction. The  appendix is not definitively identified, however there are no  inflammatory changes in the right lower quadrant.  Lymph nodes: No retroperitoneal, mesenteric, or pelvic  lymphadenopathy.  Fluid: No free fluid within the abdomen.  Vessels: No infrarenal aortic aneurysm. Atherosclerotic calcification  of the abdominal aorta and iliac arteries.    Lung bases: Small bilateral pleural effusions. Mild bibasilar  atelectasis. Status post mitral valve repair.    Bones and soft tissues: No suspicious osseous lesions. Degenerative  changes of the spine. Degenerative stepwise retrolisthesis of L2 on  L3, L3 on L4, and L4 on L5.       Impression    IMPRESSION:   1.  No acute pathology in the abdomen/pelvis compared to CT from  5/22/2023.  2.  Stable size of 7.2 cm mass of the pancreatic head/neck, consistent  with history of neuroendocrine tumor. Significantly improved  intrahepatic duct dilation status post stenting of the common bile  duct.  3.  Multiple hepatic metastases are similar to prior CT from  5/22/2023.  4.  Small bilateral pleural effusions.  5.  Punctate 2 mm stone in the left kidney. No hydronephrosis.  6.  Stable cortical thinning and hypodensity of the parenchyma in the  superior pole of the left kidney, likely sequelae of prior renal  infarct.    I have personally reviewed the examination and initial interpretation  and I agree with the findings.    SERGEI CARRANZA MD         SYSTEM ID:  [id number]   Echo Complete   Result Value    LVEF  55-60%    Narrative    [id number]  [id number]  [id number]  [id number]^KEVIN EVANS^ARIANA     [facility]  [facility],[place]  Echocardiography Laboratory  [address] [place] [address]     Name: MARTÍNEZ DENNISON  MRN: [id number]  : 1950  Study Date: 2023 08:54 AM  Age: 72 yrs  Gender: Male  Patient Location: [facility]  Reason For Study: Heart Failure  Ordering Physician: ARIANA MADDEN  Performed By: Champ Segundo RDCS     BSA: 1.9 m2  Height: 68 in  Weight: 162 lb  ______________________________________________________________________________  Procedure  Complete Portable Echo Adult.  ______________________________________________________________________________  Interpretation Summary  Global and regional left ventricular function is normal with an EF of 55-60%.  Right ventricular function, chamber size, wall motion, and thickness are  normal.  S/P MitraClip. Mean mitral gradient 8mmHg. Mild to moderate residual MR.  Mild to moderate aortic insufficiency is present.  Right ventricular systolic pressure is 41mmHg above the right atrial pressure.  Sinuses of Valsalva 4.2 cm.  Mild dilatation of the aorta is present.  IVC diameter <2.1 cm collapsing >50% with sniff suggests a normal RA pressure  of 3 mmHg.  No pericardial effusion is present.  There is no prior study for direct comparison.  ______________________________________________________________________________  Left Ventricle  Global and regional left ventricular function is normal with an EF of 55-60%.  Left ventricular wall thickness is normal. Left ventricular size is normal.  Left ventricular diastolic function is indeterminate. No regional wall motion  abnormalities are seen.     Right Ventricle  Right ventricular function, chamber size, wall motion, and thickness are  normal.     Atria  The right atria appears normal. Mild left atrial enlargement is present.     Mitral Valve  S/P MitraClip. Mean mitral gradient 8mmHg. Mild to moderate residual MR.     Aortic Valve  Trileaflet aortic sclerosis without stenosis. Mild to  moderate aortic  insufficiency is present. The mean AoV pressure gradient is 9.2 mmHg. The  calculated aortic valve are is 3.0 cm^2.     Tricuspid Valve  The tricuspid valve is normal. Mild tricuspid insufficiency is present. Right  ventricular systolic pressure is 41mmHg above the right atrial pressure.     Pulmonic Valve  The pulmonic valve is normal.     Vessels  The pulmonary artery is normal. Sinuses of Valsalva 4.2 cm. Mild dilatation of  the aorta is present. IVC diameter <2.1 cm collapsing >50% with sniff suggests  a normal RA pressure of 3 mmHg.     Pericardium  No pericardial effusion is present.     Compared to Previous Study  There is no prior study for direct comparison.  ______________________________________________________________________________  MMode/2D Measurements & Calculations  IVSd: 1.0 cm  LVIDd: 4.9 cm  LVIDs: 3.5 cm  LVPWd: 0.86 cm  FS: 29.3 %  LV mass(C)d: 165.6 grams  LV mass(C)dI: 88.6 grams/m2  Ao root diam: 4.2 cm  asc Aorta Diam: 3.6 cm  LVOT diam: 2.7 cm  LVOT area: 5.8 cm2  LA Volume (BP): 83.9 ml     LA Volume Index (BP): 44.9 ml/m2  RWT: 0.35  TAPSE: 2.1 cm     Doppler Measurements & Calculations  MV max P.4 mmHg  MV mean P.0 mmHg  MV V2 VTI: 59.3 cm  MVA(VTI): 2.1 cm2  Ao V2 max: 204.0 cm/sec  Ao max P.7 mmHg  Ao V2 mean: 143.9 cm/sec  Ao mean P.2 mmHg  Ao V2 VTI: 42.0 cm  KEZIA(I,D): 3.0 cm2  KEZIA(V,D): 2.9 cm2  AI P1/2t: 405.9 msec  LV V1 max P.0 mmHg  LV V1 max: 99.6 cm/sec  LV V1 VTI: 21.3 cm  SV(LVOT): 124.3 ml  SI(LVOT): 66.5 ml/m2  PA acc time: 0.12 sec  TR max levi: 319.5 cm/sec  TR max P.8 mmHg  AV Levi Ratio (DI): 0.49  KEZIA Index (cm2/m2): 1.6  RV S Levi: 12.2 cm/sec     ______________________________________________________________________________  Report approved by: Alanna Balderrama 2023 09:51 AM

## 2023-06-23 NOTE — UTILIZATION REVIEW
Admission Status; Secondary Review Determination       Under the authority of the Utilization Management Committee, the utilization review process indicated a secondary review on the above patient. The review outcome is based on review of the medical records, discussions with staff, and applying clinical experience noted on the date of the review.     (x) Inpatient Status Appropriate - This patient's medical care is consistent with medical management for inpatient care and reasonable inpatient medical practice.     RATIONALE FOR DETERMINATION     Tyrel Harrell is a 72 year old male admitted on 6/22/2023. 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 He presented to the ED on 6/22/23 just after midnight for evaluation of light headedness and weakness since the morning before presentation. ED evaluation showed brief hypotension with BP 86/45.  Laboratory evaluation showed sodium 133, magnesium 2.5, calcium of 8.6 with a BUN of 3.1, alkaline phosphatase 331, total bili Kwaku 7.6, CRP 19.1, hemoglobin 8.8, platelets 146, urinalysis with glycosuria (12 blood cells as well as 2 red blood cells), and negative testing for COVID/influenza/RSV.  CT of abdomen pelvis showed no acute process.  It did show stable pancreatic tumor and liver lesions.  ECG was unremarkable.  He was given 1 dose of Zosyn in the emergency department.  He was admitted for further evaluation.  Antibiotic was not continued. Echocardiogram was obtained and was unremarkable. He was given IV fluids.  He had one more episode of isolated hypotension with blood pressure of 95/46. He had significant orthostatic hypotension (120/56 laying > 100/54 sitting > 91/52 standing).  Antihypertensives are on hold. IV fluid is being continued. If orthostatic hypotension persists William may require addition of florinef of midodrine. PT and OT consulted.   MRI of brain has been ordered and is pending.     At the time of admission with the information available to the attending physician more than 2 nights Hospital complex care was anticipated, based on patient risk of adverse outcome if treated as outpatient and complex care required. Inpatient admission is appropriate based on the Medicare guidelines.     This document was produced using voice recognition software       The information on this document is developed by the utilization review team in order for the business office to ensure compliance. This only denotes the appropriateness of proper admission status and does not reflect the quality of care rendered.   The definitions of Inpatient Status and Observation Status used in making the determination above are those provided in the CMS Coverage Manual, Chapter 1 and Chapter 6, section 70.4.   Sincerely,     Romero Baig MD    Utilization Review  Physician Advisor  [facility].

## 2023-06-24 NOTE — PLAN OF CARE
Admission          6/22/2023 12:02 PM  -----------------------------------------------------------  Reason for admission: lightheadedness, shaky legs  Primary team notified of pt arrival.  Admitted from: [facility]  Via: stretcher  Accompanied by: wife so Gonzalez: Placed in closet; pt kept clothes, cellphone, watch, glasses, shoes  Admission Profile: complete  Teaching: orientation to unit and call light- call light within reach, call don't fall, use of console, meal times, when to call for the RN, and enforced importance of safety   Access: PIV  Telemetry:Placed on pt  Ht./Wt.: complete  Code Status verified on armband: yes  2 RN Skin Assessment Completed By: Francie Tamez RN  Med Rec completed: yes  Suction/Ambu bag/Flowmeter at bedside: yes  Is patient having diarrhea upon admission- if YES fill out testing algorithm : no    C. Diff Testing Algorithm (MUST be marked YES)   1. 3 or more loose stools in 24 hrs. [] Yes [] No       Additional symptoms:(At least ONE must be marked yes)   1. Abdominal pain/discomfort [] Yes [] No   2. Fever at least 38C (100.4 F) [] Yes [] No   3. Elevated WBC(>11,000) [] Yes [] No       Exclusion Criteria:  (MUST be marked YES)   1. Off laxatives for at least 48 hrs. [] Yes [] No       Pt status: Pt A/Ox4 pleasant and cooperative.  Pt up with SBA and was able to walk from stretcher to bed, pt denied lightheadedness.  Wife at bedside, pt oriented to room and call light system.  Admission questions and med rec done.  VS stable see flowsheets.      Temp:  [97.3  F (36.3  C)-98.4  F (36.9  C)] 98.1  F (36.7  C)  Pulse:  [79-89] 84  Resp:  [14-20] 16  BP: ()/(45-73) 134/64  Cuff Mean (mmHg):  [87] 87  SpO2:  [96 %-100 %] 100 %

## 2023-06-24 NOTE — PHARMACY-ADMISSION MEDICATION HISTORY
Pharmacy Intern Admission Medication History    Admission medication history is complete. The information provided in this note is only as accurate as the sources available at the time of the update.    Medication reconciliation/reorder completed by provider prior to medication history? Yes    Information Source(s): Patient, Family member and CareEverywhere/SureScripts via in-person    Pertinent Information: Spoke with patient and patient's spouse who were knowledgeable about the patient's medications.    Changes made to PTA medication list:    Added: None    Deleted: None    Changed: per patient and dispense report  o Vanicream: apply topically as needed for other --> apply topically as needed for rash on hands and feet  o Compazine: take 10mg by mouth --> take 10mg by mouth every 6 hours as needed for nausea       Allergies reviewed with patient and updates made in EHR: yes    Medication History Completed By: Alisha Mcelroy 6/24/2023 2:06 PM    Prior to Admission medications    Medication Sig Last Dose Taking? Auth Provider Long Term End Date   acetaminophen (TYLENOL) 500 MG tablet Take 500-1,000 mg by mouth every 8 hours as needed for mild pain 6/21/2023 at AM Yes Unknown, Entered By History     apixaban ANTICOAGULANT (ELIQUIS) 5 MG tablet Take 1 tablet by mouth 2 times daily 6/21/2023 at 1900 Yes Reported, Patient     calcium carbonate antacid (TUMS ULTRA 1000) 1000 MG CHEW Take 1,000 mg by mouth At Bedtime 6/21/2023 at 1900 Yes Unknown, Entered By History     dapagliflozin (FARXIGA) 10 MG TABS tablet Take 1 tablet by mouth daily 6/21/2023 at 1900 Yes Reported, Patient     emollient (VANICREAM) external cream Apply topically daily as needed for other (rash on hands and feet) 6/19/2023 Yes Unknown, Entered By History     fish oil-omega-3 fatty acids 1000 MG capsule Take 1 g by mouth daily 6/21/2023 at 0900 Yes Unknown, Entered By History     hydrOXYzine (ATARAX) 25 MG tablet Take 1 tablet (25 mg) by mouth 3 times  daily as needed for itching Past Week Yes Carmen Cho MD     levothyroxine (SYNTHROID/LEVOTHROID) 112 MCG tablet Take 1 tablet (112 mcg) by mouth daily 6/22/2023 at 0400 Yes Carmen Cho MD Yes    lipase-protease-amylase (CREON 12) [id number] units CPEP Take 3 capsules by mouth 3 times daily (with meals)  Patient taking differently: Take 2-3 capsules by mouth 3 times daily (with meals) 6/21/2023 at 1900 Yes Carmen Cho MD  12/20/23   losartan (COZAAR) 25 MG tablet Take 12.5 mg by mouth every morning 6/21/2023 at 2100 Yes Reported, Patient Yes    lovastatin (MEVACOR) 40 MG tablet Take 1 tablet (40 mg) by mouth At Bedtime 6/21/2023 at 2100 Yes Carmen Cho MD Yes    melatonin 3 MG tablet Take 3 mg by mouth nightly as needed for sleep Past Week at 2100 Yes Reported, Patient     metoprolol succinate ER (TOPROL XL) 25 MG 24 hr tablet Take 37.5 mg by mouth every morning 6/21/2023 at 0900 Yes Reported, Patient Yes    Multiple Vitamin (MULTIVITAMIN) per tablet Take 1 tablet by mouth 2 times daily 6/21/2023 at 1900 Yes Reported, Patient     ondansetron (ZOFRAN ODT) 8 MG ODT tab Take 1 tablet (8 mg) by mouth every 8 hours as needed for nausea 6/15/2023 Yes Bradly Parker MD     prochlorperazine (COMPAZINE) 10 MG tablet Take 10 mg by mouth every 6 hours as needed for nausea More than a month Yes Reported, Patient     Psyllium (HYDROCIL) 95 % PACK Take 1 packet by mouth daily as needed Past Week Yes Reported, Patient     spironolactone (ALDACTONE) 25 MG tablet Take 12.5 mg by mouth daily 6/21/2023 at 1900 Yes Reported, Patient Yes    venlafaxine (EFFEXOR XR) 150 MG 24 hr capsule Take 1 capsule (150 mg) by mouth daily  Patient taking differently: Take 150 mg by mouth daily Also takes 75 mg for total of 225 mg every morning. 6/21/2023 at 0900 Yes Carmen Cho MD Yes    venlafaxine (EFFEXOR XR) 75 MG 24 hr capsule Take 1 capsule (75 mg) by  mouth daily  Patient taking differently: Take 75 mg by mouth daily Also takes 150 mg for total of 225 mg every morning. 6/21/2023 at 0900 Yes Carmen Cho MD Yes    vitamin D3 (CHOLECALCIFEROL) 50 mcg (2000 units) tablet Take 1 tablet by mouth daily 6/21/2023 at 0900 Yes Unknown, Entered By History     augmented betamethasone dipropionate (DIPROLENE-AF) 0.05 % external ointment Apply topically 2 times daily To rash on hands and feet when flared  Patient not taking: Reported on 6/24/2023 Not Taking  Daniela Last APRN CNP     capecitabine (XELODA) 150 MG tablet Take 1 tablet (150 mg) by mouth 2 times daily for 14 days 6/15/2023  Bradly Parker MD Yes 7/4/23   capecitabine (XELODA) 500 MG tablet Take 2 tablets (1,000 mg) by mouth 2 times daily for 14 days Take with 150 mg tablet for total dose of 1,150 mg. Take for 14 days, then off for 14 days. Take within 30 mins after meal. 6/15/2023  Bradly Parker MD Yes 7/4/23   temozolomide (TEMODAR) 100 MG capsule Take 4 capsules (400 mg) by mouth At Bedtime for 5 days Take on empty stomach, Days 10 thru 14. Take Zofran 30-60 min before Temodar. 6/15/2023 at 2100  Bradly Parker MD Yes 6/25/23   temozolomide (TEMODAR) 100 MG capsule Take 4 capsules (400 mg) by mouth At Bedtime for 5 days Take on empty stomach, Days 10 thru 14. Take Zofran 30-60 min before Temodar.   Bradly Parker MD Yes 6/21/23   temozolomide (TEMODAR) 100 MG capsule Take 4 capsules (400 mg) by mouth At Bedtime for 5 days Take on empty stomach, Days 10 thru 14. Take Zofran 30-60 min before Temodar.   Bradly Parker MD Yes 6/21/23   temozolomide (TEMODAR) 100 MG capsule Take 4 capsules (400 mg) by mouth At Bedtime for 5 days Take on empty stomach, Days 10 thru 14. Take Zofran 30-60 min before Temodar.   Bradly Parker MD Yes 6/21/23

## 2023-06-24 NOTE — PROGRESS NOTES
[facility]    Medicine Progress Note - Hospitalist Service, GOLD TEAM 9    Date of Admission:  6/22/2023    Assessment & Plan   Tyrel Harrell is a 72 year old male admitted on 6/22/2023. 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    Changes Today:  - Start fludricortisone 0.1mg daily for orthostatic hypotension  - Compression stockings to LE  - Stop IVF and monitor PO intake  - Continue to hold cardiac medications  - Continue daily and PRN orthostaticcs    Orthostatic hypotension  Lightheadedness  Pt with 2 episodes of feeling light headedness on the am of the admission both while trying to go up the stairs. He also reports a more subacute history of episodes of lightheadness, shaking/tremor, weakness and controlled falls to ground over the past several months. He has never had any true syncope episodes. His orthostatic vital signs are positive (120/56 laying > 100/54 sitting > 91/52 standing) and he did not have appropriate increase in HR (stayed in 80-90s). TTE was obtained and showed EF 55-60% with mild/mod residual MR after his mitraclip placement. His orthostatic hypotension could be iatrogenic given the number of cardiac/BP meds that he is on (held on admission). He has also not been eating particularly well the last week so dehydration is also likely contributing. He does note these symptoms have increased since he has started on his chemo medications (capecitabine and temozolomide), which both have side effects including balance impairment and dizziness although does not appear they can cause autonomic dysfunction. TSH and cortisol both checked and wnl. Overall, highest concern is for autonomic dysfunction   - Start fludricortisone 0.1mg daily  - BLE compression  "stockings  - Stop IVF and monitor PO intake. Encouraged pt to push fluids.  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 Recent EUS guided choledochoduodenostomy and CBD stent placement. Currently no nausea, vomiting, abdominal pain or diarrhea. CT done at admission showing, \"Stable size of 7.2 cm mass of the pancreatic head/neck, consistent with history of neuroendocrine tumor. Significantly improved intrahepatic duct dilation status post stenting of the common bile duct.\"  - CMP in the am  - no acute concerns  - currently on an off cycle of his oral chemo meds. Is supposed to restart in next 1-2 weeks.    Pancreatic insufficiency (*)   Hypolalbuminemia  - Continue creon with meals and snacks  - continue pta vitamin supplements    Hyponatremia  Likely related to hypovolemia from last few hours of poor PO  - fluid as above    Depression   - continue pta venlafaxine    Small bilateral pleural effusions  Per imaging, pt has no recent SOB, cough or fevers    Hypothyroidism   TSH checked and wnl.  - continue pto levothyroxine    Chronic anemia  Acute on Chronic thrombocytopenia  Hgb stable ~9; no recent evidence of bleeding. Also " noted, mild drop in platelet count to 140s from baseline 160-200. Could be chemo related variations  - CBC daily    Pseudohypocalcemia  Corrects after albumin correction    Hx of Hodgkin's disease  Not active now         Diet: Regular Diet Adult    DVT Prophylaxis: DOAC  Craft Catheter: Not present  Lines: None     Cardiac Monitoring: ACTIVE order. Indication: Tachyarrhythmias, acute (48 hours)  Code Status: Full Code      Clinically Significant Risk Factors              # Hypoalbuminemia: Lowest albumin = 3.1 g/dL at 6/23/2023  6:35 AM, will monitor as appropriate                     Disposition Plan     Expected Discharge Date: 06/24/2023                  Daniela Bojorquez MD  Hospitalist Service, GOLD TEAM 9  [facility]  Securely message with [employer] (more info)  Text page via [facility] Paging/Directory   See signed in provider for up to date coverage information  ______________________________________________________________________    Interval History   No acute events overnight. Was symptomatic with standing this morning when orthostatics were positive. Denies fevers or chills. Good appetite.    Physical Exam   Vital Signs: Temp: 98.5  F (36.9  C) Temp src: Oral BP: 127/63 Pulse: 91   Resp: 18 SpO2: 96 % O2 Device: None (Room air)    Weight: 163 lbs 9.3 oz    General Appearance: NAD. Lying comfortably in bed.  Respiratory: CTAB. No increased WOB.  Cardiovascular: RRR. No m/r/g  GI: Soft. Non-tender. Non-distended.  Skin: Jaundiced.  Other: AOx4. Moving all extremities. Normal affect.    Medical Decision Making       50 MINUTES SPENT BY ME on the date of service doing chart review, history, exam, documentation & further activities per the note.      Data     I have personally reviewed the following data over the past 24 hrs:    11.0  \   9.1 (L)   / 159     134 (L) 101 11.6 /  147 (H)   4.0 23 0.80 \       ALT: 38 AST: 69 (H) AP: 287 (H) TBILI: 6.1 (H)   ALB: 2.8 (L)  TOT PROTEIN: 6.1 (L) LIPASE: N/A       Imaging results reviewed over the past 24 hrs:   Recent Results (from the past 24 hour(s))   MR Brain w/o & w Contrast    Narrative    EXAM: MR BRAIN W/O & W CONTRAST  6/23/2023 1:48 PM     HISTORY: h/o metastatic neuroendocrine tumor, h/o hodgkins lymphoma.  Several month history of intermittent tremors and weakness.       COMPARISON: Head CT 5/27/2020    TECHNIQUE: Sagittal and axial T1-weighted, axial diffusion,  multiplanar T2 FLAIR with fat saturation images were obtained without  intravenous contrast. Following intravenous gadolinium-based contrast  administration, axial T2-weighted, axial susceptibility, and  T1-weighted images (in multiple planes) were obtained.    CONTRAST: 7.5 mL gadavist.    FINDINGS:  No intracranial hemorrhage or mass. No hydrocephalus. Diffuse  parenchymal atrophy. Scattered T2 hyperintense foci throughout the  cerebral hemispheric white matter, not disproportionate to age.  Unchanged hyperdensity of the anterior splenium of the corpus  callosum/colossal septal interface. 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 No abnormal restricted diffusion.  Patent major intracranial vascular flow voids.    Orbits are unremarkable. Paranasal sinuses and mastoid air cells are  clear.      Impression    IMPRESSION:  No brain metastasis.    I have personally reviewed the examination and initial interpretation  and I agree with the findings.    BRYON GILBERT MD         SYSTEM ID:  [id number]

## 2023-06-24 NOTE — PLAN OF CARE
Neuro: A&Ox4.   Cardiac: SR. VSS.  Respiratory: Sating >95%  on RA.  GI/: Adequate urine output. Dark in color - No BM reported  Diet/appetite: Tolerating regular diet. Eating well.  Activity:  Assist of 1/ SBA  Pain: At acceptable level on current regimen - no pain reported  Skin: No new deficits  LDA's: 1x PIV Saline locked    Plan: Continue with POC. Notify primary team with changes.

## 2023-06-25 NOTE — PLAN OF CARE
Goal Outcome Evaluation:      Plan of Care Reviewed With: patient, spouse    Pt A/Ox4 pleasant and cooperative.  Pts wife at bedside during shift.  Pts orthos checked this afternoon and positive, MD made aware, pt started on florinef.  Orthos rechecked later in evening and still positive but not as bad and pt reported not feeling as lightheaded, see flowsheets for VS.  Pt encouraged to drink more as has had poor po intake.  Pt denies pain, able to repo self in bed.  Pt is jaundice and has icteric urine, recent had CBD stent placed, wife reports this is improving.

## 2023-06-25 NOTE — PROGRESS NOTES
[facility]    Medicine Progress Note - Hospitalist Service, GOLD TEAM 9    Date of Admission:  6/22/2023    Assessment & Plan   Tyrel Harrell is a 72 year old male admitted on 6/22/2023. 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    Changes Today:  - Increase fludricortisone to 0.2mg daily for orthostatic hypotension  - Compression stockings to LE  - IVF PRN  - Continue to hold cardiac medications  - Continue daily and PRN orthostaticcs  - Family updated at bedside.    Orthostatic hypotension  Lightheadedness  Pt with 2 episodes of feeling light headedness on the am of the admission both while trying to go up the stairs. He also reports a more subacute history of episodes of lightheadness, shaking/tremor, weakness and controlled falls to ground over the past several months. He has never had any true syncope episodes. His orthostatic vital signs are positive (120/56 laying > 100/54 sitting > 91/52 standing) and he did not have appropriate increase in HR (stayed in 80-90s). TTE was obtained and showed EF 55-60% with mild/mod residual MR after his mitraclip placement. His orthostatic hypotension could be iatrogenic given the number of cardiac/BP meds that he is on (held on admission). He has also not been eating particularly well the last week so dehydration is also likely contributing. He does note these symptoms have increased since he has started on his chemo medications (capecitabine and temozolomide), which both have side effects including balance impairment and dizziness although does not appear they can cause autonomic dysfunction. TSH and cortisol both checked and wnl. Overall, highest concern is for autonomic dysfunction   - Increase fludricortisone to 0.2mg daily  -  "BLE compression stockings  - IVF PRN. Encouraged pt to push fluids.  - Holding cardiac meds currently  - Likely will minimize/discontinue BB indefinitely  - Can consider starting other medications (losartan, spironolactone, dapagliflozin) slowly once orthostatics improve  - Daily orthostatic vitals  - Diet: regular diet, encourage fluids  - PT evaluated > home with assist and outpatient PT    History of HFrEF now with recovered EF (55-60%)  History of reduced RVEF, now recovered  Mitral regurg S/P mitral valve clip implantation   Paroxysmal atrial fibrillation   Hx of PE on anticoagulation  Mild aortic stenosis  Per chart review, pt had EF of ~30% in 2/2022 in the setting of MR. Underwent mitraclip and GDMT and now with recovered EF. Now complicated by severe symptomatic orthostatic hypotension likely from autonomic dysfuction. Given normalized EF, favor holding cardiac meds due to high risk of falls.  - continue apixaban  - family interested in discussing need for ongoing treatment  - hold cardiac meds including metoprolol, losartan and spironolactone as above  - Placed cardiology referral for discharge as pt would like to switch to [place] from [place].    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 Recent EUS guided choledochoduodenostomy and CBD stent placement. Currently no nausea, vomiting, abdominal pain or diarrhea. CT done at admission showing, \"Stable size of 7.2 cm mass of the pancreatic head/neck, consistent with history of neuroendocrine tumor. Significantly improved intrahepatic duct dilation status post stenting of the common bile duct.\"  - CMP in the am  - no acute concerns  - currently on an off cycle of his oral chemo meds. Is supposed to restart in next 1-2 weeks.    Pancreatic insufficiency (*)   Hypolalbuminemia  - Continue creon with meals and snacks  - " "continue pta vitamin supplements    Hyponatremia  Likely related to hypovolemia from last few hours of poor PO  - fluid as above    Depression   - continue pta venlafaxine    Small bilateral pleural effusions  Per imaging, pt has no recent SOB, cough or fevers    Hypothyroidism   TSH checked and wnl.  - continue pto levothyroxine    Chronic anemia  Acute on Chronic thrombocytopenia  Hgb stable ~9; no recent evidence of bleeding. Also noted, mild drop in platelet count to 140s from baseline 160-200. Could be chemo related variations  - CBC daily    Pseudohypocalcemia  Corrects after albumin correction    Hx of Hodgkin's disease  Not active now           Diet: Regular Diet Adult    DVT Prophylaxis: DOAC  Craft Catheter: Not present  Lines: None     Cardiac Monitoring: ACTIVE order. Indication: Tachyarrhythmias, acute (48 hours)  Code Status: Full Code      Clinically Significant Risk Factors              # Hypoalbuminemia: Lowest albumin = 2.6 g/dL at 6/25/2023  5:56 AM, will monitor as appropriate            # Overweight: Estimated body mass index is 25.47 kg/m  as calculated from the following:    Height as of 6/12/23: 1.74 m (5' 8.5\").    Weight as of this encounter: 77.1 kg (169 lb 15.6 oz)., PRESENT ON ADMISSION          Disposition Plan     Expected Discharge Date: 06/25/2023        Discharge Comments: Orthostatic hypotension. Making medication adjustments. Endocrine work up.          Daniela Bojorquez MD  Hospitalist Service, GOLD TEAM 9  [facility]  Securely message with [employer] (more info)  Text page via [facility] Paging/Directory   See signed in provider for up to date coverage information  ______________________________________________________________________    Interval History   No acute events overnight. Sat up and stood at bedside this morning and was asymptomatic although still had drop in BP. Otherwise feeling well.    Physical Exam   Vital Signs: Temp: 98.5 "  F (36.9  C) Temp src: Oral BP: 122/60 Pulse: 104   Resp: 16 SpO2: 93 % O2 Device: None (Room air)    Weight: 169 lbs 15.59 oz    General Appearance: NAD. Lying comfortably in bed.  Respiratory: CTAB. No increased WOB.  Cardiovascular: RRR. No m/r/g  GI: Soft. Non-tender. Non-distended.  Skin: Jaundice.  Other: AOx4. Moving all extremities. Normal affect.    Medical Decision Making       50 MINUTES SPENT BY ME on the date of service doing chart review, history, exam, documentation & further activities per the note.      Data     I have personally reviewed the following data over the past 24 hrs:    N/A  \   N/A   / N/A     132 (L) 100 12.4 /  117 (H)   3.5 22 0.85 \       ALT: 36 AST: 59 (H) AP: 247 (H) TBILI: 5.0 (H)   ALB: 2.6 (L) TOT PROTEIN: 5.5 (L) LIPASE: N/A

## 2023-06-25 NOTE — PLAN OF CARE
Major Shift Events:  Neuro intact. VSS. RA. Encourage PO intake.    Plan: monitor orthos  For vital signs and complete assessments, please see documentation flowsheets.

## 2023-06-26 NOTE — PLAN OF CARE
Goal Outcome Evaluation:      Plan of Care Reviewed With: patient, spouse    Pt A/Ox4 pleasant and cooperative.  Pt had positive orthos t/o day, florinef increased from 0.1mg to 0.2 mg and 500 ml LR bolus given.  Despite positive orthos, pt did report feeling less light headed and was able to go for walk in [place], denied dizziness.  Pt had increase in appetite as well today.  See flowsheets for Vs.

## 2023-06-26 NOTE — PLAN OF CARE
/53 (BP Location: Left arm)   Pulse 107   Temp 98.9  F (37.2  C) (Oral)   Resp 18   Wt 75.6 kg (166 lb 9.6 oz)   SpO2 97%   BMI 24.96 kg/m      Hours of Care: 3526-3532.    Neuro: AO x 4.  Vitals: VSS.   Respiratory: WDL on RA.  Cardiac: No tele orders.    GI/: Voiding spontaneously.  BM yesterday.  Skin/Wounds: No new deficits noted.   Lines: L PIV SL.  Diet: Regular.  Activity: Up SBA.  Pain: denies.    Plan: Discharge after pt sees PT.      Continue to monitor and follow POC    Paul Cano RN on 6/26/2023 at 2:47 PM    6B-Intermediate Care

## 2023-06-26 NOTE — PROGRESS NOTES
DISCHARGE                         6/26/2023  4:52 PM  ----------------------------------------------------------------------------  Discharged to: Home  Via: private transportation  Accompanied by: Family  Discharge Instructions: Activity with assistance, medications, follow up appointments, when to call the MD, aftercare instructions.  Prescriptions: To be filled by  [facility] in [facility]; medication list reviewed & sent with pt  Follow Up Appointments: arranged; information given  Belongings: All sent with pt  IV: d/c'd  Telemetry: d/c'd  Pt exhibits understanding of above discharge instructions; all questions answered.    Discharge Paperwork: Signed, copied, and sent home with patient.

## 2023-06-26 NOTE — PLAN OF CARE
Goal Outcome Evaluation:      Plan of Care Reviewed With: patient, family    Overall Patient Progress: improvingOverall Patient Progress: improving    Outcome Evaluation: see RD note 6/26    Josee Cobb RDN, LD  6B/Obs RD pager [phone]  Weekend/Holiday RD pager [phone]

## 2023-06-26 NOTE — PROGRESS NOTES
Care Management Discharge Note    Discharge Date: 06/26/2023       Discharge Disposition:  Home     Discharge Services:  OP PT     Discharge DME:  None    Discharge Transportation: family or friend will provide    Private pay costs discussed: Not applicable    Does the patient's insurance plan have a 3 day qualifying hospital stay waiver?  Yes   Will the waiver be used for post-acute placement? No    PAS Confirmation Code:  N/A  Patient/family educated on Medicare website which has current facility and service quality ratings: N/A     Education Provided on the Discharge Plan:  Yes  Persons Notified of Discharge Plans: Pt, pt spouse  Patient/Family in Agreement with the Plan:  Yes    Handoff Referral Completed: Yes    Additional Information:  SW met with pt and pt spouse at bedside d/t pt questions about obtaining w/c. SW shared information on where pt could obtain one (PCP office or OP PT can help). Pt spouse also asked about GOVIND or LTC settings for the future and how to prepare. Pt spouse noted that this was not needed right now, but she wants to be prepared. SW encouraged them to look at different facilities and reach out to them about their own admit process, if they have a waitlist, etc. Pt and spouse had no further questions for SW at this time.     Pt plans to discharge home today. No further discharge needs anticipated.    Jasmina Brown VARGHESE Iyer   [facility]   6B  Coverage  6B  Ph: [phone]

## 2023-06-26 NOTE — PROGRESS NOTES
CLINICAL NUTRITION SERVICES - ASSESSMENT NOTE     Nutrition Prescription    RECOMMENDATIONS FOR MDs/PROVIDERS TO ORDER:  None currently    Malnutrition Status:    Patient does not meet two of the established criteria necessary for diagnosing malnutrition    Recommendations already ordered by Registered Dietitian (RD):    None currently    Continue TID meals per pt preference and supplements PRN    Future/Additional Recommendations:    Discussed w/ pt and family, can increase Creon dosing if pt notices symptoms of enzyme insufficiency ( foul-smelling, greasy stools (steatorrhea), gas and bloating after eating, stomach pain, etc.). Pt and family comfortable to leave dose as is at this time and aware they can let provider know if they would like to increase dose in the future.      REASON FOR ASSESSMENT  Tyrel Harrell is a/an 72 year old male assessed by the dietitian for Positive Admission Nutrition Risk Screen     Patient admitted for light headedness and weakness since the morning of admission.    MEDICAL HISTORY  metastatic high grade well differentiated neuroendocrine tumor of pancreas on treatment, prior hx hodgkins, afib and PE on eliquis, and HFmREF     NUTRITION HISTORY  Pt reports he feels Creon is working well for him. Denies symptoms of enzyme insufficiency. Pt reports PO has been steady lately. He reports low energy during and PTA but wife helps provide balanced meals for him. Pt also uses oral nutrition supplements PRN at home.     CURRENT NUTRITION ORDERS  Diet: Regular    Intake/Tolerance: Patient consuming  % of 3 meal(s) daily.    LABS   06/22/23 13:42 06/22/23 13:58 06/22/23 14:02 06/23/23 06:35 06/24/23 10:16 06/25/23 05:56   Sodium 133 (L)   133 (L) 134 (L) 132 (L)   Potassium 4.6   4.0 4.0 3.5   Chloride 99   100 101 100   Carbon Dioxide (CO2) 23 23 23 22   Urea Nitrogen 18.7   16.4 11.6 12.4   Creatinine 0.98   0.98 0.80 0.85   Creatinine POCT   1.0      GFR Estimate 82   82 >90 >90  "  GFR, ESTIMATED POCT   >60      Calcium 8.6 (L)   8.5 (L) 8.1 (L) 7.9 (L)   Anion Gap 11   10 10 10   Magnesium 2.5 (H)        Albumin 3.1 (L)   3.1 (L) 2.8 (L) 2.6 (L)   Protein Total 6.6   6.3 (L) 6.1 (L) 5.5 (L)   Alkaline Phosphatase 331 (H)   319 (H) 287 (H) 247 (H)   ALT 45   44 38 36   AST See Comment   72 (H) 69 (H) 59 (H)   Bilirubin Total 7.6 (H)   6.9 (H) 6.1 (H) 5.0 (H)   Cortisol Serum     20.5    CRP Inflammation 19.10 (H)        Glucose 89   108 (H) 147 (H) 117 (H)   Lactic Acid 1.5        Lactic Acid POCT  0.8       Lipase 10 (L)            MEDICATIONS    TUMS daily    Synthroid    Creon 12 2-3 capsules with meals    Miralax daily    Vitamin D3 50 mcg daily     zofran PRN      ANTHROPOMETRICS  Height: 174 cm (5' 8.5\")  Most Recent Weight: 75.6 kg (166 lb 9.6 oz)    IBW: 72.7 kg  Body mass index is 24.96 kg/m . BMI Category: Normal BMI  Weight History: 4.2 kg (5.2%) weight loss over 6 months.  Wt Readings from Last 15 Encounters:   06/26/23 75.6 kg (166 lb 9.6 oz)   06/20/23 73.8 kg (162 lb 12.8 oz)   06/12/23 78.2 kg (172 lb 6.4 oz)   06/07/23 77.2 kg (170 lb 3.1 oz)   05/25/23 75.5 kg (166 lb 6.4 oz)   12/20/22 79.8 kg (175 lb 14.4 oz)   11/14/22 80.5 kg (177 lb 6.4 oz)   11/08/22 83.4 kg (183 lb 12.8 oz)   05/24/16 88.9 kg (195 lb 15.8 oz)   04/06/11 85.9 kg (189 lb 6 oz)         ASSESSED NUTRITION NEEDS  Dosing Weight: 75.6 kg (Actual BW)   Estimated Energy Needs: 2448-7505 kcals/day (25 - 30 kcals/kg)  Justification: Increased needs  Estimated Protein Needs: 76-91 grams protein/day (1 - 1.2 grams of pro/kg)  Justification: Increased needs  Estimated Fluid Needs: 8478-5564 mL/day (1 mL/kcal)   Justification: Maintenance    PHYSICAL FINDINGS  See malnutrition section below.    Manuel Score: 22  Per EMR: Skin  Skin WDL: WDL  Skin Color: jaundiced  Skin Temperature: warm  Skin Moisture: dry  Skin Elasticity: quick return to original state  Skin Integrity: bruised  Device Skin Interventions Taken: No " adjustments needed    MALNUTRITION  % Intake: Decreased intake does not meet criteria  % Weight Loss: Weight loss does not meet criteria for malnutrition   Subcutaneous Fat Loss: None observed   Muscle Loss: Thoracic region (clavicle, acromium bone, deltoid, trapezius, pectoral):  moderate  Fluid Accumulation/Edema: Mild  Malnutrition Diagnosis: Patient does not meet two of the established criteria necessary for diagnosing malnutrition    NUTRITION DIAGNOSIS  Altered GI function related to pancreatic insufficiency as evidenced by reliance on pancreatic enzymes.      INTERVENTIONS  Implementation  Nutrition Education: will be provided if nutrition education needs arise.    Medical food supplement therapy PRN    Goals  Patient to consume % of nutritionally adequate meal trays TID, or the equivalent with supplements/snacks.        Monitoring/Evaluation  Progress toward goals will be monitored and evaluated per protocol.    Josee Cobb RDN, LD  6B/Obs RD pager [phone]  Weekend/Holiday RD pager [phone]

## 2023-06-26 NOTE — PLAN OF CARE
Neuro: A&Ox4. Calm and cooperative with VERNON fowler.   Cardiac: Not on tele. VSS. HR ranging between 90-100s, SBP ranging between 120-130s.  Respiratory: Sating over 95% on RA.  GI/: Adequate urine output. No BM this shift.  Diet/appetite: Tolerating regular diet.  Activity: Stand-by assist, up to bedside.  Pain: At acceptable level on current regimen.   Skin: No new deficits noted.  LDA's: L-KAYY, MARIA R.    Plan: Continue with POC. Notify primary team with changes.

## 2023-06-26 NOTE — DISCHARGE SUMMARY
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  No follow up labs or test are needed.        Appointments on [place] and/or [facility] (with [facility] or [facility]   provider or service). Call [phone] if you haven't heard regarding   these appointments within 7 days of discharge.            Unresulted Labs Ordered in the Past 30 Days of this Admission     Date and Time Order Name Status Description    6/22/2023 12:40 PM Blood Culture Peripheral Blood Preliminary     6/22/2023 12:40 PM Blood Culture Peripheral Blood Preliminary     6/12/2023  3:45 PM Prepare plasma (unit) Preliminary     6/12/2023  3:45 PM Prepare plasma (unit) Preliminary       These results will be followed up by Hospitalist team    Discharge Disposition   Discharged to home  Condition at discharge: Stable    Hospital Course   Tyrel Harrell is a 72 year old male admitted on 6/22/2023. He has hx of metastatic high grade well differentiated neuroendocrine tumor of pancreas on treatment, prior hx  hodgkins, afib and PE on eliquis, and HFmREF who is admitted for light headedness and weakness since the morning of admission.    Orthostatic hypotension secondary to likely autonomic dysfunction  Lightheadedness - improved  Pt with 2 episodes of feeling light headedness on the am of the admission both while trying to go up the stairs. He also reports a more subacute history of episodes of lightheadness, shaking/tremor, weakness and controlled falls to ground over the past several months. He has never had any true syncope episodes. His orthostatic vital signs are positive (120/56 laying > 100/54 sitting > 91/52 standing) and he did not have appropriate increase in HR (stayed in 80-90s). TTE was obtained and showed EF 55-60% with mild/mod residual MR after his mitraclip placement. MRI brain obtained given history of cancer and negative for any intracranial pathology. His orthostatic hypotension could have an iatrogenic component given the number of cardiac/BP meds that he is on (held on admission). He has also not been eating particularly well the last week so dehydration is also likely contributing. He does note these symptoms have increased since he has started on his chemo medications (capecitabine and temozolomide), which both have side effects including balance impairment and dizziness although does not appear they can cause autonomic dysfunction. TSH and cortisol both checked and wnl. Overall, highest concern is for autonomic dysfunction. Started on fludricortisone with symptomatic improvement despite ongoing orthostatic BPs on measurement.   - Continue fludricortisone 0.2mg daily  - Continue compression stockings and shorts  - Orthostatic precautions as discussed with PT  - Will continue to hold cardiac medications as EF is normal and pt maintained low/normal BPs off meds during hospitalization.  - Recommend aggressive hydration at home    History of HFrEF now with recovered EF (55-60%)  History of reduced  "RVEF, now recovered  Mitral regurg S/P mitral valve clip implantation   Paroxysmal atrial fibrillation   Hx of PE on anticoagulation  Mild aortic stenosis  Per chart review, pt had EF of ~30% in 2/2022 in the setting of MR. Underwent mitraclip and GDMT and now with recovered EF. Now complicated by severe symptomatic orthostatic hypotension likely from autonomic dysfuction. Given normalized EF, favor holding cardiac meds due to high risk of falls.  - continue apixaban  - family interested in discussing need for ongoing treatment  - hold cardiac meds including metoprolol, losartan and spironolactone as above  - Placed cardiology referral for discharge as pt would like to switch to [facility] from [facility].    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 Recent EUS guided choledochoduodenostomy and CBD stent placement. Currently no nausea, vomiting, abdominal pain or diarrhea. CT done at admission showing, \"Stable size of 7.2 cm mass of the pancreatic head/neck, consistent with history of neuroendocrine tumor. Significantly improved intrahepatic duct dilation status post stenting of the common bile duct.\" Alk phos and bilirubin consistently downtrending throughout hospitalization.  - currently on an off cycle of his oral chemo meds. Is supposed to restart in next 1-2 weeks.    Pancreatic insufficiency (*)   Hypolalbuminemia  - Continue creon with meals and snacks  - continue pta vitamin supplements    Hyponatremia  Likely related to hypovolemia from last few hours of poor PO    Depression   - continue pta venlafaxine    Small bilateral pleural effusions  Per imaging, pt has no recent SOB, cough or fevers    Hypothyroidism   TSH checked and wnl.  - continue pto levothyroxine    Chronic anemia  Acute on Chronic thrombocytopenia  Hgb stable ~9; no recent evidence of bleeding. Also noted, " mild drop in platelet count to 140s from baseline 160-200. Could be chemo related variations  - CBC daily    Pseudohypocalcemia  Corrects after albumin correction    Hx of Hodgkin's disease  Not active now          Consultations This Hospital Stay   NURSING TO CONSULT FOR VASCULAR ACCESS CARE IP CONSULT  PHYSICAL THERAPY ADULT IP CONSULT  OCCUPATIONAL THERAPY ADULT IP CONSULT  NURSING TO CONSULT FOR VASCULAR ACCESS CARE IP CONSULT    Code Status   Full Code     Time Spent on this Encounter   IDaniela MD, personally saw the patient today and spent greater than 30 minutes discharging this patient.         Daniela Bojorquez MD  [facility] [address] [address]  Phone: [phone]  ______________________________________________________________________    Physical Exam   Vital Signs: Temp: 98.1  F (36.7  C) Temp src: Oral BP: 126/66 Pulse: 99   Resp: 18 SpO2: 100 % O2 Device: None (Room air)    Weight: 166 lbs 9.6 oz    General Appearance: NAD. Lying comfortably in bed.  Respiratory: CTAB. No increased WOB.  Cardiovascular: RRR. No m/r/g  GI: Soft. Non-tender. Non-distended.  Skin: Jaundiced  Other: AOx4. Moving all extremities. Normal affect.       Primary Care Physician   Carmen Cho    Discharge Orders      Adult Cardiology [facility] Referral      Reason for your hospital stay    You were hospitalized for lightheadedness, shaking, weakness and falls and were found to have orthostatic hypotension. We stopped your cardiac medications which can lower blood pressure and stop your heart rate from increasing when your blood pressure drops. We also started a medication that can help prevent a big drop in blood pressure.     - Start taking fludricortisone 0.2mg daily  - Stop your cardiac medications - metoprolol, losartan, eplerenone  - I have placed a referral for you to establish care with a cardiologist at [facility]  - Follow up with your primary  care in the next 2 weeks     Activity    Your activity upon discharge: activity as tolerated     Adult [facility] Follow-up and recommended labs and tests    Follow up with primary care provider, Carmen Cho, within 7-14 days for hospital follow- up.  No follow up labs or test are needed.      Appointments on [place] and/or [facility] (with [facility] or [facility] provider or service). Call [phone] if you haven't heard regarding these appointments within 7 days of discharge.     Thomas CHAMBERS, the undersigned, certify that the above prescribed supplies are medically necessary for this patient and is both reasonable and necessary in reference to accepted standards of medical and necessary in reference to accepted standards of medical practice in the treatment of this patient's condition and is not prescribed as a convenience.      Diet    Follow this diet upon discharge: Orders Placed This Encounter      Regular Diet Adult       Significant Results and Procedures   Most Recent 3 CBC's:Recent Labs   Lab Test 06/24/23  1016 06/23/23  0635 06/22/23  1342   WBC 11.0 9.2 9.0   HGB 9.1* 9.3* 8.8*   * 107* 108*    158 146*     Most Recent 3 BMP's:Recent Labs   Lab Test 06/25/23  0556 06/24/23  1016 06/23/23  0635   * 134* 133*   POTASSIUM 3.5 4.0 4.0   CHLORIDE 100 101 100   CO2 22 23 23   BUN 12.4 11.6 16.4   CR 0.85 0.80 0.98   ANIONGAP 10 10 10   CARLOS 7.9* 8.1* 8.5*   * 147* 108*     Most Recent 2 LFT's:Recent Labs   Lab Test 06/25/23  0556 06/24/23  1016   AST 59* 69*   ALT 36 38   ALKPHOS 247* 287*   BILITOTAL 5.0* 6.1*   ,   Results for orders placed or performed during the hospital encounter of 06/22/23   CT Abdomen Pelvis w Contrast    Narrative    EXAMINATION: CT ABDOMEN PELVIS W CONTRAST, 6/22/2023 3:26 PM    TECHNIQUE:  Helical CT images from the lung bases through the  symphysis pubis were obtained with contrast.  Coronal reformatted  images were generated at a workstation  for further assessment.    CONTRAST:  99 cc Isovue 370 IV.    COMPARISON: CT 5/22/2023    HISTORY: neuroendocrine cancer on pancrease obstructing bile flow,  recent stenting procedure evaluate for sandy v other cause of gen'l  weakness    FINDINGS:    Abdomen and pelvis:   Liver: Multiple hypodensities in the liver, including (all series 4):  - 1.5 x 1.3 cm hypodensity in the right hepatic lobe (image 49),  previously 1.4 x 1.0 cm.  - 1.2 x 1.1 cm hypodensity in the right hepatic lobe (image 74),  previously 1.1 x 1.1 cm.    Significantly improved intrahepatic biliary duct dilation compared to  prior CT. Pneumobilia in the left hepatic lobe is new and likely  related to CBD stent. Common bile duct stent with tip in the duodenum.    Gallbladder: Small air in the lumen of the gallbladder is likely  iatrogenic. No cholelithiasis or gallbladder wall thickening.  Spleen: Status post splenectomy. 5 mm soft tissue nodule in the left  upper quadrant (series 4, image 89) is similar to prior and possibly  represents residual splenic tissue.  Pancreas: Stable soft tissue mass of the pancreatic head, neck, and  uncinate process, measuring 4.1 x 3.2 x 7.2 cm (series 4, image 112  and series 6, image 38), previously 3.9 x 3.4 x 7.1 cm, consistent  with history of pancreatic neuroendocrine tumor. Unchanged central  hypodensity within the mass, likely area of necrosis. The pancreatic  body and tail are atrophic.   Adrenal glands: No adrenal nodules.  Kidneys: Punctate 2 mm nonobstructing stone in the left kidney. No  hydronephrosis. Unchanged cortical thinning and mildly hypodense  parenchyma of the posterior left kidney, likely from prior renal  infarct. Bilateral renal cysts with the largest measuring 2.3 cm in  the left kidney.  Bladder / Pelvic organs: The urinary bladder is unremarkable. Coarse  calcifications of the prostate gland.  Bowel: No bowel wall thickening or evidence for obstruction. The  appendix is not definitively  identified, however there are no  inflammatory changes in the right lower quadrant.  Lymph nodes: No retroperitoneal, mesenteric, or pelvic  lymphadenopathy.  Fluid: No free fluid within the abdomen.  Vessels: No infrarenal aortic aneurysm. Atherosclerotic calcification  of the abdominal aorta and iliac arteries.    Lung bases: Small bilateral pleural effusions. Mild bibasilar  atelectasis. Status post mitral valve repair.    Bones and soft tissues: No suspicious osseous lesions. Degenerative  changes of the spine. Degenerative stepwise retrolisthesis of L2 on  L3, L3 on L4, and L4 on L5.       Impression    IMPRESSION:   1.  No acute pathology in the abdomen/pelvis compared to CT from  5/22/2023.  2.  Stable size of 7.2 cm mass of the pancreatic head/neck, consistent  with history of neuroendocrine tumor. Significantly improved  intrahepatic duct dilation status post stenting of the common bile  duct.  3.  Multiple hepatic metastases are similar to prior CT from  5/22/2023.  4.  Small bilateral pleural effusions.  5.  Punctate 2 mm stone in the left kidney. No hydronephrosis.  6.  Stable cortical thinning and hypodensity of the parenchyma in the  superior pole of the left kidney, likely sequelae of prior renal  infarct.    I have personally reviewed the examination and initial interpretation  and I agree with the findings.    SERGEI CARRANZA MD         SYSTEM ID:  [id number]   MR Brain w/o & w Contrast    Narrative    EXAM: MR BRAIN W/O & W CONTRAST  6/23/2023 1:48 PM     HISTORY: h/o metastatic neuroendocrine tumor, h/o hodgkins lymphoma.  Several month history of intermittent tremors and weakness.       COMPARISON: Head CT 5/27/2020    TECHNIQUE: Sagittal and axial T1-weighted, axial diffusion,  multiplanar T2 FLAIR with fat saturation images were obtained without  intravenous contrast. Following intravenous gadolinium-based contrast  administration, axial T2-weighted, axial susceptibility, and  T1-weighted  images (in multiple planes) were obtained.    CONTRAST: 7.5 mL gadavist.    FINDINGS:  No intracranial hemorrhage or mass. No hydrocephalus. Diffuse  parenchymal atrophy. Scattered T2 hyperintense foci throughout the  cerebral hemispheric white matter, not disproportionate to age.  Unchanged hyperdensity of the anterior splenium of the corpus  callosum/colossal septal interface. 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 No abnormal restricted diffusion.  Patent major intracranial vascular flow voids.    Orbits are unremarkable. Paranasal sinuses and mastoid air cells are  clear.      Impression    IMPRESSION:  No brain metastasis.    I have personally reviewed the examination and initial interpretation  and I agree with the findings.    BRYON GILBERT MD         SYSTEM ID:  [id number]   Echo Complete     Value    LVEF  55-60%    Narrative    [id number]  [id number]  [id number]  [id number]^KEVIN EVANS^ARIANA     [facility]  Echocardiography Laboratory  [address] [place] [id number]     Name: MARTÍNEZ DENNISON  MRN: [id number]  : 1950  Study Date: 2023 08:54 AM  Age: 72 yrs  Gender: Male  Patient Location: [facility]  Reason For Study: Heart Failure  Ordering Physician: ARIANA MADDEN  Performed By: Champ Segundo RDCS     BSA: 1.9 m2  Height: 68 in  Weight: 162 lb  ______________________________________________________________________________  Procedure  Complete Portable Echo Adult.  ______________________________________________________________________________  Interpretation Summary  Global and regional left ventricular function is normal with an EF of 55-60%.  Right ventricular function, chamber size, wall motion, and thickness are  normal.  S/P MitraClip. Mean mitral gradient 8mmHg. Mild to moderate residual MR.  Mild to moderate aortic insufficiency is present.  Right ventricular systolic pressure is 41mmHg above the  right atrial pressure.  Sinuses of Valsalva 4.2 cm.  Mild dilatation of the aorta is present.  IVC diameter <2.1 cm collapsing >50% with sniff suggests a normal RA pressure  of 3 mmHg.  No pericardial effusion is present.  There is no prior study for direct comparison.  ______________________________________________________________________________  Left Ventricle  Global and regional left ventricular function is normal with an EF of 55-60%.  Left ventricular wall thickness is normal. Left ventricular size is normal.  Left ventricular diastolic function is indeterminate. No regional wall motion  abnormalities are seen.     Right Ventricle  Right ventricular function, chamber size, wall motion, and thickness are  normal.     Atria  The right atria appears normal. Mild left atrial enlargement is present.     Mitral Valve  S/P MitraClip. Mean mitral gradient 8mmHg. Mild to moderate residual MR.     Aortic Valve  Trileaflet aortic sclerosis without stenosis. Mild to moderate aortic  insufficiency is present. The mean AoV pressure gradient is 9.2 mmHg. The  calculated aortic valve are is 3.0 cm^2.     Tricuspid Valve  The tricuspid valve is normal. Mild tricuspid insufficiency is present. Right  ventricular systolic pressure is 41mmHg above the right atrial pressure.     Pulmonic Valve  The pulmonic valve is normal.     Vessels  The pulmonary artery is normal. Sinuses of Valsalva 4.2 cm. Mild dilatation of  the aorta is present. IVC diameter <2.1 cm collapsing >50% with sniff suggests  a normal RA pressure of 3 mmHg.     Pericardium  No pericardial effusion is present.     Compared to Previous Study  There is no prior study for direct comparison.  ______________________________________________________________________________  MMode/2D Measurements & Calculations  IVSd: 1.0 cm  LVIDd: 4.9 cm  LVIDs: 3.5 cm  LVPWd: 0.86 cm  FS: 29.3 %  LV mass(C)d: 165.6 grams  LV mass(C)dI: 88.6 grams/m2  Ao root diam: 4.2 cm  asc Aorta  Diam: 3.6 cm  LVOT diam: 2.7 cm  LVOT area: 5.8 cm2  LA Volume (BP): 83.9 ml     LA Volume Index (BP): 44.9 ml/m2  RWT: 0.35  TAPSE: 2.1 cm     Doppler Measurements & Calculations  MV max P.4 mmHg  MV mean P.0 mmHg  MV V2 VTI: 59.3 cm  MVA(VTI): 2.1 cm2  Ao V2 max: 204.0 cm/sec  Ao max P.7 mmHg  Ao V2 mean: 143.9 cm/sec  Ao mean P.2 mmHg  Ao V2 VTI: 42.0 cm  KEZIA(I,D): 3.0 cm2  KEZIA(V,D): 2.9 cm2  AI P1/2t: 405.9 msec  LV V1 max P.0 mmHg  LV V1 max: 99.6 cm/sec  LV V1 VTI: 21.3 cm  SV(LVOT): 124.3 ml  SI(LVOT): 66.5 ml/m2  PA acc time: 0.12 sec  TR max levi: 319.5 cm/sec  TR max P.8 mmHg  AV Levi Ratio (DI): 0.49  KEZIA Index (cm2/m2): 1.6  RV S Levi: 12.2 cm/sec     ______________________________________________________________________________  Report approved by: Alanna Balderrama 2023 09:51 AM               Discharge Medications   Current Discharge Medication List      START taking these medications    Details   fludrocortisone (FLORINEF) 0.1 MG tablet Take 2 tablets (0.2 mg) by mouth daily  Qty: 60 tablet, Refills: 1    Associated Diagnoses: Orthostatic hypotension; Dizziness; Autonomic dysfunction         CONTINUE these medications which have NOT CHANGED    Details   acetaminophen (TYLENOL) 500 MG tablet Take 500-1,000 mg by mouth every 8 hours as needed for mild pain      apixaban ANTICOAGULANT (ELIQUIS) 5 MG tablet Take 1 tablet by mouth 2 times daily      calcium carbonate antacid (TUMS ULTRA 1000) 1000 MG CHEW Take 1,000 mg by mouth At Bedtime      dapagliflozin (FARXIGA) 10 MG TABS tablet Take 1 tablet by mouth daily      emollient (VANICREAM) external cream Apply topically daily as needed for other (rash on hands and feet)      fish oil-omega-3 fatty acids 1000 MG capsule Take 1 g by mouth daily      hydrOXYzine (ATARAX) 25 MG tablet Take 1 tablet (25 mg) by mouth 3 times daily as needed for itching  Qty: 60 tablet, Refills: 1    Associated Diagnoses: Secondary malignant  neuroendocrine tumor of liver (H); Itching      levothyroxine (SYNTHROID/LEVOTHROID) 112 MCG tablet Take 1 tablet (112 mcg) by mouth daily  Qty: 90 tablet, Refills: 0    Associated Diagnoses: Acquired hypothyroidism      lipase-protease-amylase (CREON 12) 52292-13[address]94[address] units CPEP Take 3 capsules by mouth 3 times daily (with meals)  Qty: 810 capsule, Refills: 3    Associated Diagnoses: Pancreatic insufficiency      lovastatin (MEVACOR) 40 MG tablet Take 1 tablet (40 mg) by mouth At Bedtime  Qty: 90 tablet, Refills: 3    Associated Diagnoses: Mixed hyperlipidemia      melatonin 3 MG tablet Take 3 mg by mouth nightly as needed for sleep      Multiple Vitamin (MULTIVITAMIN) per tablet Take 1 tablet by mouth 2 times daily      ondansetron (ZOFRAN ODT) 8 MG ODT tab Take 1 tablet (8 mg) by mouth every 8 hours as needed for nausea  Qty: 30 tablet, Refills: 1    Associated Diagnoses: Malignant neoplasm of endocrine pancreas (H); Chemotherapy-induced nausea      prochlorperazine (COMPAZINE) 10 MG tablet Take 10 mg by mouth every 6 hours as needed for nausea      Psyllium (HYDROCIL) 95 % PACK Take 1 packet by mouth daily as needed      !! venlafaxine (EFFEXOR XR) 150 MG 24 hr capsule Take 1 capsule (150 mg) by mouth daily  Qty: 90 capsule, Refills: 3    Associated Diagnoses: Recurrent major depressive disorder, in partial remission (H)      !! venlafaxine (EFFEXOR XR) 75 MG 24 hr capsule Take 1 capsule (75 mg) by mouth daily  Qty: 90 capsule, Refills: 3    Associated Diagnoses: Recurrent major depressive disorder, in partial remission (H)      vitamin D3 (CHOLECALCIFEROL) 50 mcg (2000 units) tablet Take 1 tablet by mouth daily      augmented betamethasone dipropionate (DIPROLENE-AF) 0.05 % external ointment Apply topically 2 times daily To rash on hands and feet when flared  Qty: 50 g, Refills: 1    Associated Diagnoses: Hand dermatitis      !! capecitabine (XELODA) 150 MG tablet Take 1 tablet (150 mg) by mouth 2 times  daily for 14 days  Qty: 28 tablet, Refills: 0    Comments: Take with 500 mg tablets for a total dose of 1,150 mg twice daily for 14 days. Take for 14 days, then off for 14 days. Take within 30 mins after meal.  Associated Diagnoses: Malignant neoplasm of endocrine pancreas (H)      !! capecitabine (XELODA) 500 MG tablet Take 2 tablets (1,000 mg) by mouth 2 times daily for 14 days Take with 150 mg tablet for total dose of 1,150 mg. Take for 14 days, then off for 14 days. Take within 30 mins after meal.  Qty: 56 tablet, Refills: 0    Associated Diagnoses: Malignant neoplasm of endocrine pancreas (H)      temozolomide (TEMODAR) 100 MG capsule Take 4 capsules (400 mg) by mouth At Bedtime for 5 days Take on empty stomach, Days 10 thru 14. Take Zofran 30-60 min before Temodar.  Qty: 20 capsule, Refills: 0    Associated Diagnoses: Malignant neoplasm of endocrine pancreas (H)      temozolomide (TEMODAR) 100 MG capsule Take 4 capsules (400 mg) by mouth At Bedtime for 5 days Take on empty stomach, Days 10 thru 14. Take Zofran 30-60 min before Temodar.  Qty: 20 capsule, Refills: 0    Associated Diagnoses: Malignant neoplasm of endocrine pancreas (H)      temozolomide (TEMODAR) 100 MG capsule Take 4 capsules (400 mg) by mouth At Bedtime for 5 days Take on empty stomach, Days 10 thru 14. Take Zofran 30-60 min before Temodar.  Qty: 20 capsule, Refills: 0    Associated Diagnoses: Malignant neoplasm of endocrine pancreas (H)      temozolomide (TEMODAR) 100 MG capsule Take 4 capsules (400 mg) by mouth At Bedtime for 5 days Take on empty stomach, Days 10 thru 14. Take Zofran 30-60 min before Temodar.  Qty: 20 capsule, Refills: 0    Associated Diagnoses: Malignant neoplasm of endocrine pancreas (H)       !! - Potential duplicate medications found. Please discuss with provider.      STOP taking these medications       losartan (COZAAR) 25 MG tablet Comments:   Reason for Stopping:         metoprolol succinate ER (TOPROL XL) 25 MG 24 hr  tablet Comments:   Reason for Stopping:         spironolactone (ALDACTONE) 25 MG tablet Comments:   Reason for Stopping:             Allergies   Allergies   Allergen Reactions     Bee Venom Hives and Other (See Comments)     Other reaction(s): Other (see comments)  Not honey bees. 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

## 2023-06-27 NOTE — TELEPHONE ENCOUNTER
Central Prior Authorization Team   Phone: [phone]    PA Initiation    Medication: hydrOXYzine HCl 25MG tablets    Insurance Company: [facility] - Phone [phone] Fax [phone]  Pharm[facility] Filling [facility] Rx[id number] [place], [place] - [address] [place] [address] [address]  Filling Pharmacy Phone: [phone]  Filling Pharmacy Fax:    Start Date: 6/27/2023

## 2023-06-27 NOTE — NURSING NOTE
"Oncology Rooming Note    June 27, 2023 10:11 AM   Tyrel Harrell is a 72 year old male who presents for:    Chief Complaint   Patient presents with     Oncology Clinic Visit     RTN for Lymphoma     Initial Vitals: Blood Pressure 112/61   Pulse 93   Temperature 98  F (36.7  C) (Oral)   Respiration 16   Weight 76.6 kg (168 lb 12.8 oz)   Oxygen Saturation 99%   Body Mass Index 25.29 kg/m   Estimated body mass index is 25.29 kg/m  as calculated from the following:    Height as of 6/12/23: 1.74 m (5' 8.5\").    Weight as of this encounter: 76.6 kg (168 lb 12.8 oz). Body surface area is 1.92 meters squared.  No Pain (0) Comment: Data Unavailable   No LMP for male patient.  Allergies reviewed: Yes  Medications reviewed: Yes    Medications: Medication refills not needed today.  Pharmacy name entered into [employer]:    [id number] [place], [place] - [address]  [facility] - [place], [place] - [address]    Clinical concerns: none       Julienne Davies MA            "

## 2023-06-27 NOTE — TELEPHONE ENCOUNTER
Prior Authorization Approval    Authorization Effective Date: 1/1/2023  Authorization Expiration Date: 12/31/2023  Medication: hydrOXYzine HCl 25MG tablets    Approved Dose/Quantity:   Reference #:     Insurance Company: [facility] [employer] - Phone [phone] Fax [phone]  Expected CoPay:       CoPay Card Available:      Foundation Assistance Needed:    Which Pharmacy is filling the prescription (Not needed for infusion/clinic administered): [id number] [place] [address]  Pharmacy Notified: Yes  Patient Notified: Yes

## 2023-06-27 NOTE — LETTER
6/27/2023         RE: Tyrel Harrell  [address]  [facility] [address]        Dear Colleague,    Thank you for referring your patient, Tyrel Harrell, to the [facility]. Please see a copy of my visit note below.    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 He has a history of cured Hodgkin's disease with a grade 3 well-differentiated endocrine tumor in the head of his pancreas with extensive liver metastasis.  His Ki-67 labeling index is 60%, and his cancer has very little if any dotatate uptake.  He has been on CAPTEM since August 2021 with an interruption during repair of a mitral valve in the summer of 2022.  He is currently undergoing treatment with capecitabine and temozolomide. Please see previous notes for further details on the patient's history. He was recently hospitalized from 6/22-6/26/23 with orthostatic hypotension likely secondary to autonomic dysfunction. He was discharged home on fludrocortisone. He comes in today for routine follow up.    Interval History:  Patient reports that since returning home from the hospital he has been doing okay.  He had been having some minor falls prior to his hospital stay and attributes this to feeling weak as well as having some twitching.  He has ongoing fatigue.  He has been trying to use his walker to get around at home and also uses a urinal.  He did have an explosive stool this morning with incontinence that was soft.  He has developed numbness in his feet which has been getting gradually worse.  He notes that he has burning in his feet with the capecitabine, but that this seems to get better with his time off from treatment.  He does continue  to get some blistering on his hands and feet despite the additional week off from treatment.  He denies other concerns.    Current Outpatient Medications   Medication Sig Dispense Refill    acetaminophen (TYLENOL) 500 MG tablet Take 500-1,000 mg by mouth every 8 hours as needed for mild pain      apixaban ANTICOAGULANT (ELIQUIS) 5 MG tablet Take 1 tablet by mouth 2 times daily      augmented betamethasone dipropionate (DIPROLENE-AF) 0.05 % external ointment Apply topically 2 times daily To rash on hands and feet when flared (Patient not taking: Reported on 6/24/2023) 50 g 1    calcium carbonate antacid (TUMS ULTRA 1000) 1000 MG CHEW Take 1,000 mg by mouth At Bedtime      capecitabine (XELODA) 500 MG tablet Take 2 tablets (1,000 mg) by mouth 2 times daily for 14 days Take with 150 mg tablet for total dose of 1,150 mg. Take for 14 days, then off for 14 days. Take within 30 mins after meal. 56 tablet 0    dapagliflozin (FARXIGA) 10 MG TABS tablet Take 1 tablet by mouth daily      emollient (VANICREAM) external cream Apply topically daily as needed for other (rash on hands and feet)      fish oil-omega-3 fatty acids 1000 MG capsule Take 1 g by mouth daily      fludrocortisone (FLORINEF) 0.1 MG tablet Take 2 tablets (0.2 mg) by mouth daily 60 tablet 1    hydrOXYzine (ATARAX) 25 MG tablet Take 1 tablet (25 mg) by mouth 3 times daily as needed for itching 60 tablet 1    levothyroxine (SYNTHROID/LEVOTHROID) 112 MCG tablet Take 1 tablet (112 mcg) by mouth daily 90 tablet 0    lipase-protease-amylase (CREON 12) 76403-34314-64128 units CPEP Take 3 capsules by mouth 3 times daily (with meals) (Patient taking differently: Take 2-3 capsules by mouth 3 times daily (with meals)) 810 capsule 3    lovastatin (MEVACOR) 40 MG tablet Take 1 tablet (40 mg) by mouth At Bedtime 90 tablet 3    melatonin 3 MG tablet Take 3 mg by mouth nightly as needed for sleep      Multiple Vitamin (MULTIVITAMIN) per tablet Take 1 tablet by mouth 2 times  daily      ondansetron (ZOFRAN ODT) 8 MG ODT tab Take 1 tablet (8 mg) by mouth every 8 hours as needed for nausea 30 tablet 1    prochlorperazine (COMPAZINE) 10 MG tablet Take 10 mg by mouth every 6 hours as needed for nausea      Psyllium (HYDROCIL) 95 % PACK Take 1 packet by mouth daily as needed      temozolomide (TEMODAR) 100 MG capsule Take 4 capsules (400 mg) by mouth At Bedtime for 5 days Take on empty stomach, Days 10 thru 14. Take Zofran 30-60 min before Temodar. 20 capsule 0    venlafaxine (EFFEXOR XR) 150 MG 24 hr capsule Take 1 capsule (150 mg) by mouth daily (Patient taking differently: Take 150 mg by mouth daily Also takes 75 mg for total of 225 mg every morning.) 90 capsule 3    venlafaxine (EFFEXOR XR) 75 MG 24 hr capsule Take 1 capsule (75 mg) by mouth daily (Patient taking differently: Take 75 mg by mouth daily Also takes 150 mg for total of 225 mg every morning.) 90 capsule 3    vitamin D3 (CHOLECALCIFEROL) 50 mcg (2000 units) tablet Take 1 tablet by mouth daily       Physical Examination:  General: The patient is a pleasant male in no acute distress. He is here today in a wheelchair. He is here today with his wife and son.  /61   Pulse 93   Temp 98  F (36.7  C) (Oral)   Resp 16   Wt 76.6 kg (168 lb 12.8 oz)   SpO2 99%   BMI 25.29 kg/m    Wt Readings from Last 10 Encounters:   06/27/23 76.6 kg (168 lb 12.8 oz)   06/26/23 75.6 kg (166 lb 9.6 oz)   06/20/23 73.8 kg (162 lb 12.8 oz)   06/12/23 78.2 kg (172 lb 6.4 oz)   06/07/23 77.2 kg (170 lb 3.1 oz)   05/25/23 75.5 kg (166 lb 6.4 oz)   12/20/22 79.8 kg (175 lb 14.4 oz)   11/14/22 80.5 kg (177 lb 6.4 oz)   11/08/22 83.4 kg (183 lb 12.8 oz)   05/24/16 88.9 kg (195 lb 15.8 oz)   HEENT: EOMI. Sclerae are anicteric.   Lymph: Neck is supple with no lymphadenopathy in the cervical or supraclavicular areas.   Heart: Regular rate and rhythm.   Lungs: Clear to auscultation bilaterally.   Abdomen: Bowel sounds present, soft, nontender with no  palpable masses in a seated position.   Extremities: No lower extremity edema noted bilaterally. Compression stockings are in place. 1+ edema noted in the right hand. Compression sleeve is in place.   Neuro: Cranial nerves II through XII are grossly intact.  Skin: No rashes, petechiae, or bruising noted on exposed skin. Skin on palms is mildly dry.     Laboratory Data:  Most Recent 3 CBC's:  Recent Labs   Lab Test 23  1016 23  0635 23  1342 23  1625 23  1516 23  0957 23  1300   WBC 11.0 9.2 9.0   < > 4.9 5.3 6.8   HGB 9.1* 9.3* 8.8*   < > 9.9* 9.0* 10.4*   * 107* 108*   < > 115* 115* 116*    158 146*   < > 168 156 197   ANEUTAUTO  --   --   --   --  3.4 3.9 4.9    < > = values in this interval not displayed.    Most Recent 3 BMP's:  Recent Labs   Lab Test 23  0556 23  1016 23  0635   * 134* 133*   POTASSIUM 3.5 4.0 4.0   CHLORIDE 100 101 100   CO2    BUN 12.4 11.6 16.4   CR 0.85 0.80 0.98   ANIONGAP 10 10 10   CARLOS 7.9* 8.1* 8.5*   * 147* 108*   PROTTOTAL 5.5* 6.1* 6.3*   ALBUMIN 2.6* 2.8* 3.1*    Most Recent 2 LFT's:  Recent Labs   Lab Test 23  0556 23  1016   AST 59* 69*   ALT 36 38   ALKPHOS 247* 287*   BILITOTAL 5.0* 6.1*    Most Recent TSH and T4:  Recent Labs   Lab Test 23  0635   TSH 5.25*   T4 0.93   I reviewed the above labs today.    Imaging:  CT abdomen/pelvis on 23 shows the followin.  No acute pathology in the abdomen/pelvis compared to CT from  2023.  2.  Stable size of 7.2 cm mass of the pancreatic head/neck, consistent  with history of neuroendocrine tumor. Significantly improved  intrahepatic duct dilation status post stenting of the common bile  duct.  3.  Multiple hepatic metastases are similar to prior CT from  2023.  4.  Small bilateral pleural effusions.  5.  Punctate 2 mm stone in the left kidney. No hydronephrosis.  6.  Stable cortical thinning and hypodensity of  the parenchyma in the  superior pole of the left kidney, likely sequelae of prior renal  infarct.  I reviewed the above imaging today.    Assessment and Plan:  High-grade well-differentiated neuroendocrine tumor of the pancreas with liver metastasis.  His disease is stable to marginally progressed on his current CAPTEM and for now would like to continue on with treatment.  He will resume his next cycle ~7/6. Due to ongoing issues with hand foot syndrome, I will decrease the capecitabine from 1150 mg bid to 1000 mg bid for 2 weeks on, 3 weeks off. He will continue on the same dosing of Temodar. He will see Dr. Parker next month in follow-up. He will call sooner for concerns.     Biliary obstruction. Patient has had stent placement and dilation. Last EUS was on 6/12/23. His last LFT's showed some improvement in his bilirubin. Will recheck labs again in 2 weeks at his local [facility]. We reviewed symptoms to monitor for in the event that he becomes more obstructed again.     Chronic heart failure.  Well controlled at present. No acute concerns today.    Orthostatic hypotension likely 2/2 autonomic dysfunction and generalized weakness. He will continue on fludricortisone 0.2mg daily and compression stockings. Will refer him for home PT/OT/home safety evaluation. He is homebound. I also have given him a DME order for a commode.     Paroxysmal atrial fibrillation and history of PE. Patient and his family questioned the risk versus benefit of remaining on anticoagulation. We discussed that his a.fib risk is likely low, but I would defer to cardiology on that piece. We reviewed that the risk of developing another clot with an active malignancy is high, but we also need to weigh this with the risk of bleeding after a fall. After much discussion, patient would like to discontinue anticoagulation at this time.     IV/labs access. Patient has difficulty with needing multiple attempts to access his veins. We discussed the  potential for getting a port. He would like to think about it. An order has been placed in the event that he would like to get a port.      Heather Kelly PA-C  [facility]  [address], [place] [address] [phone]    72 minutes spent on the date of the encounter doing chart review, review of test results, interpretation of tests, patient visit and documentation     Transition Care Management Services  No data recorded  No data recorded  No data recorded  Interactive contact date:   Face-to-face visit date: 6/27/2023  Medical complexity decision making: High complexity ([id number])

## 2023-06-27 NOTE — PROGRESS NOTES
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 He has a history of cured Hodgkin's disease with a grade 3 well-differentiated endocrine tumor in the head of his pancreas with extensive liver metastasis.  His Ki-67 labeling index is 60%, and his cancer has very little if any dotatate uptake.  He has been on CAPTEM since August 2021 with an interruption during repair of a mitral valve in the summer of 2022.  He is currently undergoing treatment with capecitabine and temozolomide. Please see previous notes for further details on the patient's history. He was recently hospitalized from 6/22-6/26/23 with orthostatic hypotension likely secondary to autonomic dysfunction. He was discharged home on fludrocortisone. He comes in today for routine follow up.    Interval History:  Patient reports that since returning home from the hospital he has been doing okay.  He had been having some minor falls prior to his hospital stay and attributes this to feeling weak as well as having some twitching.  He has ongoing fatigue.  He has been trying to use his walker to get around at home and also uses a urinal.  He did have an explosive stool this morning with incontinence that was soft.  He has developed numbness in his feet which has been getting gradually worse.  He notes that he has burning in his feet with the capecitabine, but that this seems to get better with his time off from treatment.  He does continue to get some blistering on his hands and feet despite the additional week off from treatment.  He denies other concerns.    Current Outpatient Medications   Medication Sig Dispense Refill     acetaminophen (TYLENOL) 500 MG tablet Take 500-1,000 mg by mouth  every 8 hours as needed for mild pain       apixaban ANTICOAGULANT (ELIQUIS) 5 MG tablet Take 1 tablet by mouth 2 times daily       augmented betamethasone dipropionate (DIPROLENE-AF) 0.05 % external ointment Apply topically 2 times daily To rash on hands and feet when flared (Patient not taking: Reported on 6/24/2023) 50 g 1     calcium carbonate antacid (TUMS ULTRA 1000) 1000 MG CHEW Take 1,000 mg by mouth At Bedtime       capecitabine (XELODA) 500 MG tablet Take 2 tablets (1,000 mg) by mouth 2 times daily for 14 days Take with 150 mg tablet for total dose of 1,150 mg. Take for 14 days, then off for 14 days. Take within 30 mins after meal. 56 tablet 0     dapagliflozin (FARXIGA) 10 MG TABS tablet Take 1 tablet by mouth daily       emollient (VANICREAM) external cream Apply topically daily as needed for other (rash on hands and feet)       fish oil-omega-3 fatty acids 1000 MG capsule Take 1 g by mouth daily       fludrocortisone (FLORINEF) 0.1 MG tablet Take 2 tablets (0.2 mg) by mouth daily 60 tablet 1     hydrOXYzine (ATARAX) 25 MG tablet Take 1 tablet (25 mg) by mouth 3 times daily as needed for itching 60 tablet 1     levothyroxine (SYNTHROID/LEVOTHROID) 112 MCG tablet Take 1 tablet (112 mcg) by mouth daily 90 tablet 0     lipase-protease-amylase (CREON 12) 32048-90442-40420 units CPEP Take 3 capsules by mouth 3 times daily (with meals) (Patient taking differently: Take 2-3 capsules by mouth 3 times daily (with meals)) 810 capsule 3     lovastatin (MEVACOR) 40 MG tablet Take 1 tablet (40 mg) by mouth At Bedtime 90 tablet 3     melatonin 3 MG tablet Take 3 mg by mouth nightly as needed for sleep       Multiple Vitamin (MULTIVITAMIN) per tablet Take 1 tablet by mouth 2 times daily       ondansetron (ZOFRAN ODT) 8 MG ODT tab Take 1 tablet (8 mg) by mouth every 8 hours as needed for nausea 30 tablet 1     prochlorperazine (COMPAZINE) 10 MG tablet Take 10 mg by mouth every 6 hours as needed for nausea       Psyllium  (HYDROCIL) 95 % PACK Take 1 packet by mouth daily as needed       temozolomide (TEMODAR) 100 MG capsule Take 4 capsules (400 mg) by mouth At Bedtime for 5 days Take on empty stomach, Days 10 thru 14. Take Zofran 30-60 min before Temodar. 20 capsule 0     venlafaxine (EFFEXOR XR) 150 MG 24 hr capsule Take 1 capsule (150 mg) by mouth daily (Patient taking differently: Take 150 mg by mouth daily Also takes 75 mg for total of 225 mg every morning.) 90 capsule 3     venlafaxine (EFFEXOR XR) 75 MG 24 hr capsule Take 1 capsule (75 mg) by mouth daily (Patient taking differently: Take 75 mg by mouth daily Also takes 150 mg for total of 225 mg every morning.) 90 capsule 3     vitamin D3 (CHOLECALCIFEROL) 50 mcg (2000 units) tablet Take 1 tablet by mouth daily       Physical Examination:  General: The patient is a pleasant male in no acute distress. He is here today in a wheelchair. He is here today with his wife and son.  /61   Pulse 93   Temp 98  F (36.7  C) (Oral)   Resp 16   Wt 76.6 kg (168 lb 12.8 oz)   SpO2 99%   BMI 25.29 kg/m    Wt Readings from Last 10 Encounters:   06/27/23 76.6 kg (168 lb 12.8 oz)   06/26/23 75.6 kg (166 lb 9.6 oz)   06/20/23 73.8 kg (162 lb 12.8 oz)   06/12/23 78.2 kg (172 lb 6.4 oz)   06/07/23 77.2 kg (170 lb 3.1 oz)   05/25/23 75.5 kg (166 lb 6.4 oz)   12/20/22 79.8 kg (175 lb 14.4 oz)   11/14/22 80.5 kg (177 lb 6.4 oz)   11/08/22 83.4 kg (183 lb 12.8 oz)   05/24/16 88.9 kg (195 lb 15.8 oz)   HEENT: EOMI. Sclerae are anicteric.   Lymph: Neck is supple with no lymphadenopathy in the cervical or supraclavicular areas.   Heart: Regular rate and rhythm.   Lungs: Clear to auscultation bilaterally.   Abdomen: Bowel sounds present, soft, nontender with no palpable masses in a seated position.   Extremities: No lower extremity edema noted bilaterally. Compression stockings are in place. 1+ edema noted in the right hand. Compression sleeve is in place.   Neuro: Cranial nerves II through XII  are grossly intact.  Skin: No rashes, petechiae, or bruising noted on exposed skin. Skin on palms is mildly dry.     Laboratory Data:  Most Recent 3 CBC's:  Recent Labs   Lab Test 23  1016 23  0635 23  1342 23  1625 23  1516 23  0957 23  1300   WBC 11.0 9.2 9.0   < > 4.9 5.3 6.8   HGB 9.1* 9.3* 8.8*   < > 9.9* 9.0* 10.4*   * 107* 108*   < > 115* 115* 116*    158 146*   < > 168 156 197   ANEUTAUTO  --   --   --   --  3.4 3.9 4.9    < > = values in this interval not displayed.    Most Recent 3 BMP's:  Recent Labs   Lab Test 23  0556 23  1016 23  0635   * 134* 133*   POTASSIUM 3.5 4.0 4.0   CHLORIDE 100 101 100   CO2    BUN 12.4 11.6 16.4   CR 0.85 0.80 0.98   ANIONGAP 10 10 10   CARLOS 7.9* 8.1* 8.5*   * 147* 108*   PROTTOTAL 5.5* 6.1* 6.3*   ALBUMIN 2.6* 2.8* 3.1*    Most Recent 2 LFT's:  Recent Labs   Lab Test 23  0556 23  1016   AST 59* 69*   ALT 36 38   ALKPHOS 247* 287*   BILITOTAL 5.0* 6.1*    Most Recent TSH and T4:  Recent Labs   Lab Test 23  0635   TSH 5.25*   T4 0.93   I reviewed the above labs today.    Imaging:  CT abdomen/pelvis on 23 shows the followin.  No acute pathology in the abdomen/pelvis compared to CT from  2023.  2.  Stable size of 7.2 cm mass of the pancreatic head/neck, consistent  with history of neuroendocrine tumor. Significantly improved  intrahepatic duct dilation status post stenting of the common bile  duct.  3.  Multiple hepatic metastases are similar to prior CT from  2023.  4.  Small bilateral pleural effusions.  5.  Punctate 2 mm stone in the left kidney. No hydronephrosis.  6.  Stable cortical thinning and hypodensity of the parenchyma in the  superior pole of the left kidney, likely sequelae of prior renal  infarct.  I reviewed the above imaging today.    Assessment and Plan:  High-grade well-differentiated neuroendocrine tumor of the pancreas with  liver metastasis.  His disease is stable to marginally progressed on his current CAPTEM and for now would like to continue on with treatment.  He will resume his next cycle ~7/6. Due to ongoing issues with hand foot syndrome, I will decrease the capecitabine from 1150 mg bid to 1000 mg bid for 2 weeks on, 3 weeks off. He will continue on the same dosing of Temodar. He will see Dr. Parker next month in follow-up. He will call sooner for concerns.     Biliary obstruction. Patient has had stent placement and dilation. Last EUS was on 6/12/23. His last LFT's showed some improvement in his bilirubin. Will recheck labs again in 2 weeks at his local [facility]. We reviewed symptoms to monitor for in the event that he becomes more obstructed again.     Chronic heart failure.  Well controlled at present. No acute concerns today.    Orthostatic hypotension likely 2/2 autonomic dysfunction and generalized weakness. He will continue on fludricortisone 0.2mg daily and compression stockings. Will refer him for home PT/OT/home safety evaluation. He is homebound. I also have given him a DME order for a commode.     Paroxysmal atrial fibrillation and history of PE. Patient and his family questioned the risk versus benefit of remaining on anticoagulation. We discussed that his a.fib risk is likely low, but I would defer to cardiology on that piece. We reviewed that the risk of developing another clot with an active malignancy is high, but we also need to weigh this with the risk of bleeding after a fall. After much discussion, patient would like to discontinue anticoagulation at this time.     IV/labs access. Patient has difficulty with needing multiple attempts to access his veins. We discussed the potential for getting a port. He would like to think about it. An order has been placed in the event that he would like to get a port.      Heather Kelly PA-C  [facility]  [address] [place]  [address]  [phone]    72 minutes spent on the date of the encounter doing chart review, review of test results, interpretation of tests, patient visit and documentation     Transition Care Management Services  No data recorded  No data recorded  No data recorded  Interactive contact date:   Face-to-face visit date: 6/27/2023  Medical complexity decision making: High complexity ([id number])

## 2023-06-27 NOTE — TELEPHONE ENCOUNTER
Oral Chemotherapy Monitoring Program   Left Voicemail    Attempted to contact patient today for follow up regarding oral chemotherapy, Xeloda and Temodar. No answer. Left voicemail for patient to call us back when able. No medication name was left.    Courtney Forbes, PharmD  PGY2 Oncology Pharmacy Resident  June 27, 2023

## 2023-06-28 NOTE — PLAN OF CARE
Physical Therapy Discharge Summary    Reason for therapy discharge:    Discharged to home.    Progress towards therapy goal(s). See goals on Care Plan in [facility] electronic health record for goal details.  Goals partially met.  Barriers to achieving goals:   discharge from facility.    Therapy recommendation(s):    Continued therapy is recommended.  Rationale/Recommendations:  [facility].

## 2023-06-29 NOTE — TELEPHONE ENCOUNTER
Oral Chemotherapy Monitoring Program    Subjective/Objective:  Tyrel Harrell is a 72 year old male contacted by phone for a follow-up visit for oral chemotherapy.  William confirms knowing the appropriate dose adjustment made for the upcoming cycle of Xeloda 1000 mg (2 tablets) twice daily for 14 days on and 21 days off and Temodar 400 mg (4 capsules) on days 10-14. Denies new or worsening side effects or missed doses. His PPE and skin irritation has resolved, which was the reason behind the dose adjustment. He was recently hospitalized for orthostatic hypotension, during which he was started on fludrocortisone and taken off of metoprolol and Eliquis. No drug-drug interaction concerns with these changes and his chemotherapy treatment.          6/20/2023     2:14 PM 6/27/2023     1:00 PM 6/27/2023     1:13 PM 6/27/2023     3:00 PM 6/27/2023     3:22 PM 6/29/2023    11:00 AM 6/29/2023    11:27 AM   ORAL CHEMOTHERAPY   Assessment Type Other Refill Refill Left Voicemail Left Voicemail Monthly Follow up Monthly Follow up   Diagnosis Code Pancreatic Cancer Pancreatic Cancer Pancreatic Cancer Pancreatic Cancer Pancreatic Cancer Pancreatic Cancer Pancreatic Cancer   Providers Dr. Keith Parker   [facility]   Is this patient followed by the [facility] OC team? Yes Yes Yes Yes Yes Yes Yes   Drug Name Temodar (temozolomide) Xeloda (capecitabine) Temodar (temozolomide) Xeloda (capecitabine) Temodar (temozolomide) Temodar (temozolomide) Xeloda (capecitabine)   Dose 400 mg 1,000 mg 400 mg 1,000 mg 400 mg 400 mg 1,000 mg   Current Schedule Daily BID Daily BID Daily Daily BID   Cycle Details Other Other Other Other Other Other Other   Start Date of Last Cycle      7/7/2023 7/7/2023   Doses missed in last 2 weeks      0 0   Adherence Assessment      Adherent Adherent   Adverse Effects      Palmar-plantar  "Erythrodysethesia Syndrome Palmar-plantar Erythrodysethesia Syndrome   Palmar-plantar Erythrodysethesia syndrome[hand-foot syndrome]      Resolved due to intervention Resolved due to intervention   Pharmacist Intervention(Palmar-plantar)      No No   Any new drug interactions?      No No   Is the dose as ordered appropriate for the patient?      Yes Yes   Since the last time we talked, have you been hospitalized or used the emergency room?      Yes Yes       Last PHQ-2 Score on record:       12/20/2022     8:47 AM   PHQ-2 ( 1999 [employer])   Q1: Little interest or pleasure in doing things 0   Q2: Feeling down, depressed or hopeless 0   PHQ-2 Score 0   Q1: Little interest or pleasure in doing things Not at all    Not at all   Q2: Feeling down, depressed or hopeless Not at all    Not at all   PHQ-2 Score 0    0       Vitals:  BP:   BP Readings from Last 1 Encounters:   06/27/23 112/61     Wt Readings from Last 1 Encounters:   06/27/23 76.6 kg (168 lb 12.8 oz)     Estimated body surface area is 1.92 meters squared as calculated from the following:    Height as of 6/12/23: 1.74 m (5' 8.5\").    Weight as of an earlier encounter on 6/27/23: 76.6 kg (168 lb 12.8 oz).    Labs:  _  Result Component Current Result Ref Range   Sodium 132 (L) (6/25/2023) 136 - 145 mmol/L     _  Result Component Current Result Ref Range   Potassium 3.5 (6/25/2023) 3.4 - 5.3 mmol/L     _  Result Component Current Result Ref Range   Calcium 7.9 (L) (6/25/2023) 8.8 - 10.2 mg/dL     _  Result Component Current Result Ref Range   Magnesium 2.5 (H) (6/22/2023) 1.7 - 2.3 mg/dL     No results found for Phos within last 30 days.     _  Result Component Current Result Ref Range   Albumin 2.6 (L) (6/25/2023) 3.5 - 5.2 g/dL     _  Result Component Current Result Ref Range   Urea Nitrogen 12.4 (6/25/2023) 8.0 - 23.0 mg/dL     _  Result Component Current Result Ref Range   Creatinine 0.85 (6/25/2023) 0.67 - 1.17 mg/dL     _  Result Component Current Result Ref " "Range   AST 59 (H) (6/25/2023) 0 - 45 U/L     _  Result Component Current Result Ref Range   ALT 36 (6/25/2023) 0 - 70 U/L     _  Result Component Current Result Ref Range   Bilirubin Total 5.0 (H) (6/25/2023) <=1.2 mg/dL     _  Result Component Current Result Ref Range   WBC Count 11.0 (6/24/2023) 4.0 - 11.0 10e3/uL     _  Result Component Current Result Ref Range   Hemoglobin 9.1 (L) (6/24/2023) 13.3 - 17.7 g/dL     _  Result Component Current Result Ref Range   Platelet Count 159 (6/24/2023) 150 - 450 10e3/uL     _  Result Component Current Result Ref Range   Absolute Neutrophils 7.7 (6/22/2023) 1.6 - 8.3 10e3/uL     _  Result Component Current Result Ref Range   Absolute Neutrophils 3.4 (5/31/2023) 1.6 - 8.3 10e3/uL        Assessment/Plan:  William experienced PPE with last cycle of Xeloda and Temodar, which has greatly improved and will now have 3 weeks of \"off-time\" for each cycle. Will plan to continue with new dose of Xeloda and continue Temodar on days 10-14. He plans to start his next cycle on 7/7.    Follow-Up:  7/11: Labs  7/27: Appt with Dr. Parker    Refill Due:  [facility] to deliver on 6/30    Courtney Forbes, PharmD  PGY2 Oncology Pharmacy Resident  June 29, 2023  "

## 2023-07-07 NOTE — TELEPHONE ENCOUNTER
Levar SANCHEZ from [facility] called about order for:    Requesting okay to delay Start of Care  until 7/8/23 per patient's request.     This writer informed Levar SANCHEZ okay to delay SOC pr pt request.     [id number] Heather Kelly PA-C okay to start on 7/8/23

## 2023-07-12 NOTE — TELEPHONE ENCOUNTER
Oral Chemotherapy Monitoring Program  Lab Follow Up    Reviewed lab results from 7/11/23.        6/27/2023     1:00 PM 6/27/2023     1:13 PM 6/27/2023     3:00 PM 6/27/2023     3:22 PM 6/29/2023    11:00 AM 6/29/2023    11:27 AM 7/12/2023    10:00 AM   ORAL CHEMOTHERAPY   Assessment Type Refill Refill Left Voicemail Left Voicemail Monthly Follow up Monthly Follow up Lab Monitoring   Diagnosis Code Pancreatic Cancer Pancreatic Cancer Pancreatic Cancer Pancreatic Cancer Pancreatic Cancer Pancreatic Cancer Pancreatic Cancer   Providers Dr. Keith Parker   [facility]   Is this patient followed by the [facility] OC team? Yes Yes Yes Yes Yes Yes Yes   Drug Name Xeloda (capecitabine) Temodar (temozolomide) Xeloda (capecitabine) Temodar (temozolomide) Temodar (temozolomide) Xeloda (capecitabine) Xeloda (capecitabine)   Dose 1,000 mg 400 mg 1,000 mg 400 mg 400 mg 1,000 mg 1,000 mg   Current Schedule BID Daily BID Daily Daily BID BID   Cycle Details Other Other Other Other Other Other Other   Start Date of Last Cycle     7/7/2023 7/7/2023 7/7/2023   Doses missed in last 2 weeks     0 0    Adherence Assessment     Adherent Adherent    Adverse Effects     Palmar-plantar Erythrodysethesia Syndrome Palmar-plantar Erythrodysethesia Syndrome    Palmar-plantar Erythrodysethesia syndrome[hand-foot syndrome]     Resolved due to intervention Resolved due to intervention    Pharmacist Intervention(Palmar-plantar)     No No    Any new drug interactions?     No No    Is the dose as ordered appropriate for the patient?     Yes Yes    Since the last time we talked, have you been hospitalized or used the emergency room?     Yes Yes    What medical service did you use?     Hospitalization Hospitalization    How many hospitalizations?     1 1    How many hospitalizations were related to the cancer medication?     0 0         Labs:  _  Result Component Current Result Ref Range   Sodium 140 (7/11/2023) 136 - 145 mmol/L     _  Result Component Current Result Ref Range   Potassium 3.2 (L) (7/11/2023) 3.4 - 5.3 mmol/L     _  Result Component Current Result Ref Range   Calcium 8.5 (L) (7/11/2023) 8.8 - 10.2 mg/dL     _  Result Component Current Result Ref Range   Magnesium 2.5 (H) (6/22/2023) 1.7 - 2.3 mg/dL     No results found for Phos within last 30 days.     _  Result Component Current Result Ref Range   Albumin 3.2 (L) (7/11/2023) 3.5 - 5.2 g/dL     _  Result Component Current Result Ref Range   Urea Nitrogen 19.8 (7/11/2023) 8.0 - 23.0 mg/dL     _  Result Component Current Result Ref Range   Creatinine 0.96 (7/11/2023) 0.67 - 1.17 mg/dL     _  Result Component Current Result Ref Range   AST 49 (H) (7/11/2023) 0 - 45 U/L     _  Result Component Current Result Ref Range   ALT 25 (7/11/2023) 0 - 70 U/L     _  Result Component Current Result Ref Range   Bilirubin Total 2.3 (H) (7/11/2023) <=1.2 mg/dL     _  Result Component Current Result Ref Range   WBC Count 8.1 (7/11/2023) 4.0 - 11.0 10e3/uL     _  Result Component Current Result Ref Range   Hemoglobin 10.2 (L) (7/11/2023) 13.3 - 17.7 g/dL     _  Result Component Current Result Ref Range   Platelet Count 90 (L) (7/11/2023) 150 - 450 10e3/uL     _  Result Component Current Result Ref Range   Absolute Neutrophils 7.7 (6/22/2023) 1.6 - 8.3 10e3/uL     _  Result Component Current Result Ref Range   Absolute Neutrophils 7.3 (7/11/2023) 1.6 - 8.3 10e3/uL        Assessment & Plan:  Results show no concerning abnormalities.  Continue Xeloda/Temodar therapy as planned.  "[employer] message sent to patient.      Follow-Up:  7/27: Appt with Dr. Parker  8/3: Monthly assessment    Lien Crawford, PharmD  Hematology/Oncology Clinical Pharmacist  [facility]  [phone]

## 2023-07-13 NOTE — TELEPHONE ENCOUNTER
[facility] calling to request verbal orders for the following:    Skilled nursinx per week for 2 weeks. Then every other week for 6 weeks.  3 PRN visits.  For Oncology, cardiac, respiratory and medication management.    Please call [facility] at [phone]. Secure VM if needed.

## 2023-07-13 NOTE — PROGRESS NOTES
[facility]: Cancer Care                                                                                          Placed call to patient to review LFTs/bilirubin is going down, still slightly elevated. Heather Kelly PA-C would like to recheck on 7/26. Unable to reach patient. Left detailed message for patient and wife. Lab order placed and message sent to scheduling.              Halle Narvaez RN, BSN, OCN   RN Care Coordinator   [facility]

## 2023-07-21 NOTE — TELEPHONE ENCOUNTER
Yonatan PT calling from [facility] about home care orders    PT for strengthening, balance, and gait training.  1x week for 4weeks , then every other week for additional 4weeks.     Started today 7/21/23 with PT evaluation.     1446 per Care team, okay for PT to continue home care plan/frequency as recommended by PT yun.

## 2023-07-27 NOTE — NURSING NOTE
Is the patient currently in the state of [place]? YES    Visit mode:VIDEO    If the visit is dropped, the patient can be reconnected by: VIDEO VISIT: Send to e-mail at: [email or web address]    Will anyone else be joining the visit? NO      How would you like to obtain your AVS? MyChart    Are changes needed to the allergy or medication list? NO Pt reviewed in e-check in.    Rebeca SOTO      Reason for visit: RECHECK

## 2023-07-27 NOTE — PROGRESS NOTES
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    He is 72 years old and has been previously cured of Hodgkin's disease and currently has a grade 3 (Ki-67 greater than 60%) well-differentiated neuroendocrine tumor in the head of his pancreas with extensive liver metastasis and minimal dotatate uptake on PET scanning.  He has had CAPTEM for nearly 2 years though with an interruption last year for repair of his mitral valve last summer.  At her last response assessment 2 months ago he had stable disease and he returns today for his next planned assessment.    Since I saw him last he has had a significant decline.  A lot of this seems related to cognitive dysfunction with brain fog and some lynn hallucinations.  He had a hospitalization after a fall which was attributed to orthostatic hypotension from autonomic dysfunction.  He had been discharged from the hospital on Florinef for that though is now tapered off of it and does not think that the tapering off his resulted in any alterations in his current condition.  His activity has become quite limited in part due to shortness of breath and in part due to his neurologic dysfunction.  He currently can walk at most 1-2 blocks at a time and needs to use a walker.  He has had a lot of recent adjustments in his cardiac medications.  Over this time course he also had developed biliary obstruction and required a difficult stenting which eventually was accomplished by placing the stent transduodenal he end of common bile duct with resolution of his jaundice.  He tells me he continues to eat but really has no interest and believes he is lost about 10 pounds in the last week.  He is off of his anticoagulation now because of the problems with falls and concerns about his bleeding risk.    On exam via the video link today he is alert and seems able to follow our discussion without difficulty.  He  does not have apparent respiratory distress while sitting calmly in his chair.    I reviewed his CT scan and went over the results with him and his wife.  The radiologist interpretation is not available to me at the time of this visit.  To my review his primary site and the pancreas and his liver mets all appear stable or perhaps even slightly better.  I do not see any new sites of disease.  His bile ducts appear decompressed with his stent.  Most notable is a marked increase of his previous pleural effusions with fluid now filling about half of both lung cavities.    On labs from 2 weeks ago (I difficulty getting enough blood for today's labs) his electrolytes are nearly normal with mild hypokalemia, and normal renal function.  His calcium when corrected for his albumin is normal.  His albumin is depressed but actually improved at 3.2.  His bilirubin had returned nearly to normal at 2.3.  We do have current blood counts and his hemoglobin is down slightly from baseline at 9.1 with an elevated MCV consistent with his chemotherapy.He has a little more prominent thrombocytopenia with platelets of 84,000.  His white count and differential are unremarkable.    Assessment/plan:  1.  Metastatic high-grade but well differentiated neuroendocrine tumor of the pancreas.  His disease continues to be controlled on his current CAPTEM.  His overall clinical condition however has declined significantly.  I cannot tie that directly either to his cancer or his treatment but we decided today as we work through what is going on with his decline we would hold further chemotherapy for now.  2.  Worsening pleural effusions, dyspnea and exercise intolerance.  I suspect this is related to his heart.  I do not see clear signs of portal hypertension otherwise.  He does not have known pleural or pulmonary metastases though that is a possible etiology for the effusions.  He has close ongoing follow-up with cardiology and will discuss with them  thoracentesis.  3.  Worsening cognitive function with hallucinations.  I am not sure the etiology of this.  He had an MRI of his brain within the past month that was unremarkable and would be unusual though not impossible for him to develop brain metastasis from his malignancy.  I do not see an obvious metabolic explanation for this, but the electrolytes and other labs I have are from a couple weeks ago.  I do not believe this is hepatic encephalopathy but that is conceivable.  We will try again to get further labs from him.  4.  Poor venous access.  He has had several discussions with a variety people about getting a port placed as were having so much difficulty with this issue she has been reluctant to consider it because he was told he could not sleep on his stomach anymore.

## 2023-07-27 NOTE — LETTER
7/27/2023         RE: Tyrel Harrell  [address]  [facility] [address]        Dear Colleague,    Thank you for referring your patient, Tyrel Harrell, to the [facility]. Please see a copy of my visit note below.        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    He is 72 years old and has been previously cured of Hodgkin's disease and currently has a grade 3 (Ki-67 greater than 60%) well-differentiated neuroendocrine tumor in the head of his pancreas with extensive liver metastasis and minimal dotatate uptake on PET scanning.  He has had CAPTEM for nearly 2 years though with an interruption last year for repair of his mitral valve last summer.  At her last response assessment 2 months ago he had stable disease and he returns today for his next planned assessment.    Since I saw him last he has had a significant decline.  A lot of this seems related to cognitive dysfunction with brain fog and some lynn hallucinations.  He had a hospitalization after a fall which was attributed to orthostatic hypotension from autonomic dysfunction.  He had been discharged from the hospital on Florinef for that though is now tapered off of it and does not think that the tapering off his resulted in any alterations in his current condition.  His activity has become quite limited in part due to shortness of breath and in part due to his neurologic dysfunction.  He currently can walk at most 1-2 blocks at a time and needs to use a walker.  He has had a lot of recent adjustments in his cardiac medications.  Over this time course he also had developed biliary obstruction and required a difficult stenting which eventually was accomplished by placing the stent transduodenal he end of common bile duct with resolution of his jaundice.  He tells me he continues to eat but really has no interest and believes he is lost  about 10 pounds in the last week.  He is off of his anticoagulation now because of the problems with falls and concerns about his bleeding risk.    On exam via the video link today he is alert and seems able to follow our discussion without difficulty.  He does not have apparent respiratory distress while sitting calmly in his chair.    I reviewed his CT scan and went over the results with him and his wife.  The radiologist interpretation is not available to me at the time of this visit.  To my review his primary site and the pancreas and his liver mets all appear stable or perhaps even slightly better.  I do not see any new sites of disease.  His bile ducts appear decompressed with his stent.  Most notable is a marked increase of his previous pleural effusions with fluid now filling about half of both lung cavities.    On labs from 2 weeks ago (I difficulty getting enough blood for today's labs) his electrolytes are nearly normal with mild hypokalemia, and normal renal function.  His calcium when corrected for his albumin is normal.  His albumin is depressed but actually improved at 3.2.  His bilirubin had returned nearly to normal at 2.3.  We do have current blood counts and his hemoglobin is down slightly from baseline at 9.1 with an elevated MCV consistent with his chemotherapy.He has a little more prominent thrombocytopenia with platelets of 84,000.  His white count and differential are unremarkable.    Assessment/plan:  1.  Metastatic high-grade but well differentiated neuroendocrine tumor of the pancreas.  His disease continues to be controlled on his current CAPTEM.  His overall clinical condition however has declined significantly.  I cannot tie that directly either to his cancer or his treatment but we decided today as we work through what is going on with his decline we would hold further chemotherapy for now.  2.  Worsening pleural effusions, dyspnea and exercise intolerance.  I suspect this is related  to his heart.  I do not see clear signs of portal hypertension otherwise.  He does not have known pleural or pulmonary metastases though that is a possible etiology for the effusions.  He has close ongoing follow-up with cardiology and will discuss with them thoracentesis.  3.  Worsening cognitive function with hallucinations.  I am not sure the etiology of this.  He had an MRI of his brain within the past month that was unremarkable and would be unusual though not impossible for him to develop brain metastasis from his malignancy.  I do not see an obvious metabolic explanation for this, but the electrolytes and other labs I have are from a couple weeks ago.  I do not believe this is hepatic encephalopathy but that is conceivable.  We will try again to get further labs from him.  4.  Poor venous access.  He has had several discussions with a variety people about getting a port placed as were having so much difficulty with this issue she has been reluctant to consider it because he was told he could not sleep on his stomach anymore.          Again, thank you for allowing me to participate in the care of your patient.        Sincerely,        Bradly Parker MD

## 2023-07-28 NOTE — PROGRESS NOTES
Answers submitted by the patient for this visit:  Patient Health Questionnaire (Submitted on 7/28/2023)  If you checked off any problems, how difficult have these problems made it for you to do your work, take care of things at home, or get along with other people?: Very difficult  PHQ9 TOTAL SCORE: 14  General Questionnaire (Submitted on 7/28/2023)  Chief Complaint: Chronic problems general questions HPI Form  What is the reason for your visit today? : UTI  How many servings of fruits and vegetables do you eat daily?: 2-3  On average, how many sweetened beverages do you drink each day (Examples: soda, juice, sweet tea, etc.  Do NOT count diet or artificially sweetened beverages)?: 2  How many minutes a day do you exercise enough to make your heart beat faster?: 9 or less  How many days a week do you exercise enough to make your heart beat faster?: 3 or less  How many days per week do you miss taking your medication?: 0  William is a 72 year old who is being evaluated via a billable video visit.      How would you like to obtain your AVS? MyChart  If the video visit is dropped, the invitation should be resent by: Text to cell phone: [phone]  Will anyone else be joining your video visit? No          Assessment & Plan   Patient son is a primary care provider in [place] .  Son is present at the time of visit .  Patient is undergoing treatment for neuroendocrine Tumor .   Per son more confused recently .  No fever .  More frequent urination .   Need to wake up often at night for urination .   Was started on Farxiga for heart failure .    (R30.0) Dysuria  (primary encounter diagnosis)  Comment:    Plan: UA Macroscopic with reflex to Microscopic and         Culture - Lab Collect, UA Microscopic with         Reflex to Culture, CANCELED: UA Macroscopic         with reflex to Microscopic and Culture - Lab         Collect         Urine glycosuria due to Farxiga .  Discussed to hold medication and to discuss with cardiologist  unsure if that is the cause of dysuria and frequent urination .  No infection in UA     (R41.0) Confusion  Comment:   Plan: Comprehensive metabolic panel (BMP + Alb, Alk         Phos, ALT, AST, Total. Bili, TP), TSH with free        T4 reflex, CRP, inflammation, CBC with         platelets and differential, Ammonia            (N39.41) Urge incontinence of urine  Comment: Consider holding Farxiga ( discuss with cardiology and PCP )     Plan: oxybutynin ER (DITROPAN XL) 5 MG 24 hr tablet        Patient will follow with PCP .   Recommend ER for fever or any increased symptoms.         Depression Screening Follow Up        7/28/2023    12:41 PM   PHQ   PHQ-9 Total Score 14   Q9: Thoughts of better off dead/self-harm past 2 weeks Several days   F/U: Thoughts of suicide or self-harm No   F/U: Safety concerns No         Follow Up    Follow Up Actions Taken  Crisis resource information provided in the After Visit Summary    Discussed the following ways the patient can remain in a safe environment:        Ruth Catherine MD  [facility] [place] BOOM Thomas is a 72 year old, presenting for the following health issues:  UTI and Confusion      7/28/2023    12:48 PM   Additional Questions   Roomed by Jena HUBBARD   Accompanied by Son and wife       History of Present Illness       Reason for visit:  UTI    He eats 2-3 servings of fruits and vegetables daily.He consumes 2 sweetened beverage(s) daily.He exercises with enough effort to increase his heart rate 9 or less minutes per day.  He exercises with enough effort to increase his heart rate 3 or less days per week.   He is taking medications regularly.         Review of Systems   Constitutional:  Negative for appetite change.   HENT:  Negative for congestion.    Gastrointestinal:  Negative for abdominal distention and abdominal pain.   Genitourinary:  Positive for dysuria. Negative for difficulty urinating.   Musculoskeletal:  Positive for back pain. Negative  "for arthralgias.   Neurological:  Negative for headaches.   Psychiatric/Behavioral:  Positive for confusion. Negative for agitation.       Objective    Vitals - Patient Reported  Weight (Patient Reported): 72.6 kg (160 lb)  Height (Patient Reported): 176.5 cm (5' 9.5\")  BMI (Based on Pt Reported Ht/Wt): 23.29        Physical Exam   GENERAL: Healthy, alert and no distress  EYES: Eyes grossly normal to inspection.  No discharge or erythema, or obvious scleral/conjunctival abnormalities.  RESP: No audible wheeze, cough, or visible cyanosis.  No visible retractions or increased work of breathing.    SKIN: Visible skin clear. No significant rash, abnormal pigmentation or lesions.  NEURO: Cranial nerves grossly intact.  Mentation and speech appropriate for age.  PSYCH: Mentation appears normal, affect normal/bright, judgement and insight intact, normal speech and appearance well-groomed.    Look weak and tired .            Video-Visit Details    Type of service:  Video Visit   Video Start Time: 1:00 PM  Video End Time:1:25 PM    Originating Location (pt. Location): Home    Distant Location (provider location):  On-site  Platform used for Video Visit: Vinay"

## 2023-08-01 PROBLEM — R07.9 CHEST PAIN, UNSPECIFIED TYPE: Status: ACTIVE | Noted: 2023-01-01

## 2023-08-01 PROBLEM — R79.89 ELEVATED BRAIN NATRIURETIC PEPTIDE (BNP) LEVEL: Status: ACTIVE | Noted: 2023-01-01

## 2023-08-01 PROBLEM — R06.02 SHORTNESS OF BREATH: Status: ACTIVE | Noted: 2023-01-01

## 2023-08-01 NOTE — ED PROVIDER NOTES
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  He reports he woke up around noon from a nap, and per family he was reporting significant midsternal chest pain at this time. Per EMS report, he was given 324 mg of aspirin as well as a albuterol neb with improvement in his chest pain and shortness of breath.  Upon arrival he denies any current chest pain.    07/26 CT Chest/Abdomen/Pelvis showed: Moderate symmetric pleural effusions are present. Pleural fluid layers dependently. Associated passive atelectasis of the lungs. Interstitial thickening and adjacent groundglass opacities are present in both apices and no change in the mass occupying the pancreas head and neck, no new liver lesions.    06/22/2023 Cardiac ECHO: Global and regional left ventricular function is normal with an EF of 55-60%.  Right ventricular function, chamber size, wall motion, and thickness are  normal.  S/P MitraClip. Mean mitral gradient 8mmHg. Mild to moderate residual MR.  Mild to moderate aortic insufficiency is present.  Right ventricular systolic pressure is 41mmHg above the right atrial pressure.  Sinuses of Valsalva 4.2 cm.  Mild dilatation of the aorta is present.  IVC diameter <2.1 cm collapsing >50% with sniff suggests a normal RA pressure  of 3 mmHg.  No pericardial effusion is present.  There is no prior study for direct comparison.      Past Medical History  Past Medical History:   Diagnosis Date    Anemia     Anticoagulated     Anxiety     Biliary obstruction     Chronic kidney disease     Chronic right shoulder pain     Depression     Difficult intravenous access      NEED UTRASOUND TO FIND VEIN    H/O thrombocytopenia     History of pulmonary embolism     Hodgkin lymphoma, nodular sclerosis (H) 1988    s/p radiation    Hypothyroidism     ILD (interstitial lung disease) (H)     Lymphedema of right upper extremity     Neuroendocrine tumor of pancreas     with liver metastasis    NICM (nonischemic cardiomyopathy) (H)     Nonsenile cataract     Pancreatic insufficiency      Past Surgical History:   Procedure Laterality Date    CARDIAC SURGERY  04/22/2022    s/p MitraClip    ENDOSCOPIC RETROGRADE CHOLANGIOPANCREATOGRAM COMPLEX N/A 6/12/2023    Procedure: ENDOSCOPIC RETROGRADE CHOLANGIOPANCREATOGRAPHY with dilation and hepaticduodenostomy,  stent placement;  Surgeon: Guru Alexander Joe MD;  Location: [facility] OR    ENDOSCOPIC ULTRASOUND UPPER GASTROINTESTINAL TRACT (GI) N/A 6/12/2023    Procedure: ENDOSCOPIC ULTRASOUND, ESOPHAGOSCOPY / UPPER GASTROINTESTINAL TRACT (GI);  Surgeon: Guru Alexander Joe MD;  Location: [facility] OR    ESOPHAGOSCOPY, GASTROSCOPY, DUODENOSCOPY (EGD), COMBINED N/A 6/7/2023    Procedure: Esophagoscopy, gastroscopy, duodenoscopy (EGD), combined;  Surgeon: Guru Alexander Joe MD;  Location: [facility] OR    SPLENECTOMY  1988     acetaminophen (TYLENOL) 500 MG tablet  calcium carbonate 750 MG CHEW  dapagliflozin (FARXIGA) 10 MG TABS tablet  emollient (VANICREAM) external cream  furosemide (LASIX) 20 MG tablet  levothyroxine (SYNTHROID/LEVOTHROID) 112 MCG tablet  lipase-protease-amylase (CREON 12) 82902-999[address]-01135 units CPEP  lovastatin (MEVACOR) 40 MG tablet  melatonin 3 MG tablet  metoprolol succinate ER (TOPROL XL) 25 MG 24 hr tablet  Multiple Vitamin (MULTIVITAMIN) per tablet  ondansetron (ZOFRAN ODT) 8 MG ODT tab  polyethylene glycol 3350 POWD  potassium chloride ER (K-TAB/KLOR-CON) 10 MEQ CR tablet  prochlorperazine (COMPAZINE) 10 MG tablet  venlafaxine (EFFEXOR XR) 150 MG 24 hr capsule  venlafaxine (EFFEXOR XR) 75 MG 24  hr capsule  vitamin D3 (CHOLECALCIFEROL) 50 mcg (2000 units) tablet  capecitabine (XELODA) 500 MG tablet  capecitabine (XELODA) 500 MG tablet  temozolomide (TEMODAR) 100 MG capsule      Allergies   Allergen Reactions    Bee Venom Hives and Other (See Comments)     Other reaction(s): Other (see comments)  Not honey bees. Unknown bee  Per pt  Per pt  Per pt  Per pt      Simvastatin Muscle Pain (Myalgia) and Other (See Comments)     Other reaction(s): Muscle Aches, Other (see comments)  Patient reports muscle stiffness  Patient reports muscle stiffness  myalgias  myalgias      Vinblastine Other (See Comments)     Other reaction(s): Other (see comments)  Patient reported paralyzation of colon  Per pt  Patient reported paralyzation of colon  Per pt      Vincristine Other (See Comments)     Patient reports paralyzation of his colon  Patient reports paralyzation of his colon  Patient reports paralyzation of his colon  Patient reports paralyzation of his colon      Lina-Kit Bee Sting     Nkda [No Known Drug Allergy]      Family History  No family history on file.  Social History   Social History     Tobacco Use    Smoking status: Never     Passive exposure: Never    Smokeless tobacco: Never   Vaping Use    Vaping Use: Never used   Substance Use Topics    Alcohol use: Yes     Comment: 1 beer a month    Drug use: Never         A medically appropriate review of systems was performed with pertinent positives and negatives noted in the HPI, and all other systems negative.    Physical Exam   BP: 112/48  Pulse: 112  Temp: 97.9  F (36.6  C)  Resp: 20  SpO2: 100 %  Physical Exam  Vitals and nursing note reviewed.   HENT:      Head: Normocephalic and atraumatic.      Mouth/Throat:      Pharynx: Oropharynx is clear.   Neck:      Vascular: No carotid bruit or JVD.   Cardiovascular:      Rate and Rhythm: Normal rate and regular rhythm.      Pulses:           Radial pulses are 2+ on the right side and 2+ on the left side.        Dorsalis  pedis pulses are 2+ on the right side and 2+ on the left side.      Heart sounds: Normal heart sounds.   Pulmonary:      Effort: Pulmonary effort is normal.      Breath sounds: Normal breath sounds.   Chest:      Chest wall: No mass.   Abdominal:      General: Bowel sounds are normal.      Palpations: Abdomen is soft.   Musculoskeletal:      Cervical back: Normal range of motion and neck supple.      Right lower leg: No edema.      Left lower leg: No edema.   Neurological:      Mental Status: He is alert and oriented to person, place, and time.   Psychiatric:         Mood and Affect: Mood normal.           ED Course, Procedures, & Data      Procedures       ED Course Selections:        EKG Interpretation:      Interpreted by FADY Hathaway CNP  Time reviewed: 1401  Symptoms at time of EKG: None   Rhythm: sinus tachycardia  Rate: Tachycardia  Axis: Normal  Ectopy: none  Conduction: normal  ST Segments/ T Waves: No acute ischemic changes  Q Waves: none  Comparison to prior: Unchanged from 06/22/2023  Clinical Impression: no acute changes          Results for orders placed or performed during the hospital encounter of 08/01/23   CT Chest Pulmonary Embolism w Contrast     Status: None    Narrative    EXAMINATION: CTA pulmonary angiogram, 8/1/2023 5:05 PM     COMPARISON: 7/26/2023    HISTORY:    History of PE, sudden onset CP and shortness of breath    TECHNIQUE: Volumetric helical acquisition of CT images of the chest  from the lung apices to the kidneys were acquired after the  administration of 60 mL of Isovue-370 IV contrast. Flash technique  with free breathing acquisition.  Post-processed multiplanar and/or  MIP reformations were obtained, archived to PACS and used in  interpretation of this study.     FINDINGS:  Contrast bolus is: excellent.  Exam is negative for acute  pulmonary embolism.    The largest right ventricle transaxial diameter is (measured from  endocardium to endocardium): 4.4 cm   The largest  left ventricle transaxial diameter is (measured from  endocardium to endocardium): 4.3 cm  RV/LV ratio is: 1.0 (if ratio greater than 1.1 then sign is suspicious  for right heart strain)  Reflux of contrast into the IVC? no  Paradoxical bowing of the interventricular septum to the left? no  Pericardial effusion?:no    Atrophic thyroid. No lower cervical or axillary lymphadenopathy.  Normal size and configuration of the medial intrathoracic vessels.  Normal cardiac size. No pericardial effusion. Moderate sized bilateral  pleural effusions with associated atelectasis, similar to prior. Faint  areas of nodular opacities particularly along the left lower lobe,  such as 9 x 3 mm (series 5 image 23) and groundglass nodule measuring  8 x 4 mm (series 5 image 50). Bilateral apical faint groundglass  opacities. Unremarkable esophagus. Limited noncontrast evaluation of  the upper abdomen with visualization of the 4 cm pancreatic head mass,  biliary stenting, stable pneumobilia. Otherwise unremarkable.      Impression    IMPRESSION:   1. Exam is negative for acute pulmonary embolism.    Evidence for right heart strain or increased right heart pressures?   no.     2. Stable moderate sized bilateral pleural effusions with associated  atelectasis.    3. Left lower lobe nodular opacities of indeterminate etiology,  similar to prior, infectious, inflammatory, or malignant.     4. Partially evaluated pancreatic head mass and biliary stent..    In the event of a positive result for acute pulmonary embolism  resulting in right heart strain, consider calling the   [facility] patient placement ([phone]) for PERT (Pulmonary Embolism  Response Team) Activation?    PERT -- Pulmonary Embolism Response Team (Multidisciplinary team  including cardiology, interventional radiology, critical care,  hematology)    I have personally reviewed the examination and initial interpretation  and I agree with the findings.    MARY ANNE GOYAL DO         SYSTEM  ID:  [id number]   US Abdomen Limited (RUQ)     Status: None    Narrative    EXAMINATION: Limited Abdominal Ultrasound, 8/1/2023 7:21 PM     COMPARISON: CT CAP 7/26/2023    HISTORY:    Biliary stent, trending up LFT    FINDINGS:   Fluid: Large right pleural effusion. No evidence of ascites.    Liver: Suboptimal visualization of the liver with normal echotexture  in the visualized right hepatic lobe. The liver measures 12.1 cm in  craniocaudal dimension. There is no focal mass in the right hepatic  lobe.     Gallbladder: Normally distended containing sludge. No shadowing  cholelithiasis. No cholecystic fluid or significant gallbladder wall  thickening.    Bile Ducts: Common bile duct stent is visualized in appropriate  positioning.    Pancreas: Visualized portions of the head and body of the pancreas are  unremarkable.     Kidney: The right kidney measures 12.6 cm long. There is no  hydronephrosis or hydroureter, no shadowing renal calculi, cystic  lesion or mass.       Impression    IMPRESSION:   1.  Common bile duct stent in appropriate position.  2.  Gallbladder sludge without evidence of acute cholecystitis.   3.  Large right pleural effusion.  4.  Suboptimal visualization of the liver without focal abnormalities.    I have personally reviewed the examination and initial interpretation  and I agree with the findings.    MARY ANNE GOYAL DO         SYSTEM ID:  [id number]   Comprehensive metabolic panel     Status: Abnormal   Result Value Ref Range    Sodium 134 (L) 136 - 145 mmol/L    Potassium 4.2 3.4 - 5.3 mmol/L    Chloride 100 98 - 107 mmol/L    Carbon Dioxide (CO2) 23 22 - 29 mmol/L    Anion Gap 11 7 - 15 mmol/L    Urea Nitrogen 17.3 8.0 - 23.0 mg/dL    Creatinine 1.06 0.67 - 1.17 mg/dL    Calcium 8.4 (L) 8.8 - 10.2 mg/dL    Glucose 81 70 - 99 mg/dL    Alkaline Phosphatase 383 (H) 40 - 129 U/L    AST      ALT 55 0 - 70 U/L    Protein Total 6.5 6.4 - 8.3 g/dL    Albumin 2.7 (L) 3.5 - 5.2 g/dL    Bilirubin Total 2.3 (H)  <=1.2 mg/dL    GFR Estimate 75 >60 mL/min/1.73m2   Troponin T, High Sensitivity     Status: Abnormal   Result Value Ref Range    Troponin T, High Sensitivity 35 (H) <=22 ng/L   Magnesium     Status: Normal   Result Value Ref Range    Magnesium 1.7 1.7 - 2.3 mg/dL   UA with Microscopic reflex to Culture     Status: Abnormal    Specimen: Urine, Clean Catch   Result Value Ref Range    Color Urine Yellow Colorless, Straw, Light Yellow, Yellow    Appearance Urine Slightly Cloudy (A) Clear    Glucose Urine 500 (A) Negative mg/dL    Bilirubin Urine Negative Negative    Ketones Urine Negative Negative mg/dL    Specific Gravity Urine 1.022 1.003 - 1.035    Blood Urine Negative Negative    pH Urine 5.5 5.0 - 7.0    Protein Albumin Urine 10 (A) Negative mg/dL    Urobilinogen Urine 3.0 (A) Normal, 2.0 mg/dL    Nitrite Urine Negative Negative    Leukocyte Esterase Urine Negative Negative    Mucus Urine Present (A) None Seen /LPF    Amorphous Crystals Urine Few (A) None Seen /HPF    Calcium Oxalate Crystals Urine Few (A) None Seen /HPF    RBC Urine 36 (H) <=2 /HPF    WBC Urine 6 (H) <=5 /HPF    Hyaline Casts Urine 1 <=2 /LPF    Narrative    Urine Culture not indicated   Nt probnp inpatient (BNP)     Status: Abnormal   Result Value Ref Range    N terminal Pro BNP Inpatient 5,860 (H) 0 - 900 pg/mL   CBC with platelets and differential     Status: Abnormal   Result Value Ref Range    WBC Count 10.5 4.0 - 11.0 10e3/uL    RBC Count 2.35 (L) 4.40 - 5.90 10e6/uL    Hemoglobin 8.3 (L) 13.3 - 17.7 g/dL    Hematocrit 25.7 (L) 40.0 - 53.0 %     (H) 78 - 100 fL    MCH 35.3 (H) 26.5 - 33.0 pg    MCHC 32.3 31.5 - 36.5 g/dL    RDW 22.0 (H) 10.0 - 15.0 %    Platelet Count 102 (L) 150 - 450 10e3/uL    % Neutrophils 95 %    % Lymphocytes 0 %    % Monocytes 4 %    % Eosinophils 0 %    % Basophils 0 %    % Immature Granulocytes 1 %    NRBCs per 100 WBC 5 (H) <1 /100    Absolute Neutrophils 10.0 (H) 1.6 - 8.3 10e3/uL    Absolute Lymphocytes  0.0 (L) 0.8 - 5.3 10e3/uL    Absolute Monocytes 0.4 0.0 - 1.3 10e3/uL    Absolute Eosinophils 0.0 0.0 - 0.7 10e3/uL    Absolute Basophils 0.0 0.0 - 0.2 10e3/uL    Absolute Immature Granulocytes 0.1 <=0.4 10e3/uL    Absolute NRBCs 0.5 10e3/uL   [place] Draw     Status: None    Narrative    The following orders were created for panel order [place] Draw.  Procedure                               Abnormality         Status                     ---------                               -----------         ------                     Extra Blue Top Tube[[id number]]                              Final result               Extra Red Top Tube[[id number]]                               Final result                 Please view results for these tests on the individual orders.   Extra Blue Top Tube     Status: None   Result Value Ref Range    Hold Specimen JIC    Extra Red Top Tube     Status: None   Result Value Ref Range    Hold Specimen JIC    Troponin T, High Sensitivity     Status: Abnormal   Result Value Ref Range    Troponin T, High Sensitivity 35 (H) <=22 ng/L   Extra Tube ([place] Draw)     Status: None    Narrative    The following orders were created for panel order Extra Tube ([place] Draw).  Procedure                               Abnormality         Status                     ---------                               -----------         ------                     Extra Purple Top Tube[[id number]]                            Final result                 Please view results for these tests on the individual orders.   Extra Purple Top Tube     Status: None   Result Value Ref Range    Hold Specimen JIC    EKG 12 lead     Status: None   Result Value Ref Range    Systolic Blood Pressure  mmHg    Diastolic Blood Pressure  mmHg    Ventricular Rate 110 BPM    Atrial Rate 110 BPM    ND Interval 150 ms    QRS Duration 88 ms     ms    QTc 492 ms    P Axis 42 degrees    R AXIS 75 degrees    T Axis 53 degrees    Interpretation ECG        Sinus tachycardia  Nonspecific ST and T wave abnormality  Abnormal ECG  Unconfirmed report - interpretation of this ECG is computer generated - see medical record for final interpretation  Confirmed by - EMERGENCY ROOM, PHYSICIAN ([id number]),  EVAN HERNANDEZ ([id number]) on 8/1/2023 2:23:01 PM     CBC with platelets differential     Status: Abnormal    Narrative    The following orders were created for panel order CBC with platelets differential.  Procedure                               Abnormality         Status                     ---------                               -----------         ------                     CBC with platelets and d...[[id number]]  Abnormal            Final result                 Please view results for these tests on the individual orders.     Medications   furosemide (LASIX) injection 40 mg (40 mg Intravenous $Given 8/1/23 1712)   sodium chloride (PF) 0.9% PF flush 90 mL (90 mLs Intravenous $Given 8/1/23 1627)   iopamidol (ISOVUE-370) solution 60 mL (60 mLs Intravenous $Given 8/1/23 1627)     Labs Ordered and Resulted from Time of ED Arrival to Time of ED Departure   COMPREHENSIVE METABOLIC PANEL - Abnormal       Result Value    Sodium 134 (*)     Potassium 4.2      Chloride 100      Carbon Dioxide (CO2) 23      Anion Gap 11      Urea Nitrogen 17.3      Creatinine 1.06      Calcium 8.4 (*)     Glucose 81      Alkaline Phosphatase 383 (*)     AST        ALT 55      Protein Total 6.5      Albumin 2.7 (*)     Bilirubin Total 2.3 (*)     GFR Estimate 75     TROPONIN T, HIGH SENSITIVITY - Abnormal    Troponin T, High Sensitivity 35 (*)    ROUTINE UA WITH MICROSCOPIC REFLEX TO CULTURE - Abnormal    Color Urine Yellow      Appearance Urine Slightly Cloudy (*)     Glucose Urine 500 (*)     Bilirubin Urine Negative      Ketones Urine Negative      Specific Gravity Urine 1.022      Blood Urine Negative      pH Urine 5.5      Protein Albumin Urine 10 (*)     Urobilinogen Urine 3.0 (*)     Nitrite  Urine Negative      Leukocyte Esterase Urine Negative      Mucus Urine Present (*)     Amorphous Crystals Urine Few (*)     Calcium Oxalate Crystals Urine Few (*)     RBC Urine 36 (*)     WBC Urine 6 (*)     Hyaline Casts Urine 1     NT PROBNP INPATIENT - Abnormal    N terminal Pro BNP Inpatient 5,860 (*)    CBC WITH PLATELETS AND DIFFERENTIAL - Abnormal    WBC Count 10.5      RBC Count 2.35 (*)     Hemoglobin 8.3 (*)     Hematocrit 25.7 (*)      (*)     MCH 35.3 (*)     MCHC 32.3      RDW 22.0 (*)     Platelet Count 102 (*)     % Neutrophils 95      % Lymphocytes 0      % Monocytes 4      % Eosinophils 0      % Basophils 0      % Immature Granulocytes 1      NRBCs per 100 WBC 5 (*)     Absolute Neutrophils 10.0 (*)     Absolute Lymphocytes 0.0 (*)     Absolute Monocytes 0.4      Absolute Eosinophils 0.0      Absolute Basophils 0.0      Absolute Immature Granulocytes 0.1      Absolute NRBCs 0.5     TROPONIN T, HIGH SENSITIVITY - Abnormal    Troponin T, High Sensitivity 35 (*)    MAGNESIUM - Normal    Magnesium 1.7       US Abdomen Limited (RUQ)   Final Result   IMPRESSION:    1.  Common bile duct stent in appropriate position.   2.  Gallbladder sludge without evidence of acute cholecystitis.    3.  Large right pleural effusion.   4.  Suboptimal visualization of the liver without focal abnormalities.      I have personally reviewed the examination and initial interpretation   and I agree with the findings.      MARY ANNE GOYAL DO            SYSTEM ID:  [id number]      CT Chest Pulmonary Embolism w Contrast   Final Result   IMPRESSION:    1. Exam is negative for acute pulmonary embolism.      Evidence for right heart strain or increased right heart pressures?    no.       2. Stable moderate sized bilateral pleural effusions with associated   atelectasis.      3. Left lower lobe nodular opacities of indeterminate etiology,   similar to prior, infectious, inflammatory, or malignant.       4. Partially evaluated  pancreatic head mass and biliary stent..      In the event of a positive result for acute pulmonary embolism   resulting in right heart strain, consider calling the    [facility] patient placement ([phone]) for PERT (Pulmonary Embolism   Response Team) Activation?      PERT -- Pulmonary Embolism Response Team (Multidisciplinary team   including cardiology, interventional radiology, critical care,   hematology)      I have personally reviewed the examination and initial interpretation   and I agree with the findings.      MARY ANNE GOYAL DO            SYSTEM ID:  [id number]             Critical care was not performed.     Medical Decision Making  The patient's presentation was of high complexity (an acute health issue posing potential threat to life or bodily function).    The patient's evaluation involved:  an assessment requiring an independent historian (HPI also obtained from son and wife)  review of external note(s) from 3+ sources (family practice clinic, oncology clinic, hospital discharge summary)  ordering and/or review of 3+ test(s) in this encounter (see separate area of note for details)  review of 3+ test result(s) ordered prior to this encounter (CBC, CMP, BNP)  independent interpretation of testing performed by another health professional (CT chest)    The patient's management necessitated high risk (a decision regarding hospitalization).    Assessment & Plan    72 year old male who presents with above complaints.    Clinically, patient appears in no acute distress. Vital signs notable for sinus tachycardia, without hypotension or fever. Otherwise on examination, his lungs are clear to auscultation, he had no carotid bruit or murmur noted on auscultation.  His radial pulses were bilaterally equal.    Ddx includes, but not limited to acute coronary syndrome, pulmonary embolism, aortic dissection, pericarditis, pneumothorax, pneumonia, worsening of metastatic disease, migration of known biliary stent.    He  had an EKG completed, that showed sinus tachycardia without any acute changes from last month. He had an IV placed, and a CBC, CMP, troponin, magnesium, BMP obtained.  Also obtain a UA.  With his known history of previous pulmonary embolism, and not currently on Eliquis, also had a CT scan completed looking for pulmonary embolism.  He also had a UA obtained.    His CBC today is essentially unchanged from labs obtained as an outpatient 4 days ago.  CMP does show a worsening alkaline phosphatase, as well as mildly elevated ALT in comparison to 4 days ago.  His troponin and his delta troponin are both 35.  BNP is elevated at 5800.  It is hard to compare his outpatient BNP.  As it is in a different reference range.  I did personally review and interpret the CT images of his chest.  At this time it is negative for acute pulmonary embolism.    With his ECG showing no signs of SHALA and two stable high-sensitivity troponins less concern his pain was ACS.  CT imaging was negative for acute pulmonary embolism, CT without evidence of pneumonia, pneumothorax. At this time with his work-up less concern that his chest pain was cardiac in nature and with his chest imaging is also being unchanged from previous this makes respiratory infection also unlikely.  Also obtained a right upper quadrant ultrasound to assess for the position of the biliary stent.  At this time. according to ultrasonography stent is in the correct position.    Discussed with patient, his son and wife unsure of what caused the chest pain, but with his worsening liver function, and overall symptoms he would benefit from being admitted to the hospital.  In this regard I did speak to the triage hospitalist. Patient will be admitted to internal medicine.    I have reviewed the nursing notes. I have reviewed the findings, diagnosis, plan and need for follow up with the patient.    New Prescriptions    No medications on file       Final diagnoses:   Neuroendocrine  carcinoma of pancreas (H)   Shortness of breath   Chest pain, unspecified type   Lightheadedness   Elevated brain natriuretic peptide (BNP) level       FADY Hathaway CNP  [facility] EMERGENCY DEPARTMENT  8/1/2023     Roberto Cruz APRN CNP  08/01/23 2104    --    ED Attending Physician Attestation    I Ginger Lord MD, cared for this patient with the Advanced Practice Provider (YVONNE). I have performed a history and physical examination of the patient independent of the YVONNE. I reviewed the YVONNE's documentation above and agree with the documented findings and plan of care. I personally provided a substantive portion of the care for this patient, including the complete Medical Decision Making. Please see the YVONNE's documentation for full details.    Summary of HPI, PE, ED Course   Patient is a 72 year old male evaluated in the emergency department for episode of chest pain and shortness of breath.  Patient has known history of neuroendocrine tumor with metastasis.  Patient was seen earlier in the day by his home health nurse.  Patient was sleeping and then later woke up to go to the bathroom.  He ended up developing some chest tenderness and pain.  Only says that he was uncomfortable and standing around and they were unsure what was wrong with him.  Patient is now feeling back to normal.  Per review of patient's medical chart patient's been seen recently by his oncologist and his PCP.  They have noticed a abrupt decline in his mental status and in his generalized mobility.  Patient last CT chest about 1 week prior that showed bilateral moderate pleural effusions.  Family denies any fevers.. Exam and ED course notable for well-appearing on exam.  Patient overall looks chronically ill.  He has no acute dyspnea.  Heart is regular rate and rhythm.  Plan will be to obtain further imaging and likely admit him to the hospital for worsening fatigue in the setting of metastatic cancer and bilateral  pleural effusions.  Patient care will be completed by the YVONNE.  Plan of care was discussed with the patient and the evening ER provider.. After the completion of care in the emergency department, the patient was admitted to inpatient.    Critical Care & Procedures  Not applicable.              Ginger Lord MD  Emergency Medicine          Ginger Lord MD  08/02/23 [id number]

## 2023-08-01 NOTE — ED TRIAGE NOTES
BIBA from home for sudden onset SOB after taking a nap. Woke up with new CP and SOB. EMS reports o2 in the 70s and blue finger tips upon arrival, gave albuterol and duo neb. Now 100 on 2L NC. Gave 324 ASA.     Triage Assessment       Row Name 08/01/23 [id number]       Triage Assessment (Adult)    Airway WDL WDL       Respiratory WDL    Respiratory WDL WDL       Skin Circulation/Temperature WDL    Skin Circulation/Temperature WDL WDL       Cardiac WDL    Cardiac WDL X;chest pain;rhythm    Pulse Rate & Regularity tachycardic       Chest Pain Assessment    Chest Pain Location midsternal    Precipitating Factors at rest       Peripheral/Neurovascular WDL    Peripheral Neurovascular WDL WDL       Cognitive/Neuro/Behavioral WDL    Cognitive/Neuro/Behavioral WDL WDL       [place] Coma Scale    Best Eye Response 4-->(E4) spontaneous    Best Motor Response 6-->(M6) obeys commands    Best Verbal Response 5-->(V5) oriented    Rohit Coma Scale Score 15

## 2023-08-01 NOTE — MEDICATION SCRIBE - ADMISSION MEDICATION HISTORY
Medication Scribe Admission Medication History    Admission medication history is complete. The information provided in this note is only as accurate as the sources available at the time of the update.    Medication reconciliation/reorder completed by provider prior to medication history? No    Information Source(s): Patient and Family member via in-person    Pertinent Information: None    Changes made to PTA medication list:  Added: Furosemide 20 mg, Metoprolol succinate 25 mg, Miralax, Klor-Con  Deleted: Diprolene 0.05%, Tums 1,000 mg, Creon 12 (3 capsules TID), Oxybutynin 5 mg, Psyllium 96%, Venlafaxine 150 mg (every day), Venlafaxine 75 mg (every day)  Changed: Tums 750 mg, Creon 12 (2-3 capsules TID), Venlafaxine 150 mg (AM w/ 75 mg), Venlafaxine 75 mg (am w/ 150 mg)    Medication Affordability:  Not including over the counter (OTC) medications, was there a time in the past 3 months when you did not take your medications as prescribed because of cost?: No    Allergies reviewed with patient and updates made in EHR: yes    Medication History Completed By: Marimar Chou 8/1/2023 6:22 PM    Prior to Admission medications    Medication Sig Last Dose Taking? Auth Provider Long Term End Date   acetaminophen (TYLENOL) 500 MG tablet Take 500-1,000 mg by mouth every 8 hours as needed for mild pain 7/28/2023 at unknown Yes Unknown, Entered By History     calcium carbonate 750 MG CHEW Take 750 mg by mouth At Bedtime 8/1/2023 at unknown Yes Reported, Patient     dapagliflozin (FARXIGA) 10 MG TABS tablet Take 1 tablet by mouth daily 8/1/2023 at am Yes Reported, Patient     emollient (VANICREAM) external cream Apply topically daily as needed for other (rash on hands and feet) 7/29/2023 at unknown Yes Unknown, Entered By History     furosemide (LASIX) 20 MG tablet Take 20 mg by mouth as needed 7/30/2023 at unknown Yes Reported, Patient No    levothyroxine (SYNTHROID/LEVOTHROID) 112 MCG tablet Take 1 tablet (112 mcg) by mouth  daily 7/31/2023 at pm Yes Carmen Cho MD Yes    lipase-protease-amylase (CREON 12) [id number] units CPEP Take 2-3 capsules by mouth 3 times daily (with meals) 8/1/2023 at am Yes Reported, Patient     lovastatin (MEVACOR) 40 MG tablet Take 1 tablet (40 mg) by mouth At Bedtime 7/31/2023 at pm Yes Carmen Cho MD Yes    melatonin 3 MG tablet Take 3 mg by mouth nightly as needed for sleep Past Month at unknown Yes Reported, Patient     metoprolol succinate ER (TOPROL XL) 25 MG 24 hr tablet Take 12.5 mg by mouth 2 times daily 8/1/2023 at am Yes Reported, Patient No    Multiple Vitamin (MULTIVITAMIN) per tablet Take 1 tablet by mouth 2 times daily 8/1/2023 at am Yes Reported, Patient     ondansetron (ZOFRAN ODT) 8 MG ODT tab Take 1 tablet (8 mg) by mouth every 8 hours as needed for nausea 7/24/2023 at unknown Yes Bradly Parker MD     polyethylene glycol 3350 POWD Take 1 packet by mouth as needed 7/29/2023 at unknown Yes Reported, Patient     potassium chloride ER (K-TAB/KLOR-CON) 10 MEQ CR tablet Take 20 mEq by mouth daily 8/1/2023 at am Yes Reported, Patient     prochlorperazine (COMPAZINE) 10 MG tablet Take 10 mg by mouth every 6 hours as needed for nausea  Yes Reported, Patient     venlafaxine (EFFEXOR XR) 150 MG 24 hr capsule Take 150 mg by mouth every morning With 75 mg capsule 8/1/2023 at am Yes Reported, Patient No    venlafaxine (EFFEXOR XR) 75 MG 24 hr capsule Take 75 mg by mouth every morning With 150 mg capsule 8/1/2023 at am Yes Reported, Patient No    vitamin D3 (CHOLECALCIFEROL) 50 mcg (2000 units) tablet Take 1 tablet by mouth daily 8/1/2023 at am Yes Unknown, Entered By History     capecitabine (XELODA) 500 MG tablet Take 2 tablets (1,000 mg) by mouth 2 times daily for 14 days Take for 14 days, then off for 21 days. Take within 30 mins after meal.   Heather Kelly PA-C Yes 7/30/23   capecitabine (XELODA) 500 MG tablet Take 2 tablets (1,000 mg) by mouth 2  times daily for 14 days Take with 150 mg tablet for total dose of 1,150 mg. Take for 14 days, then off for 14 days. Take within 30 mins after meal.   Bradly Parker MD Yes 7/4/23   temozolomide (TEMODAR) 100 MG capsule Take 4 capsules (400 mg) by mouth At Bedtime for 5 days Take on empty stomach, Days 10 thru 14. Take Zofran 30-60 min before Temodar.   Bradly Parker MD Yes 6/25/23

## 2023-08-01 NOTE — ED NOTES
Bed: ED18  Expected date:   Expected time:   Means of arrival:   Comments:  A623  72M  CP & SOB, given ASA & duoneb with route with some relief

## 2023-08-02 NOTE — PROGRESS NOTES
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 He came here today because of a sudden epigastric pain for ~30 mins.     # Acute chest and epigastric pain  His acute episode of epigastric pain that last ~30 mins have a broad differential of heart, lung or GI in origin. EKG showed no STT change nor TWI and troponin T were stable at 35->35 (2hr). His congestion seems to be improving clinically with evidence of decreasing BNP from ~8000 on 7/12 and ~6000 on admission. Given Lasix in ED, does not appear hypervolemic on exam. Due to his high risk of active cancer and paroxysmal AF, CTPA was sent and was negative. His bilateral pleural effusions are moderate and unchanged from 7/26/23 scan, but increased significantly from 6/22/23 scan. Per chart reviews, he had thoracentesis done 2 times, in 8/2021 and 1/2022, both cytologies were negative for malignancy. Less likely a cause of new episode of epigastric pain considering its chronicity. Biliary colic or cholangitis are a possibility given history of obstructive malignancy (see below).   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 The  last follow up CT CAP on 7/26 showed stable disease. History of complex endoscopic procedure prior with choledochoduodenostomy. No fever or abdominal tenderness on exam, but does have leukocytosis. His ALP and bilirubin are at least double from 5 days PTA, concerning for obstruction although no clear evidence on ultrasound. At risk for cholangitis, will monitor closely.   - Hold on antibiotics for now, but low threshold if any fever, abdominal pain, or worsening CBC/LFTs   - Oncology consulted, appreciate evaluation  - GI Panc/Bili consulted, appreciate recommendations     # History of HFrEF now with recovered EF (55-60%)  # Mitral regurg S/P mitral valve clip implantation   # Mild-mod aortic insufficiency  # High BNP  Per chart review, pt had EF of ~30% in 2/2022 in the setting of MR. Underwent mitraclip and GDMT and now with recovered EF. Last echo on 6/26/23 showed normal LVEF 55-60% with mild to moderate aortic insufficiency.   He has progressive worsening of heart failure after discharging on 6/26, probably due to medication changes from his orthostatic hypotension. His heart failure has been improving as discuss above after medication adjustment. His current intravascular status is needed to be better evaluated. After he get Lasix 40 IV in the ED, he has 350mL urine after ~6 hr. Bladder was not distended per bladder scan.   - Monitor I/O  - Hold on further diuresis and monitor clinical exam     #Orthostatic hypotension likely 2/2 autonomic dysfunction  He feeling faint when he changes positions/walking but not when he lies on bed.  At home, he has baseline BP of ~120/60, HR90, O2 >94%. He has frequent urination which requires multiple trips to toilets, this trouble him because he was feeling dizzy every times he walks. He is not on Florinef now. Has had one episode inpatient of low BP of ~85/45 at 10pm (measured on lying down), but with full conscious, no dizziness, no chest pain and full pulse.   - Monitor  BP    #Paroxysmal atrial fibrillation   #Hx of PE  Recently off Eliquis on 6/27/23 after discussing with his oncologist. Patients and family decided to stop taking due to frequent falls.    # Urinary frequency  # Dysuria  - Check UA and urine culture        Diet: Fluid restriction 1000 ML FLUID  Combination Diet Regular Diet Adult; Low Saturated Fat Na <2400mg Diet  NPO per Anesthesia Guidelines for Procedure/Surgery Except for: Meds    DVT Prophylaxis: Pneumatic Compression Devices  Craft Catheter: Not present  Lines: None     Cardiac Monitoring: ACTIVE order. Indication: Chest pain/ ACS rule out (24 hours)  Code Status: Full Code      Clinically Significant Risk Factors Present on Admission              # Hypoalbuminemia: Lowest albumin = 2.7 g/dL at 8/2/2023  5:48 AM, will monitor as appropriate   # Thrombocytopenia: Lowest platelets = 102 in last 2 days, will monitor for bleeding                 Disposition Plan      Expected Discharge Date: 08/04/2023                  Maria Esther Douglass MD  Hospitalist Service, GOLD TEAM 10  [facility]  Securely message with Vocera (more info)  Text page via [facility] Paging/Directory   See signed in provider for up to date coverage information  ______________________________________________________________________    Interval History   Feeling better this morning. Epigastric pain has resolved. Denies nausea, vomiting,  fever or chills.     Physical Exam   Vital Signs: Temp: 98.6  F (37  C) Temp src: Oral BP: 123/66 Pulse: 98   Resp: 23 SpO2: 100 % O2 Device: None (Room air)    Weight: 155 lbs 14.4 oz    General Appearance: Sitting up in bed, in no distress  Respiratory: Lungs clear to auscultation bilaterally  Cardiovascular: RRR  GI: Soft, non distended, non tender to palpation   Skin: No jaundice  Other: Normal affect. No lower extremity edema.      Medical Decision Making       60 MINUTES SPENT BY ME on the date of service doing chart  review, history, exam, documentation & further activities per the note.      Data     I have personally reviewed the following data over the past 24 hrs:    13.2 (H)  \   8.3 (L)   / 108 (L)     135 (L) 101 22.8 /  93   4.6 23 1.24 (H) \     ALT: 66 AST: 155 (H) AP: 384 (H) TBILI: 2.8 (H)   ALB: 2.7 (L) TOT PROTEIN: 6.6 LIPASE: N/A     Trop: 34 (H) BNP: N/A     Ferritin:  330 % Retic:  N/A LDH:  N/A

## 2023-08-02 NOTE — PLAN OF CARE
Pt admitted 8/1 for epigastric pain and elevated BNP. PMH includes HFrEF, Hodgkin's Lymphoma (in remission), orthostatic hypotension, Mitral Regurgitation s/p Mitraclip (2022), Paroxysmal Afib, Hx of Pes, Neuroendocrine pancreatic tumor with hepatic mets s/p stents.    Neuro: A&O x 4. Afebrile. Pleasant affect. Calls appropriately.   Cardiac: SR 90s. SBP 90s to 110s. R arm limb restriction. Denies dizziness, chest pain, or palpitations. +1 bilateral ankle edema.   Respiratory: Sating well on RA. LS clear. Denies cough.   GI/: Minimal UOP via Primofit d/t frequency. LBM 8/2. Denies nausea.   Diet/Appetite: 2.4G Na/low fat / 1 L FR  Skin: Peeling on bilateral soles of feet.   LDA: L PIV - SL.   Activity: SBA with walker.   Pain: Denies.      Plan: Oncology consult this morning (8/2). Continue to monitor and alert team (Anurag) with any changes or concerns.

## 2023-08-02 NOTE — CONSULTS
GASTROENTEROLOGY CONSULTATION      Date of Admission:  8/1/2023  Requesting physician: Maria Esther Douglass MD            Reason for Consultation:   Patient with history of choledochoduodenostomy and stent due to pancreatic neuroendocrine tumor. Here with Epigastric pain, rising bilirubin         ASSESSMENT AND RECOMMENDATIONS:   Assessment:  Tyrel Harrell is a 72 year old male who was admitted for acute epigastric/chest pain with radiation to the jaws on 8/1. 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 GI was consulted for rising liver test.      # Neuroendocrine tumor of the pancreatic with liver metastasis  # Malignant biliary obstruction s/p choledochoduodenostomy with a metal stent (6/12/2023)  # Transaminases, Bilirubin and alkaline phosphatase elevation   The rising LFT is highly suggestive of CBD stent occlusion, despite the absence of intrahepatic duct dilation on CTs. The stent could be occluded from food vs biliary sludge. His new leukocytosis is concerning for cholangitis with suspected stent occlusion.     He and his wife at this point are slightly undecided as they are grateful to the time he has had with the family but the family has also seen the suffering. They would like to discuss with their children prior to a final decision very soon. We expressed our understanding and respect to their autonomy and decision on whether to pursue further treatment. We will tentatively add the ERCP on for tomorrow pending their decision.     Recommendations  - Plan for ERCP tomorrow  - NPO at MN and hold any DVT ppx   - Recommend zosyn to empirically cover cholangitis given leukocytosis and chill       Thank you for  involving us in this patient's care. Please do not hesitate to contact the GI service with any questions or concerns.     Pt care plan discussed with Dr. Barrientos, GI staff physician.      Sandrita Maxwell MD PhD  GI Fellow   [phone]   -------------------------------------------------------------------------------------------------------------------           History of Present Illness:   Tyrel Harrell is a 72 year old male who was admitted for acute epigastric/chest pain with radiation to the jaws on 8/1. 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 GI was consulted for rising liver test.     He developed acute chest/epigastric pain with radiation to the jaws and dyspnea. Pain resolved while en route to the hospital via EMS who administered full dose ASA and neb.     On admission, his Tbili was noted to increase to 2.3 (from 1.5 on 7/28) with concomitant ALP increase to 383 (114 on 7/28). Tbili continues to increase today to 2.8. Both CT and US showed that the CBD metal stent was in a good position without evidence of biliary dilation.     Regarding his malignant biliary obstruction, he underwent EUS guided s/p choledochoduodenostomy with a Zilver stent on 6/12. At that time, Tbili was 15.5. Since that procedure, his LFT had continued to improve with a quintin of Tbili of 1.5 on 7/28/2023 (ALP was normal).               Past Medical History:   Reviewed and edited as appropriate  Past Medical History:   Diagnosis Date    Anemia     Anticoagulated     Anxiety     Biliary obstruction     Chronic kidney disease     Chronic right shoulder pain     Depression     Difficult intravenous access     NEED UTRASOUND TO FIND VEIN    H/O  thrombocytopenia     History of pulmonary embolism     Hodgkin lymphoma, nodular sclerosis (H) 1988    s/p radiation    Hypothyroidism     ILD (interstitial lung disease) (H)     Lymphedema of right upper extremity     Neuroendocrine tumor of pancreas     with liver metastasis    NICM (nonischemic cardiomyopathy) (H)     Nonsenile cataract     Pancreatic insufficiency             Past Surgical History:   Reviewed and edited as appropriate   Past Surgical History:   Procedure Laterality Date    CARDIAC SURGERY  04/22/2022    s/p MitraClip    ENDOSCOPIC RETROGRADE CHOLANGIOPANCREATOGRAM COMPLEX N/A 6/12/2023    Procedure: ENDOSCOPIC RETROGRADE CHOLANGIOPANCREATOGRAPHY with dilation and hepaticduodenostomy,  stent placement;  Surgeon: Guru Alexander Joe MD;  Location: [facility] OR    ENDOSCOPIC ULTRASOUND UPPER GASTROINTESTINAL TRACT (GI) N/A 6/12/2023    Procedure: ENDOSCOPIC ULTRASOUND, ESOPHAGOSCOPY / UPPER GASTROINTESTINAL TRACT (GI);  Surgeon: Guru Alexander Joe MD;  Location: [facility] OR    ESOPHAGOSCOPY, GASTROSCOPY, DUODENOSCOPY (EGD), COMBINED N/A 6/7/2023    Procedure: Esophagoscopy, gastroscopy, duodenoscopy (EGD), combined;  Surgeon: Guru Alexander Joe MD;  Location: [facility] OR    SPLENECTOMY  1988            Social History:   Reviewed and edited as appropriate  Social History     Socioeconomic History    Marital status:      Spouse name: Not on file    Number of children: Not on file    Years of education: Not on file    Highest education level: Not on file   Occupational History    Not on file   Tobacco Use    Smoking status: Never     Passive exposure: Never    Smokeless tobacco: Never   Vaping Use    Vaping Use: Never used   Substance and Sexual Activity    Alcohol use: Yes     Comment: 1 beer a month    Drug use: Never    Sexual activity: Not on file   Other Topics Concern    Not on file   Social History Narrative    Not on file     Social Determinants of  Health     Financial Resource Strain: Not on file   Food Insecurity: Not on file   Transportation Needs: Not on file   Physical Activity: Not on file   Stress: Not on file   Social Connections: Not on file   Intimate Partner Violence: Not on file   Housing Stability: Not on file            Family History:   Reviewed and edited as appropriate  No family history on file.   No known history of colorectal cancer, liver disease, or inflammatory bowel disease.         Allergies:   Reviewed and edited as appropriate     Allergies   Allergen Reactions    Bee Venom Hives and Other (See Comments)     Other reaction(s): Other (see comments)  Not honey bees. Unknown bee  Per pt  Per pt  Per pt  Per pt      Simvastatin Muscle Pain (Myalgia) and Other (See Comments)     Other reaction(s): Muscle Aches, Other (see comments)  Patient reports muscle stiffness  Patient reports muscle stiffness  myalgias  myalgias      Vinblastine Other (See Comments)     Other reaction(s): Other (see comments)  Patient reported paralyzation of colon  Per pt  Patient reported paralyzation of colon  Per pt      Vincristine Other (See Comments)     Patient reports paralyzation of his colon  Patient reports paralyzation of his colon  Patient reports paralyzation of his colon  Patient reports paralyzation of his colon      Lina-Kit Bee Sting     Nkda [No Known Drug Allergy]             Medications:     Facility-Administered Medications Prior to Admission   Medication Dose Route Frequency Provider Last Rate Last Admin    betamethasone acet & sod phos (CELESTONE) injection 6 mg  6 mg   Tiago Lester MD   6 mg at 01/24/23 1030     Medications Prior to Admission   Medication Sig Dispense Refill Last Dose    acetaminophen (TYLENOL) 500 MG tablet Take 500-1,000 mg by mouth every 8 hours as needed for mild pain   7/28/2023 at unknown    calcium carbonate 750 MG CHEW Take 750 mg by mouth At Bedtime   8/1/2023 at unknown    dapagliflozin (FARXIGA) 10 MG TABS  tablet Take 1 tablet by mouth daily   8/1/2023 at am    emollient (VANICREAM) external cream Apply topically daily as needed for other (rash on hands and feet)   7/29/2023 at unknown    furosemide (LASIX) 20 MG tablet Take 20 mg by mouth as needed   7/30/2023 at unknown    levothyroxine (SYNTHROID/LEVOTHROID) 112 MCG tablet Take 1 tablet (112 mcg) by mouth daily 90 tablet 0 7/31/2023 at pm    lipase-protease-amylase (CREON 12) 45538-89764-44346 units CPEP Take 2-3 capsules by mouth 3 times daily (with meals)   8/1/2023 at am    lovastatin (MEVACOR) 40 MG tablet Take 1 tablet (40 mg) by mouth At Bedtime 90 tablet 3 7/31/2023 at pm    melatonin 3 MG tablet Take 3 mg by mouth nightly as needed for sleep   Past Month at unknown    metoprolol succinate ER (TOPROL XL) 25 MG 24 hr tablet Take 12.5 mg by mouth 2 times daily   8/1/2023 at am    Multiple Vitamin (MULTIVITAMIN) per tablet Take 1 tablet by mouth 2 times daily   8/1/2023 at am    ondansetron (ZOFRAN ODT) 8 MG ODT tab Take 1 tablet (8 mg) by mouth every 8 hours as needed for nausea 30 tablet 1 7/24/2023 at unknown    polyethylene glycol 3350 POWD Take 1 packet by mouth as needed   7/29/2023 at unknown    potassium chloride ER (K-TAB/KLOR-CON) 10 MEQ CR tablet Take 20 mEq by mouth daily   8/1/2023 at am    prochlorperazine (COMPAZINE) 10 MG tablet Take 10 mg by mouth every 6 hours as needed for nausea       venlafaxine (EFFEXOR XR) 150 MG 24 hr capsule Take 150 mg by mouth every morning With 75 mg capsule   8/1/2023 at am    venlafaxine (EFFEXOR XR) 75 MG 24 hr capsule Take 75 mg by mouth every morning With 150 mg capsule   8/1/2023 at am    vitamin D3 (CHOLECALCIFEROL) 50 mcg (2000 units) tablet Take 1 tablet by mouth daily   8/1/2023 at am    capecitabine (XELODA) 500 MG tablet Take 2 tablets (1,000 mg) by mouth 2 times daily for 14 days Take for 14 days, then off for 21 days. Take within 30 mins after meal. 56 tablet 0     capecitabine (XELODA) 500 MG tablet  Take 2 tablets (1,000 mg) by mouth 2 times daily for 14 days Take with 150 mg tablet for total dose of 1,150 mg. Take for 14 days, then off for 14 days. Take within 30 mins after meal. 56 tablet 0     temozolomide (TEMODAR) 100 MG capsule Take 4 capsules (400 mg) by mouth At Bedtime for 5 days Take on empty stomach, Days 10 thru 14. Take Zofran 30-60 min before Temodar. 20 capsule 0              Review of Systems:     A complete 10 point review of systems was performed and is negative except as noted in the HPI           Physical Exam:   /66 (BP Location: Left arm)   Pulse 98   Temp 98.6  F (37  C) (Oral)   Resp 23   Wt 70.7 kg (155 lb 14.4 oz)   SpO2 100%   BMI 22.69 kg/m    Wt:   Wt Readings from Last 2 Encounters:   08/02/23 70.7 kg (155 lb 14.4 oz)   07/27/23 73 kg (161 lb)      Constitutional: No acute distress, chronically ill, resting comfortably in bed  Eyes: Sclera icteric  Ears/nose/mouth/throat: Moist mucus membranes, hearing intact  Neck: supple  CV: No edema  Respiratory: Breathing comfortably on room air  Abd: Soft, nontender, nondistended, bowel sounds present  Skin: warm, perfused, no jaundice  Neuro: AAO x 3,, slow speech   Psych: Normal affect  MSK: No gross deformities         Data:   Labs and imaging below were independently reviewed and interpreted    BMP  Recent Labs   Lab 08/02/23  0548 08/01/23  1512 07/28/23  1439 07/26/23  1515   * 134* 136  --    POTASSIUM 4.6 4.2 4.1  --    CHLORIDE 101 100 100  --    CARLOS 8.7* 8.4* 8.4*  --    CO2 23 23 25  --    BUN 22.8 17.3 14.2  --    CR 1.24* 1.06 1.03 1.1   GLC 93 81 108*  --      CBC  Recent Labs   Lab 08/02/23  0548 08/01/23  1512 07/28/23  1440 07/26/23  1427   WBC 13.2* 10.5 6.4 8.7   RBC 2.34* 2.35* 2.45* 2.38*   HGB 8.3* 8.3* 8.9* 9.1*   HCT 25.8* 25.7* 26.8* 25.2*   * 109* 109* 106*   MCH 35.5* 35.3* 36.3* 38.2*   MCHC 32.2 32.3 33.2 36.1   RDW 22.0* 22.0* 21.8* 21.5*   * 102* 84* 84*     INRNo lab results  found in last 7 days.  LFTs  Recent Labs   Lab 08/02/23  0548 08/01/23  1512 07/28/23  1439   ALKPHOS 384* 383* 114   *  --  47*   ALT 66 55 21   BILITOTAL 2.8* 2.3* 1.5*   PROTTOTAL 6.6 6.5 7.0   ALBUMIN 2.7* 2.7* 3.1*      PANCNo lab results found in last 7 days.    Imaging: reviewed CTs and US    Endoscopy: reviewed EUS 6/13/23

## 2023-08-02 NOTE — H&P
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 He came here today because of a sudden epigastric pain for ~30 mins.     # Acute epigastric pain  His acute episode of epigastric pain that last ~30 mins have a broad differential of heart, lung or GI in origin.     The fact the the pain radiated to jaw is definitely concern for acute coronary syndrome. However, his EKG showed no STT change nor TWI and troponin T were stable at 35->35 (2hr). His congestion seems to be improving clinically with evidence of decreasing BNP from ~8000 on 7/12 and today ~6000.    For lung, pulmonary embolism can cause acute epigastric pain, but shouldn't subside without treatment. However, due to his high risk of active cancer and paroxysmal AF, CTPA was sent and was negative. His bilateral pleural effusions are moderate and unchanged from 7/26/23 scan, but increased significantly from 6/22/23 scan. Per chart reviews, he had thoracentesis done 2 times, in 8/2021 and 1/2022, both cytologies were negative for malignancy. This shouldn't cause a new episode of epigastric pain considering its chronicity.    For GI structure, he pain can fit perfectly with biliary colic and he has pancreatic mass with biliary obstruction that need stenting. He doesn't have any fever or abdominal tenderness on exams. His ALP and bilirubin are at least double from 5 days ago, concerning for acute cholangitis. His CBC showed no leukocytosis with absolute neutrophil 10.0. US abdomen was sent. There are no evidence of acute cholecystitis with good stent positions. I still  concern about malignant biliary obstruction but without active infection.     To summarize, we don't see definitive etiologies of his pain, but we are able to rule out most of the lethal causes of epigastric pain. I will continue to observe his symptoms.     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 The last follow up CT CAP on 7/26 showed stable disease.     He has elevated ALP and bilirubin as discussed above. Need further evaluation     - Follow LFT AM  - Consult Onco    # History of HFrEF now with recovered EF (55-60%)  # Mitral regurg S/P mitral valve clip implantation   # Mild-mod aortic insufficiency  # High BNP  Per chart review, pt had EF of ~30% in 2/2022 in the setting of MR. Underwent mitraclip and GDMT and now with recovered EF. Last echo on 6/26/23 showed normal LVEF 55-60% with mild to moderate aortic insufficiency.   He has progressive worsening of heart failure after discharging on 6/26, probably due to medication changes from his orthostatic hypotension. His heart failure has been improving as discuss above after medication adjustment. His current intravascular status is needed to be better evaluated. After he get Lasix 40 IV in the ED, he has 350mL urine after ~6 hr. Bladder was not distended per bladder scan.     - Monitor I/O    #Orthostatic hypotension likely 2/2 autonomic dysfunction  He feeling faint when he changes positions/walking but not when he lies on bed.  At home, he has baseline BP of ~120/60, HR90, O2 >94%. He has frequent urination which requires  multiple trips to toilets, this trouble him because he was feeling dizzy every times he walks. He is not on Florinef now.     Today, he has an episode of low BP of ~85/45 at 10pm (measured on lying down), but with full conscious, no dizziness, no chest pain and full pulse.     - Monitor BP    #Paroxysmal atrial fibrillation   #Hx of PE  Recently off Eliquis on 6/27/23 after discussing with his oncologist. Patients and family decided to stop taking due to frequent falls.    # Frequent urination  - Condom urine bag    # Anemia       Diet:  regualr  DVT Prophylaxis: Pneumatic Compression Devices  Craft Catheter: Not present  Fluids: none  Lines: None     Cardiac Monitoring: None  Code Status:  Full (He said that he is now full code, but considers DNR/DNI if his disease is very terminal)    Clinically Significant Risk Factors Present on Admission              # Hypoalbuminemia: Lowest albumin = 2.7 g/dL at 8/1/2023  3:12 PM, will monitor as appropriate   # Thrombocytopenia: Lowest platelets = 102 in last 2 days, will monitor for bleeding                 Disposition Plan      Expected Discharge Date: 08/03/2023                The patient's care was discussed with the Attending Physician, Dr. Bojorquez .      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 He came here today because of a sudden epigastric pain for ~30 mins.     History is obtained from the patient, his wife, and 2 sons.    History of Present Illness     He was recently admitted at [facility] during 6/22-26/23 for orthostatic  hypotension thought to be secondary to autonomic dysfunction. At discharge, Metoprolol/Losartan/Spironolactone were discontinued and Florinef was started.     Since going back home, he had more congestion with more shortness of breath and increased weight, so he doubled his Lasix and decreased Florinef from 0.2 to 0.12 mg per his son's (who is a PA) advice. The congestion improved with less leg swelling and without orthopnea or PND. He visited cardiologist again yesterday 7/31 and was told that he was too dry. Lasix 20 mg was decreased from daily to prn.     This morning, he experienced a sudden epigastric pain that radiated to both of his jaws (and not to his back) that last around 30 mins. The pain was acid-like in nature and he though it was a heartburn. He took a few TUMS, but it didn't help. His vitals measured at home including O2 saturation were unchanged from his baseline. He said he never had this kind of pain before in his life. EMS gave him  and nebulizer. The pain improved.    His legs are less swelling than before. There is no orthopnea or PND and he can sleep well. He needs to wake up every 30 mins to go to the toilet, which he had a small amount of urine each time. He has dizziness when he changes positions/walk but not when he lies on bed. The dizziness is described as a feeling about to faint without room spinning. At home, he has baseline BP of ~120/60, HR90, O2 >94%.    He has no fever, nausea/vomiting, cough, jaundice or belly pain. Her has bowel movement everyday with a help from Miralax. His stool is yellow without blood.    He lives with his wife in [place] and has 2 sons live nearby. One of his son is a PA. He never smoke. He is not drinking alcohol.    Past Medical History    Past Medical History:   Diagnosis Date    Anemia     Anticoagulated     Anxiety     Biliary obstruction     Chronic kidney disease     Chronic right shoulder pain     Depression     Difficult intravenous  access     NEED UTRASOUND TO FIND VEIN    H/O thrombocytopenia     History of pulmonary embolism     Hodgkin lymphoma, nodular sclerosis (H) 1988    s/p radiation    Hypothyroidism     ILD (interstitial lung disease) (H)     Lymphedema of right upper extremity     Neuroendocrine tumor of pancreas     with liver metastasis    NICM (nonischemic cardiomyopathy) (H)     Nonsenile cataract     Pancreatic insufficiency        Past Surgical History   Past Surgical History:   Procedure Laterality Date    CARDIAC SURGERY  04/22/2022    s/p MitraClip    ENDOSCOPIC RETROGRADE CHOLANGIOPANCREATOGRAM COMPLEX N/A 6/12/2023    Procedure: ENDOSCOPIC RETROGRADE CHOLANGIOPANCREATOGRAPHY with dilation and hepaticduodenostomy,  stent placement;  Surgeon: Guru Alexander Joe MD;  Location: [facility] OR    ENDOSCOPIC ULTRASOUND UPPER GASTROINTESTINAL TRACT (GI) N/A 6/12/2023    Procedure: ENDOSCOPIC ULTRASOUND, ESOPHAGOSCOPY / UPPER GASTROINTESTINAL TRACT (GI);  Surgeon: Guru Alexander Joe MD;  Location: [facility] OR    ESOPHAGOSCOPY, GASTROSCOPY, DUODENOSCOPY (EGD), COMBINED N/A 6/7/2023    Procedure: Esophagoscopy, gastroscopy, duodenoscopy (EGD), combined;  Surgeon: Guru Alexander Joe MD;  Location: [facility] OR    SPLENECTOMY  1988       Prior to Admission Medications   Prior to Admission Medications   Prescriptions Last Dose Informant Patient Reported? Taking?   Multiple Vitamin (MULTIVITAMIN) per tablet 8/1/2023 at am  Yes Yes   Sig: Take 1 tablet by mouth 2 times daily   acetaminophen (TYLENOL) 500 MG tablet 7/28/2023 at unknown  Yes Yes   Sig: Take 500-1,000 mg by mouth every 8 hours as needed for mild pain   calcium carbonate 750 MG CHEW 8/1/2023 at unknown  Yes Yes   Sig: Take 750 mg by mouth At Bedtime   capecitabine (XELODA) 500 MG tablet   No No   Sig: Take 2 tablets (1,000 mg) by mouth 2 times daily for 14 days Take with 150 mg tablet for total dose of 1,150 mg. Take for 14 days, then off for  14 days. Take within 30 mins after meal.   capecitabine (XELODA) 500 MG tablet   No No   Sig: Take 2 tablets (1,000 mg) by mouth 2 times daily for 14 days Take for 14 days, then off for 21 days. Take within 30 mins after meal.   dapagliflozin (FARXIGA) 10 MG TABS tablet 8/1/2023 at am  Yes Yes   Sig: Take 1 tablet by mouth daily   emollient (VANICREAM) external cream 7/29/2023 at unknown  Yes Yes   Sig: Apply topically daily as needed for other (rash on hands and feet)   furosemide (LASIX) 20 MG tablet 7/30/2023 at unknown  Yes Yes   Sig: Take 20 mg by mouth as needed   levothyroxine (SYNTHROID/LEVOTHROID) 112 MCG tablet 7/31/2023 at pm  No Yes   Sig: Take 1 tablet (112 mcg) by mouth daily   lipase-protease-amylase (CREON 12) 55605-80286-76567 units CPEP 8/1/2023 at am  Yes Yes   Sig: Take 2-3 capsules by mouth 3 times daily (with meals)   lovastatin (MEVACOR) 40 MG tablet 7/31/2023 at pm  No Yes   Sig: Take 1 tablet (40 mg) by mouth At Bedtime   melatonin 3 MG tablet Past Month at unknown  Yes Yes   Sig: Take 3 mg by mouth nightly as needed for sleep   metoprolol succinate ER (TOPROL XL) 25 MG 24 hr tablet 8/1/2023 at am  Yes Yes   Sig: Take 12.5 mg by mouth 2 times daily   ondansetron (ZOFRAN ODT) 8 MG ODT tab 7/24/2023 at unknown  No Yes   Sig: Take 1 tablet (8 mg) by mouth every 8 hours as needed for nausea   polyethylene glycol 3350 POWD 7/29/2023 at unknown  Yes Yes   Sig: Take 1 packet by mouth as needed   potassium chloride ER (K-TAB/KLOR-CON) 10 MEQ CR tablet 8/1/2023 at am  Yes Yes   Sig: Take 20 mEq by mouth daily   prochlorperazine (COMPAZINE) 10 MG tablet   Yes Yes   Sig: Take 10 mg by mouth every 6 hours as needed for nausea   temozolomide (TEMODAR) 100 MG capsule   No No   Sig: Take 4 capsules (400 mg) by mouth At Bedtime for 5 days Take on empty stomach, Days 10 thru 14. Take Zofran 30-60 min before Temodar.   venlafaxine (EFFEXOR XR) 150 MG 24 hr capsule 8/1/2023 at am  Yes Yes   Sig: Take 150 mg by  mouth every morning With 75 mg capsule   venlafaxine (EFFEXOR XR) 75 MG 24 hr capsule 8/1/2023 at am  Yes Yes   Sig: Take 75 mg by mouth every morning With 150 mg capsule   vitamin D3 (CHOLECALCIFEROL) 50 mcg (2000 units) tablet 8/1/2023 at am  Yes Yes   Sig: Take 1 tablet by mouth daily      Facility-Administered Medications Last Administration Doses Remaining   betamethasone acet & sod phos (CELESTONE) injection 6 mg 1/24/2023 10:30 AM               Physical Exam   Vital Signs: Temp: 97.9  F (36.6  C) Temp src: Oral BP: 103/43 Pulse: 112   Resp: 14 SpO2: 96 % O2 Device: None (Room air) Oxygen Delivery: 2 LPM  Weight: 0 lbs 0 oz    General Appearance: Lying on bed, alert and oriented, not in pain, not pain, no jaundice  Respiratory: decreased breath sounds both lung, no retraction, no wheezing  Cardiovascular: regular, no murmur  GI: normoactive bowel sound, soft, no tenderness or guarding, no liver enlargement  Ext : ankle edema 1+ both    Medical Decision Making             Data     I have personally reviewed the following data over the past 24 hrs:    10.5  \   8.3 (L)   / 102 (L)     134 (L) 100 17.3 /  81   4.2 23 1.06 \     ALT: 55 AST: N/A AP: 383 (H) TBILI: 2.3 (H)   ALB: 2.7 (L) TOT PROTEIN: 6.5 LIPASE: N/A     Trop: 35 (H) BNP: 5,860 (H)       Imaging results reviewed over the past 24 hrs:   Recent Results (from the past 24 hour(s))   CT Chest Pulmonary Embolism w Contrast    Narrative    EXAMINATION: CTA pulmonary angiogram, 8/1/2023 5:05 PM     COMPARISON: 7/26/2023    HISTORY:    History of PE, sudden onset CP and shortness of breath    TECHNIQUE: Volumetric helical acquisition of CT images of the chest  from the lung apices to the kidneys were acquired after the  administration of 60 mL of Isovue-370 IV contrast. Flash technique  with free breathing acquisition.  Post-processed multiplanar and/or  MIP reformations were obtained, archived to PACS and used in  interpretation of this study.     FINDINGS:   Contrast bolus is: excellent.  Exam is negative for acute  pulmonary embolism.    The largest right ventricle transaxial diameter is (measured from  endocardium to endocardium): 4.4 cm   The largest left ventricle transaxial diameter is (measured from  endocardium to endocardium): 4.3 cm  RV/LV ratio is: 1.0 (if ratio greater than 1.1 then sign is suspicious  for right heart strain)  Reflux of contrast into the IVC? no  Paradoxical bowing of the interventricular septum to the left? no  Pericardial effusion?:no    Atrophic thyroid. No lower cervical or axillary lymphadenopathy.  Normal size and configuration of the medial intrathoracic vessels.  Normal cardiac size. No pericardial effusion. Moderate sized bilateral  pleural effusions with associated atelectasis, similar to prior. Faint  areas of nodular opacities particularly along the left lower lobe,  such as 9 x 3 mm (series 5 image 23) and groundglass nodule measuring  8 x 4 mm (series 5 image 50). Bilateral apical faint groundglass  opacities. Unremarkable esophagus. Limited noncontrast evaluation of  the upper abdomen with visualization of the 4 cm pancreatic head mass,  biliary stenting, stable pneumobilia. Otherwise unremarkable.      Impression    IMPRESSION:   1. Exam is negative for acute pulmonary embolism.    Evidence for right heart strain or increased right heart pressures?   no.     2. Stable moderate sized bilateral pleural effusions with associated  atelectasis.    3. Left lower lobe nodular opacities of indeterminate etiology,  similar to prior, infectious, inflammatory, or malignant.     4. Partially evaluated pancreatic head mass and biliary stent..    In the event of a positive result for acute pulmonary embolism  resulting in right heart strain, consider calling the   [facility] patient placement ([phone]) for PERT (Pulmonary Embolism  Response Team) Activation?    PERT -- Pulmonary Embolism Response Team (Multidisciplinary team  including  cardiology, interventional radiology, critical care,  hematology)    I have personally reviewed the examination and initial interpretation  and I agree with the findings.    MARY ANNE GOYAL DO         SYSTEM ID:  [id number]   US Abdomen Limited (RUQ)    Narrative    EXAMINATION: Limited Abdominal Ultrasound, 8/1/2023 7:21 PM     COMPARISON: CT CAP 7/26/2023    HISTORY:    Biliary stent, trending up LFT    FINDINGS:   Fluid: Large right pleural effusion. No evidence of ascites.    Liver: Suboptimal visualization of the liver with normal echotexture  in the visualized right hepatic lobe. The liver measures 12.1 cm in  craniocaudal dimension. There is no focal mass in the right hepatic  lobe.     Gallbladder: Normally distended containing sludge. No shadowing  cholelithiasis. No cholecystic fluid or significant gallbladder wall  thickening.    Bile Ducts: Common bile duct stent is visualized in appropriate  positioning.    Pancreas: Visualized portions of the head and body of the pancreas are  unremarkable.     Kidney: The right kidney measures 12.6 cm long. There is no  hydronephrosis or hydroureter, no shadowing renal calculi, cystic  lesion or mass.       Impression    IMPRESSION:   1.  Common bile duct stent in appropriate position.  2.  Gallbladder sludge without evidence of acute cholecystitis.   3.  Large right pleural effusion.  4.  Suboptimal visualization of the liver without focal abnormalities.    I have personally reviewed the examination and initial interpretation  and I agree with the findings.    MARY ANNE GOYAL DO         SYSTEM ID:  [id number]

## 2023-08-02 NOTE — ED NOTES
Updated Provider:    ED RM 18    FYI:    ouput 350ml    mario alberto scan: >200    Vickie [phone]          Vickie Molina MSN, RN

## 2023-08-02 NOTE — PROGRESS NOTES
Admission          8/2/2023  3:40 AM  -----------------------------------------------------------  Diagnosis: Epigastric pain & Elevated BNP    Admitted from: ED  Report given from: LAURA Sandra  Via: Bed  Accompanied by: Transport  Family Aware of Admission: Yes  Belongings: Clothes, shoes, glasses, watch.   Admission Profile: Complete  Teaching: Orientation to unit, call don't fall, use of call light, meal times, visiting hours, when to call for the RN (angina/sob/dizziness, etc.), and enforced importance of safety.  Access: L PIV  Telemetry: Placed on pt  Ht./Wt.: Complete  2 RN skin assessment: Completed by  and Meseret FAJRADO RN    Temp:  [97.6  F (36.4  C)-97.9  F (36.6  C)] 97.6  F (36.4  C)  Pulse:  [] 93  Resp:  [13-34] 14  BP: ()/(41-59) 116/59  SpO2:  [85 %-100 %] 100 %

## 2023-08-02 NOTE — CONSULTS
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 Parker  Current Treatment Plan: Recently discontinued/held palliative first line capecitebine/temozolomide (8/2021 to 7/2023) due to functional decline    Reason for Consult:   Reason for Consult pancreatic neuroendocrine tumor S/P biliary stent with increased ALP, bilateral pleural effusion - need to tap?     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     Atypical chest/epigastric pain  Troponin elevation  Neutrophilia  Rising LFTs -- possibly 2/2 liver mets, US w/o e/o recurrent hepatobiliary obstruction  LEÓN  Increasing bilateral pleural effusions -- unlikely related to malignancy, prior cytology 8/6/21 and 2/1/22 negative for malignancy (care everywhere)  Subacute mild macrocytic anemia and moderate thrombocytopenia -- likely due to chemotherapy, though is at risk for tr-MDS  In the setting of:  Stage IV grade 3 well-differentiated PNET (Ki-67 60-70%, minimal activity on dotatate scan, JAK1 and BCORL1 mutated, TMB 14/mb, PDL1 <1%) metastatic to liver most recently on palliative first line CAPTEM (8/2021 to 7/2023), held due to functional decline  Hx malignant biliary obstruction s/p choledochoduodenostomy and stenting 6/12/23  Hand-foot syndrome (improved)  Cancer-associated VTE -- PE 1/27/22, discontinued AC 6/2023 due to bleeding risk with recurrent falls   Pancreatic insufficiency on creon  Hx Stage IIB nodular sclerosing Hodgkin's s/p mantle/periaortic field radiation 1989 c/b relapse 1/1990 s/p MOPP/ABV x6 cycles  Staging laparotomy with splenectomy, liver and LN biopsies,  "and appendectomy and splenectomy 88  Right arm lymphedema  Subclinical hypothyroidism  Hx Severe MR c/b HFrEF (30%) s/p mitraclip 2022 now with recovered EF (55%)  Paroxysmal atrial fibrillation -- no AC as above  Orthostatic hypotension 2/2 autonomic dysfunction on fludrocortisone  Limited IV access -- pt has been opposed to port placement    ECO-3  Access: peripheral    Plan & Recommendations:   Consider GI consultation considering rising LFTs and recent advanced endoscopic procedure 23  Recommend workup of anemia with iron studies/ferritin, B12, folate (ordered).    We will continue to follow this patient. Please do not hesitate to page with any questions or concerns.  Patient was seen and plan of care was discussed with attending physician Dr. Hayes.    Desean Abernathy MD   PGY5 Hematology/Oncology Fellow   [facility] \"ONCOLOGY/HEMATOLOGY/BONE MARROW TRANSPLANT BMT MEDICINE ADULT/ [facility]\"    ==============================================================================    History of Present Illness:    yTrel Harrell was at home yesterday when he developed new onset substernal/epigastric burning pain with associated jaw pain.  This did not improve with taking an antacid at home and his wife prompted him to call EMS.  He was brought in with a concern for ACS, and had mildly elevated high sensitive troponins of 34 that were stable on repeat testing.  He was also noted to have slightly elevated LFTs compared to those checked in clinic recently.  At the time of our visit today his symptoms had completely resolved and he does not currently have any abdominal epigastric pain.    Oncologic History:  Per oncology clinic note 23 by Dr. Parker:  \"Tyrel Harrell returns today in follow-up of metastatic well differentiated high-grade neuroendocrine tumor of his pancreas.     He is 72 years old and has been previously cured of Hodgkin's disease and currently has a grade 3 (Ki-67 greater than 60%) " "well-differentiated neuroendocrine tumor in the head of his pancreas with extensive liver metastasis and minimal dotatate uptake on PET scanning.  He has had CAPTEM for nearly 2 years though with an interruption last year for repair of his mitral valve last summer.  At her last response assessment 2 months ago he had stable disease and he returns today for his next planned assessment.\"    Subjective    Review of Systems:  A comprehensive ROS was performed and found to be negative or non-contributory with the exception of that noted in the HPI above.    Past Medical History:   Diagnosis Date    Anemia     Anticoagulated     Anxiety     Biliary obstruction     Chronic kidney disease     Chronic right shoulder pain     Depression     Difficult intravenous access     NEED UTRASOUND TO FIND VEIN    H/O thrombocytopenia     History of pulmonary embolism     Hodgkin lymphoma, nodular sclerosis (H) 1988    s/p radiation    Hypothyroidism     ILD (interstitial lung disease) (H)     Lymphedema of right upper extremity     Neuroendocrine tumor of pancreas     with liver metastasis    NICM (nonischemic cardiomyopathy) (H)     Nonsenile cataract     Pancreatic insufficiency      Past Surgical History:   Procedure Laterality Date    CARDIAC SURGERY  04/22/2022    s/p MitraClip    ENDOSCOPIC RETROGRADE CHOLANGIOPANCREATOGRAM COMPLEX N/A 6/12/2023    Procedure: ENDOSCOPIC RETROGRADE CHOLANGIOPANCREATOGRAPHY with dilation and hepaticduodenostomy,  stent placement;  Surgeon: Guru Alexander Joe MD;  Location: [facility] OR    ENDOSCOPIC ULTRASOUND UPPER GASTROINTESTINAL TRACT (GI) N/A 6/12/2023    Procedure: ENDOSCOPIC ULTRASOUND, ESOPHAGOSCOPY / UPPER GASTROINTESTINAL TRACT (GI);  Surgeon: Guru Alexander Joe MD;  Location: [facility] OR    ESOPHAGOSCOPY, GASTROSCOPY, DUODENOSCOPY (EGD), COMBINED N/A 6/7/2023    Procedure: Esophagoscopy, gastroscopy, duodenoscopy (EGD), combined;  Surgeon: Guru Tatyana " Alexander Clements MD;  Location: [facility]  1988     Social History     Socioeconomic History    Marital status:    Tobacco Use    Smoking status: Never     Passive exposure: Never    Smokeless tobacco: Never   Vaping Use    Vaping Use: Never used   Substance and Sexual Activity    Alcohol use: Yes     Comment: 1 beer a month    Drug use: Never      No family history on file.  Facility-Administered Medications Prior to Admission   Medication Dose Route Frequency Provider Last Rate Last Admin    betamethasone acet & sod phos (CELESTONE) injection 6 mg  6 mg   Tiago Lester MD   6 mg at 01/24/23 1030     Medications Prior to Admission   Medication Sig Dispense Refill Last Dose    acetaminophen (TYLENOL) 500 MG tablet Take 500-1,000 mg by mouth every 8 hours as needed for mild pain   7/28/2023 at unknown    calcium carbonate 750 MG CHEW Take 750 mg by mouth At Bedtime   8/1/2023 at unknown    dapagliflozin (FARXIGA) 10 MG TABS tablet Take 1 tablet by mouth daily   8/1/2023 at am    emollient (VANICREAM) external cream Apply topically daily as needed for other (rash on hands and feet)   7/29/2023 at unknown    furosemide (LASIX) 20 MG tablet Take 20 mg by mouth as needed   7/30/2023 at unknown    levothyroxine (SYNTHROID/LEVOTHROID) 112 MCG tablet Take 1 tablet (112 mcg) by mouth daily 90 tablet 0 7/31/2023 at pm    lipase-protease-amylase (CREON 12) 17486-43320-14867 units CPEP Take 2-3 capsules by mouth 3 times daily (with meals)   8/1/2023 at am    lovastatin (MEVACOR) 40 MG tablet Take 1 tablet (40 mg) by mouth At Bedtime 90 tablet 3 7/31/2023 at pm    melatonin 3 MG tablet Take 3 mg by mouth nightly as needed for sleep   Past Month at unknown    metoprolol succinate ER (TOPROL XL) 25 MG 24 hr tablet Take 12.5 mg by mouth 2 times daily   8/1/2023 at am    Multiple Vitamin (MULTIVITAMIN) per tablet Take 1 tablet by mouth 2 times daily   8/1/2023 at am    ondansetron (ZOFRAN ODT) 8 MG ODT tab  Take 1 tablet (8 mg) by mouth every 8 hours as needed for nausea 30 tablet 1 7/24/2023 at unknown    polyethylene glycol 3350 POWD Take 1 packet by mouth as needed   7/29/2023 at unknown    potassium chloride ER (K-TAB/KLOR-CON) 10 MEQ CR tablet Take 20 mEq by mouth daily   8/1/2023 at am    prochlorperazine (COMPAZINE) 10 MG tablet Take 10 mg by mouth every 6 hours as needed for nausea       venlafaxine (EFFEXOR XR) 150 MG 24 hr capsule Take 150 mg by mouth every morning With 75 mg capsule   8/1/2023 at am    venlafaxine (EFFEXOR XR) 75 MG 24 hr capsule Take 75 mg by mouth every morning With 150 mg capsule   8/1/2023 at am    vitamin D3 (CHOLECALCIFEROL) 50 mcg (2000 units) tablet Take 1 tablet by mouth daily   8/1/2023 at am    capecitabine (XELODA) 500 MG tablet Take 2 tablets (1,000 mg) by mouth 2 times daily for 14 days Take for 14 days, then off for 21 days. Take within 30 mins after meal. 56 tablet 0     capecitabine (XELODA) 500 MG tablet Take 2 tablets (1,000 mg) by mouth 2 times daily for 14 days Take with 150 mg tablet for total dose of 1,150 mg. Take for 14 days, then off for 14 days. Take within 30 mins after meal. 56 tablet 0     temozolomide (TEMODAR) 100 MG capsule Take 4 capsules (400 mg) by mouth At Bedtime for 5 days Take on empty stomach, Days 10 thru 14. Take Zofran 30-60 min before Temodar. 20 capsule 0      No current outpatient medications on file.     Objective   Physical Exam:    Blood pressure 116/59, pulse 93, temperature 97.6  F (36.4  C), temperature source Oral, resp. rate 14, weight 70.7 kg (155 lb 14.4 oz), SpO2 100 %.  Physical Exam  Constitutional:       General: He is not in acute distress.  HENT:      Head: Normocephalic and atraumatic.      Mouth/Throat:      Mouth: Mucous membranes are moist.   Eyes:      General: Scleral icterus present.   Cardiovascular:      Rate and Rhythm: Normal rate and regular rhythm.   Pulmonary:      Effort: Pulmonary effort is normal. No respiratory  distress.      Breath sounds: No wheezing or rales.   Abdominal:      General: There is no distension.      Palpations: Abdomen is soft.      Tenderness: There is no abdominal tenderness. There is no guarding.   Skin:     General: Skin is warm and dry.      Coloration: Skin is jaundiced.   Neurological:      Mental Status: He is alert. Mental status is at baseline.   Psychiatric:         Mood and Affect: Mood normal.         Labs & Studies: I personally reviewed the following studies:  ROUTINE LABS (Last four results):  CMP  Recent Labs   Lab 08/02/23  0548 08/01/23  1512 07/28/23  1439 07/26/23  1515   * 134* 136  --    POTASSIUM 4.6 4.2 4.1  --    CHLORIDE 101 100 100  --    CO2 23 23 25  --    ANIONGAP 11 11 11  --    GLC 93 81 108*  --    BUN 22.8 17.3 14.2  --    CR 1.24* 1.06 1.03 1.1   GFRESTIMATED 62 75 78 >60   CARLOS 8.7* 8.4* 8.4*  --    MAG  --  1.7  --   --    PROTTOTAL 6.6 6.5 7.0  --    ALBUMIN 2.7* 2.7* 3.1*  --    BILITOTAL 2.8* 2.3* 1.5*  --    ALKPHOS 384* 383* 114  --    *  --  47*  --    ALT 66 55 21  --      CBC  Recent Labs   Lab 08/02/23  0548 08/01/23  1512 07/28/23  1440 07/26/23  1427   WBC 13.2* 10.5 6.4 8.7   RBC 2.34* 2.35* 2.45* 2.38*   HGB 8.3* 8.3* 8.9* 9.1*   HCT 25.8* 25.7* 26.8* 25.2*   * 109* 109* 106*   MCH 35.5* 35.3* 36.3* 38.2*   MCHC 32.2 32.3 33.2 36.1   RDW 22.0* 22.0* 21.8* 21.5*   * 102* 84* 84*     INRNo lab results found in last 7 days.    Imaging:  US Abdomen Limited (RUQ)   Final Result   IMPRESSION:    1.  Common bile duct stent in appropriate position.   2.  Gallbladder sludge without evidence of acute cholecystitis.    3.  Large right pleural effusion.   4.  Suboptimal visualization of the liver without focal abnormalities.      I have personally reviewed the examination and initial interpretation   and I agree with the findings.      MARY ANNE GOYLA DO            SYSTEM ID:  [id number]      CT Chest Pulmonary Embolism w Contrast   Final Result    IMPRESSION:    1. Exam is negative for acute pulmonary embolism.      Evidence for right heart strain or increased right heart pressures?    no.       2. Stable moderate sized bilateral pleural effusions with associated   atelectasis.      3. Left lower lobe nodular opacities of indeterminate etiology,   similar to prior, infectious, inflammatory, or malignant.       4. Partially evaluated pancreatic head mass and biliary stent..      In the event of a positive result for acute pulmonary embolism   resulting in right heart strain, consider calling the    [facility] patient placement ([phone]) for PERT (Pulmonary Embolism   Response Team) Activation?      PERT -- Pulmonary Embolism Response Team (Multidisciplinary team   including cardiology, interventional radiology, critical care,   hematology)      I have personally reviewed the examination and initial interpretation   and I agree with the findings.      MARY ANNE GOYAL DO            SYSTEM ID:  [id number]

## 2023-08-02 NOTE — ED NOTES
Updated Provider: Anurag Res      ED RM 18    soft BPs, 80s/40s, map 52-61, pt feeling tired, no CP, feeling SOB, pursed lip breathing, SPO2 98%.  Magdi Johnston [phone] provider assessed pt and ok with current pressures d/t medical history, will continue to monitor and update as needed.          Vickie Molina MSN, RN

## 2023-08-02 NOTE — UTILIZATION REVIEW
Admission Status; Secondary Review Determination    Under the authority of the Utilization Management Committee, the utilization review process indicated a secondary review on the above patient. The review outcome is based on review of the medical records, discussions with staff, and applying clinical experience noted on the date of the review.    (x) Inpatient Status Appropriate - This patient's medical care is consistent with medical management for inpatient care and reasonable inpatient medical practice.    RATIONALE FOR DETERMINATION:72-year-old male with history of non-Hodgkin's lymphoma in remission, heart failure with reduced ejection fraction now with recovered ejection fraction post MitraClip 4/2022, atrial fibrillation, metastatic neuroendocrine tumor of the pancreas with mets to the liver who now presents to the hospital with chest and epigastric pain as well as shortness of breath.  Patient also hospitalized the end of June with significant orthostatic hypotension felt to be from autonomic dysfunction.  Patient does have rising alkaline phosphatase and bilirubin along with hypoalbuminemia, recurrent significant orthostatic hypotension with systolic blood pressure dropping to 85, and due to patient's recent falls recent discontinuation of anticoagulation.  Patient has developed new progressive acute kidney injury on hospital day 2 as well as progressive increase in AST tripling since 7/28/2023 with persistent elevation of alkaline phosphatase also hold tripled since 28 July.  Patient also with new leukocytosis and significant left-sided shift with 92-95% neutrophils and 0% lymphocytes.  Patient thus will require greater than 2 nights in the hospital for ongoing evaluation and management appropriate for inpatient care.    At the time of admission with the information available to the attending physician more than 2 nights Hospital complex care was anticipated, based on patient risk of adverse outcome  if treated as outpatient and complex care required. Inpatient admission is appropriate based on the Medicare guidelines.    This document was produced using voice recognition software    The information on this document is developed by the utilization review team in order for the business office to ensure compliance. This only denotes the appropriateness of proper admission status and does not reflect the quality of care rendered.    The definitions of Inpatient Status and Observation Status used in making the determination above are those provided in the CMS Coverage Manual, Chapter 1 and Chapter 6, section 70.4.    Sincerely,    Popeye Bridges MD  Utilization Review  Physician Advisor  [facility].

## 2023-08-03 NOTE — PROGRESS NOTES
Shift 7330-8080    Pt admitted 8/1 for epigastric pain and elevated BNP. PMH includes HFrEF, Hodgkin's Lymphoma (in remission), orthostatic hypotension, Mitral Regurgitation s/p Mitraclip (2022), Paroxysmal Afib, Hx of Pes, Neuroendocrine pancreatic tumor with hepatic mets s/p stents.     Vitals: VSS, febrile spike temp of 100.6. Tylenol given. Sepsis protocol triggered, made team aware . Ordered Blood cultures . Results of lactic 1.2.   Neuro: A/O x2 disoriented to place and situation but waxes and wanes. Hallucinates at times and team is aware     Cardiac:  SR 90s. SBP 90s to 110s. R arm limb restriction. Denies dizziness, chest pain, or palpitations. +1 bilateral ankle edema           Respiratory:  O2 saturation > 92% on RA.   GI/:  Pain with urination. UOP via Primofit d/t frequency. LBM 8/2. Denies nausea   Diet/appetite:  2.4G Na/low fat / 1 L FR   Activity: SBA with walker  ..   Pain:  Denies  Skin: Peeling on bilateral soles of feet.   LDA's: L PIV - SL    Plan:  Clarification of code status with Wife and team. NPO at MN for ERCP.

## 2023-08-03 NOTE — ANESTHESIA PREPROCEDURE EVALUATION
Anesthesia Pre-Procedure Evaluation    Patient: Tyrel Harrell   MRN: [id number] : 1950        Procedure : Procedure(s):  ENDOSCOPIC RETROGRADE CHOLANGIOPANCREATOGRAPHY          Past Medical History:   Diagnosis Date    Anemia     Anticoagulated     Anxiety     Biliary obstruction     Chronic kidney disease     Chronic right shoulder pain     Depression     Difficult intravenous access     NEED UTRASOUND TO FIND VEIN    H/O thrombocytopenia     History of pulmonary embolism     Hodgkin lymphoma, nodular sclerosis (H)     s/p radiation    Hypothyroidism     ILD (interstitial lung disease) (H)     Lymphedema of right upper extremity     Neuroendocrine tumor of pancreas     with liver metastasis    NICM (nonischemic cardiomyopathy) (H)     Nonsenile cataract     Pancreatic insufficiency       Past Surgical History:   Procedure Laterality Date    CARDIAC SURGERY  2022    s/p MitraClip    ENDOSCOPIC RETROGRADE CHOLANGIOPANCREATOGRAM COMPLEX N/A 2023    Procedure: ENDOSCOPIC RETROGRADE CHOLANGIOPANCREATOGRAPHY with dilation and hepaticduodenostomy,  stent placement;  Surgeon: Guru Alexander Joe MD;  Location: [facility] OR    ENDOSCOPIC ULTRASOUND UPPER GASTROINTESTINAL TRACT (GI) N/A 2023    Procedure: ENDOSCOPIC ULTRASOUND, ESOPHAGOSCOPY / UPPER GASTROINTESTINAL TRACT (GI);  Surgeon: Guru Alexander Joe MD;  Location: [facility] OR    ESOPHAGOSCOPY, GASTROSCOPY, DUODENOSCOPY (EGD), COMBINED N/A 2023    Procedure: Esophagoscopy, gastroscopy, duodenoscopy (EGD), combined;  Surgeon: Guru Alexander Joe MD;  Location: [facility] OR    SPLENECTOMY        Allergies   Allergen Reactions    Bee Venom Hives and Other (See Comments)     Other reaction(s): Other (see comments)  Not honey bees. Unknown bee  Per pt  Per pt  Per pt  Per pt      Simvastatin Muscle Pain (Myalgia) and Other (See Comments)     Other reaction(s): Muscle Aches, Other (see comments)  Patient  reports muscle stiffness  Patient reports muscle stiffness  myalgias  myalgias      Vinblastine Other (See Comments)     Other reaction(s): Other (see comments)  Patient reported paralyzation of colon  Per pt  Patient reported paralyzation of colon  Per pt      Vincristine Other (See Comments)     Patient reports paralyzation of his colon  Patient reports paralyzation of his colon  Patient reports paralyzation of his colon  Patient reports paralyzation of his colon      Lina-Kit Bee Sting     Nkda [No Known Drug Allergy]       Social History     Tobacco Use    Smoking status: Never     Passive exposure: Never    Smokeless tobacco: Never   Substance Use Topics    Alcohol use: Yes     Comment: 1 beer a month      Wt Readings from Last 1 Encounters:   08/02/23 70.7 kg (155 lb 14.4 oz)        Anesthesia Evaluation   Pt has had prior anesthetic. Type: MAC and General.    No history of anesthetic complications       ROS/MED HX  ENT/Pulmonary: Comment: H/O ILD, denies symptoms    H/o Pleural effusion without residual symptoms   (-) tobacco use   Neurologic:  - neg neurologic ROS     Cardiovascular:     (+) Dyslipidemia - -   -  - -   Taking blood thinners   CHF etiology: NICM Last EF: 55-60%  NYHA classification: II.              dysrhythmias (PAF), a-fib,  valvular problems/murmurs type: MR s/p MitraClip using 2 clips 4/22/22.    Previous cardiac testing   Echo: Date: 6/23/23 Results:  Global and regional left ventricular function is normal with an EF of 55-60%.  Right ventricular function, chamber size, wall motion, and thickness are  normal.  S/P MitraClip. Mean mitral gradient 8mmHg. Mild to moderate residual MR.  Mild to moderate aortic insufficiency is present.  Right ventricular systolic pressure is 41mmHg above the right atrial pressure.  Sinuses of Valsalva 4.2 cm.  Mild dilatation of the aorta is present.  IVC diameter <2.1 cm collapsing >50% with sniff suggests a normal RA pressure  of 3 mmHg.  No pericardial  effusion is present.  There is no prior study for direct comparison.  Stress Test:  Date: Results:    ECG Reviewed:  Date: 8/1/23 Results:  Sinus tachycardia  Nonspecific ST and T wave abnormality  Cath:  Date: Results:      METS/Exercise Tolerance: 4 - Raking leaves, gardening Comment: Able to walk 1/3 mile    Hematologic:     (+) History of blood clots,    pt is not anticoagulated, anemia,       (-) history of blood transfusion   Musculoskeletal: Comment: Lymphedema of right arm      GI/Hepatic: Comment: S/p splenectomy 1988    Currently has rising LFT, highly suggestive of CBD stent occlusion    (+)         appendicitis (s/p appendectomy),    liver disease (liver mets.),    (-) GERD (now in remssion)   Renal/Genitourinary:     (+) renal disease, type: CRI,      BPH (Nocturia),      Endo:     (+)          thyroid problem, hypothyroidism,           Psychiatric/Substance Use:     (+) psychiatric history (Doing well with medicaton and therapist.) anxiety and depression    (-) alcohol abuse history and chronic opioid use history   Infectious Disease:  - neg infectious disease ROS     Malignancy:   (+) Malignancy, History of Lymphoma/Leukemia, Other and Skin.Skin CA Remission status post Surgery.  Lymph CA Remission status post Radiation and Chemo.  Other CA metastatic neuroendocrine tumor of pancreas with mets to liver.  Active status post.    Other:  - neg other ROS    (+)  , H/O Chronic Pain (Right shoulder pain ),            OUTSIDE LABS:  CBC:   Lab Results   Component Value Date    WBC 13.2 (H) 08/02/2023    WBC 10.5 08/01/2023    HGB 8.3 (L) 08/02/2023    HGB 8.3 (L) 08/01/2023    HCT 25.8 (L) 08/02/2023    HCT 25.7 (L) 08/01/2023     (L) 08/02/2023     (L) 08/01/2023     BMP:   Lab Results   Component Value Date     (L) 08/02/2023     (L) 08/01/2023    POTASSIUM 4.6 08/02/2023    POTASSIUM 4.2 08/01/2023    CHLORIDE 101 08/02/2023    CHLORIDE 100 08/01/2023    CO2 23 08/02/2023    CO2  23 08/01/2023    BUN 22.8 08/02/2023    BUN 17.3 08/01/2023    CR 1.24 (H) 08/02/2023    CR 1.06 08/01/2023    GLC 93 08/02/2023    GLC 81 08/01/2023     COAGS:   Lab Results   Component Value Date    INR 1.72 (H) 06/12/2023     POC: No results found for: BGM, HCG, HCGS  HEPATIC:   Lab Results   Component Value Date    ALBUMIN 2.7 (L) 08/02/2023    PROTTOTAL 6.6 08/02/2023    ALT 66 08/02/2023     (H) 08/02/2023    ALKPHOS 384 (H) 08/02/2023    BILITOTAL 2.8 (H) 08/02/2023    KRYS 29 07/28/2023     OTHER:   Lab Results   Component Value Date    LACT 0.8 06/22/2023    CARLOS 8.7 (L) 08/02/2023    PHOS 3.6 04/05/2006    MAG 1.7 08/01/2023    LIPASE 10 (L) 06/22/2023    AMYLASE 33 06/12/2023    TSH 7.46 (H) 07/28/2023    T4 1.35 07/28/2023    CRP <2.9 05/24/2016    SED 22 (H) 06/22/2023       Anesthesia Plan    ASA Status:  3    NPO Status:  NPO Appropriate    Anesthesia Type: General.     - Airway: ETT   Induction: Intravenous.   Maintenance: Balanced.        Consents    Anesthesia Plan(s) and associated risks, benefits, and realistic alternatives discussed. Questions answered and patient/representative(s) expressed understanding.     - Discussed:     - Discussed with:  Patient            Postoperative Care    Pain management: IV analgesics, Multi-modal analgesia.   PONV prophylaxis: Ondansetron (or other 5HT-3), Dexamethasone or Solumedrol     Comments:                Paul Merrill MD

## 2023-08-03 NOTE — PLAN OF CARE
Shift Events:   ERCP complete today. Gastroenterology note shows results. Blood culture came back positive, re-drawing blood cultures and re-starting IV abx. Afebrile today. Supportive family (wife & sons at bedside). Changed code status today to DNR/DNI. Pt tired but in good spirits.   Plan: Monitor for signs of infection. Waiting on blood cultures collected today.  For vital signs and complete assessments, please see documentation flowsheets.     Goal Outcome Evaluation:  Plan of Care Reviewed With: patient  Overall Patient Progress: improvingOverall Patient Progress: improving

## 2023-08-03 NOTE — ANESTHESIA PROCEDURE NOTES
Airway       Patient location during procedure: OR       Procedure Start/Stop Times: 8/3/2023 8:23 AM  Staff -        CRNA: Gina Vizcaino APRN CRNA       Performed By: CRNA  Consent for Airway        Urgency: elective  Indications and Patient Condition       Indications for airway management: prashant-procedural       Induction type:intravenous       Mask difficulty assessment: 1 - vent by mask    Final Airway Details       Final airway type: endotracheal airway       Successful airway: ETT - single  Endotracheal Airway Details        ETT size (mm): 7.5       Cuffed: yes       Successful intubation technique: direct laryngoscopy       DL Blade Type: Blank 2       Grade View of Cords: 1       Adjucts: stylet       Position: Right       Measured from: gums/teeth       Secured at (cm): 22       Bite block used: Oral Airway    Post intubation assessment        Placement verified by: capnometry, equal breath sounds and chest rise        Number of attempts at approach: 1       Secured with: silk tape       Ease of procedure: easy       Dentition: Intact and Unchanged    Medication(s) Administered   Medication Administration Time: 8/3/2023 8:23 AM

## 2023-08-03 NOTE — ANESTHESIA POSTPROCEDURE EVALUATION
Patient: Tyrel Harrell    Procedure: Procedure(s):  ENDOSCOPIC RETROGRADE CHOLANGIOPANCREATOGRAPHY with balloon sweep of bile duct for sludge       Anesthesia Type:  General    Note:  Disposition: Inpatient   Postop Pain Control: Uneventful            Sign Out: Well controlled pain   PONV: No   Neuro/Psych: Uneventful            Sign Out: Acceptable/Baseline neuro status   Airway/Respiratory: Uneventful            Sign Out: Acceptable/Baseline resp. status   CV/Hemodynamics: Uneventful            Sign Out: Acceptable CV status; No obvious hypovolemia; No obvious fluid overload   Other NRE: NONE   DID A NON-ROUTINE EVENT OCCUR? No           Last vitals:  Vitals Value Taken Time   /64 08/03/23 0930   Temp 37.3  C (99.1  F) 08/03/23 0917   Pulse 90 08/03/23 0939   Resp 28 08/03/23 0939   SpO2 94 % 08/03/23 0939   Vitals shown include unvalidated device data.    Electronically Signed By: Corazon Burton MD  August 3, 2023  9:41 AM

## 2023-08-03 NOTE — PROGRESS NOTES
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 He came here today because of a sudden epigastric pain for ~30 mins.     Changes today:  - S/p ERCP, duodenal stent occluded and was cleared  - Blood culture from 8/2/23 returned positive GNR; continue Zosyn and await speciation. Hopeful to transition to PO antibiotics tomorrow  - Repeat blood cultures   - Soft/mechanical low residue diet; nutrition consulted at family request for education  - Encourage PO intake   - If labs improved and no signs of worsening infection, plan to discharge tomorrow     # Acute chest and epigastric pain  His acute episode of epigastric pain that last ~30 mins have a broad differential of heart, lung or GI in origin. EKG showed no STT change nor TWI and troponin T were stable at 35->35 (2hr). His congestion seems to be improving clinically with evidence of decreasing BNP from ~8000 on 7/12 and ~6000 on admission. Given Lasix in ED, does not appear hypervolemic on exam. Due to his high risk of active cancer and paroxysmal AF, CTPA was sent and was negative. His bilateral pleural effusions are moderate and unchanged from 7/26/23 scan, but increased significantly from 6/22/23 scan. Per chart reviews, he had thoracentesis done 2 times, in 8/2021 and 1/2022, both cytologies were negative for malignancy. Less likely a cause of new episode of epigastric pain considering its chronicity. Biliary colic or cholangitis are a possibility given history of obstructive malignancy (see below).   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 The last follow up CT CAP on 7/26 showed stable disease. History of complex endoscopic procedure prior with choledochoduodenostomy. No fever or abdominal tenderness on exam, but does have leukocytosis. His ALP and bilirubin are at least double from 5 days PTA, concerning for obstruction although no clear evidence on ultrasound. At risk for cholangitis, will monitor closely.   - Continue Zosyn   - Repeat Bcx   - Oncology consulted, appreciate evaluation  - GI Panc/Bili consulted, appreciate recommendations     # History of HFrEF now with recovered EF (55-60%)  # Mitral regurg S/P mitral valve clip implantation   # Mild-mod aortic insufficiency  # High BNP  Per chart review, pt had EF of ~30% in 2/2022 in the setting of MR. Underwent mitraclip and GDMT and now with recovered EF. Last echo on 6/26/23 showed normal LVEF 55-60% with mild to moderate aortic insufficiency.   He has progressive worsening of heart failure after discharging on 6/26, probably due to medication changes from his orthostatic hypotension. His heart failure has been improving as discuss above after medication adjustment. His current intravascular status is needed to be better evaluated. After he get Lasix 40 IV in the ED, he has 350mL urine after ~6 hr. Bladder was not distended per bladder scan.   - Monitor I/O  - Hold on further diuresis and monitor clinical exam     #Orthostatic hypotension likely 2/2 autonomic dysfunction  He feeling faint when he changes positions/walking but not  when he lies on bed.  At home, he has baseline BP of ~120/60, HR90, O2 >94%. He has frequent urination which requires multiple trips to toilets, this trouble him because he was feeling dizzy every times he walks. He is not on Florinef now. Has had one episode inpatient of low BP of ~85/45 at 10pm (measured on lying down), but with full conscious, no dizziness, no chest pain and full pulse.   - Monitor BP    #Paroxysmal atrial fibrillation   #Hx of PE  Recently off Eliquis on 6/27/23 after discussing with his oncologist. Patients and family decided to stop taking due to frequent falls.    # Urinary frequency  # Dysuria  - Check UA and urine culture        Diet: Mechanical/Dental Soft Diet    DVT Prophylaxis: Pneumatic Compression Devices  Craft Catheter: Not present  Lines: None     Cardiac Monitoring: None  Code Status: No CPR- Do NOT Intubate      Clinically Significant Risk Factors              # Hypoalbuminemia: Lowest albumin = 2.7 g/dL at 8/2/2023  5:48 AM, will monitor as appropriate     # Thrombocytopenia: Lowest platelets = 95 in last 2 days, will monitor for bleeding                   Disposition Plan     Expected Discharge Date: 08/04/2023      Destination: home with family;home with help/services            Maria Esther Douglass MD  Hospitalist Service, GOLD TEAM 10  [facility]  Securely message with BMdr (more info)  Text page via [facility] Paging/Directory   See signed in provider for up to date coverage information  ______________________________________________________________________    Interval History   Feeling better this morning. Epigastric pain has resolved. Denies nausea, vomiting,  fever or chills. Looking forward to going home. Family at bedside and updated.     Physical Exam   Vital Signs: Temp: 97.9  F (36.6  C) Temp src: Axillary BP: 105/60 Pulse: 88   Resp: 25 SpO2: 97 % O2 Device: None (Room air) Oxygen Delivery: 2 LPM  Weight: 154 lbs 11.2  oz    General Appearance: Sitting up in bed, in no distress  Respiratory: Lungs clear to auscultation bilaterally  Cardiovascular: RRR  GI: Soft, non distended, non tender to palpation   Skin: No jaundice  Other: Normal affect. No lower extremity edema.      Medical Decision Making       60 MINUTES SPENT BY ME on the date of service doing chart review, history, exam, documentation & further activities per the note.      Data     I have personally reviewed the following data over the past 24 hrs:    8.5  \   8.2 (L)   / 95 (L)     133 (L) 98 20.3 /  98   3.8 24 1.24 (H) \     ALT: 51 AST: 82 (H) AP: 322 (H) TBILI: 1.9 (H)   ALB: 2.8 (L) TOT PROTEIN: 6.6 LIPASE: N/A     Procal: N/A CRP: N/A Lactic Acid: 1.2       Ferritin:  N/A % Retic:  3.9 (H) LDH:  N/A

## 2023-08-03 NOTE — BRIEF OP NOTE
[facility]    Brief Operative Note    Pre-operative diagnosis: Cholangitis [K83.09]  Post-operative diagnosis Same as pre-operative diagnosis    Procedure: Procedure(s):  ENDOSCOPIC RETROGRADE CHOLANGIOPANCREATOGRAPHY with balloon sweep of bile duct for sludge  Surgeon: Surgeon(s) and Role:     * Bonilla Ulloa MD - Primary  Anesthesia: General   Estimated Blood Loss: None    Drains: None  Specimens: * No specimens in log *  Findings:   None.  Complications: None.  Implants: * No implants in log *      Choledchoduodenal stent in good position into bifurcation of L and R ducts, into duodenal bulb. Completely occluded w food plug. Swept clear and wire passed into L and R ducts, flushed swept clear. Decided not to place plastic stent    REC  Start liquid/soft mechanical lo residue diet similar to duodenal stent diet.

## 2023-08-03 NOTE — INTERIM SUMMARY
Cross-cover provider was notified by RN that William triggered a sepsis alert. Temp 100.6 deg F, , SBPs 120s. Currently on Zosyn. Lactic acid 1.2. Blood cultures x2 pending. No change in antibiotics indicated at this time. Notably, mental status has waxes and wanes per RN report, asked RN to notify provider if mental status continues to worsen.     Also of note, RN reports that patient and wife were talking about CODE status and had made comments that he would want to be DNR/DNI. No imminent need to change CODE status at this time. Would defer to day team to speak with patient and/or wife about this.      Sebastián Titus DO / Internal and Palliative Medicine   Securely message with the Vocera Web Console (learn more here)   Text page via [employer] Paging/Directory

## 2023-08-03 NOTE — ANESTHESIA CARE TRANSFER NOTE
Patient: Tyrel Harrell    Procedure: Procedure(s):  ENDOSCOPIC RETROGRADE CHOLANGIOPANCREATOGRAPHY with balloon sweep of bile duct for sludge       Diagnosis: Cholangitis [K83.09]  Diagnosis Additional Information: No value filed.    Anesthesia Type:   General     Note:    Oropharynx: oropharynx clear of all foreign objects and spontaneously breathing  Level of Consciousness: awake  Oxygen Supplementation: nasal cannula  Level of Supplemental Oxygen (L/min / FiO2): 3  Independent Airway: airway patency satisfactory and stable  Dentition: dentition unchanged  Vital Signs Stable: post-procedure vital signs reviewed and stable  Report to RN Given: handoff report given  Patient transferred to: PACU    Handoff Report: Identifed the Patient, Identified the Reponsible Provider, Reviewed the pertinent medical history, Discussed the surgical course, Reviewed Intra-OP anesthesia mangement and issues during anesthesia, Set expectations for post-procedure period and Allowed opportunity for questions and acknowledgement of understanding    Vitals:  Vitals Value Taken Time   /57 08/03/23 0917   Temp     Pulse 91 08/03/23 0922   Resp     SpO2 99 % 08/03/23 0917   Vitals shown include unvalidated device data.    Electronically Signed By: FADY Castillo CRNA  August 3, 2023  9:24 AM

## 2023-08-03 NOTE — PROVIDER NOTIFICATION
Gold 10 notified:  Critical lab resulted. Blood cultures drawn from left hand on 8/2 at 2132 showed gram negative bacilli.

## 2023-08-03 NOTE — CONSULTS
Care Management Initial Consult    General Information  Assessment completed with:  PatientWilliam. Spouse, Alissa. The majority of information was from Alissa.  Type of CM/SW Visit: Offer D/C Planning    Primary Care Provider verified and updated as needed:  Yes. Dr Carmen Cho. [facility].   Readmission within the last 30 days: no previous admission in last 30 days   Reason for Consult: discharge planning; elevated risk score  Advance Care Planning:  Documents on file       Communication Assessment  Patient's communication style: spoken language (English or Bilingual)    Hearing Difficulty or Deaf: no   Wear Glasses or Blind: yes    Cognitive  Cognitive/Neuro/Behavioral: WDL  Level of Consciousness: alert  Arousal Level: opens eyes spontaneously  Orientation: oriented x 4  Mood/Behavior: calm, cooperative  Best Language: 0 - No aphasia  Speech: clear, spontaneous    Living Environment:   People in home: spouse  WifeAlissa  Current living Arrangements: house. They live in a split level house. There are 7 steps up to the main level, then everything is on 1 level.       Able to return to prior arrangements:  yes     Family/Social Support:  Care provided by: self, spouse/significant other, homecare agency  Provides care for:    Marital Status:   WifeAlissa. Children.       Description of Support System: Supportive, Involved       Current Resources:   Patient receiving home care services:  Yes  Skilled Home Care Services: Skilled Nursing, Physical Therapy, Occupational Therapy thru [facility].  Community Resources: Home Care  Equipment currently used at home: walker, rolling  Supplies currently used at home:      Employment/Financial:  Employment Status:       Employment/ Comments: Was not in the .  Financial Concerns: No concerns identified   Referral to Financial Worker: No     Does the patient's insurance plan have a 3 day qualifying  hospital stay waiver?   No    Lifestyle & Psychosocial Needs:  Social Determinants of Health     Tobacco Use: Low Risk  (8/3/2023)    Patient History     Smoking Tobacco Use: Never     Smokeless Tobacco Use: Never     Passive Exposure: Never   Alcohol Use: Not on file   Financial Resource Strain: Not on file   Food Insecurity: Not on file   Transportation Needs: Not on file   Physical Activity: Not on file   Stress: Not on file   Social Connections: Not on file   Intimate Partner Violence: Not on file   Depression: At risk (7/28/2023)    PHQ-2     PHQ-2 Score: 3   Housing Stability: Not on file       Functional Status:  Prior to admission patient needed assistance:   Dependent ADLs:  Ambulation-walker, Bathing, Dressing  Dependent IADLs:  Cleaning, Cooking, Laundry, Medication Management, Transportation     Mental Health Status:        Chemical Dependency Status:            Values/Beliefs:  Spiritual, Cultural Beliefs, Church Practices, Values that affect care:               Additional Information:  Pt with history of HFrEF, Hodgkin's Lymphoma (in remission), Mitral Regurgitation s/p Mitraclip (2022), Paroxysmal Afib, Neuroendocrine pancreatic tumor with hepatic mets s/p stents. Admitted with epigastric pain and elevated BNP. Oncology consult pending. ERCP done today.   This afternoon I introduced myself to pt and his wife, Alissa. Pt awake & alert, resting in bed. Pt said he is going home tomorrow; feeling better. Pt lives with his wife in a split level home. There are 7 steps up to the main level, then everything is on 1 level. Pt uses a walker at home. Alissa walks behind him when he goes up the steps. Wife helps pt with a shower; he sometimes needs help putting on hi socks; helps put on his lymphedema sleeve.   Pt was independent with his meds until he was hospitalized in June, 2023 with hypotension. His BP meds were changed at that time and pt's BP has been better since then, for the last month.   Wife  "has been doing meds since then. Alissa said pt was on chemo for 2 years and has \"chemo fog.\" He is off chemo right now.   Pt was getting up every 1 hour at night to urinate. Takes a diuretic in the morning & hx of BPH. Pt said he also had some burning with urination. Son said pt had antibiotics (for something else); pt has felt better since then and is voiding better.   Wife said pt has lost 10 pounds in the last week. She wants to speak with the Dietician.   Wife would like pt to be moving around [place] before discharge. PT & OT are pending. Discussed pt could get up in the chair later with assist.     Wife inquired about the limit of Medicare coverage. She is aware there is a limit with Medicare coverage for a TCU stay. They are interested in planning for the future. I suggested they could visit some facilities. I provided her with the phone number for the Senior Linkage Line; gave the [employer] [facility] as a resource. Showed her the [employer]are[employer] web site and how to look for TCUs on it.     Notified Velia Lerner RN Liaison with [facility] of pt's admission. She reported they have pt receiving RN & PT services.       Heather Melo RN Care Coordinator  [facility]  On 8- RNCC coverage for [address]. Call [phone].     [facility] (resume RN & PT)  Phone: [phone]      "

## 2023-08-03 NOTE — PROGRESS NOTES
[facility]  Patient is currently open to home care services with [facility]. The patient is currently receiving  RN PT  services.  [facility]  and team have been notified of patient admission.  [facility] liaison will continue to follow patient during stay.  If appropriate provide orders to resume home care at time of discharge.

## 2023-08-04 NOTE — PROVIDER NOTIFICATION
Below page sent to Alberto Lugo MD    6C [id number]: Pt spiked at temp of 101.2, other VS stable. Thanks, Luci SANCHEZ.  #[phone].

## 2023-08-04 NOTE — PLAN OF CARE
Goal Outcome Evaluation:      Plan of Care Reviewed With: patient, spouse    Overall Patient Progress: no changeOverall Patient Progress: no change    Outcome Evaluation: Encourage high kcal options, adequate protein intake. Rec small, frequent meals to help increase oral intake. Chew all foods well. Rec self-select tolerated foods/beverages. Encourage intake of oral supplements

## 2023-08-04 NOTE — PROGRESS NOTES
08/04/23 1300   Call Information   Date of Call 08/04/23   Time of Call 1302   Name of person requesting the team lAba   Title of person requesting team RN   RRT Arrival time 1312   Time RRT ended 1320   Reason for call   Type of RRT Adult   Primary reason for call Sepsis suspected   Sepsis Suspected Elevated Lactate level   Was patient transferred from the ED, ICU, or PACU within last 24 hours prior to RRT call? No   SBAR   Situation LA=2.3   Background 72 year old male with past medical history HFrEF, orthostatic hypotension, mitral regurgitation s/p Mitraclip in 4/22, Paroxysmal A Fib, Hx of PE, Neuroendocrine pancreatic tumor with hepatic mets, malignant biliary obstruction s/p choledochoduodenostomy with a metal stent (6/12/2023) and Hodgkin s lymphoma in remission. Patient was admitted on 8/1/2023 via EMS with sudden onset of shortness of breath, sudden epigastric pain and elevated BNP.   Notable History/Conditions Cardiac;Cancer   Assessment A/OX4, VSS on R/A, reports productive cough, denies other s/s.   Interventions No interventions   Patient Outcome   Patient Outcome Stabilized on unit   RRT Team   Attending/Primary/Covering Physician Gold 10   Date Attending Physician notified 08/04/23   Time Attending Physician notified 1302   Physician(s) Talya Bianchi RN Donita Willis   RT N/A   Post RRT Intervention Assessment   Post RRT Assessment Stable/Improved   Date Follow Up Done 08/04/23   Time Follow Up Done 5724

## 2023-08-04 NOTE — PROGRESS NOTES
Antimicrobial Stewardship Team Note    Antimicrobial Stewardship Program - A joint venture between [facility] and  Physicians to optimize antibiotic management.  NOT a formal consult - Restricted Antimicrobial Review     Patient: Tyrel Harrell  MRN: [id number]  Allergies: Bee venom, Simvastatin, Vinblastine, Vincristine, Lina-kit bee sting, and Nkda [no known drug allergy]    Brief summary: Tyrel Harrell is a 72 year old male with past medical history HFrEF, orthostatic hypotension, mitral regurgitation s/p Mitraclip in 4/22, Paroxysmal A Fib, Hx of PE, Neuroendocrine pancreatic tumor with hepatic mets, malignant biliary obstruction s/p choledochoduodenostomy with a metal stent (6/12/2023) and Hodgkin s lymphoma in remission. Patient was admitted on 8/1/2023 via EMS with sudden onset of shortness of breath, sudden epigastric pain and elevated BNP.    History of Present Illness: Symptoms improved on arrival to the ED. Patient was afebrile with all other VS wnl, except a couple episodes low blood pressures stabilized without intervention. Patient does have history of orthostatic hypotension with baseline BP of ~120/60. Labs were notable for worsening total bilirubin (2.3) and alkaline phosphatase (383) from outpatient visit on 7/28. WBC wnl and UA unremarkable for pyuria. Elevated BNP 5800 and troponin 35. EKG with showed sinus tachycardia and nonspecific ST and T wave abnormality.  Of note, patient was recently taken off anticoagulation due to fall and bleeding risk. CT chest was negative for acute pulmonary embolism and showed stable moderate sized bilateral pleural effusions with associated atelectasis, left lower lobe nodular opacities of indeterminate etiology similar to prior.      Due to epigastric pain and rising LFTs, there was suspicion for CBD stent occlusion despite the absence of intrahepatic duct dilation on CT s. Abdominal ultrasound showed the common bile duct stent in  appropriate position, gallbladder sludge without evidence of acute cholecystitis, large right pleural effusion and no focal mass in the right hepatic lobe. The patient was afebrile upon presentation, but later spiked fevers (tmax 100.6) with tachycardia () and WBC 13.2 on 8/2. Patient was started on empiric Zosyn for possible cholangitis and blood cultures were obtained.  Patient underwent ERCP on 8/3 with findings of completely occluded duodenal stent with food plug, swept clear. Both sets of blood cultures, taken from the left hand on 8/2 turned positive with gram negative bacilli, identified as Klebsiella pneumoniae by Verigene panel. Repeat blood cultures on 8/3 and 8/4 are pending.           Active Anti-infective Medications   (From admission, onward)                 Start     Stop    08/03/23 1700  piperacillin-tazobactam  3.375 g,   Intravenous,   EVERY 6 HOURS        Bacteremia, Intra-Abdominal Infection       --                  Assessment: Klebsiella pneumoniae bacteremia secondary to cholangitis   Patient presented with cholangitis due to duodenal stent occlusion complicated by Klebsiella pneumoniae bacteremia.  ERCP with duodenal stent clearance provides source control of infection. Susceptibilities of Klebsiella pneumoniae are pending and no resistance genes were identified on Verigene. We recommend deescalating empiric Zosyn to ceftriaxone given no evidence of Pseudomonas on culture. Anaerobic coverage may be considered if the patient is not improving, but we believe not needed given source control with ERCP.  If susceptible, may consider oral step-down therapy with fluoroquinolones given their excellent oral bioavailability and tissue penetration. Patient had QTc of 492 on 8/1, previously 465 on 6/22, consider repeat EKG prior to initiation if starting a fluoroquinolone. Given GNR bacteremia secondary to IAI with source control, we recommend a total of 7 days of therapy with continued  improvement and blood culture clearance.     Recommendation:  Deescalate empiric Zosyn to ceftriaxone 2g IV every 24 hours   If susceptible, consider ciprofloxacin 500 mg by mouth every 12 hours to complete a total duration of 7 days (end date 8/10) with continued clinical improvement and blood culture clearance       Pharmacy took the following actions: Called/paged provider, Electronic note created.    Discussed with ID Staff: Heather Hall MD and Nicolette Cardoza, PharmD, BCIDP  Jae Alcazar, PharmD IV Candidate    Vital Signs/Clinical Features:  Vitals         08/02 0700 08/03 0659 08/03 0700  08/04 0659 08/04 0700  08/04 1328   Most Recent      Temp ( F) 98.2 -  100.6    97.9 -  101.2    97.6 -  98.9     97.6 (36.4) 08/04 1312    Pulse 88 -  110    88 -  106    91 -  92     92 08/04 1312    Resp 15 -  28    15 -  25    17 -  25     25 08/04 1312    BP 99/43 -  143/65    105/60 -  135/69    112/66 -  125/66     125/66 08/04 1312    SpO2 (%) 93 -  100    94 -  99    96 -  98     96 08/04 1312            Labs  Estimated Creatinine Clearance: 49.8 mL/min (A) (based on SCr of 1.39 mg/dL (H)).  Recent Labs   Lab Test 07/26/23  1515 07/28/23  1439 08/01/23  1512 08/02/23  0548 08/03/23  0711 08/04/23  0706   CR 1.1 1.03 1.06 1.24* 1.24* 1.39*       Recent Labs   Lab Test 05/24/16  1246 11/30/22  1326 06/20/23  1011 06/22/23  1342 06/23/23  0635 07/28/23  1440 08/01/23  1512 08/02/23  0548 08/03/23  0711 08/03/23  1046 08/04/23  0706   WBC 6.5   < > 8.1 9.0   < > 6.4 10.5 13.2* 8.6 8.5 5.7   ANEU 3.9  --  6.4 7.7  --  5.1  --   --   --   --   --    ALYM 1.7  --  0.4* 0.1*  --  0.1*  --   --   --   --   --    MERT 0.7  --  1.2 1.2  --  0.7  --   --   --   --   --    AEOS 0.1  --  0.0 0.0  --  0.2  --   --   --   --   --    HGB 13.7   < > 8.9* 8.8*   < > 8.9* 8.3* 8.3* 8.1* 8.2* 7.5*   HCT 41.3   < > 23.2* 24.2*   < > 26.8* 25.7* 25.8* 25.6* 24.9* 23.2*   MCV 94   < > 99 108*   < > 109* 109* 110* 110* 108* 107*       < > 141* 146*   < > 84* 102* 108* 102* 95* 95*    < > = values in this interval not displayed.       Recent Labs   Lab Test 06/25/23  0556 07/11/23  1441 07/28/23  1439 08/01/23  1512 08/02/23  0548 08/03/23  0711 08/04/23  0706   BILITOTAL 5.0* 2.3* 1.5* 2.3* 2.8* 1.9* 1.5*   ALKPHOS 247* 150* 114 383* 384* 322* 246*   ALBUMIN 2.6* 3.2* 3.1* 2.7* 2.7* 2.8* 2.6*   AST 59* 49* 47*  --  155* 82* 52*   ALT 36 25 21 55 66 51 37       Recent Labs   Lab Test 05/24/16  1246 06/22/23  1342 06/22/23  1358 06/22/23  1604 07/28/23  1439 08/02/23  2132 08/04/23  1213   LACT  --  1.5 0.8  --   --  1.2 2.3*   CRP <2.9  --   --   --   --   --   --    CRPI  --  19.10*  --   --  62.70*  --   --    SED 12  --   --  22*  --   --   --              Culture Results:  7-Day Micro Results       Procedure Component Value Units Date/Time    Blood Culture Hand, Left [[id number]] Collected: 08/04/23 0706    Order Status: Sent Lab Status: In process Updated: 08/04/23 0715    Specimen: Blood from Hand, Left     Blood Culture Hand, Left [[id number]]  (Normal) Collected: 08/04/23 0026    Order Status: Completed Lab Status: Preliminary result Updated: 08/04/23 1246    Specimen: Blood from Hand, Left      Culture No growth after 12 hours    Blood Culture Hand, Left [[id number]]  (Normal) Collected: 08/03/23 1644    Order Status: Completed Lab Status: Preliminary result Updated: 08/04/23 0632    Specimen: Blood from Hand, Left      Culture No growth after 12 hours    Blood Culture Hand, Left [[id number]]  (Normal) Collected: 08/03/23 1547    Order Status: Completed Lab Status: Preliminary result Updated: 08/04/23 0501    Specimen: Blood from Hand, Left      Culture No growth after 12 hours    Blood Culture Hand, Left [[id number]]  (Abnormal) Collected: 08/02/23 2220    Order Status: Completed Lab Status: Preliminary result Updated: 08/04/23 1142    Specimen: Blood from Hand, Left      Culture Positive on the 1st day of incubation       Klebsiella pneumoniae     Comment: 1 of 2 bottlesSusceptibilities done on previous cultures       Blood Culture Hand, Left [[id number]]  (Abnormal) Collected: 08/02/23 2132    Order Status: Completed Lab Status: Preliminary result Updated: 08/04/23 1141    Specimen: Blood from Hand, Left      Culture Positive on the 1st day of incubation      Klebsiella pneumoniae     Comment: 1 of 2 bottles       Verigene GN Panel [[id number]]  (Abnormal) Collected: 08/02/23 2132    Order Status: Completed Lab Status: Final result Updated: 08/03/23 1659    Specimen: Blood from Hand, Left      Acinetobacter species Not Detected     Citrobacter species Not Detected     Enterobacter species Not Detected     Proteus species Not Detected     Escherichia coli Not Detected     Klebsiella pneumoniae Detected     Comment: Positive for Klebsiella pneumoniae by [employer] multiplex nucleic acid test. Final identification and antimicrobial susceptibility testing will be verified by standard methods.        Klebsiella oxytoca Not Detected     Pseudomonas aeruginosa Not Detected     CTX-M Not Detected     KPC Not Detected     NDM Not Detected     VIM Not Detected     IMP Not Detected     OXA Not Detected    Narrative:      Specimen tested with Verigene multiplex, gram-negative blood culture nucleic acid test for the following targets: Acinetobacter species, Citrobacter species, Enterobacter species, Proteus species, Escherichia coli, Klebsiella pneumoniae, Klebsiella oxytoca, Pseudomonas aeruginosa, and the following resistance markers: CTX-M, KPC, NDM, VIM, IMP and OXA.    Urine Culture [[id number]] Collected: 08/02/23 1656    Order Status: Completed Lab Status: Final result Updated: 08/04/23 0946    Specimen: Urine, Midstream      Culture <10,000 CFU/mL Mixture of urogenital tova            Recent Labs   Lab Test 06/22/23  1318 07/28/23  1410 08/01/23  1834 08/02/23  1656   URINEPH 5.5 6.0 5.5 5.0   NITRITE Negative Negative Negative  Negative   LEUKEST Negative Negative Negative Negative   WBCU 12* 0-5 6* 6*                         Imaging: XR Surgery YUVAL Fluoro Less Than 5 Min w Stills    Result Date: 8/3/2023  This exam was marked as non-reportable because it will not be read by a radiologist or a [place] non-radiologist provider.     US Abdomen Limited (RUQ)    Result Date: 8/1/2023  EXAMINATION: Limited Abdominal Ultrasound, 8/1/2023 7:21 PM COMPARISON: CT CAP 7/26/2023 HISTORY:    Biliary stent, trending up LFT FINDINGS: Fluid: Large right pleural effusion. No evidence of ascites. Liver: Suboptimal visualization of the liver with normal echotexture in the visualized right hepatic lobe. The liver measures 12.1 cm in craniocaudal dimension. There is no focal mass in the right hepatic lobe. Gallbladder: Normally distended containing sludge. No shadowing cholelithiasis. No cholecystic fluid or significant gallbladder wall thickening. Bile Ducts: Common bile duct stent is visualized in appropriate positioning. Pancreas: Visualized portions of the head and body of the pancreas are unremarkable. Kidney: The right kidney measures 12.6 cm long. There is no hydronephrosis or hydroureter, no shadowing renal calculi, cystic lesion or mass.     IMPRESSION: 1.  Common bile duct stent in appropriate position. 2.  Gallbladder sludge without evidence of acute cholecystitis. 3.  Large right pleural effusion. 4.  Suboptimal visualization of the liver without focal abnormalities. I have personally reviewed the examination and initial interpretation and I agree with the findings. MARY ANNE GOYAL DO   SYSTEM ID:  [id number]    CT Chest Pulmonary Embolism w Contrast    Result Date: 8/1/2023  EXAMINATION: CTA pulmonary angiogram, 8/1/2023 5:05 PM COMPARISON: 7/26/2023 HISTORY:    History of PE, sudden onset CP and shortness of breath TECHNIQUE: Volumetric helical acquisition of CT images of the chest from the lung apices to the kidneys were acquired after the  administration of 60 mL of Isovue-370 IV contrast. Flash technique with free breathing acquisition.  Post-processed multiplanar and/or MIP reformations were obtained, archived to PACS and used in interpretation of this study. FINDINGS:  Contrast bolus is: excellent.  Exam is negative for acute pulmonary embolism. The largest right ventricle transaxial diameter is (measured from endocardium to endocardium): 4.4 cm The largest left ventricle transaxial diameter is (measured from endocardium to endocardium): 4.3 cm RV/LV ratio is: 1.0 (if ratio greater than 1.1 then sign is suspicious for right heart strain) Reflux of contrast into the IVC? no Paradoxical bowing of the interventricular septum to the left? no Pericardial effusion?:no Atrophic thyroid. No lower cervical or axillary lymphadenopathy. Normal size and configuration of the medial intrathoracic vessels. Normal cardiac size. No pericardial effusion. Moderate sized bilateral pleural effusions with associated atelectasis, similar to prior. Faint areas of nodular opacities particularly along the left lower lobe, such as 9 x 3 mm (series 5 image 23) and groundglass nodule measuring 8 x 4 mm (series 5 image 50). Bilateral apical faint groundglass opacities. Unremarkable esophagus. Limited noncontrast evaluation of the upper abdomen with visualization of the 4 cm pancreatic head mass, biliary stenting, stable pneumobilia. Otherwise unremarkable.     IMPRESSION: 1. Exam is negative for acute pulmonary embolism. Evidence for right heart strain or increased right heart pressures? no. 2. Stable moderate sized bilateral pleural effusions with associated atelectasis. 3. Left lower lobe nodular opacities of indeterminate etiology, similar to prior, infectious, inflammatory, or malignant. 4. Partially evaluated pancreatic head mass and biliary stent.. In the event of a positive result for acute pulmonary embolism resulting in right heart strain, consider calling the [facility]  patient placement ([phone]) for PERT (Pulmonary Embolism Response Team) Activation? PERT -- Pulmonary Embolism Response Team (Multidisciplinary team including cardiology, interventional radiology, critical care, hematology) I have personally reviewed the examination and initial interpretation and I agree with the findings. MARY ANNE GOYAL DO   SYSTEM ID:  [id number]

## 2023-08-04 NOTE — PROGRESS NOTES
"    SPIRITUAL HEALTH SERVICES Progress Note  [place]  6C    Saw pt Tyrel Harrell per Routine Consult.    Patient/Family Understanding of Illness and Goals of Care - William and his wife Kacie both shared that they understand William's prognosis and believe that \"when its time, it's time.\"  William said \"I'm ready to go when it is time,\" and that he is not afraid.  William and Kacie both believe that while they have hope, it is also important to make the most of whatever time is left.  William and Kacie expressed interest in meeting with palliative care, and RN is aware and will let physician know about interest.     Distress and Loss - Kacie shared William's illness narrative and the ways in which he's been challenged by his health over the last several years.  William said \"I feel like I've just done what I needed to do, nothing special.\"     Strengths, Coping, and Resources - Kacie and William find immense support from their family, including their two children and grandchildren. They described themselves as \"tightknit\" and a \"wonderful family\"    Meaning, Beliefs, and Spirituality - Both Kacie and William have marin, Kacie is Mu-ism and William is Pentecostalism.  They belonged to a [employer] in [place].  They asked for prayer, which was provided.     Plan of Care - [facility] will remain available and continue to visit/support.     Rabbi Ginger Aquino, [facility]  Staff   [phone]    "

## 2023-08-04 NOTE — PROGRESS NOTES
GASTROENTEROLOGY PROGRESS NOTE    Date: 08/04/2023     ASSESSMENT:  Tyrel Harrell is a 72 year old male who was admitted for acute epigastric/chest pain with radiation to the jaws on 8/1. 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 GI was consulted for rising liver test.      # Neuroendocrine tumor of the pancreatic with liver metastasis  # Malignant biliary obstruction s/p choledochoduodenostomy with a metal stent (6/12/2023)  # Ascending cholangitis 2/2 biliary stent blockage  # Gram negative bacteremia   The rising LFT was highly suggestive of CBD stent occlusion, despite the absence of intrahepatic duct dilation on CTs. He underwent ERCP on 8/3 with the finding of a blocked metal stent with food within the duodenal bulb. Blockage was cleared. LFT has improved since with Tbili decreased to 1.5.     He was also noted to have gram negative bacteremia (Klebsiella pneumoniae), most likely from ascending cholantitis from the stent blockage. Has been on zosyn since 8/2.     RECOMMENDATIONS:  - Antibiotics treatment for bacteremia per primary team  - GI will sign off       Thank you for involving us in this patient's care. Please do not hesitate to contact the GI service with any questions or concerns.      Pt care plan discussed with Dr. Barrientos, GI staff physician.      Sandrita Maxwell MD, PhD  Gastroenterology Fellow  Division of Gastroenterology, Hepatology and Nutrition  [facility]  Pager: [phone]  _______________________________________________________________      Subjective:  Spiked a fever of 101.2 while on zosyn. Previous Bcx was positive for Klebsiella pneumoniae on Verigene. Repeat Bcx obtained.  Hemodynamics has remained stable.     Objective:  Blood pressure 122/71, pulse 91, temperature 98.9  F (37.2  C), temperature source Oral, resp. rate 17, weight 72.2 kg (159 lb 3.2 oz), SpO2 96 %.    Gen: A&Ox3, NAD  HEENT: sclera anicteric  CV: RRR  Lungs: breathing comfortably on room air  Abd:  soft, nontender, nondistended, bowel sounds present  Skin: no jaundice, no stigmata of chronic liver disease  MS: appropriate muscle mass for age  Neuro: non focal       LABS:  BMP  Recent Labs   Lab 08/04/23  0706 08/03/23  0711 08/02/23  0548 08/01/23  1512   * 133* 135* 134*   POTASSIUM 4.0 3.8 4.6 4.2   CHLORIDE 100 98 101 100   CARLOS 8.0* 8.5* 8.7* 8.4*   CO2 23 24 23 23   BUN 19.1 20.3 22.8 17.3   CR 1.39* 1.24* 1.24* 1.06   * 98 93 81     CBC  Recent Labs   Lab 08/04/23  0706 08/03/23  1046 08/03/23  0711 08/02/23  0548   WBC 5.7 8.5 8.6 13.2*   RBC 2.16* 2.30* 2.33* 2.34*   HGB 7.5* 8.2* 8.1* 8.3*   HCT 23.2* 24.9* 25.6* 25.8*   * 108* 110* 110*   MCH 34.7* 35.7* 34.8* 35.5*   MCHC 32.3 32.9 31.6 32.2   RDW 21.6* 21.6* 21.8* 22.0*   PLT 95* 95* 102* 108*     INRNo lab results found in last 7 days.  LFTs  Recent Labs   Lab 08/04/23  0706 08/03/23  0711 08/02/23  0548 08/01/23  1512 07/28/23  1439   ALKPHOS 246* 322* 384* 383* 114   AST 52* 82* 155*  --  47*   ALT 37 51 66 55 21   BILITOTAL 1.5* 1.9* 2.8* 2.3* 1.5*   PROTTOTAL 6.1* 6.6 6.6 6.5 7.0   ALBUMIN 2.6* 2.8* 2.7* 2.7* 3.1*      PANCNo lab results found in last 7 days.    IMAGING:  Reviewed

## 2023-08-04 NOTE — PROGRESS NOTES
Goal outcome evaluation:  Time: 1900 - 0700  Plan of care reviewed with: Patient  Overall patient progress: No change  VS /73 (BP Location: Left arm)   Pulse 102   Temp (!) 101.2  F (38.4  C) (Oral)   Resp 20   Wt 70.2 kg (154 lb 11.2 oz)   SpO2 94%   BMI 22.52 kg/m        Status: Admitted for sudden epigastric pain  Activity: Up with assist x 1  Neuros: Oriented to self. Waxes and wanes overnight. BA on. Clear and appropriate speech. Follows instructions.  Psychosocial Assessment: Cooperative and interacts appropriately with staff.   Cardiac: Sinus tachy. Peripheral pulses are palpable and equal bilaterally.  Respiratory: Respiratory rate is normal and effortless on RA. Lung sounds are clear bilaterally   GI/: Reports no nausea, vomiting, or abdominal pain. Bowel sounds are present in all quadrants. Reports no difficulty or pain with urination, external cath in place..   Diet: Soft diet, no complaints of nausea, vomiting or abdominal discomfort.  Skin/Incisions: Skin is warm and dry, no new deficits noted.  Lines/Drains: LPIV TKO  Labs: Gram negative bacilli  Pain/Nausea: No c/o of pain or nausea.  New changes this shift: Critical result, positive blood culture for gram negative bacilli from left hand, drawn on 8/2/23 at 2220. Spiked at temp of 101.2. Provider notified, pt already on Zosyn, repeat blood cx ordered.  Plan: Continue POC, notify provider of changes

## 2023-08-04 NOTE — PROVIDER NOTIFICATION
"Below page sent Alberto Lugo MD    \"Got critical from lab, positive blood culture for gram negative bacilli from left hand, drawn on 8/2/23 at 2220\".  "

## 2023-08-04 NOTE — PROGRESS NOTES
"CLINICAL NUTRITION SERVICES - ASSESSMENT NOTE     Nutrition Prescription    RECOMMENDATIONS FOR MDs/PROVIDERS TO ORDER:  1. Consider checking a fecal elastase, if warranted. Order Creon 12, 1 capsule with snacks.  2. Monitor lytes (Phos, Mg++, and K+) for refeeding syndrome. Urinary ketones negative 8/2. K+ and Mg++ were WNL when last checked. If lytes trend low, aggressively replace. Ensure the lyte Replacement ADULT order set/s is/are implemented and select the option for high replacement.     Malnutrition Status:    Severe malnutrition in the context of chronic illness    Recommendations already ordered by Registered Dietitian (RD):  Oral supplements    Future/Additional Recommendations:  1. Encourage high kcal options, adequate protein intake. Rec small, frequent meals to help increase oral intake. Chew all foods well. Rec self-select tolerated foods/beverages. Encourage intake of oral supplements.  2. Continue Creon 12, 3 capsules at meals. Monitor stools for steatorrhea and adjust dosing if needed.  3. Check vitamin D status. Pt's vitamin D deficiency lab was 22 on 12/20/2022. Ordered to receive vitamin D supplementation. Monitor need for calcium supplementation, if warranted.   4. Monitor BG control. BG was 104 on 8/4.    5. Order a multivitamin with minerals to help meet micronutrient needs, if inadequate intake.      REASON FOR ASSESSMENT  Tyrel Harrell is a/an 72 year old male assessed by the dietitian for Admission Nutrition Risk Screen for malnutrition score of two or greater (Have you recently lost weight without trying?: yes.If yes, how much weight have you lost?: lost 14 - 23 pounds (pt states losing 15 lbs in last 20 days).Have you been eating poorly because of a decreased appetite?: yes) Received consult for \"Patient and family education on low residue soft diet/duodenal stent diet.\"     NUTRITION/ADDITIONAL HISTORY  Per RD note 6/26/23, \"Diet: Regular. Intake/Tolerance: Patient consuming  " "% of 3 meal(s) daily. Pt reports he feels Creon is working well for him. Denies symptoms of enzyme insufficiency. Pt reports PO has been steady lately. He reports low energy during and PTA but wife helps provide balanced meals for him. Pt also uses oral nutrition supplements PRN at home. Discussed w/ pt and family, can increase Creon dosing if pt notices symptoms of enzyme insufficiency ( foul-smelling, greasy stools (steatorrhea), gas and bloating after eating, stomach pain, etc.). 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 He doesn't have any fever or abdominal tenderness on exams. He has no fever, nausea/vomiting, cough, jaundice or belly pain. Her has bowel movement everyday with a help from Miralax. His stool is yellow without blood.\" Lives with wife. Impaired glucose intolerance. Anemia. Pancreatic insufficiency. Pt was ordered to take, noting, calcium carbonate, lasix, Creon 12 (two to three capsules at meals provides 343-514 units of lipase/kg at meals), multiple vitamin, zofran, miralax, potassium, vitamin D, and compazine PTA. Vitamin D deficiency hx.   Per discussion with pt and family, pt was losing wt PTA. Overall, eating one-third to one-half less than normal amounts PTA for the last few weeks. Recent chemo (decreased appetite, previous intermittent nausea). Per pt's family, pt is taking Creon at home (two to three capsules, depending on the size of the meal. Aware of oral supplements.     CURRENT NUTRITION ORDERS  Diet: Low Fiber since 8/4 (mechanical/dental soft on 8/3). Has been on regular, full liquid, and 2 g sodium diets this admission. Fluid " "restriction was discontinued.   Intake/Tolerance: Tolerating diet. Per nursing flowsheets (% intake), pt consuming 50-75% on 8/2 and 100% with a fair to good appetite 8/3. [employer] (meal ordering system) indicates food/beverages sent 8/2-8/3 totaled 1251 kcals and 45 g protein daily on average.     LABS  Labs reviewed    MEDICATIONS  Medications reviewed    ANTHROPOMETRICS  Height: 5' 8.5\" (174 cm)  Most Recent Weight: 72.2 kg (159 lb 3.2 oz)    IBW: 71.4 kg  BMI: Normal BMI  Weight History: 88.9 kg (5/24/16), 83. kg (11/8/2022), 79.8 kg (12/20/2022), 75.5 kg (5/25/23), 77.2 kg (6/7/2023), 73 kg (7/27/23), 70.7 kg (8/2, admit), 72.2 kg (8/4) - Pt has lost 9% of body wt over the last two months. Receiving lasix this admission.   Dosing Weight: 70 kg (based on lowest wt so far this admission of 70.2 kg on 8/3)    ASSESSED NUTRITION NEEDS (for inpatient hospital stay)  Estimated Energy Needs: 8679-7678 kcals/day (30 - 35 kcals/kg)  Justification: Repletion, suspected hypermetabolism  Estimated Protein Needs:  grams protein/day (1.2 - 1.5 grams of pro/kg)  Justification: Increased needs pending renal function (rec lower end of this range at this time)  Estimated Fluid Needs: 4658-1761 mL/day (25 - 30 mL/kg)   Justification: Maintenance needs or per provider    PHYSICAL FINDINGS/OTHER FINDINGS  See malnutrition section below.  GI: Having zero to one stool daily on average. Last Bowel Movement: 08/02/23 (08/03/23 0800). Stools are formed and tan. Per GI MD 8/3, \"ENDOSCOPIC RETROGRADE CHOLANGIOPANCREATOGRAPHY with balloon sweep of bile duct for sludge. Start liquid/soft mechanical lo residue diet similar to duodenal stent diet.\" Per GI 8/4, \"The rising LFT was highly suggestive of CBD stent occlusion, despite the absence of intrahepatic duct dilation on CTs. He underwent ERCP on 8/3 with the finding of a blocked metal stent with food within the duodenal bulb. Blockage was cleared .\"   WOC: Not following at this " time    MALNUTRITION  % Intake: </= 50% for >/= 5 days (severe)  % Weight Loss: > 7.5% in 3 months (severe)  Subcutaneous Fat Loss: Facial region:  mild  Muscle Loss: Temporal:  moderate, Thoracic region (clavicle, acromium bone, deltoid, trapezius, pectoral):  moderate, and Dorsal hand:  moderate  Fluid Accumulation/Edema: None noted  Malnutrition Diagnosis: Severe malnutrition in the context of chronic illness    NUTRITION DIAGNOSIS  Inadequate oral intake related to decreased appetite, hypermetabolism as evidenced by pt consuming one-third to 50% of meals for greater than five days.       INTERVENTIONS  Implementation  Nutrition Education: Importance of adequate nutrition intake discussed with pt. Encourage high kcal options, adequate protein intake. Rec small, frequent meals to help increase oral intake. Chew all foods well. Gave [employer] handout, Easy To Chew and Swallow Diet. Gave examples of recommended and not recommended foods.  Medical food supplement therapy: Discussed oral supplements. Gave oral supplement menu. Scheduled chocolate Ensure Enlive @ 2, strawberry Ensure Enlive@ HS. Placed one-time berry Magic Cup and vanilla Vital Cuisines. Pt/family aware they may order additional oral supplements prn.     Goals  Patient to consume % of nutritionally adequate meal trays TID, or the equivalent with supplements/snacks.     Monitoring/Evaluation  Progress toward goals will be monitored and evaluated per protocol.       Nutrition will continue to follow.      Tess Adler, MS, RD, LD, [facility]   6C/7C(beds 7807 - 5026) RD pgr: [phone]

## 2023-08-04 NOTE — PROGRESS NOTES
[facility]    Medicine Progress Note - Hospitalist Service, GOLD TEAM 10    Date of Admission:  8/1/2023    Assessment & Plan   Tyrel Harrell is a 72 year old male with a medical history of HFrEF, orthostatic hypotension, mitral regurgitation S/P Mitraclip 4/22, Paroxysmal A Fib, Hx of PE, Neuroendocrine pancreatic tumor with hepatic mets S/P stents, Hodgkin's lymphoma at age 39 in remission. He came here today because of a sudden epigastric pain for ~30 mins.     Changes today:  - Febrile overnight, 2 of 2 blood cultures positive Klebsiella pneumonia   - De-escalated Zosyn to Ceftriaxone per AMT recommendations  - Soft/mechanical low residue diet  - Encourage PO intake   - Discharge pending improvement in fever curve, negative blood cultures     # Acute chest and epigastric pain  His acute episode of epigastric pain that last ~30 mins have a broad differential of heart, lung or GI in origin. EKG showed no STT change nor TWI and troponin T were stable at 35->35 (2hr). His congestion seems to be improving clinically with evidence of decreasing BNP from ~8000 on 7/12 and ~6000 on admission. Given Lasix in ED, does not appear hypervolemic on exam. Due to his high risk of active cancer and paroxysmal AF, CTPA was sent and was negative. His bilateral pleural effusions are moderate and unchanged from 7/26/23 scan, but increased significantly from 6/22/23 scan. Per chart reviews, he had thoracentesis done 2 times, in 8/2021 and 1/2022, both cytologies were negative for malignancy. Less likely a cause of new episode of epigastric pain considering its chronicity. Biliary colic or cholangitis are a possibility given history of obstructive malignancy (see below).   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 The last follow up CT CAP on 7/26 showed stable disease. History of complex endoscopic procedure prior with choledochoduodenostomy. No fever or abdominal tenderness on exam, but does have leukocytosis. His ALP and bilirubin are at least double from 5 days PTA, concerning for obstruction although no clear evidence on ultrasound. At risk for cholangitis, will monitor closely.   - De-escalate Zosyn to Ceftriaxone 2 g Q24H per AMT recommendations  - Repeat Bcx NGTD  - Monitor fever curve, WBC   - Oncology consulted, appreciate evaluation  - GI Panc/Bili consulted, appreciate recommendations     # History of HFrEF now with recovered EF (55-60%)  # Mitral regurg S/P mitral valve clip implantation   # Mild-mod aortic insufficiency  # High BNP  Per chart review, pt had EF of ~30% in 2/2022 in the setting of MR. Underwent mitraclip and GDMT and now with recovered EF. Last echo on 6/26/23 showed normal LVEF 55-60% with mild to moderate aortic insufficiency.   He has progressive worsening of heart failure after discharging on 6/26, probably due to medication changes from his orthostatic hypotension. His heart failure has been improving as discuss above after medication adjustment. His current intravascular status is needed to be better evaluated. After he get Lasix 40 IV in the ED, he has 350mL urine after ~6 hr. Bladder was not distended per bladder scan.   - Monitor I/O  - Hold on further diuresis and monitor clinical exam     #Orthostatic hypotension likely 2/2 autonomic dysfunction  He feeling faint when he changes positions/walking but not when he lies on bed.  At home, he has baseline BP of ~120/60,  HR90, O2 >94%. He has frequent urination which requires multiple trips to toilets, this trouble him because he was feeling dizzy every times he walks. He is not on Florinef now. Has had one episode inpatient of low BP of ~85/45 at 10pm (measured on lying down), but with full conscious, no dizziness, no chest pain and full pulse.   - Monitor BP    #Paroxysmal atrial fibrillation   #Hx of PE  Recently off Eliquis on 6/27/23 after discussing with his oncologist. Patients and family decided to stop taking due to frequent falls.    # Urinary frequency  # Dysuria  - UA with 6 WBCs, but Ucx with mixed tova, not concerning for UTI        Diet: Combination Diet Low Fiber Diet  Snacks/Supplements Adult: Ensure Enlive; Between Meals    DVT Prophylaxis: Pneumatic Compression Devices  Craft Catheter: Not present  Lines: None     Cardiac Monitoring: None  Code Status: No CPR- Do NOT Intubate      Clinically Significant Risk Factors              # Hypoalbuminemia: Lowest albumin = 2.6 g/dL at 8/4/2023  7:06 AM, will monitor as appropriate     # Thrombocytopenia: Lowest platelets = 95 in last 2 days, will monitor for bleeding  # Acute Kidney Injury, unspecified: based on a >150% or 0.3 mg/dL increase in last creatinine compared to past 90 day average, will monitor renal function          # Severe Malnutrition: based on nutrition assessment, PRESENT ON ADMISSION        Disposition Plan      Expected Discharge Date: 08/06/2023      Destination: home with family;home with help/services            Maria Esther Douglass MD  Hospitalist Service, GOLD TEAM 10  [facility]  Securely message with [employer] (more info)  Text page via [facility] Paging/Directory   See signed in provider for up to date coverage information  ______________________________________________________________________    Interval History   Feeling better this morning. Epigastric pain has resolved. Denies nausea, vomiting,  fever or  chills. Looking forward to going home. Family at bedside and updated.     Physical Exam   Vital Signs: Temp: 97.6  F (36.4  C) Temp src: Oral BP: 125/66 Pulse: 92   Resp: 25 SpO2: 96 % O2 Device: None (Room air)    Weight: 159 lbs 3.2 oz    General Appearance: Sitting up in bed, in no distress  Respiratory: Lungs clear to auscultation bilaterally  Cardiovascular: RRR  GI: Soft, non distended, non tender to palpation   Skin: No jaundice  Other: Normal affect. No lower extremity edema.      Medical Decision Making       45 MINUTES SPENT BY ME on the date of service doing chart review, history, exam, documentation & further activities per the note.      Data     I have personally reviewed the following data over the past 24 hrs:    5.7  \   7.5 (L)   / 95 (L)     134 (L) 100 19.1 /  104 (H)   4.0 23 1.39 (H) \     ALT: 37 AST: 52 (H) AP: 246 (H) TBILI: 1.5 (H)   ALB: 2.6 (L) TOT PROTEIN: 6.1 (L) LIPASE: N/A     Procal: N/A CRP: N/A Lactic Acid: 2.3 (H)

## 2023-08-05 NOTE — PLAN OF CARE
"Goal Outcome Evaluation:      Plan of Care Reviewed With: patient    Overall Patient Progress: improvingOverall Patient Progress: improving    .    Changes: No changes during shift      History: Tyrel \"William\" Julio Cesar is a 72 year old male with past medical history HFrEF, orthostatic hypotension, mitral regurgitation s/p Mitraclip in 4/22, Paroxysmal A Fib, Hx of PE, Neuroendocrine pancreatic tumor with hepatic mets, malignant biliary obstruction s/p choledochoduodenostomy with a metal stent (6/12/2023) and Hodgkin s lymphoma in remission. Patient was admitted on 8/1/2023 via EMS with sudden onset of shortness of breath, sudden epigastric pain and elevated BNP.        Neuro: A/Ox4. Calls appropriately. Pleasant and cooperative. In previous nights has had periods of disorientation, but remained oriented throughout the shift. Bed alarm on.  Cardiac/Tele:  SR-ST and VSS. Afebrile. Denies chest pain   Respiratory: Room air. Tolerating well  GI/: Continent. Urinal at bedside during the day. Primofit placed for night due to frequency and urgency  Diet/Appetite: Low fiber diet. Creon given with food.  Skin: No new deficits noted.   LDAs: L PIV- TKO  Activity: AX1  Pain: Soreness from walking around today, treated with PRN tylenol, and effective per the patient.      Plan: Continue to monitor and notify team with changes.    "

## 2023-08-05 NOTE — PLAN OF CARE
Patient stable to discharge home today. A/O x4, SBA. VSS, RA. No c/o pain. Patient wife present at bedside at time of discharge. Instructions reviewed with patient and wife and both verbalized understanding of discharge instructions. All questions answered. Patient stable at time of discharge.

## 2023-08-05 NOTE — PROVIDER NOTIFICATION
MD Notification    Notified Person: MD    Notified Person Name: Maria Esther Douglass MD    Notification Date/Time: 8/5/23 0852    Notification Interaction: web page    Purpose of Notification: FYI lab called with positive BC results.     Orders Received:     Comments:

## 2023-08-05 NOTE — DISCHARGE SUMMARY
[facility]  Hospitalist Discharge Summary      Date of Admission:  8/1/2023  Date of Discharge:  8/5/2023 10:37 AM  Discharging Provider: Maria Esther Douglass MD  Discharge Service: Hospitalist Service, GOLD TEAM 10    Discharge Diagnoses   See below     Clinically Significant Risk Factors     # Severe Malnutrition: based on nutrition assessment      Follow-ups Needed After Discharge   Follow-up Appointments     Adult [facility] Follow-up and recommended labs and tests      Follow up with primary care provider, Carmen Cho, within 7   days for hospital follow- up.  The following labs/tests are recommended:   CMP and CBC with platelets.      Appointments on [place] and/or [facility] (with [facility] or [facility]   provider or service). Call [phone] if you haven't heard regarding   these appointments within 7 days of discharge.            Unresulted Labs Ordered in the Past 30 Days of this Admission       Date and Time Order Name Status Description    8/3/2023 11:34 PM Blood Culture Hand, Left Preliminary     8/3/2023 11:34 PM Blood Culture Hand, Left Preliminary     8/3/2023  3:08 PM Blood Culture Hand, Left Preliminary     8/3/2023  3:08 PM Blood Culture Hand, Left Preliminary         These results will be followed up by Hospitalist Pool/PCP.     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 He presented due to sudden epigastric pain for ~30 mins, and was found to have acute cholangitis.     # Acute chest and epigastric pain  His acute episode of epigastric pain that last ~30 mins have a broad differential of heart, lung or GI in origin. EKG showed  no STT change nor TWI and troponin T were stable at 35->35 (2hr). His congestion seems to be improving clinically with evidence of decreasing BNP from ~8000 on 7/12 and ~6000 on admission. Given Lasix in ED, does not appear hypervolemic on exam. Due to his high risk of active cancer and paroxysmal AF, CTPA was sent and was negative. His bilateral pleural effusions are moderate and unchanged from 7/26/23 scan, but increased significantly from 6/22/23 scan. Per chart reviews, he had thoracentesis done 2 times, in 8/2021 and 1/2022, both cytologies were negative for malignancy. Less likely a cause of new episode of epigastric pain considering its chronicity. Biliary colic or cholangitis are a possibility given history of obstructive malignancy (see below).   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 The last follow up CT CAP on 7/26 showed stable disease. History of complex endoscopic procedure prior with choledochoduodenostomy. Fever, leukocytosis, and tachycardia concerning for sepsis likely secondary to ascending cholangitis, in light of rising alk phos and bilirubin. GI panc/bili consulted and ERCP performed 8/3 which found blocked metal stent with food within the duodenal bulb which was cleared. Blood cultures returned positive for GNR's which speciated as Klebsiella pneumoniae. Fever and leukocytosis resolved.   - Initially on Zosyn, de-escalated to Ceftriaxone, and then transitioned to PO Ciprofloxacin once susceptibilities returned to complete a planned 7 day course.   - Soft/mechanical diet, low residue recommended on  discharge     # History of HFrEF now with recovered EF (55-60%)  # Mitral regurg S/P mitral valve clip implantation   # Mild-mod aortic insufficiency  # High BNP  Per chart review, pt had EF of ~30% in 2/2022 in the setting of MR. Underwent mitraclip and GDMT and now with recovered EF. Last echo on 6/26/23 showed normal LVEF 55-60% with mild to moderate aortic insufficiency.   He has progressive worsening of heart failure after discharging on 6/26, probably due to medication changes from his orthostatic hypotension. His heart failure has been improving as discuss above after medication adjustment.   - Resume PTA Lasix PRN for edema or weight gain   - Follow up with outpatient cardiologist at [facility] as scheduled     #Orthostatic hypotension likely 2/2 autonomic dysfunction  He feeling faint when he changes positions/walking but not when he lies on bed.  At home, he has baseline BP of ~120/60, HR90, O2 >94%. He has frequent urination which requires multiple trips to toilets, this trouble him because he was feeling dizzy every times he walks. He is not on Florinef now. Has had one episode inpatient of low BP of ~85/45 at 10pm (measured on lying down), but with full conscious, no dizziness, no chest pain and full pulse.   - Monitor BP    #Paroxysmal atrial fibrillation   #Hx of PE  Recently off Eliquis on 6/27/23 after discussing with his oncologist. Patients and family decided to stop taking due to frequent falls.    # Urinary frequency  # Dysuria  - UA with 6 WBCs, but Ucx with mixed tova, not concerning for UTI     Consultations This Hospital Stay   NURSING TO CONSULT FOR VASCULAR ACCESS CARE IP CONSULT  VASCULAR ACCESS FOR PICC PLACEMENT ADULT IP CONSULT  ONCOLOGY ADULT IP CONSULT  GI PANCREATICOBILIARY ADULT IP CONSULT  NURSING TO CONSULT FOR VASCULAR ACCESS CARE IP CONSULT  NUTRITION SERVICES ADULT IP CONSULT  CARE MANAGEMENT / SOCIAL WORK IP CONSULT  SPIRITUAL HEALTH SERVICES IP CONSULT    Code Status   No CPR-  Do NOT Intubate    Time Spent on this Encounter   I, Maria Esther Douglass MD, personally saw the patient today and spent greater than 30 minutes discharging this patient.       Maria Esther Douglass MD  [facility] [address] [address]  Phone: [phone]  ______________________________________________________________________    Physical Exam   Vital Signs: Temp: 98.2  F (36.8  C) Temp src: Oral BP: 115/57 Pulse: 85   Resp: 18 SpO2: 99 % O2 Device: None (Room air)    Weight: 159 lbs 3.2 oz    General Appearance: Sitting up in bed, pleasant and conversant, no distress  Respiratory: Breathing comfortably in room air, lungs clear to auscultation  Cardiovascular: RRR no murmurs   GI: Soft, non-distended, non-tender to palpation +BS  Skin: No rash or jaundice   Other: No lower extremity edema         Primary Care Physician   Carmen Cho    Discharge Orders      Home Care Referral      Reason for your hospital stay    Cholangitis (infection of the biliary duct)     Activity    Your activity upon discharge: activity as tolerated     Adult [facility] Follow-up and recommended labs and tests    Follow up with primary care provider, Carmen Cho, within 7 days for hospital follow- up.  The following labs/tests are recommended: CMP and CBC with platelets.      Appointments on [place] and/or [facility] (with [facility] or [facility] provider or service). Call [phone] if you haven't heard regarding these appointments within 7 days of discharge.     Diet    Follow this diet upon discharge: Soft foods, low fiber       Significant Results and Procedures   Most Recent 3 CBC's:  Recent Labs   Lab Test 08/05/23  0613 08/04/23  0706 08/03/23  1046   WBC 5.6 5.7 8.5   HGB 7.4* 7.5* 8.2*   * 107* 108*   PLT 98* 95* 95*     Most Recent 3 BMP's:  Recent Labs   Lab Test 08/05/23  0613 08/04/23  0706 08/03/23  0711   * 134* 133*   POTASSIUM 4.8 4.0 3.8   CHLORIDE 99 100  98   CO2 23 23 24   BUN 19.4 19.1 20.3   CR 1.27* 1.39* 1.24*   ANIONGAP 10 11 11   CARLOS 8.0* 8.0* 8.5*   * 104* 98     Most Recent 2 LFT's:  Recent Labs   Lab Test 08/05/23  0613 08/04/23  0706   AST 68* 52*   ALT 33 37   ALKPHOS 208* 246*   BILITOTAL 1.2 1.5*   ,   Results for orders placed or performed during the hospital encounter of 08/01/23   CT Chest Pulmonary Embolism w Contrast    Narrative    EXAMINATION: CTA pulmonary angiogram, 8/1/2023 5:05 PM     COMPARISON: 7/26/2023    HISTORY:    History of PE, sudden onset CP and shortness of breath    TECHNIQUE: Volumetric helical acquisition of CT images of the chest  from the lung apices to the kidneys were acquired after the  administration of 60 mL of Isovue-370 IV contrast. Flash technique  with free breathing acquisition.  Post-processed multiplanar and/or  MIP reformations were obtained, archived to PACS and used in  interpretation of this study.     FINDINGS:  Contrast bolus is: excellent.  Exam is negative for acute  pulmonary embolism.    The largest right ventricle transaxial diameter is (measured from  endocardium to endocardium): 4.4 cm   The largest left ventricle transaxial diameter is (measured from  endocardium to endocardium): 4.3 cm  RV/LV ratio is: 1.0 (if ratio greater than 1.1 then sign is suspicious  for right heart strain)  Reflux of contrast into the IVC? no  Paradoxical bowing of the interventricular septum to the left? no  Pericardial effusion?:no    Atrophic thyroid. No lower cervical or axillary lymphadenopathy.  Normal size and configuration of the medial intrathoracic vessels.  Normal cardiac size. No pericardial effusion. Moderate sized bilateral  pleural effusions with associated atelectasis, similar to prior. Faint  areas of nodular opacities particularly along the left lower lobe,  such as 9 x 3 mm (series 5 image 23) and groundglass nodule measuring  8 x 4 mm (series 5 image 50). Bilateral apical faint  groundglass  opacities. Unremarkable esophagus. Limited noncontrast evaluation of  the upper abdomen with visualization of the 4 cm pancreatic head mass,  biliary stenting, stable pneumobilia. Otherwise unremarkable.      Impression    IMPRESSION:   1. Exam is negative for acute pulmonary embolism.    Evidence for right heart strain or increased right heart pressures?   no.     2. Stable moderate sized bilateral pleural effusions with associated  atelectasis.    3. Left lower lobe nodular opacities of indeterminate etiology,  similar to prior, infectious, inflammatory, or malignant.     4. Partially evaluated pancreatic head mass and biliary stent..    In the event of a positive result for acute pulmonary embolism  resulting in right heart strain, consider calling the   [facility] patient placement ([phone]) for PERT (Pulmonary Embolism  Response Team) Activation?    PERT -- Pulmonary Embolism Response Team (Multidisciplinary team  including cardiology, interventional radiology, critical care,  hematology)    I have personally reviewed the examination and initial interpretation  and I agree with the findings.    MARY ANNE GOYAL DO         SYSTEM ID:  [id number]   US Abdomen Limited (RUQ)    Narrative    EXAMINATION: Limited Abdominal Ultrasound, 8/1/2023 7:21 PM     COMPARISON: CT CAP 7/26/2023    HISTORY:    Biliary stent, trending up LFT    FINDINGS:   Fluid: Large right pleural effusion. No evidence of ascites.    Liver: Suboptimal visualization of the liver with normal echotexture  in the visualized right hepatic lobe. The liver measures 12.1 cm in  craniocaudal dimension. There is no focal mass in the right hepatic  lobe.     Gallbladder: Normally distended containing sludge. No shadowing  cholelithiasis. No cholecystic fluid or significant gallbladder wall  thickening.    Bile Ducts: Common bile duct stent is visualized in appropriate  positioning.    Pancreas: Visualized portions of the head and body of the  pancreas are  unremarkable.     Kidney: The right kidney measures 12.6 cm long. There is no  hydronephrosis or hydroureter, no shadowing renal calculi, cystic  lesion or mass.       Impression    IMPRESSION:   1.  Common bile duct stent in appropriate position.  2.  Gallbladder sludge without evidence of acute cholecystitis.   3.  Large right pleural effusion.  4.  Suboptimal visualization of the liver without focal abnormalities.    I have personally reviewed the examination and initial interpretation  and I agree with the findings.    MARY ANNE GOYAL DO         SYSTEM ID:  [id number]   XR Surgery YUVAL Fluoro Less Than 5 Min w Stills    Narrative    This exam was marked as non-reportable because it will not be read by a   radiologist or a [place] non-radiologist provider.             Discharge Medications   Current Discharge Medication List        START taking these medications    Details   ciprofloxacin (CIPRO) 500 MG tablet Take 1 tablet (500 mg) by mouth every 12 hours for 11 doses  Qty: 11 tablet, Refills: 0    Associated Diagnoses: Cholangitis           CONTINUE these medications which have NOT CHANGED    Details   acetaminophen (TYLENOL) 500 MG tablet Take 500-1,000 mg by mouth every 8 hours as needed for mild pain      calcium carbonate 750 MG CHEW Take 750 mg by mouth At Bedtime      dapagliflozin (FARXIGA) 10 MG TABS tablet Take 1 tablet by mouth daily      emollient (VANICREAM) external cream Apply topically daily as needed for other (rash on hands and feet)      furosemide (LASIX) 20 MG tablet Take 20 mg by mouth as needed      levothyroxine (SYNTHROID/LEVOTHROID) 112 MCG tablet Take 1 tablet (112 mcg) by mouth daily  Qty: 90 tablet, Refills: 0    Associated Diagnoses: Acquired hypothyroidism      lipase-protease-amylase (CREON 12) 88404-559[address]25[address] units CPEP Take 2-3 capsules by mouth 3 times daily (with meals)      lovastatin (MEVACOR) 40 MG tablet Take 1 tablet (40 mg) by mouth At Bedtime  Qty: 90  tablet, Refills: 3    Associated Diagnoses: Mixed hyperlipidemia      melatonin 3 MG tablet Take 3 mg by mouth nightly as needed for sleep      metoprolol succinate ER (TOPROL XL) 25 MG 24 hr tablet Take 12.5 mg by mouth 2 times daily      Multiple Vitamin (MULTIVITAMIN) per tablet Take 1 tablet by mouth 2 times daily      ondansetron (ZOFRAN ODT) 8 MG ODT tab Take 1 tablet (8 mg) by mouth every 8 hours as needed for nausea  Qty: 30 tablet, Refills: 1    Associated Diagnoses: Malignant neoplasm of endocrine pancreas (H); Chemotherapy-induced nausea      polyethylene glycol 3350 POWD Take 1 packet by mouth as needed      prochlorperazine (COMPAZINE) 10 MG tablet Take 10 mg by mouth every 6 hours as needed for nausea      !! venlafaxine (EFFEXOR XR) 150 MG 24 hr capsule Take 150 mg by mouth every morning With 75 mg capsule      !! venlafaxine (EFFEXOR XR) 75 MG 24 hr capsule Take 75 mg by mouth every morning With 150 mg capsule      vitamin D3 (CHOLECALCIFEROL) 50 mcg (2000 units) tablet Take 1 tablet by mouth daily      capecitabine (XELODA) 500 MG tablet Take 2 tablets (1,000 mg) by mouth 2 times daily for 14 days Take for 14 days, then off for 21 days. Take within 30 mins after meal.  Qty: 56 tablet, Refills: 0    Associated Diagnoses: Malignant neoplasm of endocrine pancreas (H)      capecitabine (XELODA) 500 MG tablet Take 2 tablets (1,000 mg) by mouth 2 times daily for 14 days Take with 150 mg tablet for total dose of 1,150 mg. Take for 14 days, then off for 14 days. Take within 30 mins after meal.  Qty: 56 tablet, Refills: 0    Associated Diagnoses: Malignant neoplasm of endocrine pancreas (H)      temozolomide (TEMODAR) 100 MG capsule Take 4 capsules (400 mg) by mouth At Bedtime for 5 days Take on empty stomach, Days 10 thru 14. Take Zofran 30-60 min before Temodar.  Qty: 20 capsule, Refills: 0    Associated Diagnoses: Malignant neoplasm of endocrine pancreas (H)       !! - Potential duplicate medications  found. Please discuss with provider.        STOP taking these medications       potassium chloride ER (K-TAB/KLOR-CON) 10 MEQ CR tablet Comments:   Reason for Stopping:             Allergies   Allergies   Allergen Reactions    Bee Venom Hives and Other (See Comments)     Other reaction(s): Other (see comments)  Not honey bees. Unknown bee  Per pt  Per pt  Per pt  Per pt      Simvastatin Muscle Pain (Myalgia) and Other (See Comments)     Other reaction(s): Muscle Aches, Other (see comments)  Patient reports muscle stiffness  Patient reports muscle stiffness  myalgias  myalgias      Vinblastine Other (See Comments)     Other reaction(s): Other (see comments)  Patient reported paralyzation of colon  Per pt  Patient reported paralyzation of colon  Per pt      Vincristine Other (See Comments)     Patient reports paralyzation of his colon  Patient reports paralyzation of his colon  Patient reports paralyzation of his colon  Patient reports paralyzation of his colon      Lina-Kit Bee Sting     Nkda [No Known Drug Allergy]

## 2023-08-06 NOTE — TELEPHONE ENCOUNTER
Notified by micro lab that a blood culture from 8/3/23 returned positive for GPC in pairs and chains, verigene  + E. Faecalis. Patient discharged earlier today on Ciprofloxacin after being treated in the hospital for cholangitis with initial blood cultures positive for Klebsiella.     Called William and spoke to he and his wife Alissa. William continues to feel well, no fevers or any new symptoms. He is taking the ciprofloxacin. Informed them of the new blood culture result, and that we may need to start a second antibiotic because ciprofloxacin may not be the best choice to cover enterococcus. Currently there are no open pharmacies near their house and compatible with insurance, so they prefer to have the new antibiotic sent to their regular pharmacy to  first thing in the morning, and just monitor symptoms overnight. I asked that if any fever, abdominal pain, vomiting, or other new symptoms overnight please go right away back to the ER for further treatment. Otherwise will plan to send amoxicillin to the pharmacy to take in addition to ciprofloxacin. I will also plan to consult with ID in the morning for advice on whether this new result warrants any further inpatient treatment or work up. It is reassuring that he continues to feel better on current therapy.     Maria Esther Douglass MD  9:07 PM      Addendum 08/06/23 2:30 PM  Called and spoke to on call ID physician on [facility]. Reviewed new blood culture results, and they agreed with continuing Ciprofloxacin + amoxicillin. Less concerned for ongoing bacteremia as repeat cultures are negative and source control was obtained. Updated William by phone, who has started taking amoxicillin and still feels well.

## 2023-08-07 NOTE — PROGRESS NOTES
[facility]    Background: Transitional Care Management program identified per system criteria and reviewed by [facility] team for possible outreach.    Assessment: Upon chart review, CCR Team member will not proceed with patient outreach related to this episode of Transitional Care Management program due to reason below:    Patient has active communication with a nurse, provider or care team for reason of post-hospital follow up plan.  Outreach call by CCR team not indicated to minimize duplicative efforts.     Plan: Transitional Care Management episode addressed appropriately per reason noted above.      Ruby Cerrato RN  [facility]    *Connected Care Resource Team does NOT follow patient ongoing. Referrals are identified based on internal discharge reports and the outreach is to ensure patient has an understanding of their discharge instructions.

## 2023-08-09 NOTE — TELEPHONE ENCOUNTER
"Routing refill request to provider for review/approval because:  Labs out of range:  TSH    Last Written Prescription Date:  5/11/2023  Last Fill Quantity: 90,  # refills: 0   Last office visit provider:  7/28/2023     Requested Prescriptions   Pending Prescriptions Disp Refills    levothyroxine (SYNTHROID/LEVOTHROID) 112 MCG tablet 90 tablet 0     Sig: Take 1 tablet (112 mcg) by mouth daily       Thyroid Protocol Failed - 8/9/2023  2:09 PM        Failed - Normal TSH on file in past 12 months     Recent Labs   Lab Test 07/28/23  1439   TSH 7.46*              Passed - Patient is 12 years or older        Passed - Recent (12 mo) or future (30 days) visit within the authorizing provider's specialty     Patient has had an office visit with the authorizing provider or a provider within the authorizing providers department within the previous 12 mos or has a future within next 30 days. See \"Patient Info\" tab in inbasket, or \"Choose Columns\" in Meds & Orders section of the refill encounter.              Passed - Medication is active on med list             MAJOR BRUNSON RN 08/09/23 2:09 PM  "

## 2023-08-11 NOTE — PROGRESS NOTES
[facility]: Cancer Care                                                                                          Spoke with patient and his wife regarding post discharge hospital plan. Patient states he is feeling much better. He has enjoyed being off treatment for several weeks. We discussed Dr Parker would like him to continue to hold chemo for 2 weeks after discharge before resuming CapTem. He will plan to have post hospital visit with Heather Kelly PA-C on 8/14. Unfortunately, he was unable to schedule labs prior. No additional questions or concerns at this time.     Halle Narvaez RN, BSN, OCN   RN Care Coordinator   [facility]

## 2023-08-14 NOTE — NURSING NOTE
Is the patient currently in the state of [place]? YES    Visit mode:VIDEO    If the visit is dropped, the patient can be reconnected by: VIDEO VISIT: Send to e-mail at: [email or web address]    Will anyone else be joining the visit? NO      How would you like to obtain your AVS? MyChart    Are changes needed to the allergy or medication list? NO  Pt would like more information on steps/process towards hospice and end of care.     Reason for visit: JOHNNIE Dillon, Virtual Facilitator

## 2023-08-14 NOTE — LETTER
"    8/14/2023         RE: Tyrel Harrell  [address]  [facility] [address]        Dear Colleague,    Thank you for referring your patient, Tyrel Harrell, to the [facility]. Please see a copy of my visit note below.      Virtual Visit Details    Type of service:  Video Visit   Video Start Time: {video visit start/end time for provider to select:263017}  Video End Time:{video visit start/end time for provider to select:996382}    Originating Location (pt. Location): {video visit patient location:285746::\"Home\"}  {PROVIDER LOCATION On-site should be selected for visits conducted from your clinic location or adjoining [facility] hospital, academic office, or other nearby [facility] building. 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 He has a history of cured Hodgkin's disease with a grade 3 well-differentiated endocrine tumor in the head of his pancreas with extensive liver metastasis.  His Ki-67 labeling index is 60%, and his cancer has very little if any dotatate uptake.  He has been on CAPTEM since August 2021 with an interruption during repair of a mitral valve in the summer of 2022.  He is currently undergoing treatment with capecitabine and temozolomide. Please see previous notes for further details on the patient's history. He was hospitalized from 6/22-6/26/23 with orthostatic hypotension likely secondary to autonomic dysfunction. He was discharged home on " fludrocortisone.     Overall, through the summer of 2023, he has had a decline in his cognitive function. He was hospitalized from 8/1-8/5/23 with acute cholangitis. ERCP performed 8/3 which found blocked metal stent with food within the duodenal bulb which was cleared. He was treated with Zosyn and ceftriaxone and discharged home on Cipro.    He is here today for routine hospital follow up.    Interval History:        Patient reports that since returning home from the hospital he has been doing okay.  He had been having some minor falls prior to his hospital stay and attributes this to feeling weak as well as having some twitching.  He has ongoing fatigue.  He has been trying to use his walker to get around at home and also uses a urinal.  He did have an explosive stool this morning with incontinence that was soft.  He has developed numbness in his feet which has been getting gradually worse.  He notes that he has burning in his feet with the capecitabine, but that this seems to get better with his time off from treatment.  He does continue to get some blistering on his hands and feet despite the additional week off from treatment.  He denies other concerns.    Current Outpatient Medications   Medication Sig Dispense Refill    acetaminophen (TYLENOL) 500 MG tablet Take 500-1,000 mg by mouth every 8 hours as needed for mild pain      calcium carbonate 750 MG CHEW Take 750 mg by mouth At Bedtime      capecitabine (XELODA) 500 MG tablet Take 2 tablets (1,000 mg) by mouth 2 times daily for 14 days Take for 14 days, then off for 21 days. Take within 30 mins after meal. 56 tablet 0    capecitabine (XELODA) 500 MG tablet Take 2 tablets (1,000 mg) by mouth 2 times daily for 14 days Take with 150 mg tablet for total dose of 1,150 mg. Take for 14 days, then off for 14 days. Take within 30 mins after meal. 56 tablet 0    dapagliflozin (FARXIGA) 10 MG TABS tablet Take 1 tablet by mouth daily      emollient (VANICREAM) external  cream Apply topically daily as needed for other (rash on hands and feet)      furosemide (LASIX) 20 MG tablet Take 20 mg by mouth as needed      levothyroxine (SYNTHROID/LEVOTHROID) 112 MCG tablet Take 1 tablet (112 mcg) by mouth daily 90 tablet 0    lipase-protease-amylase (CREON 12) 18843-52739-04089 units CPEP Take 2-3 capsules by mouth 3 times daily (with meals)      lovastatin (MEVACOR) 40 MG tablet Take 1 tablet (40 mg) by mouth At Bedtime 90 tablet 3    melatonin 3 MG tablet Take 3 mg by mouth nightly as needed for sleep      metoprolol succinate ER (TOPROL XL) 25 MG 24 hr tablet Take 12.5 mg by mouth 2 times daily      Multiple Vitamin (MULTIVITAMIN) per tablet Take 1 tablet by mouth 2 times daily      ondansetron (ZOFRAN ODT) 8 MG ODT tab Take 1 tablet (8 mg) by mouth every 8 hours as needed for nausea 30 tablet 1    polyethylene glycol 3350 POWD Take 1 packet by mouth as needed      prochlorperazine (COMPAZINE) 10 MG tablet Take 10 mg by mouth every 6 hours as needed for nausea      temozolomide (TEMODAR) 100 MG capsule Take 4 capsules (400 mg) by mouth At Bedtime for 5 days Take on empty stomach, Days 10 thru 14. Take Zofran 30-60 min before Temodar. 20 capsule 0    venlafaxine (EFFEXOR XR) 150 MG 24 hr capsule Take 150 mg by mouth every morning With 75 mg capsule      venlafaxine (EFFEXOR XR) 75 MG 24 hr capsule Take 75 mg by mouth every morning With 150 mg capsule      vitamin D3 (CHOLECALCIFEROL) 50 mcg (2000 units) tablet Take 1 tablet by mouth daily       Objective:  General: patient appears well in no acute distress, alert and oriented, speech clear and fluid  Skin: no visualized rash or lesions on visualized skin  Resp: Appears to be breathing comfortably without accessory muscle usage, speaking in full sentences, no audible wheezes or cough.  Psych: Coherent speech, normal rate and volume, able to articulate logical thoughts, able to abstract reason, no tangential thoughts, no hallucinations or  delusions  Patient's affect is appropriate.    Laboratory Data:  Most Recent 3 CBC's:  Recent Labs   Lab Test 08/05/23  0613 08/04/23  0706 08/03/23  1046 08/03/23  0711 08/02/23  0548 08/01/23  1512   WBC 5.6 5.7 8.5   < > 13.2* 10.5   HGB 7.4* 7.5* 8.2*   < > 8.3* 8.3*   * 107* 108*   < > 110* 109*   PLT 98* 95* 95*   < > 108* 102*   ANEUTAUTO  --   --  7.8  --  12.2* 10.0*    < > = values in this interval not displayed.    Most Recent 3 BMP's:  Recent Labs   Lab Test 08/05/23  0613 08/04/23  0706 08/03/23  0711   * 134* 133*   POTASSIUM 4.8 4.0 3.8   CHLORIDE 99 100 98   CO2 23 23 24   BUN 19.4 19.1 20.3   CR 1.27* 1.39* 1.24*   ANIONGAP 10 11 11   CARLOS 8.0* 8.0* 8.5*   * 104* 98   PROTTOTAL 6.3* 6.1* 6.6   ALBUMIN 2.5* 2.6* 2.8*    Most Recent 2 LFT's:  Recent Labs   Lab Test 08/05/23  0613 08/04/23  0706   AST 68* 52*   ALT 33 37   ALKPHOS 208* 246*   BILITOTAL 1.2 1.5*    Most Recent TSH and T4:  Recent Labs   Lab Test 07/28/23  1439   TSH 7.46*   T4 1.35   I reviewed the above labs today.      Assessment and Plan:  High-grade well-differentiated neuroendocrine tumor of the pancreas with liver metastasis.  His disease is stable on last imaging. Due to a decline in his performance status, treatment is currently on hold.     Biliary obstruction. Patient has had stent placement and dilation. Last EUS was on 6/12/23 and ERCP was on 8/3/23, as noted above.     Chronic heart failure.  Managed by cardiology. No acute concerns today.     Orthostatic hypotension likely 2/2 autonomic dysfunction and generalized weakness. Now off of Florinef with no worsening of his symptoms. Home care referral?    Paroxysmal atrial fibrillation and history of PE. Now off of anticoagulation due to fall risk.     Worsening cognitive function with hallucinations.  I am not sure the etiology of this.  He had an MRI of his brain within the past month that was unremarkable and would be unusual though not impossible for him  to develop brain metastasis from his malignancy.  I do not see an obvious metabolic explanation for this, but the electrolytes and other labs I have are from a couple weeks ago.  I do not believe this is hepatic encephalopathy but that is conceivable.  We will try again to get further labs from him.    Heather Kelly PA-C  [facility]  [address] [place] [address]  [phone]    *** minutes spent on the date of the encounter doing {2021 E&M time in:094619}       Virtual Visit Details    Type of service:  Video Visit   Video Start Time: 12:12 PM  Video End Time:12:31 PM    Originating Location (pt. 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 He has a history of cured Hodgkin's disease with a grade 3 well-differentiated endocrine tumor in the head of his pancreas with extensive liver metastasis.  His Ki-67 labeling index is 60%, and his cancer has very little if any dotatate uptake.  He has been on CAPTEM since August 2021 with an interruption during repair of a mitral valve in the summer of 2022.  He is currently undergoing treatment with capecitabine and temozolomide. Please see previous notes for further details on the patient's history. He was hospitalized from 6/22-6/26/23 with orthostatic hypotension likely secondary to autonomic dysfunction. He was discharged home on fludrocortisone.     Overall, through the summer of 2023, he has had a decline in his cognitive function. He was hospitalized from 8/1-8/5/23 with acute cholangitis. ERCP performed 8/3 which found blocked metal stent with food within the duodenal  bulb which was cleared. He was treated with Zosyn and ceftriaxone and discharged home on Cipro.    He is here today for routine hospital follow up.    Interval History:  -Has ongoing fatigue. Feels his brain fog has improved.   -Balance is also doing a bit better. Using a walker to get around.   -Also, notes that he is less twitchy in his sleep while off of the chemotherapy.  -Denies any recent hallucinations since being off of the chemotherapy.   -Skin on hands and feet are less dry with the chemo break.   -Last chemo was on 7/21.   -Completed antibiotics on Saturday.     Current Outpatient Medications   Medication Sig Dispense Refill    acetaminophen (TYLENOL) 500 MG tablet Take 500-1,000 mg by mouth every 8 hours as needed for mild pain      calcium carbonate 750 MG CHEW Take 750 mg by mouth At Bedtime      capecitabine (XELODA) 500 MG tablet Take 2 tablets (1,000 mg) by mouth 2 times daily for 14 days Take for 14 days, then off for 21 days. Take within 30 mins after meal. 56 tablet 0    capecitabine (XELODA) 500 MG tablet Take 2 tablets (1,000 mg) by mouth 2 times daily for 14 days Take with 150 mg tablet for total dose of 1,150 mg. Take for 14 days, then off for 14 days. Take within 30 mins after meal. 56 tablet 0    dapagliflozin (FARXIGA) 10 MG TABS tablet Take 1 tablet by mouth daily      emollient (VANICREAM) external cream Apply topically daily as needed for other (rash on hands and feet)      furosemide (LASIX) 20 MG tablet Take 20 mg by mouth as needed      levothyroxine (SYNTHROID/LEVOTHROID) 112 MCG tablet Take 1 tablet (112 mcg) by mouth daily 90 tablet 0    lipase-protease-amylase (CREON 12) 58753-70833-85947 units CPEP Take 2-3 capsules by mouth 3 times daily (with meals)      lovastatin (MEVACOR) 40 MG tablet Take 1 tablet (40 mg) by mouth At Bedtime 90 tablet 3    melatonin 3 MG tablet Take 3 mg by mouth nightly as needed for sleep      metoprolol succinate ER (TOPROL XL) 25 MG 24 hr tablet Take  12.5 mg by mouth 2 times daily      Multiple Vitamin (MULTIVITAMIN) per tablet Take 1 tablet by mouth 2 times daily      ondansetron (ZOFRAN ODT) 8 MG ODT tab Take 1 tablet (8 mg) by mouth every 8 hours as needed for nausea 30 tablet 1    polyethylene glycol 3350 POWD Take 1 packet by mouth as needed      prochlorperazine (COMPAZINE) 10 MG tablet Take 10 mg by mouth every 6 hours as needed for nausea      temozolomide (TEMODAR) 100 MG capsule Take 4 capsules (400 mg) by mouth At Bedtime for 5 days Take on empty stomach, Days 10 thru 14. Take Zofran 30-60 min before Temodar. 20 capsule 0    venlafaxine (EFFEXOR XR) 150 MG 24 hr capsule Take 150 mg by mouth every morning With 75 mg capsule      venlafaxine (EFFEXOR XR) 75 MG 24 hr capsule Take 75 mg by mouth every morning With 150 mg capsule      vitamin D3 (CHOLECALCIFEROL) 50 mcg (2000 units) tablet Take 1 tablet by mouth daily       Objective:  General: patient appears well in no acute distress, alert and oriented, speech clear and fluid  Skin: no visualized rash or lesions on visualized skin  Resp: Appears to be breathing comfortably without accessory muscle usage, speaking in full sentences, no audible wheezes or cough.  Psych: Coherent speech, normal rate and volume, able to articulate logical thoughts, able to abstract reason, no tangential thoughts, no hallucinations or delusions  Patient's affect is appropriate.    Laboratory Data:  Most Recent 3 CBC's:  Recent Labs   Lab Test 08/05/23  0613 08/04/23  0706 08/03/23  1046 08/03/23  0711 08/02/23  0548 08/01/23  1512   WBC 5.6 5.7 8.5   < > 13.2* 10.5   HGB 7.4* 7.5* 8.2*   < > 8.3* 8.3*   * 107* 108*   < > 110* 109*   PLT 98* 95* 95*   < > 108* 102*   ANEUTAUTO  --   --  7.8  --  12.2* 10.0*    < > = values in this interval not displayed.    Most Recent 3 BMP's:  Recent Labs   Lab Test 08/05/23  0613 08/04/23  0706 08/03/23  0711   * 134* 133*   POTASSIUM 4.8 4.0 3.8   CHLORIDE 99 100 98   CO2 23  23 24   BUN 19.4 19.1 20.3   CR 1.27* 1.39* 1.24*   ANIONGAP 10 11 11   CARLOS 8.0* 8.0* 8.5*   * 104* 98   PROTTOTAL 6.3* 6.1* 6.6   ALBUMIN 2.5* 2.6* 2.8*    Most Recent 2 LFT's:  Recent Labs   Lab Test 08/05/23  0613 08/04/23  0706   AST 68* 52*   ALT 33 37   ALKPHOS 208* 246*   BILITOTAL 1.2 1.5*    Most Recent TSH and T4:  Recent Labs   Lab Test 07/28/23  1439   TSH 7.46*   T4 1.35   I reviewed the above labs today.    Assessment and Plan:  High-grade well-differentiated neuroendocrine tumor of the pancreas with liver metastasis.  His disease is stable on last imaging. Due to a decline in his performance status, treatment is currently on hold. He is doing better today and will resume chemotherapy next week, followed by a 3 week break. He will have labs later today. Will also refer him to see palliative care and for a hospice consult only.    Biliary obstruction. Patient has had stent placement and dilation. Last EUS was on 6/12/23 and ERCP was on 8/3/23, as noted above.     Chronic heart failure.  Managed by cardiology. No acute concerns today.     Orthostatic hypotension likely 2/2 autonomic dysfunction and generalized weakness. Now off of Florinef with no worsening of his symptoms. Home care referral previously made now with nurse, PT, and OT weekly.    Paroxysmal atrial fibrillation and history of PE. Now off of anticoagulation due to fall risk.     Worsening cognitive function with hallucinations.  Unclear cause, but now improved. Will continue to monitor.     Heather Kelly PA-C  [facility]  [address] [place] [address] [id number] [phone]    *** minutes spent on the date of the encounter doing {2021 E&M time in:158453}        Again, thank you for allowing me to participate in the care of your patient.        Sincerely,        Heather Kelly PA-C

## 2023-08-14 NOTE — PROGRESS NOTES
Virtual Visit Details    Type of service:  Video Visit   Video Start Time: 12:12 PM  Video End Time:12:31 PM    Originating Location (pt. 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 He has a history of cured Hodgkin's disease with a grade 3 well-differentiated endocrine tumor in the head of his pancreas with extensive liver metastasis.  His Ki-67 labeling index is 60%, and his cancer has very little if any dotatate uptake.  He has been on CAPTEM since August 2021 with an interruption during repair of a mitral valve in the summer of 2022.  He is currently undergoing treatment with capecitabine and temozolomide. Please see previous notes for further details on the patient's history. He was hospitalized from 6/22-6/26/23 with orthostatic hypotension likely secondary to autonomic dysfunction. He was discharged home on fludrocortisone.     Overall, through the summer of 2023, he has had a decline in his cognitive function. He was hospitalized from 8/1-8/5/23 with acute cholangitis. ERCP performed 8/3 which found blocked metal stent with food within the duodenal bulb which was cleared. He was treated with Zosyn and ceftriaxone and discharged home on Cipro.    He is here today for routine hospital follow up.    Interval History:  -Has ongoing fatigue. Feels his brain fog has improved.   -Balance is also doing a bit better. Using a walker to get around.   -Also, notes that he is less twitchy in his sleep while off of the chemotherapy.  -Denies any recent hallucinations since being off of the chemotherapy.   -Skin on hands and feet are less dry  with the chemo break.   -Last chemo was on 7/21.   -Completed antibiotics on Saturday.     Current Outpatient Medications   Medication Sig Dispense Refill    acetaminophen (TYLENOL) 500 MG tablet Take 500-1,000 mg by mouth every 8 hours as needed for mild pain      calcium carbonate 750 MG CHEW Take 750 mg by mouth At Bedtime      capecitabine (XELODA) 500 MG tablet Take 2 tablets (1,000 mg) by mouth 2 times daily for 14 days Take for 14 days, then off for 21 days. Take within 30 mins after meal. 56 tablet 0    capecitabine (XELODA) 500 MG tablet Take 2 tablets (1,000 mg) by mouth 2 times daily for 14 days Take with 150 mg tablet for total dose of 1,150 mg. Take for 14 days, then off for 14 days. Take within 30 mins after meal. 56 tablet 0    dapagliflozin (FARXIGA) 10 MG TABS tablet Take 1 tablet by mouth daily      emollient (VANICREAM) external cream Apply topically daily as needed for other (rash on hands and feet)      furosemide (LASIX) 20 MG tablet Take 20 mg by mouth as needed      levothyroxine (SYNTHROID/LEVOTHROID) 112 MCG tablet Take 1 tablet (112 mcg) by mouth daily 90 tablet 0    lipase-protease-amylase (CREON 12) 65955-83166-30295 units CPEP Take 2-3 capsules by mouth 3 times daily (with meals)      lovastatin (MEVACOR) 40 MG tablet Take 1 tablet (40 mg) by mouth At Bedtime 90 tablet 3    melatonin 3 MG tablet Take 3 mg by mouth nightly as needed for sleep      metoprolol succinate ER (TOPROL XL) 25 MG 24 hr tablet Take 12.5 mg by mouth 2 times daily      Multiple Vitamin (MULTIVITAMIN) per tablet Take 1 tablet by mouth 2 times daily      ondansetron (ZOFRAN ODT) 8 MG ODT tab Take 1 tablet (8 mg) by mouth every 8 hours as needed for nausea 30 tablet 1    polyethylene glycol 3350 POWD Take 1 packet by mouth as needed      prochlorperazine (COMPAZINE) 10 MG tablet Take 10 mg by mouth every 6 hours as needed for nausea      temozolomide (TEMODAR) 100 MG capsule Take 4 capsules (400 mg) by mouth At  Bedtime for 5 days Take on empty stomach, Days 10 thru 14. Take Zofran 30-60 min before Temodar. 20 capsule 0    venlafaxine (EFFEXOR XR) 150 MG 24 hr capsule Take 150 mg by mouth every morning With 75 mg capsule      venlafaxine (EFFEXOR XR) 75 MG 24 hr capsule Take 75 mg by mouth every morning With 150 mg capsule      vitamin D3 (CHOLECALCIFEROL) 50 mcg (2000 units) tablet Take 1 tablet by mouth daily       Objective:  General: patient appears well in no acute distress, alert and oriented, speech clear and fluid  Skin: no visualized rash or lesions on visualized skin  Resp: Appears to be breathing comfortably without accessory muscle usage, speaking in full sentences, no audible wheezes or cough.  Psych: Coherent speech, normal rate and volume, able to articulate logical thoughts, able to abstract reason, no tangential thoughts, no hallucinations or delusions  Patient's affect is appropriate.    Laboratory Data:  Most Recent 3 CBC's:  Recent Labs   Lab Test 08/05/23  0613 08/04/23  0706 08/03/23  1046 08/03/23  0711 08/02/23  0548 08/01/23  1512   WBC 5.6 5.7 8.5   < > 13.2* 10.5   HGB 7.4* 7.5* 8.2*   < > 8.3* 8.3*   * 107* 108*   < > 110* 109*   PLT 98* 95* 95*   < > 108* 102*   ANEUTAUTO  --   --  7.8  --  12.2* 10.0*    < > = values in this interval not displayed.    Most Recent 3 BMP's:  Recent Labs   Lab Test 08/05/23 0613 08/04/23  0706 08/03/23  0711   * 134* 133*   POTASSIUM 4.8 4.0 3.8   CHLORIDE 99 100 98   CO2 23 23 24   BUN 19.4 19.1 20.3   CR 1.27* 1.39* 1.24*   ANIONGAP 10 11 11   CARLOS 8.0* 8.0* 8.5*   * 104* 98   PROTTOTAL 6.3* 6.1* 6.6   ALBUMIN 2.5* 2.6* 2.8*    Most Recent 2 LFT's:  Recent Labs   Lab Test 08/05/23 0613 08/04/23  0706   AST 68* 52*   ALT 33 37   ALKPHOS 208* 246*   BILITOTAL 1.2 1.5*    Most Recent TSH and T4:  Recent Labs   Lab Test 07/28/23  1439   TSH 7.46*   T4 1.35   I reviewed the above labs today.    Assessment and Plan:  High-grade well-differentiated  neuroendocrine tumor of the pancreas with liver metastasis.  His disease is stable on last imaging. Due to a decline in his performance status, treatment is currently on hold. He is doing better today and will resume chemotherapy next week, followed by a 3 week break. He will have labs later today. Will also refer him to see palliative care and for a hospice consult only.    Biliary obstruction. Patient has had stent placement and dilation. Last EUS was on 6/12/23 and ERCP was on 8/3/23, as noted above.     Chronic heart failure.  Managed by cardiology. No acute concerns today.     Orthostatic hypotension likely 2/2 autonomic dysfunction and generalized weakness. Now off of Florinef with no worsening of his symptoms. Home care referral previously made now with nurse, PT, and OT weekly.    Paroxysmal atrial fibrillation and history of PE. Now off of anticoagulation due to fall risk.     Worsening cognitive function with hallucinations.  Unclear cause, but now improved. Will continue to monitor.     Heather Kelly PA-C  [facility]  [address] [place], [place] [id number] [phone]    51 minutes spent on the date of the encounter doing chart review, review of test results, interpretation of tests, patient visit, and documentation

## 2023-08-16 NOTE — ORAL ONC MGMT
Oral Chemotherapy Monitoring Program    Subjective/Objective:  Tyrel Harrell is a 72 year old male contacted by phone for a follow-up visit for oral chemotherapy.  William confirms the correct dose for capecitabine 1000 mg (2 x 500mg tablets) twice daily for 14 days on and 21 days off and temozolomide 400 mg (4 x 100mg capsules) on days 10-14. Confirmed zero capecitabine tablets and zero temozolomide capsules on hand. Denies new medications. Denies missed doses. Aware of next appointments. Aware of next cycle start date.         6/29/2023    11:00 AM 6/29/2023    11:27 AM 7/12/2023    10:00 AM 8/15/2023     1:00 PM 8/15/2023     1:41 PM 8/16/2023    11:00 AM 8/16/2023    11:23 AM   ORAL CHEMOTHERAPY   Assessment Type Monthly Follow up Monthly Follow up Lab Monitoring Refill Refill Monthly Follow up Monthly Follow up   Diagnosis Code Pancreatic Cancer Pancreatic Cancer Pancreatic Cancer Pancreatic Cancer Pancreatic Cancer Pancreatic Cancer Pancreatic Cancer   Providers Dr. Keith Parker   [facility]   Is this patient followed by the [facility] OC team? Yes Yes Yes Yes Yes Yes Yes   Drug Name Temodar (temozolomide) Xeloda (capecitabine) Xeloda (capecitabine) Xeloda (capecitabine) Temodar (temozolomide) Xeloda (capecitabine) Temodar (temozolomide)   Dose 400 mg 1,000 mg 1,000 mg 1,000 mg 400 mg 1,000 mg 400 mg   Current Schedule Daily BID BID BID Daily BID Daily   Cycle Details Other Other Other Other Other Other Other   Start Date of Last Cycle 7/7/2023 7/7/2023 7/7/2023       Planned next cycle start date    8/21/2023 8/21/2023 8/21/2023 8/21/2023   Doses missed in last 2 weeks 0 0    0 0   Adherence Assessment Adherent Adherent    Adherent Adherent   Adverse Effects Palmar-plantar Erythrodysethesia Syndrome Palmar-plantar Erythrodysethesia Syndrome    No AE identified during assessment No AE  "identified during assessment   Palmar-plantar Erythrodysethesia syndrome[hand-foot syndrome] Resolved due to intervention Resolved due to intervention        Pharmacist Intervention(Palmar-plantar) No No        Any new drug interactions? No No    No No   Is the dose as ordered appropriate for the patient? Yes Yes    Yes Yes   Since the last time we talked, have you been hospitalized or used the emergency room? Yes Yes    No Yes   What medical service did you use? Hospitalization Hospitalization     Hospitalization   How many hospitalizations? 1 1     1   How many hospitalizations were related to the cancer medication? 0 0     0       Last PHQ-2 Score on record:       12/20/2022     8:47 AM   PHQ-2 ( 1999 [employer])   Q1: Little interest or pleasure in doing things 0   Q2: Feeling down, depressed or hopeless 0   PHQ-2 Score 0   Q1: Little interest or pleasure in doing things Not at all    Not at all   Q2: Feeling down, depressed or hopeless Not at all    Not at all   PHQ-2 Score 0    0       Vitals:  BP:   BP Readings from Last 1 Encounters:   08/14/23 124/61     Wt Readings from Last 1 Encounters:   08/14/23 71.7 kg (158 lb)     Estimated body surface area is 1.86 meters squared as calculated from the following:    Height as of 8/14/23: 1.74 m (5' 8.5\").    Weight as of 8/14/23: 71.7 kg (158 lb).    Labs:  _  Result Component Current Result Ref Range   Sodium 139 (8/14/2023) 136 - 145 mmol/L     _  Result Component Current Result Ref Range   Potassium 3.9 (8/14/2023) 3.4 - 5.3 mmol/L     _  Result Component Current Result Ref Range   Calcium 9.0 (8/14/2023) 8.8 - 10.2 mg/dL     _  Result Component Current Result Ref Range   Magnesium 2.0 (8/5/2023) 1.7 - 2.3 mg/dL     _  Result Component Current Result Ref Range   Phosphorus 3.7 (8/5/2023) 2.5 - 4.5 mg/dL     _  Result Component Current Result Ref Range   Albumin 3.2 (L) (8/14/2023) 3.5 - 5.2 g/dL     _  Result Component Current Result Ref Range   Urea Nitrogen 16.6 " (8/14/2023) 8.0 - 23.0 mg/dL     _  Result Component Current Result Ref Range   Creatinine 0.89 (8/14/2023) 0.67 - 1.17 mg/dL     _  Result Component Current Result Ref Range   AST 40 (8/14/2023) 0 - 45 U/L     _  Result Component Current Result Ref Range   ALT 23 (8/14/2023) 0 - 70 U/L     _  Result Component Current Result Ref Range   Bilirubin Total 0.9 (8/14/2023) <=1.2 mg/dL     _  Result Component Current Result Ref Range   WBC Count 5.5 (8/14/2023) 4.0 - 11.0 10e3/uL     _  Result Component Current Result Ref Range   Hemoglobin 9.0 (L) (8/14/2023) 13.3 - 17.7 g/dL     _  Result Component Current Result Ref Range   Platelet Count 143 (L) (8/14/2023) 150 - 450 10e3/uL     _  Result Component Current Result Ref Range   Absolute Neutrophils 5.1 (7/28/2023) 1.6 - 8.3 10e3/uL     _  Result Component Current Result Ref Range   Absolute Neutrophils 4.4 (8/14/2023) 1.6 - 8.3 10e3/uL      Assessment/Plan:  Denies new or worse side effects. Continue plan of care. Next cycle 8/21/2023.    Follow-Up:  9/11: Visit     Refill Due:  [facility] will deliver capecitabine and temozolomide 8/18.     Paul Adams, PharmD  Hematology/Oncology Clinical Pharmacist  [facility] [place]  [phone]

## 2023-08-24 NOTE — PROGRESS NOTES
Social Work - Distress Screen Intervention  [facility]    Identified Concern and Score from Distress Screenin. How concerned are you about your ability to eat? 4     2. How concerned are you about unintended weight loss or your current weight? 5     3. How concerned are you about feeling depressed or very sad?  4     4. How concerned are you about feeling anxious or very scared?  1     5. Do you struggle with the loss of meaning and priyank in your life?  Somewhat     6. How concerned are you about work and home life issues that may be affected by your cancer?  4     7. How concerned are you about knowing what resources are available to help you?  (!) 8     8. Do you currently have what you would describe as Episcopalian or spiritual struggles? Not at all     9. If you want to be contacted by one of our professionals, I can send a message to them right now.  No data recorded     Date of Distress Screen: 23  Data: At time of last visit, patient scored positive on distress screening.  outreached to patient today to follow up on elevated distress and introduce psychosocial services and support.  Intervention/Education provided:  contacted patient by phone to discuss distress screening results. No answer, Left voicemail message, and Provided  contact information and requested return call  Follow-up Required:  to remain available for support and await return call from patient    Anna VIDAL, [facility]  - Oncology  Phone : [phone]  Pager: [phone]

## 2023-08-28 NOTE — PROGRESS NOTES
Virtual Visit Details    Type of service:  Video Visit   Video Start Time: 8:55 AM  Video End Time: 9:50 AM    Originating Location (pt. Location): Home    Distant Location (provider location):  On-site  Platform used for Video Visit: [facility]    Palliative Care Outpatient Clinic      Patient ID/Chief Complaint: Tyrel Harrell 72 year old male who is presenting to the palliative medicine clinic today at the request of [facility] for a palliative care consultation secondary to malignant neoplasm of endocrine pancreas.   The patient's primary care provider is:  Carmen Cho.       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 He has a history of cured Hodgkin's disease 30 years ago. He has been on CAPTEM since August 2021 with an interruption during repair of a mitral valve in the summer of 2022.  He is currently undergoing treatment with capecitabine and temozolomide. He was hospitalized from 6/22-6/26/23 with orthostatic hypotension likely secondary to autonomic dysfunction. Overall, through the summer of 2023, he has had a decline in his cognitive function. He was hospitalized from 8/1-8/5/23 with acute cholangitis. ERCP performed 8/3 which found blocked metal stent with food within the duodenal bulb which was cleared.     He continues to struggle with cognitive dysfunction and relies on his wife significantly.  He hopes that cognition will clear some during weeks off from chemo.     He has significant fatigue and generalized weakness.    He lost approximately 20 pounds when he had cholangitis but weight has stabilized.    Because he spent majority of his life living in [facility] (Tyrel Valerio is a  with [place] [employer] and Kacie both an ) but now lives in the [facility], he and wife have lost strong support system,  "spiritual group support.  Fortunately they have family in [facility] who are involved.    Generally he does not have pain but has recently noticed some right upper quadrant abdominal discomfort.  Had significant pain with cholangitis.    Sleep is impaired by nocturia.    He uses a walker related to gait dysfunction and risk of fall.  He is now off of anticoagulation due to fall risk, previous history of A-fib and PE.    Symptoms/recommendations/discussion:  Healthcare directive present in EMR naming his wife Alissa as primary agent.  William is able to participate in healthcare decision-making but definitely needs his wife's assistance.  POLST form completed and emailed noting NO CPR and DNI.   We talked a lot about William's worries about his deteriorating health.  He believes he is approaching the end of life.  At times, he does not wish to seek ongoing cancer treatment but at other times does.  Currently, plan is to continue with ongoing treatment.  William and Kacie are well-informed about role of hospice when cancer treatment stops in the future.  They are both agreeable to ongoing palliative care services for symptom management needs as they arise and ongoing goals of care discussion.   Referral to palliative care  as William would like to talk with someone about emotional concerns. William worries about \" being a burden\" on Kacie and has concerns about cost of care and taking away from his loved ones.  He has a history of depression requiring treatment, doing relatively well on Effexor.  Prescription for condom catheter sent as nocturia interferes with his sleep.  He would like to try condom catheter a few nights a week in hopes of being able to get a better nights sleep.  Palliative care follow-up in 4 to 6 weeks        How to get a hold of us:  For non-urgent matters, MyChart works best.    For more urgent matters, or if you prefer not to use MyChart, call our clinic nurse coordinator Mercedes Gaming RN at " [phone] or [phone]    We have an on-call number for evenings and weekends. Please call this only if you are having uncontrolled symptoms or serious side effects from your medicines: [phone].     For refills, please give us a week (5 working days) notice. We don't always have providers available everyday to do refills. If you call the day you run out of your medicine, we may not be able to refill it in time, so call 5 days in advance        History:  History gathered today from: patient, family/loved ones, medical chart, outside records including Care Everywhere, health care directive/s      PE: There were no vitals taken for this visit.   Wt Readings from Last 3 Encounters:   08/14/23 71.7 kg (158 lb)   08/04/23 72.2 kg (159 lb 3.2 oz)   07/27/23 73 kg (161 lb)       Gen alert, comfortable appearing, chronically ill-appearing  Head NCAT.  Eyes anicteric without injection  Face symmetric, eyes conjugate  Lungs unlabored, no cough, speaking full sentences  Skin no rashes or lesions evident on face/neck  Neuro Face symmetric, eyes conjugate; speech fluent.  Neuropsych answers questions appropriately much of the time, loses track of topic at times, acknowledges memory impairment        Data reviewed:  I reviewed electrolytes, BUN/creatinine, liver profile, hemoglobin and hematocrit, platelet count, and most recent imaging  Recent oncology notes  Recent hospitalization notes     database reviewed: N/A        75 minutes spent on the date of the encounter doing chart review, history and exam, patient education & counseling, documentation and other activities as noted above.        Thank you for involving us in the patient's care.   FADY Ramirez, [facility] Palliative Care Service

## 2023-08-28 NOTE — PATIENT INSTRUCTIONS
Thank you for meeting with us in the [facility].    How to get a hold of us:  For non-urgent matters, MyChart works best.    For more urgent matters, or if you prefer not to use MyChart, call our clinic nurse coordinator Mercedes Gaming RN at [phone] or [phone]    We have an on-call number for evenings and weekends. Please call this only if you are having uncontrolled symptoms or serious side effects from your medicines: [phone].     For refills, please give us a week (5 working days) notice. We don't always have providers available everyday to do refills. If you call the day you run out of your medicine, we may not be able to refill it in time, so call 5 days in advance!

## 2023-08-28 NOTE — NURSING NOTE
Is the patient currently in the state of [place]? YES    Visit mode:VIDEO    If the visit is dropped, the patient can be reconnected by: VIDEO VISIT: Text to cell phone:   Telephone Information:   Mobile [phone]       Will anyone else be joining the visit? NO  (If patient encounters technical issues they should call [phone])    How would you like to obtain your AVS? MyChart    Are changes needed to the allergy or medication list? No    Reason for visit: Consult (First visit)    Roberto TRIANA

## 2023-08-31 NOTE — PROGRESS NOTES
Virtual Visit Details    Type of service:  Video Visit   Video Start Time:  11:01am  Video End Time: 11:35am    Originating Location (pt. Location): Home    Distant Location (provider location):  Off-site  Platform used for Video Visit: [facility]    Palliative Care Counseling Services - Initial Assessment    PLEASE NOTE:  THIS IS A MENTAL HEALTH NOTE.  OTHER PROVIDERS VIEWING THIS NOTE SHOULD USE THIS ONLY FOR UNDERSTANDING THE CONTEXT OF THE PATIENT S EXPERIENCE.  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 Seen today for initial palliative care counseling assessment at via [facility].Spouse Kacie also present.  Referred by: Toni Quezada NP Palliative Care    Presenting Issues: Coping with illness    Preferred Name: William    Mental Status Exam: (List all that apply)      Appearance: Appropriate      Eye Contact: Good       Orientation: Yes, x4      Mood: Normal      Affect: Appropriate      Thought Content: Clear         Thought Form: Logical      Psychomotor Behavior: Normal    Family:       Marital status:    Name of spouse/partner: Kacie       Children: 2 sons       Siblings: seven siblings    Support system: Family, Friends, and [place] community    Living situation: House   Difficulty accessing and/or getting around living space: No   Other concerns: No     Employment history:      Current employment status:  Retired         Kind of work:  [employer]    Education highest level: College    Financial:       Descriptor: Comfortable      Health insurance: Medicare A    Legal concerns: No       Area(s) of concern: Not applicable    Health Care Directive: Has one:  Yes       If yes, copy in EMR: Yes       Basic information regarding health care directive provided: Yes        Health Care Agent(s): Named in health care directive    POLST? N    Perceived Needs: Wanted to hear about PCSW but more about transition to hospice services and hospice information.    Resource needs/Referrals: None    Plan: Did not complete the assessment wanted to talk about hospice. Encouraged them to interview hospice agency.    Time Spent with Patient/Family:  34 min   (Start 11:01am, end 11:35am)    MARCY Bolaños, [facility]   Palliative Care    [phone]       DO NOT SEND ANY LETTERS

## 2023-08-31 NOTE — LETTER
8/31/2023       RE: Tyrel Harrell  [address]  [place] [address]       Dear Colleague,    Thank you for referring your patient, [facility] Harrell[facility]. Please see a copy of my visit note below.    Palliative Care Counseling Services - Initial Assessment    PLEASE NOTE:  THIS IS A MENTAL HEALTH NOTE.  OTHER PROVIDERS VIEWING THIS NOTE SHOULD USE THIS ONLY FOR UNDERSTANDING THE CONTEXT OF THE PATIENT S EXPERIENCE.  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 Seen today for initial palliative care counseling assessment at via [facility].Spouse Kacie also present.  Referred by: Toni Quezada NP Palliative Care    Presenting Issues: Coping with illness    Preferred Name: William    Mental Status Exam: (List all that apply)      Appearance: Appropriate      Eye Contact: Good       Orientation: Yes, x4      Mood: Normal      Affect: Appropriate      Thought Content: Clear         Thought Form: Logical      Psychomotor Behavior: Normal    Family:       Marital status:    Name of spouse/partner: Kacie       Children: 2 sons       Siblings: seven siblings    Support system: Family, Friends, and [place] community    Living situation: House   Difficulty accessing and/or getting around living space: No   Other concerns: No     Employment history:      Current employment status:  Retired         Kind of work:  [employer]    Education highest level: College    Financial:       Descriptor: Comfortable      Health insurance: Medicare A    Legal concerns: No       Area(s) of concern: Not applicable    Health Care Directive: Has one:  Yes       If yes, copy in EMR: Yes       Basic information regarding health care directive  provided: Yes        Health Care Agent(s): Named in health care directive   POLST? N    Perceived Needs: Wanted to hear about PCSW but more about transition to hospice services and hospice information.    Resource needs/Referrals: None    Plan: Did not complete the assessment wanted to talk about hospice. Encouraged them to interview hospice agency.    Time Spent with Patient/Family:  34 min   (Start 11:01am, end 11:35am)    DO NOT SEND ANY LETTERS       Again, thank you for allowing me to participate in the care of your patient.      Sincerely,    BRENDA Bolaños

## 2023-08-31 NOTE — NURSING NOTE
Is the patient currently in the state of [place]? YES    Visit mode:VIDEO    If the visit is dropped, the patient can be reconnected by: VIDEO VISIT: Send to e-mail at: [email or web address]    Will anyone else be joining the visit? NO  (If patient encounters technical issues they should call [phone])    How would you like to obtain your AVS? MyChart    Are changes needed to the allergy or medication list? No    Reason for visit: Video Visit (New apt )    Domonique TRIANA

## 2023-09-01 NOTE — TELEPHONE ENCOUNTER
Orders    Skilled Nursing, to assess pain     once every other week for 8 weeks     [facility]   [phone]

## 2023-09-08 NOTE — PROGRESS NOTES
Virtual Visit Details    Type of service:  Video Visit   Video Start Time: 11:08 AM  Video End Time:11:25 AM    Originating Location (pt. 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 He has a history of cured Hodgkin's disease with a grade 3 well-differentiated endocrine tumor in the head of his pancreas with extensive liver metastasis.  His Ki-67 labeling index is 60%, and his cancer has very little if any dotatate uptake.  He has been on CAPTEM since August 2021 with an interruption during repair of a mitral valve in the summer of 2022.  He is currently undergoing treatment with capecitabine and temozolomide. Please see previous notes for further details on the patient's history. He was hospitalized from 6/22-6/26/23 with orthostatic hypotension likely secondary to autonomic dysfunction. He was discharged home on fludrocortisone.     Overall, through the summer of 2023, he has had a decline in his cognitive function. He was hospitalized from 8/1-8/5/23 with acute cholangitis. ERCP performed 8/3 which found blocked metal stent with food within the duodenal bulb which was cleared. He was treated with Zosyn and ceftriaxone and discharged home on Cipro.    He is here today for routine hospital follow up.    Interval History:  -Has noticed further decline in weight, 4 pounds in the last 2 weeks.   -Feels memory is worse with intermittent memory lapses.  -Sees something in his peripheral vision that is not there, most days.  -Has ongoing fatigue.  -Notes a low appetite.   -Eats a good breakfast, but eats less later in the day.  -Sleeping  fairly well at night except up at night to urinate about 5 times.   -Naps about 2 hours/day.   -Off chemo for a week now. Still has 2 more weeks off. Considering hospice.   -BP and pulse have been okay.   -Has intermittent knawing pain under ribs.   -Urine remains dark yellow. Skin is not jaundiced.   -Working with palliative care.    Current Outpatient Medications   Medication Sig Dispense Refill    acetaminophen (TYLENOL) 500 MG tablet Take 500-1,000 mg by mouth every 8 hours as needed for mild pain      calcium carbonate 750 MG CHEW Take 750 mg by mouth At Bedtime      capecitabine (XELODA) 500 MG tablet Take 2 tablets (1,000 mg) by mouth 2 times daily for 14 days Take for 14 days, then off for 21 days. Take within 30 mins after meal. 56 tablet 0    dapagliflozin (FARXIGA) 10 MG TABS tablet Take 1 tablet by mouth daily      emollient (VANICREAM) external cream Apply topically daily as needed for other (rash on hands and feet)      EPINEPHrine (ANY BX GENERIC EQUIV) 0.3 MG/0.3ML injection 2-pack Inject 0.3 mLs (0.3 mg) into the muscle as needed for anaphylaxis May repeat one time in 5-15 minutes if response to initial dose is inadequate. 2 each 0    furosemide (LASIX) 20 MG tablet Take 20 mg by mouth as needed      levothyroxine (SYNTHROID/LEVOTHROID) 112 MCG tablet Take 1 tablet (112 mcg) by mouth daily 90 tablet 0    lipase-protease-amylase (CREON 12) 73409-50130-89863 units CPEP Take 2-3 capsules by mouth 3 times daily (with meals)      lovastatin (MEVACOR) 40 MG tablet Take 1 tablet (40 mg) by mouth At Bedtime 90 tablet 3    melatonin 3 MG tablet Take 3 mg by mouth nightly as needed for sleep      metoprolol succinate ER (TOPROL XL) 25 MG 24 hr tablet Take 12.5 mg by mouth 2 times daily      Multiple Vitamin (MULTIVITAMIN) per tablet Take 1 tablet by mouth 2 times daily      ondansetron (ZOFRAN ODT) 8 MG ODT tab Take 1 tablet (8 mg) by mouth every 8 hours as needed for nausea 30 tablet 1    polyethylene glycol  3350 POWD Take 1 packet by mouth as needed      prochlorperazine (COMPAZINE) 10 MG tablet Take 10 mg by mouth every 6 hours as needed for nausea      temozolomide (TEMODAR) 100 MG capsule Take 4 capsules (400 mg) by mouth At Bedtime for 5 days Take on empty stomach, Days 10 thru 14 of each 35-day cycle. Take Zofran 30-60 min before Temodar. 20 capsule 0    temozolomide (TEMODAR) 100 MG capsule Take 4 capsules (400 mg) by mouth At Bedtime for 5 days Take on empty stomach, Days 10 thru 14. Take Zofran 30-60 min before Temodar. 20 capsule 0    venlafaxine (EFFEXOR XR) 150 MG 24 hr capsule Take 150 mg by mouth every morning With 75 mg capsule      venlafaxine (EFFEXOR XR) 75 MG 24 hr capsule Take 75 mg by mouth every morning With 150 mg capsule      vitamin D3 (CHOLECALCIFEROL) 50 mcg (2000 units) tablet Take 1 tablet by mouth daily       Objective:  General: patient appears well in no acute distress, alert and oriented, speech clear and fluid  Skin: no visualized rash or lesions on visualized skin  Resp: Appears to be breathing comfortably without accessory muscle usage, speaking in full sentences, no audible wheezes or cough.  Psych: Coherent speech, normal rate and volume, able to articulate logical thoughts, able to abstract reason, no tangential thoughts, no hallucinations or delusions  Patient's affect is appropriate.    Laboratory Data:  Most Recent 3 CBC's:  Recent Labs   Lab Test 09/07/23  1441 08/14/23  1637 08/05/23  0613 08/04/23  0706 08/03/23  1046 08/03/23  0711 08/02/23  0548   WBC 6.1 5.5 5.6   < > 8.5   < > 13.2*   HGB 10.0* 9.0* 7.4*   < > 8.2*   < > 8.3*   * 105* 106*   < > 108*   < > 110*   * 143* 98*   < > 95*   < > 108*   ANEUTAUTO  --  4.4  --   --  7.8  --  12.2*    < > = values in this interval not displayed.    Most Recent 3 BMP's:  Recent Labs   Lab Test 09/07/23  1441 08/14/23  1637 08/05/23  0613    139 132*   POTASSIUM 3.8 3.9 4.8   CHLORIDE 102 104 99   CO2 25 25 23    BUN 14.7 16.6 19.4   CR 0.96 0.89 1.27*   ANIONGAP 10 10 10   CARLOS 8.4* 9.0 8.0*   * 118* 104*   PROTTOTAL 7.5 7.5 6.3*   ALBUMIN 3.0* 3.2* 2.5*    Most Recent 2 LFT's:  Recent Labs   Lab Test 09/07/23  1441 08/14/23  1637   AST 48* 40   ALT 18 23   ALKPHOS 143* 156*   BILITOTAL 0.8 0.9    Most Recent TSH and T4:  Recent Labs   Lab Test 08/14/23  1637   TSH 4.42*   T4 1.16   I reviewed the above labs today.    Assessment and Plan:  High-grade well-differentiated neuroendocrine tumor of the pancreas with liver metastasis.  His disease is stable on last imaging. Due to a decline in his performance status, treatment was held. He resumed chemotherapy in August, followed by a 3 week break, of which he is in the midst. I will see him back on 9/26 to consider resuming treatment versus hospice. Will tentatively plan to repeat imaging in October.    Biliary obstruction. Patient has had stent placement and dilation. Last EUS was on 6/12/23 and ERCP was on 8/3/23. No acute concerns today.    Chronic heart failure.  Managed by cardiology. No acute concerns today.     Orthostatic hypotension likely 2/2 autonomic dysfunction and generalized weakness. Now off of Florinef with no worsening of his symptoms. Home care referral previously made now with nurse, PT, and OT.    Paroxysmal atrial fibrillation and history of PE. Now off of anticoagulation due to fall risk.     Worsening cognitive function with hallucinations.  Unclear cause, but now improved. Will continue to monitor.     Epigastric pain. Recommend a trial of Tums for possible acid reflux.     Anorexia and weight loss. Met with dietician on 8/4/23. Will continue to work on eating small, frequent meals.     Peripheral vision changes. Possibly related to floaters. If very bothersome, would recommend an eye exam.     Heather Kelly PA-C  [facility]  [address] [place] [address]  [phone]    32 minutes spent on the date of the encounter doing  chart review, review of test results, interpretation of tests, patient visit, and documentation

## 2023-09-11 NOTE — LETTER
9/11/2023         RE: Tyrel Harrell  [address]  [place] [place] [address]        Dear Colleague,    Thank you for referring your patient, Tyrel Harrell, to the [facility]. Please see a copy of my visit note below.    Virtual Visit Details    Type of service:  Video Visit   Video Start Time: 11:08 AM  Video End Time:11:25 AM    Originating Location (pt. 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 He has a history of cured Hodgkin's disease with a grade 3 well-differentiated endocrine tumor in the head of his pancreas with extensive liver metastasis.  His Ki-67 labeling index is 60%, and his cancer has very little if any dotatate uptake.  He has been on CAPTEM since August 2021 with an interruption during repair of a mitral valve in the summer of 2022.  He is currently undergoing treatment with capecitabine and temozolomide. Please see previous notes for further details on the patient's history. He was hospitalized from 6/22-6/26/23 with orthostatic hypotension likely secondary to autonomic dysfunction. He was discharged home on fludrocortisone.     Overall, through the summer of 2023, he has had a decline in his cognitive function. He was hospitalized from 8/1-8/5/23 with acute cholangitis. ERCP performed 8/3 which found blocked metal stent with food within the duodenal bulb which was cleared. He was treated with Zosyn and ceftriaxone and discharged home on Cipro.    He is here today for routine hospital follow up.    Interval History:  -Has noticed further decline in weight, 4 pounds in the last 2  weeks.   -Feels memory is worse with intermittent memory lapses.  -Sees something in his peripheral vision that is not there, most days.  -Has ongoing fatigue.  -Notes a low appetite.   -Eats a good breakfast, but eats less later in the day.  -Sleeping fairly well at night except up at night to urinate about 5 times.   -Naps about 2 hours/day.   -Off chemo for a week now. Still has 2 more weeks off. Considering hospice.   -BP and pulse have been okay.   -Has intermittent knawing pain under ribs.   -Urine remains dark yellow. Skin is not jaundiced.   -Working with palliative care.    Current Outpatient Medications   Medication Sig Dispense Refill    acetaminophen (TYLENOL) 500 MG tablet Take 500-1,000 mg by mouth every 8 hours as needed for mild pain      calcium carbonate 750 MG CHEW Take 750 mg by mouth At Bedtime      capecitabine (XELODA) 500 MG tablet Take 2 tablets (1,000 mg) by mouth 2 times daily for 14 days Take for 14 days, then off for 21 days. Take within 30 mins after meal. 56 tablet 0    dapagliflozin (FARXIGA) 10 MG TABS tablet Take 1 tablet by mouth daily      emollient (VANICREAM) external cream Apply topically daily as needed for other (rash on hands and feet)      EPINEPHrine (ANY BX GENERIC EQUIV) 0.3 MG/0.3ML injection 2-pack Inject 0.3 mLs (0.3 mg) into the muscle as needed for anaphylaxis May repeat one time in 5-15 minutes if response to initial dose is inadequate. 2 each 0    furosemide (LASIX) 20 MG tablet Take 20 mg by mouth as needed      levothyroxine (SYNTHROID/LEVOTHROID) 112 MCG tablet Take 1 tablet (112 mcg) by mouth daily 90 tablet 0    lipase-protease-amylase (CREON 12) 85460-02790-59447 units CPEP Take 2-3 capsules by mouth 3 times daily (with meals)      lovastatin (MEVACOR) 40 MG tablet Take 1 tablet (40 mg) by mouth At Bedtime 90 tablet 3    melatonin 3 MG tablet Take 3 mg by mouth nightly as needed for sleep      metoprolol succinate ER (TOPROL XL) 25 MG 24 hr tablet Take 12.5  mg by mouth 2 times daily      Multiple Vitamin (MULTIVITAMIN) per tablet Take 1 tablet by mouth 2 times daily      ondansetron (ZOFRAN ODT) 8 MG ODT tab Take 1 tablet (8 mg) by mouth every 8 hours as needed for nausea 30 tablet 1    polyethylene glycol 3350 POWD Take 1 packet by mouth as needed      prochlorperazine (COMPAZINE) 10 MG tablet Take 10 mg by mouth every 6 hours as needed for nausea      temozolomide (TEMODAR) 100 MG capsule Take 4 capsules (400 mg) by mouth At Bedtime for 5 days Take on empty stomach, Days 10 thru 14 of each 35-day cycle. Take Zofran 30-60 min before Temodar. 20 capsule 0    temozolomide (TEMODAR) 100 MG capsule Take 4 capsules (400 mg) by mouth At Bedtime for 5 days Take on empty stomach, Days 10 thru 14. Take Zofran 30-60 min before Temodar. 20 capsule 0    venlafaxine (EFFEXOR XR) 150 MG 24 hr capsule Take 150 mg by mouth every morning With 75 mg capsule      venlafaxine (EFFEXOR XR) 75 MG 24 hr capsule Take 75 mg by mouth every morning With 150 mg capsule      vitamin D3 (CHOLECALCIFEROL) 50 mcg (2000 units) tablet Take 1 tablet by mouth daily       Objective:  General: patient appears well in no acute distress, alert and oriented, speech clear and fluid  Skin: no visualized rash or lesions on visualized skin  Resp: Appears to be breathing comfortably without accessory muscle usage, speaking in full sentences, no audible wheezes or cough.  Psych: Coherent speech, normal rate and volume, able to articulate logical thoughts, able to abstract reason, no tangential thoughts, no hallucinations or delusions  Patient's affect is appropriate.    Laboratory Data:  Most Recent 3 CBC's:  Recent Labs   Lab Test 09/07/23  1441 08/14/23  1637 08/05/23  0613 08/04/23  0706 08/03/23  1046 08/03/23  0711 08/02/23  0548   WBC 6.1 5.5 5.6   < > 8.5   < > 13.2*   HGB 10.0* 9.0* 7.4*   < > 8.2*   < > 8.3*   * 105* 106*   < > 108*   < > 110*   * 143* 98*   < > 95*   < > 108*   ANEUTAUTO   --  4.4  --   --  7.8  --  12.2*    < > = values in this interval not displayed.    Most Recent 3 BMP's:  Recent Labs   Lab Test 09/07/23  1441 08/14/23  1637 08/05/23  0613    139 132*   POTASSIUM 3.8 3.9 4.8   CHLORIDE 102 104 99   CO2 25 25 23   BUN 14.7 16.6 19.4   CR 0.96 0.89 1.27*   ANIONGAP 10 10 10   CARLOS 8.4* 9.0 8.0*   * 118* 104*   PROTTOTAL 7.5 7.5 6.3*   ALBUMIN 3.0* 3.2* 2.5*    Most Recent 2 LFT's:  Recent Labs   Lab Test 09/07/23  1441 08/14/23  1637   AST 48* 40   ALT 18 23   ALKPHOS 143* 156*   BILITOTAL 0.8 0.9    Most Recent TSH and T4:  Recent Labs   Lab Test 08/14/23  1637   TSH 4.42*   T4 1.16   I reviewed the above labs today.    Assessment and Plan:  High-grade well-differentiated neuroendocrine tumor of the pancreas with liver metastasis.  His disease is stable on last imaging. Due to a decline in his performance status, treatment was held. He resumed chemotherapy in August, followed by a 3 week break, of which he is in the midst. I will see him back on 9/26 to consider resuming treatment versus hospice. Will tentatively plan to repeat imaging in October.    Biliary obstruction. Patient has had stent placement and dilation. Last EUS was on 6/12/23 and ERCP was on 8/3/23. No acute concerns today.    Chronic heart failure.  Managed by cardiology. No acute concerns today.     Orthostatic hypotension likely 2/2 autonomic dysfunction and generalized weakness. Now off of Florinef with no worsening of his symptoms. Home care referral previously made now with nurse, PT, and OT.    Paroxysmal atrial fibrillation and history of PE. Now off of anticoagulation due to fall risk.     Worsening cognitive function with hallucinations.  Unclear cause, but now improved. Will continue to monitor.     Epigastric pain. Recommend a trial of Tums for possible acid reflux.     Anorexia and weight loss. Met with dietician on 8/4/23. Will continue to work on eating small, frequent meals.     Peripheral  vision changes. Possibly related to floaters. If very bothersome, would recommend an eye exam.     Heather Kelly PA-C  [facility]  [address] [place], [place] [address] [phone]    32 minutes spent on the date of the encounter doing chart review, review of test results, interpretation of tests, patient visit, and documentation

## 2023-09-11 NOTE — NURSING NOTE
Is the patient currently in the state of [place]? YES    Visit mode:VIDEO    If the visit is dropped, the patient can be reconnected by: VIDEO VISIT: Send to e-mail at: [email or web address]    Will anyone else be joining the visit? NO    How would you like to obtain your AVS? MyChart    Are changes needed to the allergy or medication list? Pt stated no changes to allergies and Pt stated no med changes    Reason for visit: JOHNNIE Ramirez LPN

## 2023-09-12 PROBLEM — A41.9 SEPSIS, DUE TO UNSPECIFIED ORGANISM, UNSPECIFIED WHETHER ACUTE ORGAN DYSFUNCTION PRESENT (H): Status: ACTIVE | Noted: 2023-01-01

## 2023-09-12 PROBLEM — J90 PLEURAL EFFUSION: Status: ACTIVE | Noted: 2023-01-01

## 2023-09-12 PROBLEM — J96.01 ACUTE RESPIRATORY FAILURE WITH HYPOXIA (H): Status: ACTIVE | Noted: 2023-01-01

## 2023-09-12 NOTE — ED NOTES
Bed: ED23  Expected date:   Expected time:   Means of arrival:   Comments:   72 M, ab pain pancreatic cx

## 2023-09-12 NOTE — ED TRIAGE NOTES
BIBA FROM HOME WITH ABDOMINAL PAIN, FEVER, CHILLS AND DRY HEAVES AND NAUSEA.  PATIENT WAS ADMITTED IN 7/23 FOR ABDOMINAL PAIN WHICH SHOWED BLOCKAGE. PATIENT HAS A BILE STENT PLACED     Triage Assessment       Row Name 09/12/23 0933       Triage Assessment (Adult)    Airway WDL WDL       Respiratory WDL    Respiratory WDL WDL       Skin Circulation/Temperature WDL    Skin Circulation/Temperature WDL WDL       Cardiac WDL    Cardiac WDL X   DIZZINESS, TACHY       Peripheral/Neurovascular WDL    Peripheral Neurovascular WDL X   NEUROPATHY IN HANDS AND FEET       Cognitive/Neuro/Behavioral WDL    Cognitive/Neuro/Behavioral WDL X     Level of Consciousness confused    Orientation disoriented to;place;time;situation      Row Name 09/12/23 0928       Triage Assessment (Adult)    Airway WDL WDL       Respiratory WDL    Respiratory WDL WDL       Skin Circulation/Temperature WDL    Skin Circulation/Temperature WDL WDL       Cardiac WDL    Cardiac WDL WDL       Peripheral/Neurovascular WDL    Peripheral Neurovascular WDL WDL       Cognitive/Neuro/Behavioral WDL    Cognitive/Neuro/Behavioral WDL X    Level of Consciousness confused    Orientation disoriented to;place;time;situation       [place] Coma Scale    Best Eye Response 4-->(E4) spontaneous    Best Motor Response 6-->(M6) obeys commands    Best Verbal Response 4-->(V4) confused    [place] Coma Scale Score 14

## 2023-09-12 NOTE — PROGRESS NOTES
Transfer Type: [facility]  Transfer Triage Note    Originating unit:[facility] ED    Final intended location for transfer: [facility]- med surg or IMC, or ICU    Tele required:  Yes    Time of admission request: 1438h 9/12/23    Anticipated to be boarding in ED for >4 hours? No    Patient added to Interhospital transfer list?  Yes    Brief case description:       71 yo with PMH of metastatic high grade well differentiated neuroendocrine tumor of pancreas on treatment, prior hx hodgkins, afib and PE on eliquis, and HFmREF who is admitted for sepsis, possibly due to acute cholangitis    Presented with fever, chills, nausea and dry heaves  WBC 14,  Tbili 3.3, no abd pain, US abd had no evid of obstruction  Suspected volume up per Dr Moore, judcious IVF given and empiric Zosyn started  At the time of admission CT CAP pending for other septic source, given US abd findings and Tbili and transaminases minimally elevated, planned for [facility] admission for IV abtx and close monitoring. No beds in [place].     [facility] med surg bed with tele, is available immediately and patient will be leaving for [facility] immediately (will attempt to have [facility] team complete H&P prior to transfer to [facility]).  \Continue Zosyn, trend CMP  If clinical decline, rising liver labs or Tbili would have low threshold for Panc Bili team consult and [place] transfer        Jasson Branch MD

## 2023-09-12 NOTE — ED PROVIDER NOTES
ED Provider Note  [facility]      History     Chief Complaint   Patient presents with    Abdominal Pain    Fever    Chills     HPI  Tyrel Harrell is a 72 year old male with history of pancreatic neuroendocrine tumor, cured Hodgkin's lymphoma, HFrEF, mitral regurgitation s/p Mitraclip 4/22/22, paroxysmal A Fib, hx PE, and acute cholangitis who presents with chills, weakness, and abdominal pain. He awoke this morning at around 6 AM and immediately felt cold and required several blankets to warm up. He also felt quite weak and had difficulty walking. In addition to chills, he also experienced shaking, inability to urinate, dry cough, and wheezing. At this time, he also experienced fleeting diffuse abdominal pain that has since resolved. He says that the abdominal pain was not as painful as his previous episode of acute cholangitis about one month ago. He did not have a fever. He took a COVID test at home, which was negative, and promptly presented to the Emergency Department. Currently taking temozolomide/capecitabine combination chemotherapy for pancreatic neuroendocrine tumor. He recently attended a [employer] game on 9/9/23, but no other members of his party were sick and he has not had any other sick contacts. Endorses nausea, dysuria, cough, shortness of breath, chills, and weight loss. Denies vomiting, diarrhea, hematochezia, urinary frequency, urinary urgency, hematuria, and night sweats.    Past Medical History  Past Medical History:   Diagnosis Date    Anemia     Anticoagulated     Anxiety     Biliary obstruction     Chronic kidney disease     Chronic right shoulder pain     Depression     Difficult intravenous access     NEED UTRASOUND TO FIND VEIN    H/O thrombocytopenia     History of pulmonary embolism     Hodgkin lymphoma, nodular sclerosis (H) 1988    s/p radiation    Hypothyroidism     ILD (interstitial lung disease) (H)     Lymphedema of right upper extremity      Neuroendocrine tumor of pancreas     with liver metastasis    NICM (nonischemic cardiomyopathy) (H)     Nonsenile cataract     Pancreatic insufficiency      Past Surgical History:   Procedure Laterality Date    CARDIAC SURGERY  04/22/2022    s/p MitraClip    ENDOSCOPIC RETROGRADE CHOLANGIOPANCREATOGRAM N/A 8/3/2023    Procedure: ENDOSCOPIC RETROGRADE CHOLANGIOPANCREATOGRAPHY with balloon sweep of bile duct for sludge;  Surgeon: Bonilla Ulloa MD;  Location: [facility] OR    ENDOSCOPIC RETROGRADE CHOLANGIOPANCREATOGRAM COMPLEX N/A 6/12/2023    Procedure: ENDOSCOPIC RETROGRADE CHOLANGIOPANCREATOGRAPHY with dilation and hepaticduodenostomy,  stent placement;  Surgeon: Guru Alexander Joe MD;  Location: [facility] OR    ENDOSCOPIC ULTRASOUND UPPER GASTROINTESTINAL TRACT (GI) N/A 6/12/2023    Procedure: ENDOSCOPIC ULTRASOUND, ESOPHAGOSCOPY / UPPER GASTROINTESTINAL TRACT (GI);  Surgeon: Guru Alexander Joe MD;  Location: [facility] OR    ESOPHAGOSCOPY, GASTROSCOPY, DUODENOSCOPY (EGD), COMBINED N/A 6/7/2023    Procedure: Esophagoscopy, gastroscopy, duodenoscopy (EGD), combined;  Surgeon: Guru Alexander Joe MD;  Location: [facility] OR    SPLENECTOMY  1988     acetaminophen (TYLENOL) 500 MG tablet  calcium carbonate 750 MG CHEW  capecitabine (XELODA) 500 MG tablet  dapagliflozin (FARXIGA) 10 MG TABS tablet  emollient (VANICREAM) external cream  EPINEPHrine (ANY BX GENERIC EQUIV) 0.3 MG/0.3ML injection 2-pack  furosemide (LASIX) 20 MG tablet  levothyroxine (SYNTHROID/LEVOTHROID) 112 MCG tablet  lipase-protease-amylase (CREON 12) 13190-15377-76349 units CPEP  lovastatin (MEVACOR) 40 MG tablet  melatonin 3 MG tablet  metoprolol succinate ER (TOPROL XL) 25 MG 24 hr tablet  Multiple Vitamin (MULTIVITAMIN) per tablet  ondansetron (ZOFRAN ODT) 8 MG ODT tab  polyethylene glycol 3350 POWD  prochlorperazine (COMPAZINE) 10 MG tablet  temozolomide (TEMODAR) 100 MG capsule  temozolomide (TEMODAR) 100 MG  "capsule  venlafaxine (EFFEXOR XR) 150 MG 24 hr capsule  venlafaxine (EFFEXOR XR) 75 MG 24 hr capsule  vitamin D3 (CHOLECALCIFEROL) 50 mcg (2000 units) tablet      Allergies   Allergen Reactions    Bee Venom Hives and Other (See Comments)     Other reaction(s): Other (see comments)  Not honey bees. Unknown bee  Per pt  Per pt  Per pt  Per pt      Simvastatin Muscle Pain (Myalgia) and Other (See Comments)     Other reaction(s): Muscle Aches, Other (see comments)  Patient reports muscle stiffness  Patient reports muscle stiffness  myalgias  myalgias      Vinblastine Other (See Comments)     Other reaction(s): Other (see comments)  Patient reported paralyzation of colon  Per pt  Patient reported paralyzation of colon  Per pt      Vincristine Other (See Comments)     Patient reports paralyzation of his colon  Patient reports paralyzation of his colon  Patient reports paralyzation of his colon  Patient reports paralyzation of his colon      Lina-Kit Bee Sting     Nkda [No Known Drug Allergy]      Family History  History reviewed. No pertinent family history.  Social History   Social History     Tobacco Use    Smoking status: Never     Passive exposure: Never    Smokeless tobacco: Never   Vaping Use    Vaping Use: Never used   Substance Use Topics    Alcohol use: Not Currently     Comment: 1 beer a month    Drug use: Never         A medically appropriate review of systems was performed with pertinent positives and negatives noted in the HPI, and all other systems negative.    Physical Exam   BP: 104/51  Pulse: 108  Temp: 100  F (37.8  C)  Resp: 18  Height: 172.7 cm (5' 8\")  Weight: 69.9 kg (154 lb 1.6 oz)  SpO2: 94 %  Physical Exam  Constitutional:       Appearance: He is ill-appearing.   HENT:      Head: Normocephalic and atraumatic.   Eyes:      Extraocular Movements: Extraocular movements intact.   Cardiovascular:      Rate and Rhythm: Regular rhythm. Tachycardia present.      Heart sounds: Normal heart sounds. "   Pulmonary:      Effort: Pulmonary effort is normal.      Breath sounds: Normal breath sounds.   Abdominal:      General: Abdomen is flat. Bowel sounds are normal. There is no distension.      Palpations: Abdomen is soft. There is no hepatomegaly, splenomegaly or mass.      Tenderness: There is no abdominal tenderness. There is no guarding or rebound. Negative signs include Rich's sign.   Skin:     General: Skin is warm and dry.   Neurological:      Mental Status: He is alert.         ED Course, Procedures, & Data      Procedures            EKG Interpretation:      Interpreted by Lien Ramirez MD  Time reviewed: 0946  Symptoms at time of EKG: None   Rhythm: sinus tachycardia  Rate: Tachycardia  Axis: Normal  Ectopy: none  Conduction: normal  ST Segments/ T Waves: No acute ischemic changes  Q Waves: none  Comparison to prior: Unchanged    Clinical Impression: sinus tachycardia                 Results for orders placed or performed during the hospital encounter of 09/12/23   EKG 12 lead     Status: None (Preliminary result)   Result Value Ref Range    Systolic Blood Pressure  mmHg    Diastolic Blood Pressure  mmHg    Ventricular Rate 111 BPM    Atrial Rate 111 BPM    NC Interval 138 ms    QRS Duration 94 ms     ms    QTc 481 ms    P Axis 4 degrees    R AXIS 47 degrees    T Axis -65 degrees    Interpretation ECG       Sinus tachycardia  Nonspecific ST and T wave abnormality  Abnormal ECG       Medications - No data to display  Labs Ordered and Resulted from Time of ED Arrival to Time of ED Departure - No data to display  No orders to display          Critical care was not performed.     Medical Decision Making  The patient's presentation was of high complexity (an acute health issue posing potential threat to life or bodily function).    The patient's evaluation involved:  review of external note(s) from 1 sources (hospital discharge summary)  ordering and/or review of 3+ test(s) in this encounter (see  "separate area of note for details)  discussion of management or test interpretation with another health professional ([facility])    The patient's management necessitated moderate risk (prescription drug management including medications given in the ED) and high risk (a decision regarding hospitalization).    Assessment & Plan    Tyrel Harrell is a 72 year old male with history of pancreatic neuroendocrine tumor who presents with chills, weakness, and abdominal pain.    #Abdominal pain, chills, weakness  Presentation of chills and weakness with fleeting abdominal pain most concerning for intra-abdominal infection vs. bacteremia. Infectious source not yet identified, so differential diagnosis also includes URI, pneumonia, and UTI. Symptoms may also reflect response to chemotherapy regimen vs. ongoing progression of neuroendocrine tumor, though these are less likely given the abrupt nature of symptom onset. Patient will require admission to general medicine floor for further evaluation and treatment.  - Abx (Zosyn), 1L NS bolus  - Blood cultures 2x  - UA w/ culture --> glucose >1000, protein albumin 30, urobilinogen 6.0, RBCs 7, WBCs 20  - US abdomen, duplex right lower extremity   - US abdomen --> Sludge without cholecystitis    - Duplex RLE --> \"Right peroneal veins were not visuzlied. Otherwise, no deep venous thrombosis demonstrated in the RIGHT leg.\"  - CXR --> pleural effusion  - EKG --> \"Sinus tachycardia. Nonspecific ST and T wave abnormality.\"  - CBC, CMP, INR, Mg   - CBC --> WBC 14.9, ANC 14.8 Hgb 9.6, , Plts 107   - CMP --> , ALT 49, Alkaline Phosphatase 354, Total bilirubin 3.3   - INR --> 1.47   - Mg --> 1.8  - Lactate, Ammonia   - Lactate --> 2.3   - Ammonia --> 51  - Lipase --> 12  - COVID, Flu, RSV PCR --> Negative    Disposition: Admit to Medicine    --    ED Attending Physician Attestation    I Lien Ramirez MD, cared for this patient with the Medical Student. I performed, or " re-performed, the physical exam and medical decision-making. I have verified the accuracy of all the medical student findings and documentation above, and have edited as necessary.    Summary of HPI, PE, ED Course  Patient is a 72 year old male evaluated in the emergency department for fever, chills and intermittent abdominal discomfort. Exam and ED course notable for no significant abdominal discomfort on exam but is generally ill-appearing.  Chest x-ray shows bilateral pleural effusions which she has had previously.  BNP is elevated compared to prior as well as a slightly elevated troponin and currently on supplemental O2.  He was given 1 L IV fluids.  Held on additional fluids given his associated pleural effusion, volume overload and history of CHF.  He was given Zosyn and blood cultures are pending.  Right upper quadrant ultrasound does not show any obvious cholecystitis or obstruction of this biliary stents however certainly this is a possibility.  Patient is currently pending CT of the chest, abdomen and pelvis with plan to admit to medicine for continued IV antibiotics and infectious evaluation. After the completion of care in the emergency department, the patient was admitted to inpatient.      Lien Ramirez MD  Emergency Medicine       I have reviewed the nursing notes. I have reviewed the findings, diagnosis, plan and need for follow up with the patient.    New Prescriptions    No medications on file       Final diagnoses:   Sepsis, due to unspecified organism, unspecified whether acute organ dysfunction present (H)   Pleural effusion   Acute respiratory failure with hypoxia (H)   Patient seen and evaluated with the attending physician.    Warner Colunga, Medical Student  [employer]  09/12/2023 10:00 AM      Lien Ramirez  [facility] EMERGENCY DEPARTMENT  9/12/2023     Lien Ramirez MD  09/12/23 5050

## 2023-09-12 NOTE — H&P
[facility]    History and Physical - Hospitalist Service, GOLD TEAM        Date of Admission:  2023    Assessment & Plan      Tyrel Harrell is a 72 year old male admitted on 2023. 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 Patient admitted to Medicine for further evaluation and care.     ## Chills, abdominal pain, weakness:   ## Hx of biliary Obstruction  ## AHRF: Admit - w/ acute cholangitis s/p ERCP (8/3/23) with noted blocked stent in duodenal bulb which was cleared. Presents with chills, abdominal pain, wheezing, dry cough, and weakness. WBC 14.9, T bili 3.3 (0.8), lipase 12, LFTs AST/ALT: 139/49, .  CT A/P with stable position of CBD stent with decreased pneumobilia; no significant change in heterogeneous pancreatic head mass; increased stool throughout the large bowel, punctate left renal calculus w/o obstruction. Abdominal US: Hypoechoic hepatic parenchyma with periportal hyperechogenicity- can be seen with hepatitis; distended and sludge filled gallbladder w/o signs or symptoms of acute cholecystitis; prominent, though not obstructed, stented CBD.  LA 2.3 --> 1.5 s/p 1L IVF, CRP 42.30, Procal 1.27. Maintains on 1L O2 via NC with Sats 94-98%. VB.42/49/17/31. NT-BNP ~ 10K. EKG with sinus tachycardia. Afebrile. CXR with moderate to large bilateral pleural effusions w/ adjacent hazy opacities representing atelectasis/ma vs infection. Pleural effusion similar to size compared to 23. RVP PCR negative.  UA with 20 wbc, though negative nitrite and LE. Started on Zosyn in ED for possible intraabdominal  infection. Suspect sx are multifactorial insetting of possible CHF exacerbation w/ likely infectious component (Pulmonary vs Urinary vs translocation from pancreatic tumor)  - Continue Zosyn   - Please follow UC  - Please follow BCx2  - Consider GI consult if not improving   - Continue PTA creon w/ meals   - Tylenol PRN for pain  - Zofran PRN for nausea  - Palliative care consult placed to discuss ongoing GOC. Please discuss with in AM.   - PT  - Continue O2 to maintain sats greater than 94%    ## Chronic Bilateral pleural Effusions: Presents since at least 7/2023 on available imaging. Family reports that patient has received thoracentesis previously, though 2 years ago. CXR with moderate to large bilateral pleural effusions w/ adjacent hazy opacities representing atelectasis/ma vs infection. Pleural effusion similar to size compared to 8/1/23.   - Consider thoracentesis   - Monitor     ## Bicytopenia: Hgb 9.6, Platelets 107. Likely in setting of chemotherapy. BL Hgb appears 9-10, BL platelets appears .   - Monitor  - Type and screen  - Transfuse for Hgb less than 7.0 or hemodynamically significant bleed    ## Metastatic high-grade, but well-differentiated neuroendocrine tumor of the pancreas:   ## Hx of cured Hodgkin's disease  ## Mild cognitive decline: Tumor at head of pancreas with extensive liver mets. Follows with [facility] with recent video visit 9/11/23. Tumor stable on most recent imaging. Chemotherapy currently on hold d/t decline in functional status, though will resume in one week, followed by a 3 week break. Palliative care referral and Hospice consult placed. Repeat Imaging in October.  Noted to have recent hallucinations on Oncology clinic note. Patient not currently experiencing hallucinations on admission.   - Oncology consult placed. Please discuss with in AM     ## HX of PE: Not Currently on A/c d/t fall concerns.  - monitor    ## HLD: PTA statin  - Continue    ## HFrEF: Echo(6/23/23)  with EF of 55-60% Nl RV function; s/p mitral clips, mild to moderate residual MR, no pericardial effusion, mild to moderate aortic insufficiency. NT-BNP ~ 10K on admission.  Lasix recently held ~ 4 weeks ago. Bilateral LE edema 2+. No PND, orthopnea.    - Consider Cardiology consult for possible HF exacerbation   - ECHO in AM  - Consider resuming Lasix in AM- did not order tonight as patient resting comfortably on 1L O2 and received IVF for sepsis     ## Hypothyroidism: PTA synthroid.  - Continue PTA medication  - Check TSH    ## Orthostatic Hypotension: Previously maintained on florinef. Admit 6/22-6/26/2023 for orthostatic hypotension, though s/t autonomic dysfunction.   - Monitor  - Fall precautions     ## Paroxysmal Atrial fibrillation  ## S/p mitral valve repair (2022): EKG with sinus tachycardia on admission. PTA metoprolol  - Continue PTA metoprolol with hold parameters HR less than 60 or SBP less than 100    ## Depression: PTA Effexor.   - Continue PTA medication        Diet:  Low Fiber per PTA recommendation   DVT Prophylaxis: Pneumatic Compression Devices  Craft Catheter: Not present  Lines: None     Cardiac Monitoring: None  Code Status:  DNR/DNI    Clinically Significant Risk Factors Present on Admission              # Hypoalbuminemia: Lowest albumin = 3.1 g/dL at 9/12/2023 11:12 AM, will monitor as appropriate  # Coagulation Defect: INR = 1.47 (Ref range: 0.85 - 1.15) and/or PTT = N/A, will monitor for bleeding  # Thrombocytopenia: Lowest platelets = 107 in last 2 days, will monitor for bleeding                   Disposition Plan      Expected Discharge Date: 09/14/2023                The patient's care was discussed with the Attending Physician, Dr. Encarnacion .    Josefa Campo PA-C  Hospitalist Service, [facility] [place]  Securely message with [employer] (more info)  Text page via [employer] Paging/Directory   See signed in provider for up to date  "coverage information    ______________________________________________________________________    Chief Complaint   Chills, abdominal pain, weakness, dry cough, wheeze    History is obtained from the patient and EMR    History of Present Illness   Tyrel Harrell is a 72 year old male w/ complex PMH hx os outlined above who presents to the ED for evaluation of Chills, abdominal pain, weakness, dry cough, wheeze. Patient reports that sx began 9/11/23 and have slightly improved since onset. Patient noted to take a home COVID test which was negative. Pt reports that abdominal pain has gotten better since onset. No nausea or emesis. No fever, though with chills. Patient has not has BM in a \"few days\". Reports that he has not taken lasix in ~ 4 weeks. No PND, orthopnea and patient is able to sleep flat. No reported sick contacts. We discussed POC as outlined above and patient agreeable.    Past Medical History    Past Medical History:   Diagnosis Date    Anemia     Anticoagulated     Anxiety     Biliary obstruction     Chronic kidney disease     Chronic right shoulder pain     Depression     Difficult intravenous access     NEED UTRASOUND TO FIND VEIN    H/O thrombocytopenia     History of pulmonary embolism     Hodgkin lymphoma, nodular sclerosis (H) 1988    s/p radiation    Hypothyroidism     ILD (interstitial lung disease) (H)     Lymphedema of right upper extremity     Neuroendocrine tumor of pancreas     with liver metastasis    NICM (nonischemic cardiomyopathy) (H)     Nonsenile cataract     Pancreatic insufficiency        Past Surgical History   Past Surgical History:   Procedure Laterality Date    CARDIAC SURGERY  04/22/2022    s/p MitraClip    ENDOSCOPIC RETROGRADE CHOLANGIOPANCREATOGRAM N/A 8/3/2023    Procedure: ENDOSCOPIC RETROGRADE CHOLANGIOPANCREATOGRAPHY with balloon sweep of bile duct for sludge;  Surgeon: Bonilla Ulloa MD;  Location: [facility] OR    ENDOSCOPIC RETROGRADE CHOLANGIOPANCREATOGRAM " COMPLEX N/A 6/12/2023    Procedure: ENDOSCOPIC RETROGRADE CHOLANGIOPANCREATOGRAPHY with dilation and hepaticduodenostomy,  stent placement;  Surgeon: Guru Alexander Joe MD;  Location: [facility] OR    ENDOSCOPIC ULTRASOUND UPPER GASTROINTESTINAL TRACT (GI) N/A 6/12/2023    Procedure: ENDOSCOPIC ULTRASOUND, ESOPHAGOSCOPY / UPPER GASTROINTESTINAL TRACT (GI);  Surgeon: Guru Alexander Joe MD;  Location: [facility] OR    ESOPHAGOSCOPY, GASTROSCOPY, DUODENOSCOPY (EGD), COMBINED N/A 6/7/2023    Procedure: Esophagoscopy, gastroscopy, duodenoscopy (EGD), combined;  Surgeon: Guru Alexander Joe MD;  Location: [facility] OR    SPLENECTOMY  1988       Prior to Admission Medications   Prior to Admission Medications   Prescriptions Last Dose Informant Patient Reported? Taking?   EPINEPHrine (ANY BX GENERIC EQUIV) 0.3 MG/0.3ML injection 2-pack   No Yes   Sig: Inject 0.3 mLs (0.3 mg) into the muscle as needed for anaphylaxis May repeat one time in 5-15 minutes if response to initial dose is inadequate.   Multiple Vitamin (MULTIVITAMIN) per tablet 9/12/2023 at am  Yes Yes   Sig: Take 1 tablet by mouth 2 times daily   acetaminophen (TYLENOL) 500 MG tablet 9/11/2023 at unknown  Yes Yes   Sig: Take 500-1,000 mg by mouth every 8 hours as needed for mild pain   calcium carbonate 750 MG CHEW 9/11/2023 at pm  Yes Yes   Sig: Take 750 mg by mouth At Bedtime   dapagliflozin (FARXIGA) 10 MG TABS tablet 9/12/2023 at am  Yes Yes   Sig: Take 1 tablet by mouth daily   emollient (VANICREAM) external cream Past Month at unknown  Yes Yes   Sig: Apply topically daily as needed for other (rash on hands and feet)   furosemide (LASIX) 20 MG tablet More than a month at unknown  Yes Yes   Sig: Take 20 mg by mouth as needed   levothyroxine (SYNTHROID/LEVOTHROID) 112 MCG tablet 9/11/2023 at pm  No Yes   Sig: Take 1 tablet (112 mcg) by mouth daily   lipase-protease-amylase (CREON 12) 21059-94639-50850 units CPEP 9/11/2023 at pm   Yes Yes   Sig: Take 2-3 capsules by mouth 3 times daily (with meals)   lovastatin (MEVACOR) 40 MG tablet 9/11/2023 at pm  No Yes   Sig: Take 1 tablet (40 mg) by mouth At Bedtime   melatonin 3 MG tablet More than a month at unknown  Yes Yes   Sig: Take 3 mg by mouth nightly as needed for sleep   metoprolol succinate ER (TOPROL XL) 25 MG 24 hr tablet 9/12/2023 at am  Yes Yes   Sig: Take 12.5 mg by mouth 2 times daily   ondansetron (ZOFRAN ODT) 8 MG ODT tab Past Month at unknown  No Yes   Sig: Take 1 tablet (8 mg) by mouth every 8 hours as needed for nausea   polyethylene glycol 3350 POWD 9/11/2023 at am  Yes Yes   Sig: Take 1 packet by mouth as needed   prochlorperazine (COMPAZINE) 10 MG tablet   Yes Yes   Sig: Take 10 mg by mouth every 6 hours as needed for nausea   venlafaxine (EFFEXOR XR) 150 MG 24 hr capsule 9/12/2023 at am  Yes Yes   Sig: Take 150 mg by mouth every morning With 75 mg capsule   venlafaxine (EFFEXOR XR) 75 MG 24 hr capsule 9/12/2023 at am  Yes Yes   Sig: Take 75 mg by mouth every morning With 150 mg capsule   vitamin D3 (CHOLECALCIFEROL) 50 mcg (2000 units) tablet 9/11/2023 at pm  Yes Yes   Sig: Take 1 tablet by mouth daily      Facility-Administered Medications Last Administration Doses Remaining   betamethasone acet & sod phos (CELESTONE) injection 6 mg 1/24/2023 10:30 AM            Review of Systems    The 10 point Review of Systems is negative other than noted in the HPI or here.      Physical Exam   Vital Signs: Temp: 100  F (37.8  C) Temp src: Oral BP: 104/51 Pulse: 108   Resp: 18 SpO2: 94 % O2 Device: None (Room air)    Weight: 154 lbs 1.62 oz    Physical Exam   Constitutional: Pleasant thin, male lying in ED bed. Conversant. SO at bedside.   Well nourished, well developed, resting comfortably   HEENT:   Head: Normocephalic and atraumatic.   Eyes: Conjunctivae are normal. Pupils are equal, round, and reactive to light.  Pharynx has no erythema or exudate, mucous membranes are moist  Neck:    No adenopathy, no bony tenderness  Cardiovascular: Regular rate and rhythm without murmurs or gallops  Pulmonary/Chest: Clear to auscultation bilaterally, with no wheezes or retractions. No respiratory distress.  GI: Soft with good bowel sounds.  Non-tender, non-distended, with no guarding, no rebound, no peritoneal signs.   Back:  No bony or CVA tenderness   Musculoskeletal:  No edema or clubbing   Skin: Skin is warm and dry. No rash noted to exposed skin areas.   Neurological: Alert and oriented to person, place, and time. Nonfocal exam  Psychiatric:  Normal mood and affect.      Medical Decision Making       75 MINUTES SPENT BY ME on the date of service doing chart review, history, exam, documentation & further activities per the note.      Data     I have personally reviewed the following data over the past 24 hrs:    14.9 (H)  \   9.6 (L)   / 107 (L)     140 102 15.2 /  83   3.4 27 1.04 \     ALT: 49 AST: 139 (H) AP: 354 (H) TBILI: 3.3 (H)   ALB: 3.1 (L) TOT PROTEIN: 7.2 LIPASE: 12 (L)     Trop: 50 (H) BNP: 10,330 (H)     Procal: N/A CRP: N/A Lactic Acid: 1.5       INR:  1.47 (H) PTT:  N/A   D-dimer:  N/A Fibrinogen:  N/A

## 2023-09-12 NOTE — MEDICATION SCRIBE - ADMISSION MEDICATION HISTORY
Medication Scribe Admission Medication History    Admission medication history is complete. The information provided in this note is only as accurate as the sources available at the time of the update.    Medication reconciliation/reorder completed by provider prior to medication history? No    Information Source(s): Patient and Family member via in-person    Pertinent Information: Pt's family member stated that pt has never really used compazine but has it available if needed.    Changes made to PTA medication list:  Added: None  Deleted: Celestone 6 mg, Temozolomide 100 m (+ duplicate)   Changed: None    Medication Affordability:  Not including over the counter (OTC) medications, was there a time in the past 3 months when you did not take your medications as prescribed because of cost?: No    Allergies reviewed with patient and updates made in EHR: yes    Medication History Completed By: Marimar Chou 9/12/2023 4:13 PM    Prior to Admission medications    Medication Sig Last Dose Taking? Auth Provider Long Term End Date   acetaminophen (TYLENOL) 500 MG tablet Take 500-1,000 mg by mouth every 8 hours as needed for mild pain 9/11/2023 at unknown Yes Unknown, Entered By History     calcium carbonate 750 MG CHEW Take 750 mg by mouth At Bedtime 9/11/2023 at pm Yes Reported, Patient     dapagliflozin (FARXIGA) 10 MG TABS tablet Take 1 tablet by mouth daily 9/12/2023 at am Yes Reported, Patient     emollient (VANICREAM) external cream Apply topically daily as needed for other (rash on hands and feet) Past Month at unknown Yes Unknown, Entered By History     EPINEPHrine (ANY BX GENERIC EQUIV) 0.3 MG/0.3ML injection 2-pack Inject 0.3 mLs (0.3 mg) into the muscle as needed for anaphylaxis May repeat one time in 5-15 minutes if response to initial dose is inadequate.  Yes Carmen Cho MD     furosemide (LASIX) 20 MG tablet Take 20 mg by mouth as needed More than a month at unknown Yes Reported, Patient No     levothyroxine (SYNTHROID/LEVOTHROID) 112 MCG tablet Take 1 tablet (112 mcg) by mouth daily 9/11/2023 at pm Yes Carmen Cho MD Yes    lipase-protease-amylase (CREON 12) 05875-49710-20451 units CPEP Take 2-3 capsules by mouth 3 times daily (with meals) 9/11/2023 at pm Yes Reported, Patient     lovastatin (MEVACOR) 40 MG tablet Take 1 tablet (40 mg) by mouth At Bedtime 9/11/2023 at pm Yes Carmen Cho MD Yes    melatonin 3 MG tablet Take 3 mg by mouth nightly as needed for sleep More than a month at unknown Yes Reported, Patient     metoprolol succinate ER (TOPROL XL) 25 MG 24 hr tablet Take 12.5 mg by mouth 2 times daily 9/12/2023 at am Yes Reported, Patient No    Multiple Vitamin (MULTIVITAMIN) per tablet Take 1 tablet by mouth 2 times daily 9/12/2023 at am Yes Reported, Patient     ondansetron (ZOFRAN ODT) 8 MG ODT tab Take 1 tablet (8 mg) by mouth every 8 hours as needed for nausea Past Month at unknown Yes Bradly Parker MD     polyethylene glycol 3350 POWD Take 1 packet by mouth as needed 9/11/2023 at am Yes Reported, Patient     prochlorperazine (COMPAZINE) 10 MG tablet Take 10 mg by mouth every 6 hours as needed for nausea  Yes Reported, Patient     venlafaxine (EFFEXOR XR) 150 MG 24 hr capsule Take 150 mg by mouth every morning With 75 mg capsule 9/12/2023 at am Yes Reported, Patient No    venlafaxine (EFFEXOR XR) 75 MG 24 hr capsule Take 75 mg by mouth every morning With 150 mg capsule 9/12/2023 at am Yes Reported, Patient No    vitamin D3 (CHOLECALCIFEROL) 50 mcg (2000 units) tablet Take 1 tablet by mouth daily 9/11/2023 at pm Yes Unknown, Entered By History

## 2023-09-13 NOTE — PROGRESS NOTES
Writer called to placed IV for pt. Second CC RN present as well. 2 attempts were unsuccessful. US was brought in, but no good veins were visible. Pt had small veins near radial artery or above the elbow. Provider order needed to place IV access in those areas. Explained situation to pt and pt ok with attempting IV access in the morning. Pt reports poor fluid intake and only had L arm use for access. Right arm limb alert. Primary nurse updated on unsuccessful IV access attempts and need to update provider.

## 2023-09-13 NOTE — CONSULTS
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 Cross sectional imaging with stable position of common bile duct stent with decreased pneumobilia and up trending LFTs prompting a GI consult.      Suspected Cholangitis  Sespsis   Hx of Biliary Obstruction   Pancreatic Neuroendocrine Tumor      Patient with hx of pancreatic neuroendocrine tumor and previous hx of biliary obstruction who presents with progressively worse weakness, fever, chills and abdominal pain. He was noted to be febrile with leukocytosis, up trending Tbili and cross sectional imaging with stable position of common bile duct stent with decreased pneumobilia.     Patient was admitted for sepsis suspected to be 2/2 to cholangitis/stent occlusion.  Blood cultures growing gram negative bacteremia all further raising suspicion of an intraabdominal source.     After speaking with the patient and his wife today, they said they are currently in the middle of a conversation of potentially proceeding with hospice, they are discussing with their sons one of whom is a physician assistant.     They will like some more time to discuss next steps which could potentially be just moving towards hospice and comfort care in which case patient will not want to pursue any more procedures.     Continue conservative management and supportive care until we have a more definitive decision from the patient and his family.     RECOMMENDATIONS  --Continue antibiotics and supportive care   --Monitor LFTs   --Monitor  fever curve and WBC   --Further procedure planning pending [facility], if patient and family are choosing to pursue active treatments then we will consider ERCP tomorrow.     Thank you for involving us in this patient's care. Please do not hesitate to contact the GI service with any questions or concerns.     Patient care plan discussed with Dr. Carney, GI staff physician.    Jean-Paul Soni MD  Gastroenterology Fellow  Pager             Chief Complaint:   We were asked by ABBEY SHARMA MD  of the hospital medicine team to evaluate this patient with suspected biliary obstruction/stent occlusion.     History is obtained from the patient and the medical record.          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 Cross sectional imaging with stable position of common bile duct stent with decreased pneumobilia and up trending LFTs prompting a GI consult.    Patient states about a week ago he started to note weakness which got progressively worse and then by yesterday he developed chills and was unable to keep warm despite several blankets, also reported associated abdominal pain which prompted his presentation to the ED, per his wife they did not have an objective fever at home but reported a documented objective fever when they got to the ED, he denies nausea, vomiting or evidence of overt GI bleed.     Of note he was recently admitted from 8/1-8/5 with cholangitis s/p endoscopic intervention.     Labs revealed leukocytosis and up trending Tbili, Bcx with gram negative bacilli, he was admitted for sepsis suspected  to be 2/2 to biliary obstruction.             Past Medical History:   Reviewed and edited as appropriate  Past Medical History:   Diagnosis Date     Anemia      Anticoagulated      Anxiety      Biliary obstruction      Chronic kidney disease      Chronic right shoulder pain      Depression      Difficult intravenous access     NEED UTRASOUND TO FIND VEIN     H/O thrombocytopenia      History of pulmonary embolism      Hodgkin lymphoma, nodular sclerosis (H) 1988    s/p radiation     Hypothyroidism      ILD (interstitial lung disease) (H)      Lymphedema of right upper extremity      Neuroendocrine tumor of pancreas     with liver metastasis     NICM (nonischemic cardiomyopathy) (H)      Nonsenile cataract      Pancreatic insufficiency             Past Surgical History:   Reviewed and edited as appropriate   Past Surgical History:   Procedure Laterality Date     CARDIAC SURGERY  04/22/2022    s/p MitraClip     ENDOSCOPIC RETROGRADE CHOLANGIOPANCREATOGRAM N/A 8/3/2023    Procedure: ENDOSCOPIC RETROGRADE CHOLANGIOPANCREATOGRAPHY with balloon sweep of bile duct for sludge;  Surgeon: Bonilla Ulloa MD;  Location: [facility] OR     ENDOSCOPIC RETROGRADE CHOLANGIOPANCREATOGRAM COMPLEX N/A 6/12/2023    Procedure: ENDOSCOPIC RETROGRADE CHOLANGIOPANCREATOGRAPHY with dilation and hepaticduodenostomy,  stent placement;  Surgeon: Guru Alexander Joe MD;  Location: [facility] OR     ENDOSCOPIC ULTRASOUND UPPER GASTROINTESTINAL TRACT (GI) N/A 6/12/2023    Procedure: ENDOSCOPIC ULTRASOUND, ESOPHAGOSCOPY / UPPER GASTROINTESTINAL TRACT (GI);  Surgeon: Guru Alexander Joe MD;  Location: [facility] OR     ESOPHAGOSCOPY, GASTROSCOPY, DUODENOSCOPY (EGD), COMBINED N/A 6/7/2023    Procedure: Esophagoscopy, gastroscopy, duodenoscopy (EGD), combined;  Surgeon: Guru Alexander Joe MD;  Location: [facility] OR     SPLENECTOMY  1988            Previous Endoscopy:   No results found for this or any previous visit.          Social History:   Reviewed and edited as appropriate  Social History     Socioeconomic History     Marital status:      Spouse name: Not on file     Number of children: Not on file     Years of education: Not on file     Highest education level: Not on file   Occupational History     Not on file   Tobacco Use     Smoking status: Never     Passive exposure: Never     Smokeless tobacco: Never   Vaping Use     Vaping Use: Never used   Substance and Sexual Activity     Alcohol use: Not Currently     Comment: 1 beer a month     Drug use: Never     Sexual activity: Not on file   Other Topics Concern     Not on file   Social History Narrative     Not on file     Social Determinants of Health     Financial Resource Strain: Not on file   Food Insecurity: Not on file   Transportation Needs: Not on file   Physical Activity: Not on file   Stress: Not on file   Social Connections: Not on file   Intimate Partner Violence: Not on file   Housing Stability: Not on file            Family History:   Reviewed and edited as appropriate  No known history of gastrointestinal/liver disease or  gastrointestinal malignancies       Allergies:   Reviewed and edited as appropriate     Allergies   Allergen Reactions     Bee Venom Hives and Other (See Comments)     Other reaction(s): Other (see comments)  Not honey bees. Unknown bee  Per pt  Per pt  Per pt  Per pt       Simvastatin Muscle Pain (Myalgia) and Other (See Comments)     Other reaction(s): Muscle Aches, Other (see comments)  Patient reports muscle stiffness  Patient reports muscle stiffness  myalgias  myalgias       Vinblastine Other (See Comments)     Other reaction(s): Other (see comments)  Patient reported paralyzation of colon  Per pt  Patient reported paralyzation of colon  Per pt       Vincristine Other (See Comments)     Patient reports paralyzation of his colon  Patient reports paralyzation of his colon  Patient reports paralyzation of his colon  Patient reports  "paralyzation of his colon       Lina-Kit Bee Sting      Nkda [No Known Drug Allergy]             Medications:            Review of Systems:     A complete review of systems was performed and is negative except as noted in the HPI           Physical Exam:   BP (!) 140/70 (BP Location: Left arm, Patient Position: Semi-Duarte's, Cuff Size: Adult Regular)   Pulse 95   Temp 98.6  F (37  C) (Oral)   Resp 19   Ht 1.727 m (5' 8\")   Wt 69.9 kg (154 lb 1.6 oz)   SpO2 99%   BMI 23.43 kg/m    Wt:   Wt Readings from Last 2 Encounters:   09/12/23 69.9 kg (154 lb 1.6 oz)   09/11/23 69.9 kg (154 lb)      Constitutional: cooperative, pleasant, not dyspneic/diaphoretic, no acute distress  Eyes: Sclera anicteric/injected  Ears/nose/mouth/throat: Normal oropharynx without ulcers or exudate, mucus membranes moist  Neck: supple  CV: No edema  Respiratory: Unlabored breathing  Abdomen:Non distended, non tender, no peritoneal signs  Skin: warm, perfused  Neuro: AAO x 3, No asterixis  Psych: Normal affect  MSK: Normal gait         Data:   Labs and imaging below were independently reviewed and interpreted    BMP  Recent Labs   Lab 09/13/23  0743 09/12/23  1112 09/07/23  1441   * 140 137   POTASSIUM 3.3* 3.4 3.8   CHLORIDE 100 102 102   CARLOS 8.3* 8.5* 8.4*   CO2 25 27 25   BUN 18.0 15.2 14.7   CR 1.10 1.04 0.96   * 83 114*     CBC  Recent Labs   Lab 09/13/23  0743 09/12/23  1112 09/07/23  1441   WBC 15.0* 14.9* 6.1   RBC 2.06* 2.88* 2.90*   HGB 6.8* 9.6* 10.0*   HCT 20.3* 29.3* 30.0*   MCV 99 102* 103*   MCH 33.0 33.3* 34.5*   MCHC 33.5 32.8 33.3   RDW 24.3* 25.2* 24.3*   * 107* 128*     INR  Recent Labs   Lab 09/12/23  1112   INR 1.47*     LFTs  Recent Labs   Lab 09/13/23  0743 09/12/23  1112 09/07/23  1441   ALKPHOS 297* 354* 143*   * 139* 48*   ALT 52 49 18   BILITOTAL 2.9* 3.3* 0.8   PROTTOTAL 6.8 7.2 7.5   ALBUMIN 2.7* 3.1* 3.0*      PANC  Recent Labs   Lab 09/12/23  1112   LIPASE 12*       Imaging:     CT " Abdomen     IMPRESSION:   1.  Slightly increased moderate bilateral pleural effusions.  2.  Stable position of common bile duct stent with decreased  pneumobilia.  3.  No significant change in the heterogeneous pancreatic head mass  consistent with known neuroendocrine tumor and known liver metastases.  4.  Increased stool throughout the large bowel.

## 2023-09-13 NOTE — PLAN OF CARE
Goal Outcome Evaluation:      Plan of Care Reviewed With: patient, spouse    Overall Patient Progress: no changeOverall Patient Progress: no change    Outcome Evaluation: Providing variety of high protein supplements (TID)    Tere COLON  Mental Health Pager (M-F): [phone]  On Call Pager (weekends only): [phone]

## 2023-09-13 NOTE — CONSULTS
"  Oncology  Consult Note   Date of Service: 09/13/2023    Patient: Tyrel Harrell  MRN: [id number]  Admission Date: 9/12/2023  Hospital Day # 1    Reason for Consult: sepsis in patient with history of pancreatic neuroendocrine tumor    History of Present Illness:    Tyrel Harrell is a 72 year old male with history of pancreatic neuroendocrine tumor (not currently on treatment), Hodgkin's Lymphoma, orthostatic hypotension, HFrEF, mitral regurgitation s/p Mitraclip (4/22/23), paroxysmal atrial fibrillation, PE (not on AC due to risk of bleeding with recurrent falls) and biliary obstruction s/p CBD stent placement and acute cholangitis (recent admission 8/1-8/5) who presented for evaluation of chills, weakness and abdominal pain and was admitted for sepsis. Labs notable for WBC 14.9k, Hb 9.6, plt 102, Procal 1.27. CXR showed bilateral pleural effusions. CT CAP showed stable disease with moderate bilateral pleural effusions. Blood cultures are positive for klebsiella pneumoniae and is currently on zosyn for antibiotic coverage.      Oncologic History:  Per previous oncology note:  \"Tyrel Harrell is a 72 year old male with metastatic high-grade but well differentiated neuroendocrine tumor of the pancreas. He has a history of cured Hodgkin's disease with a grade 3 well-differentiated endocrine tumor in the head of his pancreas with extensive liver metastasis.  His Ki-67 labeling index is 60%, and his cancer has very little if any dotatate uptake.  He has been on CAPTEM since August 2021 with an interruption during repair of a mitral valve in the summer of 2022.  He is currently undergoing treatment with capecitabine and temozolomide. Please see previous notes for further details on the patient's history. He was hospitalized from 6/22-6/26/23 with orthostatic hypotension likely secondary to autonomic dysfunction. He was discharged home on fludrocortisone.      Overall, through the summer of 2023, he has had a " "decline in his cognitive function. He was hospitalized from 8/1-8/5/23 with acute cholangitis. ERCP performed 8/3 which found blocked metal stent with food within the duodenal bulb which was cleared. He was treated with Zosyn and ceftriaxone and discharged home on Cipro.\"     Speaking with Mr. Harrell, he states that he was having chills at home but he tends to run on the cold side. His abdominal pain has subsided. He denies any fevers at home.     Review of Systems: A comprehensive ROS was performed and found to be negative or non-contributory with the exception of that noted in the HPI above.    Social History: Never smoker, no alcohol    Family History: No family history of any known malignancies    Outpatient Medications reviewed.    Physical Exam:    Temp:  [98.4  F (36.9  C)-98.9  F (37.2  C)] 98.6  F (37  C)  Pulse:  [] 95  Resp:  [18-28] 19  BP: (113-140)/(62-70) 140/70  SpO2:  [99 %-100 %] 99 %     A comprehensive physical examination is deferred due to tele visit.   GENERAL: Healthy, alert and no distress  EYES: Eyes grossly normal to inspection.  No discharge or erythema, or obvious scleral/conjunctival abnormalities.  RESP: No audible wheeze, cough, or visible cyanosis.  No visible retractions or increased work of breathing.    SKIN: Visible skin clear. No significant rash, abnormal pigmentation or lesions.  NEURO: Cranial nerves grossly intact.  Mentation and speech appropriate for age.  PSYCH: Mentation appears normal, affect normal/bright, judgement and insight intact, normal speech and appearance well-groomed.    Labs:  CMP  Recent Labs   Lab 09/13/23  0743 09/12/23  1112 09/07/23  1441   * 140 137   POTASSIUM 3.3* 3.4 3.8   CHLORIDE 100 102 102   CO2 25 27 25   ANIONGAP 9 11 10   BUN 18.0 15.2 14.7   CR 1.10 1.04 0.96   GFRESTIMATED 72 77 85   CARLOS 8.3* 8.5* 8.4*   PROTTOTAL 6.8 7.2 7.5   ALBUMIN 2.7* 3.1* 3.0*   BILITOTAL 2.9* 3.3* 0.8   ALKPHOS 297* 354* 143*   * 139* 48*   ALT " 52 49 18     Recent Labs   Lab 09/13/23  0743 09/12/23  1721 09/12/23  1112 09/07/23  1441   *  --  83 114*   MAG  --  1.8 1.8  --    PHOS  --   --  1.7*  --      CBC  Recent Labs   Lab 09/13/23  0743 09/12/23  1112 09/07/23  1441   WBC 15.0* 14.9* 6.1   RBC 2.06* 2.88* 2.90*   HGB 6.8* 9.6* 10.0*   HCT 20.3* 29.3* 30.0*   MCV 99 102* 103*   MCH 33.0 33.3* 34.5*   MCHC 33.5 32.8 33.3   RDW 24.3* 25.2* 24.3*   * 107* 128*     COAGS  Recent Labs   Lab 09/12/23  1112   INR 1.47*        Imaging:  CT Chest/Abdomen/Pelvis w Contrast 9/12/2023    IMPRESSION: 1.  Slightly increased moderate bilateral pleural effusions. 2.  Stable position of common bile duct stent with decreased pneumobilia. 3.  No significant change in the heterogeneous pancreatic head mass consistent with known neuroendocrine tumor and known liver metastases. 4.  Increased stool throughout the large bowel. 5.  Punctate left renal calculus without obstruction. I have personally reviewed the examination and initial interpretation and I agree with the findings. SHAMA CARO MD   SYSTEM ID:  [id number]    US Abdomen Limited (RUQ) 9/12/2023    IMPRESSION: 1.  Hypoechoic hepatic parenchyma with periportal hyperechogenicity which can be seen with hepatitis. Given recent history of ERCP, some of the linear echogenicity in the liver likely reflects expected pneumobilia. 2.  Distended and sludge-filled gallbladder without signs or symptoms of acute cholecystitis. 3.  Prominent but nonobstructed, stented CBD. 4.  Nonvisualization of the pancreas. I have personally reviewed the examination and initial interpretation and I agree with the findings. JOEL PEREA MD   SYSTEM ID:  [id number]    XR Chest 2 Views 9/12/2023    Impression: Moderate to large bilateral pleural effusions with adjacent hazy opacities representing atelectasis/edema versus infection. Pleural effusions are likely similar in size compared to 8/1/2023 CT. I have personally reviewed  "the examination and initial interpretation and I agree with the findings. ISIAH DAVIS DO   SYSTEM ID:  [id number]    US Lower Extremity Venous Duplex Right 9/12/2023    IMPRESSION: Right peroneal veins were not visualized. Otherwise, no deep venous thrombosis demonstrated in the RIGHT leg. Reference: \"Duplex Ultrasound in the Diagnosis of Lower-Extremity Deep Venous Thrombosis\"- Rocío Bolton MD, S; Flakito Summers MD (Circulation. 2014;129:917-921. http://circ.ahajournals.org) DEANNA PAZ MD   SYSTEM ID:  [id number]       Pathology:  No new pathology    Assessment and Plan:  Tyrel Harrell is a 72 year old male with history of pancreatic neuroendocrine tumor (not currently on treatment), Hodgkin's Lymphoma, PE (not on AC due to risk of bleeding with recurrent falls), biliary obstruction s/p CBD stent placement and acute cholangitis (recent admission 8/1-8/5) who is being treated for sepsis 2/2 Klebsiella pneumoniae bacteremia.     We discussed with Mr. Harrell that he likely is not feeling well due to his hemoglobin being low today at 6.8 and he has an active infection with bacteremia. Unclear etiology of bacteremia but possibly pulmonary given his pleural effusions, but no known pneumonia seen on CXR or CT, vs GI with his history of biliary obstruction/cholangitis. Agree with primary team's current workup and appreciate GI input. As for his anemia, he has had longstanding history of anemia likely therapy related and now worsened from underlying sepsis and bacteremia.     In regards to his current diagnosis of pancreatic neuroendocrine tumor, he was previously being treated with capecitabine/temozolomide. He is not currently on active treatment due to decline in performance status. CT CAP on 9/12/2023 showed stable disease without any obstructive patterns. At this point, his performance status has not improved and we would not recommend any cancer directed therapy while inpatient. Previous clinic visit " mentioned that he was considering palliative/hospice. Would recommend inpatient visit to discuss goals of care.     Recommendations:   - Continue with antibiotics  - Will need to continue investigating source of bacteremia  - No cancer directed therapy while inpatient. Will set up follow up with Dr. Parker and his team once closer to discharge.  - Palliative consult    Patient was seen and plan of care was discussed with attending physician Dr. Bird Thomas.    The oncology service will continue to follow with you. Please do not hesitate to page with questions or concerns.     Gabby Martini MD   Heme/Onc/Transplant Fellow  Pgr #[phone]

## 2023-09-13 NOTE — CONSULTS
INTERVENTIONAL RADIOLOGY CONSULT NOTE    Reason for referral:   Bilateral thoracentesis    History:   72-year-old patient with Hodgkin lymphoma, pancreatic head neuroendocrine tumor with associated central bile duct obstruction and common bile duct stent in place, history of cholangitis, and cardiac history including HFrEF, mitral regurgitation and atrial fibrillation.  He is admitted with Klebsiella pneumoniae bacteremia with has chronic bilateral pleural effusions that have increased in size and associated shortness of breath; bilateral thoracentesis requested for symptom relief and to rule out infection.    Labs:  Lab Results   Component Value Date    HGB 6.8 09/13/2023    HGB 13.7 05/24/2016     Lab Results   Component Value Date     09/13/2023     05/24/2016     Lab Results   Component Value Date    WBC 15.0 09/13/2023    WBC 6.5 05/24/2016       Lab Results   Component Value Date    INR 1.47 09/12/2023       Lab Results   Component Value Date    PROTTOTAL 6.8 09/13/2023    PROTTOTAL 7.1 05/24/2016      Lab Results   Component Value Date    ALBUMIN 2.7 09/13/2023    ALBUMIN 3.4 04/18/2023    ALBUMIN 3.5 05/24/2016     Lab Results   Component Value Date    BILITOTAL 2.9 09/13/2023    BILITOTAL 0.8 05/24/2016     Lab Results   Component Value Date    ALKPHOS 297 09/13/2023    ALKPHOS 70 05/24/2016     Lab Results   Component Value Date     09/13/2023    AST 31 05/24/2016     Lab Results   Component Value Date    ALT 52 09/13/2023    ALT 30 05/24/2016       Lab Results   Component Value Date    CR 1.10 09/13/2023    CR 0.93 05/24/2016     Lab Results   Component Value Date    BUN 18.0 09/13/2023    BUN 31 04/18/2023    BUN 16 05/24/2016       Imaging:   CT 9/12/2023 shows large bilateral pleural effusions.  Pancreatic head mass and occluded CBD stent are noted.    Assessment/Plan:   Patient will be placed on IR schedule today for bilateral thoracentesis.

## 2023-09-13 NOTE — PROGRESS NOTES
09/13/23 1021   Appointment Info   Signing Clinician's Name / Credentials (PT) Maryan Cruz DPT       Present no   Language English   Living Environment   People in Home spouse   Current Living Arrangements house   Home Accessibility stairs to enter home;stairs within home   Number of Stairs, Main Entrance 1   Stair Railings, Main Entrance none   Number of Stairs, Within Home, Primary seven   Stair Railings, Within Home, Primary railings safe and in good condition;railings on both sides of stairs   Transportation Anticipated family or friend will provide   Living Environment Comments Once patient is up the initial stairs, remains on one level. Has 24 hour support and assist from spouse and family. Home very set up to accomodate needs   Self-Care   Usual Activity Tolerance moderate   Current Activity Tolerance fair   Regular Exercise No   Equipment Currently Used at Home walker, rolling  (has all necessary equipment)   Fall history within last six months no   General Information   Onset of Illness/Injury or Date of Surgery 09/12/23   Referring Physician Josefa Campo PA-C   Patient/Family Therapy Goals Statement (PT) Did not endorse   Pertinent History of Current Problem (include personal factors and/or comorbidities that impact the POC) 72 year old male admitted on 9/12/2023. 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 Patient admitted to Medicine for further evaluation and care.   Existing Precautions/Restrictions fall   Weight-Bearing Status - LUE full weight-bearing   Weight-Bearing Status - RUE full weight-bearing   Weight-Bearing Status - LLE full  weight-bearing   Weight-Bearing Status - RLE full weight-bearing   General Observations Supine in bed upon arrival, pleasant and agreeable   Cognition   Affect/Mental Status (Cognition) WNL   Orientation Status (Cognition) oriented x 3   Follows Commands (Cognition) WNL   Pain Assessment   Patient Currently in Pain No   Integumentary/Edema   Integumentary/Edema no deficits were identifed   Posture    Posture Protracted shoulders;Forward head position;Kyphosis   Posture Comments Significant sitting EOB and standing   Range of Motion (ROM)   ROM Comment Did not formally assess, demonstrates functional ROM with mobility   Strength (Manual Muscle Testing)   Strength Comments Did not formally assess, patient generally deconditioned with decreased activity tolerance   Bed Mobility   Comment, (Bed Mobility) Supervision supine<>sit transfer   Transfers   Comment, (Transfers) CGA for sit<>stand transfer with use of walker   Gait/Stairs (Locomotion)   Comment, (Gait/Stairs) CGA to SBA for ambulation with walker   Balance   Balance Comments Independent sitting balance, SBA for standing balance with UE support from walker   Sensory Examination   Sensory Perception WNL   Clinical Impression   Criteria for Skilled Therapeutic Intervention Yes, treatment indicated   PT Diagnosis (PT) impaired tolerance for functional activities   Influenced by the following impairments impaired transfers and ambulation   Functional limitations due to impairments impaired tolerance for functional activities   Clinical Presentation (PT Evaluation Complexity) Stable/Uncomplicated   Clinical Presentation Rationale Per clinical judgment   Clinical Decision Making (Complexity) low complexity   Planned Therapy Interventions (PT) balance training;gait training;home exercise program;stair training;strengthening;transfer training;progressive activity/exercise;risk factor education;home program guidelines   Anticipated Equipment Needs at Discharge (PT)    (has all equipment)   Risk & Benefits of therapy have been explained evaluation/treatment results reviewed;care plan/treatment goals reviewed;risks/benefits reviewed;current/potential barriers reviewed   PT Total Evaluation Time   PT Eval, Low Complexity Minutes (62422) 6   Physical Therapy Goals   PT Frequency 5x/week   PT Predicted Duration/Target Date for Goal Attainment 09/20/23   PT Goals Bed Mobility;Transfers;Stairs;Gait   PT: Bed Mobility Independent;Supine to/from sit   PT: Transfers Modified independent;Sit to/from stand;Bed to/from chair;Assistive device   PT: Gait Modified independent;Assistive device;Greater than 200 feet   PT: Stairs Supervision/stand-by assist;6 stairs;Rail on both sides   Therapeutic Procedure/Exercise   Ther. Procedure: strength, endurance, ROM, flexibillity Minutes (81362) 8   Symptoms Noted During/After Treatment fatigue   Treatment Detail/Skilled Intervention PT: Reviews seated exercises including heel/toe raises, marching, LAQ, GS, hip abd/add. Good understanding noted overall.   Therapeutic Activity   Therapeutic Activities: dynamic activities to improve functional performance Minutes (42670) 14   Symptoms Noted During/After Treatment Fatigue   Treatment Detail/Skilled Intervention PT: Supine in bed upon arrival, agreeable to PT. Some increased time needed but doing well. Completes supine<>sit transfer with supervision. Sits EOB with good tolerance. Completes sit<>stand transfer with SBA and use of walker. Tolerated ambulating x 300' total in yanez taking 1 seated rest break at bench after approximately 250'. Ambulates at CGA to close SBA, fairly steady throughout. Patient then returns to room and needs to be in bed for ECHO. Educated on up to recliner (initial plan) and patient in agreement. Ended in bed with needs in reach   PT Discharge Planning   PT Plan PT; Continue gait endurance, bring seated HEP, progress to stairs as able   PT Discharge Recommendation (DC Rec) home  with assist   PT Rationale for DC Rec PT: Anticipate pending medical clearance patient will be safe to return home with assist from family   PT Brief overview of current status PT: Supervision to near independent bed mobility, CGA to close SBA for transfers and ambulation with walker   Total Session Time   Timed Code Treatment Minutes 22   Total Session Time (sum of timed and untimed services) 28

## 2023-09-13 NOTE — PROGRESS NOTES
[facility]    Medicine Progress Note - Hospitalist Service, GOLD TEAM 16    Date of Admission:  2023    Assessment & Plan   Tyrel Harrell is a 72 year old male admitted on 2023. 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 Patient admitted to Medicine for further evaluation and care.      ## Chills, abdominal pain, weakness:   ## Hx of biliary Obstruction  ## AHRF  ##Sepsis due to Gram -ve bacilli bacteremia   -- Admit - w/ acute cholangitis s/p ERCP (8/3/23) with noted blocked stent in duodenal bulb which was cleared. Presents with chills, abdominal pain, wheezing, dry cough, and weakness. WBC 14.9, T bili 3.3 (0.8), lipase 12, LFTs AST/ALT: 139/49, .  CT A/P with stable position of CBD stent with decreased pneumobilia; no significant change in heterogeneous pancreatic head mass; increased stool throughout the large bowel, punctate left renal calculus w/o obstruction. Abdominal US: Hypoechoic hepatic parenchyma with periportal hyperechogenicity- can be seen with hepatitis; distended and sludge filled gallbladder w/o signs or symptoms of acute cholecystitis; prominent, though not obstructed, stented CBD.  LA 2.3 --> 1.5 s/p 1L IVF, CRP 42.30, Procal 1.27. Maintains on 1L O2 via NC with Sats 94-98%. VB.42/49/17/31. NT-BNP ~ 10K. EKG with sinus tachycardia. Afebrile. CXR with moderate to large bilateral pleural effusions w/ adjacent hazy opacities representing atelectasis/ma vs infection. Pleural effusion similar to size compared to 23. RVP PCR negative.  UA with 20 wbc, though negative nitrite and LE. Started on  Zosyn in ED for possible intraabdominal infection. Suspect sx are multifactorial insetting of possible CHF exacerbation w/ likely infectious component (Pulmonary vs Urinary vs translocation from pancreatic tumor)  - Blood cx now growing gram negative bacilli bacteremia x2/2 sets likely suggestive of klebsiella pneumniae   Continue Zosyn for now (pseudomonal dosing)  Follow-up urine cx   Follow-up repeat Bcx2  GI consult given concern of bacteremia from intra-abdoinal source given recent instrumentation  As we obtain further speciation, we'll consult ID to help guide abx treatment    IR consult for thoracentesis  Follow-up pleural fluid protein, LDH, glucose, cell count, Gram stain and culture, anaerobic culture.  - Continue PTA creon w/ meals   - Tylenol PRN for pain  - Zofran PRN for nausea  - Palliative care consult placed to discuss ongoing GOC. Please discuss with in AM.   - PT  - Continue O2 to maintain sats greater than 94%     ## Chronic Bilateral pleural Effusions:   Presents since at least 7/2023 on available imaging. Family reports that patient has received thoracentesis previously, though 2 years ago. CXR with moderate to large bilateral pleural effusions w/ adjacent hazy opacities representing atelectasis/ma vs infection. Pleural effusion similar to size compared to 8/1/23.   Thoracentesis as above, rule out infection.  Obtaining TTE as well.  - Monitor      ## Bicytopenia:  On admission, Hgb 9.6, Platelets 107. Likely in setting of chemotherapy. BL Hgb appears 9-10, BL platelets appears .  On 9/13/2023, hemoglobin 6.8.  - Monitor  Type and screen  Patient to receive 1 units of PRBC (irradiated cells)  - Transfuse for Hgb less than 7.0 or hemodynamically significant bleed     ## Metastatic high-grade, but well-differentiated neuroendocrine tumor of the pancreas:   ## Hx of cured Hodgkin's disease  ## Mild cognitive decline: Tumor at head of pancreas with extensive liver mets. Follow[facility]  Oncology with recent video visit 9/11/23. Tumor stable on most recent imaging. Chemotherapy currently on hold d/t decline in functional status, though will resume in one week, followed by a 3 week break. Palliative care referral and Hospice consult placed. Repeat Imaging in October.  Noted to have recent hallucinations on Oncology clinic note. Patient not currently experiencing hallucinations on admission.   - Oncology consult placed.  Patient seen and evaluated by them, with plans to continue ongoing infectious work-up.  No surgical therapy at this time.     ##HX of PE: Not Currently on A/c d/t fall concerns.  - monitor    ##HLD: PTA statin  - Continue     ## HFrEF: Echo(6/23/23) with EF of 55-60% Nl RV function; s/p mitral clips, mild to moderate residual MR, no pericardial effusion, mild to moderate aortic insufficiency. NT-BNP ~ 10K on admission.  Lasix recently held ~ 4 weeks ago. Bilateral LE edema 2+. No PND, orthopnea.    - Consider Cardiology consult for possible HF exacerbation   - ECHO in AM  - Consider resuming Lasix in AM- did not order tonight as patient resting comfortably on 1L O2 and received IVF for sepsis      ## Hypothyroidism: PTA synthroid.  - Continue PTA medication, TSH within normal limits.     ## Orthostatic Hypotension: Previously maintained on florinef. Admit 6/22-6/26/2023 for orthostatic hypotension, though s/t autonomic dysfunction.   - Monitor  - Fall precautions     #Elevated lactic acid  - Likely due to decreased tissue perfusion in the setting of sepsis order hypovolemia  - Resolved after 500 cc of LR.     ## Paroxysmal Atrial fibrillation  ## S/p mitral valve repair (2022): EKG with sinus tachycardia on admission. PTA metoprolol  - Continue PTA metoprolol with hold parameters HR less than 60 or SBP less than 100     ## Depression: PTA Effexor.   - Continue PTA medication        Diet: Low Fiber Diet    DVT Prophylaxis: Hold pharmacologic AC for now; PCD's   Craft Catheter: Not  present  Lines: None     Cardiac Monitoring: ACTIVE order. Indication: Possible fluid overload  Code Status: No CPR- Do NOT Intubate      Clinically Significant Risk Factors Present on Admission        # Hypokalemia: Lowest K = 3.3 mmol/L in last 2 days, will replace as needed       # Hypoalbuminemia: Lowest albumin = 2.7 g/dL at 9/13/2023  7:43 AM, will monitor as appropriate    # Coagulation Defect: INR = 1.47 (Ref range: 0.85 - 1.15) and/or PTT = N/A, will monitor for bleeding    # Thrombocytopenia: Lowest platelets = 107 in last 2 days, will monitor for bleeding                   Disposition Plan  to be determined.      Expected Discharge Date: 09/15/2023                  ABBEY SHARMA MD  Hospitalist Service, GOLD TEAM 16  [facility]  Securely message with [employer] (more info)  Text page via [facility] Paging/Directory   See signed in provider for up to date coverage information  ______________________________________________________________________    Interval History   - came in due to difficulty breathing  - some abdominal pain that is mild; shooting pains in the middle in the pancrease area.   - Not feeling pain as much right now; he feels so much exhaustion, having a hard time sitting on bed  - reports a hx of constipation     Physical Exam   Vital Signs: Temp: (!) 95.7  F (35.4  C) Temp src: Oral BP: 117/64 Pulse: 96   Resp: 14 SpO2: 95 % O2 Device: None (Room air) Oxygen Delivery: 1 LPM  Weight: 154 lbs 1.62 oz    General Appearance: Lying comfortably in bed, on room air, in no acute distress of discomfort  HEENT: PERRL: EOMI; moist mucous membrane w/o lesions  Neck: No JVD  Pulmonary: Clear to auscultation bilaterally, no wheezes or crackles. Decreased breath sounds in the bases.   CVS: Regular rhythm, no murmurs, rubs or gallops  GI: BS (+), soft nontender, no rebound or guarding   Extremities: bilatereal LE edema   Skin: No rashes or lesions  Neurologic: A&O  x3      Medical Decision Making       55 MINUTES SPENT BY ME on the date of service doing chart review, history, exam, documentation & further activities per the note.      Data   ------------------------- PAST 24 HR DATA REVIEWED -----------------------------------------------    I have personally reviewed the following data over the past 24 hrs:    15.0 (H)  \   6.8 (LL)   / 112 (L)     134 (L) 100 18.0 /  100 (H)   3.3 (L) 25 1.10 \     ALT: 52 AST: 102 (H) AP: 297 (H) TBILI: 2.9 (H)   ALB: 2.7 (L) TOT PROTEIN: 6.8 LIPASE: N/A     Trop: 50 (H) BNP: N/A     TSH: N/A T4: N/A A1C: N/A     Procal: N/A CRP: N/A Lactic Acid: 1.6       Imaging results reviewed over the past 24 hrs:   Recent Results (from the past 24 hour(s))   XR Chest 2 Views    Narrative    Exam: XR CHEST 2 VIEWS, 9/12/2023 1:33 PM    Comparison: CT chest 8/1/2023    History: fever, cough    Findings:  AP and lateral views of the chest. The cardiac silhouette is obscured.  Mitral valve clips. Moderate to large bilateral pleural effusions with  adjacent hazy opacities. No pneumothorax. Biliary stent in the right  upper quadrant. No acute osseous abnormality.      Impression    Impression: Moderate to large bilateral pleural effusions with  adjacent hazy opacities representing atelectasis/edema versus  infection. Pleural effusions are likely similar in size compared to  8/1/2023 CT.    I have personally reviewed the examination and initial interpretation  and I agree with the findings.    ISIAH DAVIS DO         SYSTEM ID:  [id number]   CT Chest/Abdomen/Pelvis w Contrast    Narrative    EXAMINATION: CT CHEST/ABDOMEN/PELVIS W CONTRAST, 9/12/2023 3:33 PM    INDICATION: fever, cough, abdominal pain    COMPARISON STUDY: CT chest radiographs, CT chest abdomen and pelvis  7/26/2023, CT chest PE 8/1/2023    TECHNIQUE: CT scan of the chest, abdomen and pelvis was performed on  multidetector CT scanner using volumetric acquisition technique and  images were  reconstructed in multiple planes with variable thickness  and reviewed on dedicated workstations.     CONTRAST: iopamidol (ISOVUE-370) solution 95 mL. Without oral  contrast.    CT scan radiation dose is optimized to minimum requisite dose using  automated dose modulation techniques.    FINDINGS:    Chest:   Mediastinum: Atrophic thyroid Heart size is within normal limits. No  thoracic lymphadenopathy.    Pleura: Slightly increased moderate bilateral pleural effusions. No  pneumothorax.     Lungs: Central tracheobronchial tree is patent. Subsegmental dependant  atelectasis. Mild dependent groundglass opacities.    Abdomen and Pelvis:  Liver: Unchanged hepatic hypodensities. No intrahepatic biliary ductal  dilation. Similar mildly heterogeneous attenuation of the hepatic  parenchyma in the inferior right lobe.    Biliary System: Normal gallbladder. Stable common bile duct stent.  Decreased pneumobilia.    Pancreas: Unchanged heterogeneous ovoid mass at the location of the  head of the pancreas, measuring 4.3 x 3.8 cm on axial views. The mass  abuts the main portal vein and celiac artery without encasement.  Atrophic tail of the pancreas.    Adrenal glands: No mass or nodules    Spleen: Surgically absent..    Kidneys: No suspicious mass, obstructing calculus or hydronephrosis.  Unchanged scarring and cystic appearance of the inferior pole of the  left kidney. Punctate calcification in the lower left renal collecting  system.    Gastrointestinal tract: Normal caliber small bowel. Multiple redundant  loops of large bowel. Stool burden throughout the large bowel. The  appendix is surgically absent.     Pelvis: Urinary bladder is normal. Calcifications in the prostate.     Mesentery/peritoneum/retroperitoneum: No mass. No free fluid or air.    Lymph nodes: No significant lymphadenopathy.    Vasculature: Atherosclerotic calcification of abdominal aorta with no  aneurysmal dilation.     Soft tissues: Mild diffuse  subcutaneous anasarca.    Osseous structures: No aggressive or acute osseous lesion. Stable  degenerative changes of the lumbar spine with severe disc height loss  and stepwise grade 1 retrolisthesis at L3-4 and L4-5.      Impression    IMPRESSION:   1.  Slightly increased moderate bilateral pleural effusions.  2.  Stable position of common bile duct stent with decreased  pneumobilia.  3.  No significant change in the heterogeneous pancreatic head mass  consistent with known neuroendocrine tumor and known liver metastases.  4.  Increased stool throughout the large bowel.  5.  Punctate left renal calculus without obstruction.    I have personally reviewed the examination and initial interpretation  and I agree with the findings.    SHAMA CARO MD         SYSTEM ID:  [id number]

## 2023-09-13 NOTE — PROGRESS NOTES
Brief Cross Cover Note    Contacted by nursing re: inability to obtain IV access. Have tried multiple attempts, including having the ICU nurse come to bedside to attempt placement (unsuccessful) and using ultrasound (no acceptable veins). Will give CTX x 1 IM for now for coverage (although would not cover some pathogens, would cover many potential sources of infx), while awaiting access (per discussion with RN, will likely need to be in foot, above elbow. Patient stated to RN flyer that he has had a leg IV in the past as well).     Malachi Johns MD  Internal Medicine and Pediatrics Hospitalist Service  [facility]   P: [phone]

## 2023-09-13 NOTE — PLAN OF CARE
[id number]  Changes this shift ; Per previous shift RN ,IV access lost d/t infiltration. IV access has been attempted by 2 RN flyers and ICU nurses using Ultrasound . No success. CRNA was called but never came to the unit . Right limb alert. So Pt did not get the scheduled  2 doses of  Zosyn . Instead IM abx was administered.  Overnight MD aware.     A&Ox3- disorientated to time, VSS, On RA low 90s sats.  On telemetry .   Cough and generalized weakness.  Denies pain.  N/V, SOB, chest pain.   Baseline Numbness and tingling in all extremities  Continent of bowel and bladder. Voiding without difficulty .LBM 9/12/2023.   SBA w/gait belt - ambulated to bathroom severally. Urinary frequency .  Regular- Low fiber

## 2023-09-13 NOTE — PROGRESS NOTES
Antimicrobial Stewardship Team Note    Antimicrobial Stewardship Program - A joint venture between [facility] and  Physicians to optimize antibiotic management.  NOT a formal consult - Restricted Antimicrobial Review     Patient: Tyrel Harrell  MRN: [id number]  Allergies: Bee venom, Simvastatin, Vinblastine, Vincristine, Lina-kit bee sting, and Nkda [no known drug allergy]    Brief Summary: Tyrel Harrell is a 72 year old male with PMHx significant for pancreatic neuroendocrine tumor with liver metastases, cured Hodgkin's lymphoma, HFrEF, mitral regurgitation with Mitraclip (4/22/22), thoracentesis (8/2021 and 1/2022), paroxysmal A Fib, and PE (not currently on anticoagulation due to fall concerns) who was admitted on 9/12/2023 with chills, weakness, and abdominal pain.    History of Present Illness: Of note, patient was hospitalized in early August (8/1-8/5) with sudden chest and epigastric pain where he was found to have a blocked metal CBD stent via ERCP on 8/3. 8/2 and 8/3 peripheral blood cultures were also positive with Klebsiella pneumoniae and Enterococcus faecalis. He was initiated on Zosyn, then transitioned to ceftriaxone, followed by oral ciprofloxacin on discharge for a 7-day total antibiotic course. Patient reported chills, abdominal pain, and weakness started on 9/11/2023 so he took a COVID test at home. This was negative, and he promptly presented to the ED. He reported abdominal pain improved somewhat since onset at presentation. He denied fever, though endorsed chills. He reported not taking Lasix for ~4 weeks and is able to sleep flat.      On presentation to the ED, he had a fever (100F), tachypnea (RR 28), tachycardic (), normotensive, and on room air to 1L supplemental O2 via nasal cannula. Initial labs were notable for elevated WBC (14.9), CRP (42.3), LA (2.3), procalcitonin (1.27), BNP 14055, and creatinine 1.04. CXR showed bilateral pleural effusions which he has  had previously. EKG with sinus tachycardia. Right upper quadrant ultrasound without any obvious cholecystitis or obstruction of this biliary stents. CT of c/a/p with stable position of CBD stent with decreased pneumobilia; no significant change in heterogeneous pancreatic head mass; increased stool throughout the large bowel, punctate left renal calculus w/o obstruction. TTE pending. SARS CoV2 PCR, RSV, Influenza A and B tests were all negative. Peripheral blood cultures from admission growing Gram negative bacilli in 4/4 bottles (2/2 sets), identified as Klebsiella pneumoniae with no resistance markers per Verigene. 9/13 repeat blood cultures pending. 9/12 UA with glucose, hematuria, 20 WBC, negative for LE and nitrites, urine culture is pending. No brooks catheter in place. While in the ED, he was started on Zosyn for possible intraabdominal infection and remains on it today (received a one-time ceftriaxone dose this morning before being transitioned back to Zosyn).         Active Anti-infective Medications   (From admission, onward)                 Start     Stop    09/13/23 1030  piperacillin-tazobactam  4.5 g,   Intravenous,   EVERY 6 HOURS        Intra-Abdominal Infection       --                  Assessment: Recurrent Gram negative bacilli bacteremia secondary to unclear source in the setting of pancreatic neuroendocrine tumor with liver metastases  Sepsis has resolved, as he is afebrile, normal sinus rhythm and RR on room air. However, WBC trending up very slightly (15.0 today), suspect to be reactive in the setting of bacteremia and antibiotic therapy not being at steady-state concentrations at the time of lab draw. This bacteremia appears to be high-grade with 4/4 bottles being positive and recurrent, posing concerns for other bacteremia sources. Patient's presentation of chills and weakness with abdominal pain most concerning for intra-abdominal source in the setting of his underlying malignancy and known  CBD stent. Abdominal imaging did not show any fluid collections that would be concerning for possible abscesses. Klebsiella pneumoniae has a predilection of liver abscess, but liver findings were stable on imaging. Stent appeared to be in place. It could be possible that stent is colonized, but would not necessarily expect the high inoculum of organisms in the blood secondary to translocation from the stent. Other possible sources of bacteremia include pleural effusions. While pleural effusions appear stable in size and BNP was elevated, suggesting fluid overload/heart failure exacerbation, recommend further workup of pleural effusions with diagnostic thoracentesis with bacteremia recurrence. In addition, agree with TTE given MitraClip. Patient does not have any other prosthetic material per chart review that could be serving as a bacteremia nidus. Agree with repeat blood cultures to help assess bacteremia clearance. Reasonable to continue Zosyn for now for broader coverage with the possibility of a polymicrobial bacteremia with concerns related to an intraabdominal source. Low threshold to consult ID for further source workup (may consult in coming days pending TTE and thoracentesis results). ID may have to weigh-in on the need for JUN with recurrent bacteremia, thoracentesis results, and/or duration of treatment.    Recommendations:  Reasonable to continue Zosyn for now   For further bacteremia source workup in the setting of recurrence, recommend diagnostic thoracentesis to better assess pleural effusions and agree with TTE  Low threshold to consult ID for further source workup (may consult in coming days pending TTE and thoracentesis results) - ID may have to weigh-in on the need for JUN with recurrent bacteremia, thoracentesis results, and/or duration of treatment    Pharmacy took the following actions: Called/paged provider, Electronic note created.    Discussed with ID Staff Lina Campuzano MD &  Kary  Jae, PharmD, BCIDP  Melonie Joe, 2024 PharmD Candidate  [phone]    Vital Signs/Clinical Features:  Vitals         09/11 0700  09/12 0659 09/12 0700  09/13 0659 09/13 0700  09/13 1233   Most Recent      Temp ( F)   98.4 -  100      97.5     97.5 (36.4) 09/13 0700    Pulse   96 -  110      95     95 09/13 0700    Resp   18 -  28      19     19 09/13 0700    BP   104/51 -  131/68      140/70     140/70 09/13 0700    SpO2 (%)   94 -  100      99     99 09/13 0700            Labs  Estimated Creatinine Clearance: 60.9 mL/min (based on SCr of 1.1 mg/dL).  Recent Labs   Lab Test 08/04/23  0706 08/05/23  0613 08/14/23  1637 09/07/23  1441 09/12/23  1112 09/13/23  0743   CR 1.39* 1.27* 0.89 0.96 1.04 1.10       Recent Labs   Lab Test 05/24/16  1246 11/30/22  1326 06/20/23  1011 06/22/23  1342 06/23/23  0635 07/28/23  1440 08/01/23  1512 08/04/23  0706 08/05/23  0613 08/14/23  1637 09/07/23  1441 09/12/23  1112 09/13/23  0743   WBC 6.5   < > 8.1 9.0   < > 6.4   < > 5.7 5.6 5.5 6.1 14.9* 15.0*   ANEU 3.9  --  6.4 7.7  --  5.1  --   --   --   --  5.3 14.8*  --    ALYM 1.7  --  0.4* 0.1*  --  0.1*  --   --   --   --  0.2* 0.0*  --    MERT 0.7  --  1.2 1.2  --  0.7  --   --   --   --  0.4 0.1  --    AEOS 0.1  --  0.0 0.0  --  0.2  --   --   --   --  0.1 0.0  --    HGB 13.7   < > 8.9* 8.8*   < > 8.9*   < > 7.5* 7.4* 9.0* 10.0* 9.6* 6.8*   HCT 41.3   < > 23.2* 24.2*   < > 26.8*   < > 23.2* 22.4* 27.8* 30.0* 29.3* 20.3*   MCV 94   < > 99 108*   < > 109*   < > 107* 106* 105* 103* 102* 99      < > 141* 146*   < > 84*   < > 95* 98* 143* 128* 107* 112*    < > = values in this interval not displayed.       Recent Labs   Lab Test 08/04/23  0706 08/05/23  0613 08/14/23  1637 09/07/23  1441 09/12/23  1112 09/13/23  0743   BILITOTAL 1.5* 1.2 0.9 0.8 3.3* 2.9*   ALKPHOS 246* 208* 156* 143* 354* 297*   ALBUMIN 2.6* 2.5* 3.2* 3.0* 3.1* 2.7*   AST 52* 68* 40 48* 139* 102*   ALT 37 33 23 18 49 52       Recent Labs   Lab Test  05/24/16  1246 06/22/23  1342 06/22/23  1358 06/22/23  1604 07/28/23  1439 08/02/23  2132 09/12/23  1112 09/12/23  1126 09/12/23  1402 09/12/23  1439 09/13/23  0725 09/13/23  0743 09/13/23  0933   PCAL  --   --   --   --   --   --  1.27*  --   --   --   --   --   --    LACT  --  1.5   < >  --   --    < >  --  2.3* 1.3 1.5 1.6 2.6* 1.6   CRP <2.9  --   --   --   --   --   --   --   --   --   --   --   --    CRPI  --  19.10*  --   --  62.70*  --  42.30*  --   --   --   --   --   --    SED 12  --   --  22*  --   --   --   --   --   --   --   --   --     < > = values in this interval not displayed.             Culture Results:  7-Day Micro Results       Procedure Component Value Units Date/Time    Blood Culture Hand, Left [[id number]] Collected: 09/13/23 0743    Order Status: Resulted Lab Status: In process Updated: 09/13/23 0754    Specimen: Blood from Hand, Left     Blood Culture Hand, Left [[id number]] Collected: 09/13/23 0725    Order Status: Resulted Lab Status: In process Updated: 09/13/23 0828    Specimen: Blood from Hand, Left     Blood Culture Peripheral Blood     Order Status: Canceled Lab Status: No result     Specimen: Peripheral Blood     Blood Culture Peripheral Blood     Order Status: Canceled Lab Status: No result     Specimen: Peripheral Blood     Urine Culture [[id number]] Collected: 09/12/23 1125    Order Status: Sent Lab Status: In process Updated: 09/12/23 1224    Specimen: Urine, Clean Catch     Blood Culture Peripheral Blood [[id number]]  (Abnormal) Collected: 09/12/23 1112    Order Status: Completed Lab Status: Preliminary result Updated: 09/13/23 1038    Specimen: Peripheral Blood      Culture Positive on the 1st day of incubation      Klebsiella pneumoniae     Comment: 2 of 2 bottles       Blood Culture Peripheral Blood [[id number]]  (Abnormal) Collected: 09/12/23 1112    Order Status: Completed Lab Status: Preliminary result Updated: 09/13/23 2171    Specimen: Peripheral Blood       Culture Positive on the 1st day of incubation      Gram negative bacilli     Comment: 2 of 2 bottles       Verigene GN Panel [[id number]]  (Abnormal) Collected: 09/12/23 1112    Order Status: Completed Lab Status: Final result Updated: 09/13/23 0306    Specimen: Peripheral Blood      Acinetobacter species Not Detected     Citrobacter species Not Detected     Enterobacter species Not Detected     Proteus species Not Detected     Escherichia coli Not Detected     Klebsiella pneumoniae Detected     Comment: Positive for Klebsiella pneumoniae by Verigene multiplex nucleic acid test. Final identification and antimicrobial susceptibility testing will be verified by standard methods.        Klebsiella oxytoca Not Detected     Pseudomonas aeruginosa Not Detected     CTX-M Not Detected     KPC Not Detected     NDM Not Detected     VIM Not Detected     IMP Not Detected     OXA Not Detected    Narrative:      Specimen tested with Verigene multiplex, gram-negative blood culture nucleic acid test for the following targets: Acinetobacter species, Citrobacter species, Enterobacter species, Proteus species, Escherichia coli, Klebsiella pneumoniae, Klebsiella oxytoca, Pseudomonas aeruginosa, and the following resistance markers: CTX-M, KPC, NDM, VIM, IMP and OXA.            Recent Labs   Lab Test 06/22/23  1318 07/28/23  1410 08/01/23  1834 08/02/23  1656 09/12/23  1125   URINEPH 5.5 6.0 5.5 5.0 5.5   NITRITE Negative Negative Negative Negative Negative   LEUKEST Negative Negative Negative Negative Negative   WBCU 12* 0-5 6* 6* 20*                         Imaging: CT Chest/Abdomen/Pelvis w Contrast    Result Date: 9/12/2023  EXAMINATION: CT CHEST/ABDOMEN/PELVIS W CONTRAST, 9/12/2023 3:33 PM INDICATION: fever, cough, abdominal pain COMPARISON STUDY: CT chest radiographs, CT chest abdomen and pelvis 7/26/2023, CT chest PE 8/1/2023 TECHNIQUE: CT scan of the chest, abdomen and pelvis was performed on multidetector CT scanner using  volumetric acquisition technique and images were reconstructed in multiple planes with variable thickness and reviewed on dedicated workstations. CONTRAST: iopamidol (ISOVUE-370) solution 95 mL. Without oral contrast. CT scan radiation dose is optimized to minimum requisite dose using automated dose modulation techniques. FINDINGS: Chest: Mediastinum: Atrophic thyroid Heart size is within normal limits. No thoracic lymphadenopathy. Pleura: Slightly increased moderate bilateral pleural effusions. No pneumothorax. Lungs: Central tracheobronchial tree is patent. Subsegmental dependant atelectasis. Mild dependent groundglass opacities. Abdomen and Pelvis: Liver: Unchanged hepatic hypodensities. No intrahepatic biliary ductal dilation. Similar mildly heterogeneous attenuation of the hepatic parenchyma in the inferior right lobe. Biliary System: Normal gallbladder. Stable common bile duct stent. Decreased pneumobilia. Pancreas: Unchanged heterogeneous ovoid mass at the location of the head of the pancreas, measuring 4.3 x 3.8 cm on axial views. The mass abuts the main portal vein and celiac artery without encasement. Atrophic tail of the pancreas. Adrenal glands: No mass or nodules Spleen: Surgically absent.. Kidneys: No suspicious mass, obstructing calculus or hydronephrosis. Unchanged scarring and cystic appearance of the inferior pole of the left kidney. Punctate calcification in the lower left renal collecting system. Gastrointestinal tract: Normal caliber small bowel. Multiple redundant loops of large bowel. Stool burden throughout the large bowel. The appendix is surgically absent. Pelvis: Urinary bladder is normal. Calcifications in the prostate. Mesentery/peritoneum/retroperitoneum: No mass. No free fluid or air. Lymph nodes: No significant lymphadenopathy. Vasculature: Atherosclerotic calcification of abdominal aorta with no aneurysmal dilation. Soft tissues: Mild diffuse subcutaneous anasarca. Osseous  structures: No aggressive or acute osseous lesion. Stable degenerative changes of the lumbar spine with severe disc height loss and stepwise grade 1 retrolisthesis at L3-4 and L4-5.     IMPRESSION: 1.  Slightly increased moderate bilateral pleural effusions. 2.  Stable position of common bile duct stent with decreased pneumobilia. 3.  No significant change in the heterogeneous pancreatic head mass consistent with known neuroendocrine tumor and known liver metastases. 4.  Increased stool throughout the large bowel. 5.  Punctate left renal calculus without obstruction. I have personally reviewed the examination and initial interpretation and I agree with the findings. SHAMA CARO MD   SYSTEM ID:  [id number]    US Abdomen Limited (RUQ)    Result Date: 9/12/2023  EXAMINATION: Limited Abdominal Ultrasound, 9/12/2023 11:16 AM COMPARISON: Ultrasound 8/1/2023, CT 7/26/2023 HISTORY: Fever and abdominal pain, status post ERCP with stent placement FINDINGS: Fluid: Moderate pleural effusion. Liver: Incomplete visualization of the left liver lobe secondary to effusion/intra-abdominal edema and bowel gas obscuration. Slightly hypoechoic appearance of the visualized portions of the hepatic parenchyma with prominent portal triads and portal/periportal hyperechogenicity, some of which appears to be linear. Patient status post ERCP. Measuring 16.5 cm in craniocaudal dimension. Patient's known liver metastases not visualized sonographically. Gallbladder: Moderately distended gallbladder with layering heterogeneous sludge and punctate hypoechoic foci. No associated gallbladder wall thickening or pericholecystic fluid. Negative sonographic Rich's sign. Bile Ducts: No significant biliary ductal dilation. Common bile duct is visualized with metal stent in place, and measures 11 mm. Pancreas: Not visualized. Kidney: The right kidney measures 10.6 cm long. There is no hydronephrosis or hydroureter, no shadowing renal calculi, cystic  lesion or mass.     IMPRESSION: 1.  Hypoechoic hepatic parenchyma with periportal hyperechogenicity which can be seen with hepatitis. Given recent history of ERCP, some of the linear echogenicity in the liver likely reflects expected pneumobilia. 2.  Distended and sludge-filled gallbladder without signs or symptoms of acute cholecystitis. 3.  Prominent but nonobstructed, stented CBD. 4.  Nonvisualization of the pancreas. I have personally reviewed the examination and initial interpretation and I agree with the findings. JOEL PEREA MD   SYSTEM ID:  [id number]    XR Chest 2 Views    Result Date: 9/12/2023  Exam: XR CHEST 2 VIEWS, 9/12/2023 1:33 PM Comparison: CT chest 8/1/2023 History: fever, cough Findings: AP and lateral views of the chest. The cardiac silhouette is obscured. Mitral valve clips. Moderate to large bilateral pleural effusions with adjacent hazy opacities. No pneumothorax. Biliary stent in the right upper quadrant. No acute osseous abnormality.     Impression: Moderate to large bilateral pleural effusions with adjacent hazy opacities representing atelectasis/edema versus infection. Pleural effusions are likely similar in size compared to 8/1/2023 CT. I have personally reviewed the examination and initial interpretation and I agree with the findings. ISIAH DAVIS DO   SYSTEM ID:  [id number]    US Lower Extremity Venous Duplex Right    Result Date: 9/12/2023  ULTRASOUND LOWER EXTREMITY VENOUS DUPLEX RIGHT 9/12/2023 11:16 AM CLINICAL HISTORY: right leg swelling, active malignancy. COMPARISONS: None available. REFERRING PROVIDER: KAYLYNN JASSO TECHNIQUE: Grayscale, color Doppler, Doppler waveform ultrasound evaluation was performed through the right common femoral, femoral, and popliteal veins. Right posterior tibial and peroneal veins were evaluated with grayscale imaging and compression. Left common femoral vein was evaluated for symmetry. FINDINGS: Left common femoral vein is patent, fully  "compressible, and demonstrates normal phasic Doppler waveform. Right common femoral, femoral, and popliteal veins are fully compressible, patent, and demonstrate normal phasic Doppler waveforms. Right posterior tibial veins are fully compressible to the ankle. Right peroneal veins were not visualized.     IMPRESSION: Right peroneal veins were not visualized. Otherwise, no deep venous thrombosis demonstrated in the RIGHT leg. Reference: \"Duplex Ultrasound in the Diagnosis of Lower-Extremity Deep Venous Thrombosis\"- Rocío Bolton MD, S; Flakito Summers MD (Circulation. 2014;129:917-921. http://circ.ahajournals.org) DEANNA PAZ MD   SYSTEM ID:  [id number]   "

## 2023-09-13 NOTE — CONSULTS
Care Management Initial Consult    General Information  Assessment completed with: Patient, Spouse or significant other,         Primary Care Provider verified and updated as needed:     Readmission within the last 30 days:        Reason for Consult: discharge planning, end of life/hospice  Advance Care Planning:            Communication Assessment  Patient's communication style: spoken language (English or Bilingual)    Hearing Difficulty or Deaf: no   Wear Glasses or Blind: yes    Cognitive  Cognitive/Neuro/Behavioral: .WDL except (forgetful. confused)  Level of Consciousness: confused     Orientation: disoriented to, time, place             Living Environment:   People in home: spouse  wife, Kacie  Current living Arrangements: house      Able to return to prior arrangements: yes       Family/Social Support:  Care provided by: self, spouse/significant other  Provides care for:    Marital Status:   Wife  Kacie       Description of Support System: Supportive, Involved    Support Assessment: Adequate family and caregiver support, Adequate social supports    Current Resources:   Patient receiving home care services: No     Community Resources:  (Previous use of home care with [facility])  Equipment currently used at home: walker, rolling  Supplies currently used at home:      Employment/Financial:  Employment Status: retired        Financial Concerns: No concerns identified           Does the patient's insurance plan have a 3 day qualifying hospital stay waiver?  No    Lifestyle & Psychosocial Needs:  Social Determinants of Health     Tobacco Use: Low Risk  (9/12/2023)    Patient History     Smoking Tobacco Use: Never     Smokeless Tobacco Use: Never     Passive Exposure: Never   Alcohol Use: Not on file   Financial Resource Strain: Not on file   Food Insecurity: Not on file   Transportation Needs: Not on file   Physical Activity: Not on file   Stress: Not on file   Social Connections: Not on file   Intimate  Partner Violence: Not on file   Depression: At risk (7/28/2023)    PHQ-2     PHQ-2 Score: 3   Housing Stability: Not on file       Functional Status:  Prior to admission patient needed assistance:   Dependent ADLs:: Ambulation-walker, Bathing, Dressing  Dependent IADLs:: Cleaning, Cooking, Laundry, Medication Management, Transportation       Mental Health Status:          Chemical Dependency Status:                Values/Beliefs:  Spiritual, Cultural Beliefs, Oriental orthodox Practices, Values that affect care:                 Additional Information:  Pt with PMH of pancreatic neuroendocrine tumor on active treatment, Hodgkin's  Lymphoma, orthostatic Hypotension, HFrEF, mitral regurgitation s/p Mitraclip (4/22/23), paroxysmal atrial fibrillation, Hx of PE and Biliary obstruction s/p CBD stent placement and acute cholangitis (admission  8/1-8/5) who presents to the ED for evaluation of chills, weakness and abdominal pain. Patient admitted to Medicine for further evaluation and care.      Met with patient and spouse Kacie to introduce self/role and complete initial assessment.     Pt and Kacie spoke to writer at length about pt's history with cancer. Pt was diagnosed with Hodgkin's Lymphoma 30 years ago. At that time, pt's prognosis was very poor and Kacie reports she was preparing to become a . After pt's recovery, Kacie reports they have treasured every day together. 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 They lived in [place] until recently, moving to [place] to be closer to children and grandchildren in [place]. Kacie shared that pt was diagnosed again with cancer in 2021 and at that time was given 3-6 months to live. They are grateful for all of the time they have had. Kacie reported that pt has been getting progressively more week due to ongoing chemo treatments. They had initiated a conversation with their  oncologist regarding hospice. Writer spoke with Kacie and pt about the services hospice and palliative both provide and that hospice is for when treatments are no longer pursued. Kacie reported that with pt's treatment and escalating interventions (IV access, blood transfusion, thoracentesis) they are debating whether they would like to continue interventions. They were advised by their oncologist, that if pt were to stop chemo, that he would likely have less than 6 months. Pt does have a palliative consult but writer requested hospice consult as well from primary provider. Writer also informed pt and Kacie of the Medicare.gov site for finding local hospice agencies and their ratings. Kacie said they had previously met with  hospice but they would like to explore other options. Writer will follow up with family and follow recommendations from palliative and hospice consults.     Shireen Levi RNCC  Covering for 5 Med/Surg  Phone [phone]  Pager [phone]      SEARCHABLE in [facility] - search CARE COORDINATOR      [place] & [place] ([phone]) Saturday & Sunday; ([phone])  Recognized Holidays    Weekend Pager--  Units: 5A, 5B & 5C  Pager: [phone]  Units: 6B, 6C & 6D    Pager: [phone]  Units: 7A, 7B, 7C & 7D    Pager: [phone]  Units: 6A & ICU   Pager: [phone]  Units: 5 Ortho, 5MS & WB ED Pager: [phone]  Units: 6MS, 8A & 10 ICU  Pager [phone]

## 2023-09-13 NOTE — PLAN OF CARE
Goal Outcome Evaluation:    Patient is alert and confused ( baseline), forgetful. Bed alarm on for the safety.     VSS, on RA, TELE in place ( sinus regular).     Infrequent dry cough after Thoracentesis this PM. LS clear.     Denied chest pain, SOB, mild and tolerable abd pain,   Cont of B/B, had Bm today,   SBA with walker, no GB needed, steady gait,     L PIV, TKO.     Skin intact.   Had Blood transfusion this shift.   Had Phos and K+ replacement this shift.    Cont of POC.

## 2023-09-13 NOTE — PLAN OF CARE
Vital signs:  Temp: 98.6  F (37  C) Temp src: Oral BP: 123/65 Pulse: 96   Resp: 28 SpO2: 100 % - room air    Pt came to 5 med-surg @ ~2100    Neuro/CMS: A&Ox3- disorientated to time, VSS, strong motor strength all extremities, denies N/V, SOB, chest pain, states he has N/T in all extremities- baseline.   Respiratory: Lungs sounds clear upper lobes bilaterally, LLQ coarse crackles, diminished RLL. Sating low 90's room air  Gastro/Genito: Bowels normoactive, LBM this evening 9/12/2023.  Activity: SBA w/gait belt  Skin/Dressings: pale/discolored nailbeds, cool skin temp  Lines/Drains/Airways: L PIV, SL  Pain:Denies  Diet: Regular- Low fiber  Plan: Cardiac monitoring- NSR,  monitor labs, continue POC

## 2023-09-13 NOTE — PROGRESS NOTES
[facility] Procedure Note    Pre-operative diagnosis: Pancreatic neuroendocrine tumor, Hodgkin lymphoma, bacteremia, large bilateral pleural effusions.  Sample from 1 side requested to be sent to the lab.   Post-operative diagnosis Same    Procedure: Ultrasound-guided bilateral thoracentesis   Surgeon: Tammi Estrella MD   Assistants(s): -   Estimated blood loss: None        Findings: 1500 ml drained from right pleural effusion with small to moderate amount of fluid remaining.  Sample from this side sent to the lab.  1500 ml drained from left pleural effusion with small amount remaining.   Complications: None.

## 2023-09-13 NOTE — SEDATION DOCUMENTATION
Patient Name: Tyrel Harrell  Medical Record Number: [id number]  Today's Date: 9/13/2023    Procedure: THoracentesis (Bilateral)  Proceduralist: Dr Estrella  Pathology present: NA    Procedure Start: 1520  Procedure end: 1550  Sedation medications administered: NA     Report given to: Michael SANCHEZ  : JOSE A    Other Notes: Pt arrived to IR room 2 from 546. Consent reviewed. Pt denies any questions or concerns regarding procedure. Pt positioned Sitting up and monitored per protocol. Pt tolerated procedure without any noted complications. Pt transferred back to 546. 3L of fluid removed from right and left chest (1.5L each side)

## 2023-09-13 NOTE — CONSULTS
SPIRITUAL HEALTH SERVICES Consult Note  [facility]B    Visited patient Tyrel per routine consult. Tyrel expressed interest in a  visit but requested that I come back tomorrow.    Plan: I will visit tomorrow.     Eva Deshpande MDiv  Chaplain Resident  Pager [phone]    * [facility] remains available 24/7 for emergent requests/referrals, either by having the switchboard page the on-call  or by entering an ASAP/STAT consult in Epic (this will also page the on-call ). Routine Epic consults receive an initial response within 24 hours.*

## 2023-09-13 NOTE — PROGRESS NOTES
CLINICAL NUTRITION SERVICES - ASSESSMENT NOTE     Nutrition Prescription    RECOMMENDATIONS FOR MDs/PROVIDERS TO ORDER:  Messaged MD to add 1 capsule PRN for snacks    Malnutrition Status:    Severe malnutrition in the context of chronic illness     Recommendations already ordered by Registered Dietitian (RD):  Ensure Enlive at B  Gelatein Plus at L  Magic Cup at D  Add PRN 1 capsule Creon for snacks    Future/Additional Recommendations:  1. Encourage high kcal options, adequate protein intake. Rec small, frequent meals to help increase oral intake. Chew all foods well. Rec self-select tolerated foods/beverages. Encourage intake of oral supplements.  2. Continue Creon 12, 3 capsules at meals. Monitor stools for steatorrhea and adjust dosing if needed.  3. Check vitamin D status. Pt's vitamin D deficiency lab was 22 on 12/20/2022. Ordered to receive vitamin D supplementation. Monitor need for calcium supplementation, if warranted.   4. Monitor BG control. BG was 100 on 9/13.    5. Order a multivitamin with minerals to help meet micronutrient needs, if inadequate intake.      REASON FOR ASSESSMENT  Tyrel Harrell is a 72 year old male assessed by the dietitian for positive Admission Nutrition Risk Screen: 24-33 lb weight loss, decreased appetite.    Reason for admission: evaluation of chills, weakness and abdominal pain     PMH: pancreatic neuroendocrine tumor not currently on active treatment due to decline in performance status, Hodgkin's  Lymphoma, orthostatic Hypotension, HFrEF, mitral regurgitation s/p Mitraclip (4/22/23), paroxysmal atrial fibrillation, Hx of PE and Biliary obstruction s/p CBD stent placement and acute cholangitis (admission  8/1-8/5)   Past Medical History:   Diagnosis Date    Anemia     Anticoagulated     Anxiety     Biliary obstruction     Chronic kidney disease     Chronic right shoulder pain     Depression     Difficult intravenous access     NEED UTRASOUND TO FIND VEIN    H/O  "thrombocytopenia     History of pulmonary embolism     Hodgkin lymphoma, nodular sclerosis (H) 1988    s/p radiation    Hypothyroidism     ILD (interstitial lung disease) (H)     Lymphedema of right upper extremity     Neuroendocrine tumor of pancreas     with liver metastasis    NICM (nonischemic cardiomyopathy) (H)     Nonsenile cataract     Pancreatic insufficiency        NUTRITION HISTORY  Pt reports since last hospitalization intakes have been poor d/t no appetite. Appetite is greatest in the morning and decreases at L/D. Pt's last chemo was 9 days ago and is unsure if he will continue with future treatments as he is considering hospice care. Pt states \"decided this is my time.\"  Pt reports he tolerates Boost protein shakes at home and son who is a PA purchased a case of Magic Cups. Pt agrees to Ensure nutrition drinks, high protein Gelatein, and Magic Cup during admission.   Discussed importance of small and frequent meals and scheduling meals rather than eating according to hunger cues. Encouraged pt to save items from meals to eat between meals. Protein items should be consumed first.     CURRENT NUTRITION ORDERS  Diet: Low Fiber  since 8/4 (mechanical/dental soft on 8/3)   Intake/Tolerance: only one meal thus far, Cereal, muffin, juice    LABS  Labs reviewed   09/13/23 07:43   Sodium 134 (L)   Potassium 3.3 (L)   Chloride 100   Carbon Dioxide (CO2) 25   Urea Nitrogen 18.0   Creatinine 1.10   GFR Estimate 72   Calcium 8.3 (L)   Anion Gap 9   Albumin 2.7 (L)   Protein Total 6.8   Alkaline Phosphatase 297 (H)   ALT 52    (H)   Bilirubin Total 2.9 (H)   Glucose 100 (H)   Lactic Acid 2.6 (H)   WBC 15.0 (H)   Hemoglobin 6.8 (LL)   Hematocrit 20.3 (L)   Platelet Count 112 (L)   RBC Count 2.06 (L)   MCV 99   MCH 33.0   MCHC 33.5   RDW 24.3 (H)   BLOOD CULTURE Rpt     MEDICATIONS  Medications reviewed  Current Facility-Administered Medications   Medication    acetaminophen (TYLENOL) tablet 500-1,000 mg    " "atorvastatin (LIPITOR) tablet 10 mg    calcium carbonate chew tablet 750 mg    lactated ringers injection    levothyroxine (SYNTHROID/LEVOTHROID) tablet 112 mcg    lidocaine (LMX4) cream    lidocaine 1 % 0.1-1 mL    lipase-protease-amylase (CREON 12) 22932-42522-30533 units per capsule 2-3 capsule    melatonin tablet 1 mg    metoprolol succinate ER (TOPROL-XL) 24 hr half-tab 12.5 mg    ondansetron (ZOFRAN ODT) ODT tab 4 mg    Or    ondansetron (ZOFRAN) injection 4 mg    piperacillin-tazobactam (ZOSYN) 4.5 g vial to attach to  mL bag    potassium & sodium phosphates (NEUTRA-PHOS) Packet 2 packet    sodium chloride (PF) 0.9% PF flush 3 mL    sodium chloride (PF) 0.9% PF flush 3 mL    sodium chloride 0.9 % infusion    venlafaxine (EFFEXOR XR) 24 hr capsule 225 mg    Vitamin D3 (CHOLECALCIFEROL) tablet 50 mcg       ANTHROPOMETRICS  Height: 172.7 cm (5' 8\")  Most Recent Weight: 69.9 kg (154 lb 1.6 oz)    IBW: 70 kg  Body mass index is 23.43 kg/m .  BMI: .Desired BMI for individuals over age 65: 23-27  Weight History:  10.6% wt loss x 6 months  Wt Readings from Last 15 Encounters:   09/12/23 69.9 kg (154 lb 1.6 oz)   09/11/23 69.9 kg (154 lb)   08/31/23 71.2 kg (157 lb)   08/14/23 71.7 kg (158 lb)   08/04/23 72.2 kg (159 lb 3.2 oz)   07/27/23 73 kg (161 lb)   06/27/23 76.6 kg (168 lb 12.8 oz)   06/26/23 75.6 kg (166 lb 9.6 oz)   06/20/23 73.8 kg (162 lb 12.8 oz)   06/12/23 78.2 kg (172 lb 6.4 oz)   06/07/23 77.2 kg (170 lb 3.1 oz)   05/25/23 75.5 kg (166 lb 6.4 oz)   12/20/22 79.8 kg (175 lb 14.4 oz)   11/14/22 80.5 kg (177 lb 6.4 oz)   11/08/22 83.4 kg (183 lb 12.8 oz)     Dosing Weight: 69.9 kg    ASSESSED NUTRITION NEEDS  Estimated Energy Needs: 2100 - 2445 kcals/day (30 - 35 kcals/kg)  Justification: Maintenance  Estimated Protein Needs: 84 - 105 grams protein/day (1.2 - 1.5 grams of pro/kg)  Justification: Increased needs  Estimated Fluid Needs: 1750 - 2100 mL/day (25 - 30 mL/kg)   Justification: " "Maintenance    PHYSICAL FINDINGS  See malnutrition section below.  GI MD 8/3, \"ENDOSCOPIC RETROGRADE CHOLANGIOPANCREATOGRAPHY with balloon sweep of bile duct for sludge. Start liquid/soft mechanical lo residue diet similar to duodenal stent diet.\"    Manuel: 20    MALNUTRITION  % Intake: </=50% for >/= 1 month (severe)  % Weight Loss: > 7.5% in 3 months (severe) 10.6% loss x 3 months  Subcutaneous Fat Loss: Facial region:  Moderate  Muscle Loss: Temporal:  moderate, Thoracic region (clavicle, acromium bone, deltoid, trapezius, pectoral):  moderate, and Dorsal hand:  moderate  Fluid Accumulation/Edema: None noted  Malnutrition Diagnosis: Severe malnutrition in the context of chronic illness    NUTRITION DIAGNOSIS  Inadequate energy intake related to increased needs r/t hypercatabolic state as evidenced by pancreatic cancer with chemotherapy      INTERVENTIONS  Implementation  Pt received nutrition education on 8/04/23 admission   Medical food supplement therapy     Goals  Patient to consume % of nutritionally adequate meal trays TID, or the equivalent with supplements/snacks.     Monitoring/Evaluation  Progress toward goals will be monitored and evaluated per protocol.  Tere Brothers RDN LD  5 Med/Surg pager [phone]  On Call Pager (weekends only): [phone]    "

## 2023-09-14 NOTE — PROGRESS NOTES
[facility]  Patient is currently receiving home care services from [facility]. The patient is currently receiving RN and PT services.  and home health team have been notified of patient admission. [facility] liaison will continue to follow patient during stay. If appropriate provide orders to resume home care at time of discharge.

## 2023-09-14 NOTE — PROCEDURES
[facility]    Single Lumen Midline Placement    Date/Time: 9/14/2023 10:10 AM    Performed by: Idania Palmer RN  Authorized by: Israel Encarnacion MD  Indications: vascular access      UNIVERSAL PROTOCOL   Site Marked: Yes  Prior Images Obtained and Reviewed:  Yes  Required items: Required blood products, implants, devices and special equipment available    Patient identity confirmed:  Verbally with patient and arm band  NA - No sedation, light sedation, or local anesthesia  Confirmation Checklist:  Patient's identity using two indicators, relevant allergies, procedure was appropriate and matched the consent or emergent situation and correct equipment/implants were available  Time out: Immediately prior to the procedure a time out was called    Universal Protocol: the Joint Commission Universal Protocol was followed    Preparation: Patient was prepped and draped in usual sterile fashion    ESBL (mL):  2     ANESTHESIA    Anesthesia:  See MAR for details  Local Anesthetic:  Lidocaine 1% without epinephrine  Anesthetic Total (mL):  2      SEDATION    Patient Sedated: No        Preparation: skin prepped with ChloraPrep  Skin prep agent: skin prep agent completely dried prior to procedure  Sterile barriers: maximum sterile barriers were used: cap, mask, sterile gown, sterile gloves, and large sterile sheet  Hand hygiene: hand hygiene performed prior to central venous catheter insertion  Type of line used: Midline  Catheter type: single lumen  Lumen type: non-valved  Catheter size: 3 Fr  Brand: [employer]  Lot number: [id number]  Placement method: venipuncture, MST and ultrasound  Number of attempts: 1  Difficulty threading catheter: no  Successful placement: yes  Orientation: left  Catheter to Vein (%): 29  Location: brachial vein (medial)  Tip Location: distal to axillary vein  Site rationale: very diffilcult accessl, basilic vessel less than 1mm, lymphedema in RUE  :  6CM.  Extremity circumference: 29  Visible catheter length: 3  Total catheter length: 17  Dressing and securement: alcohol impregnated caps, blood cleaned with CHG, chlorhexidine disc applied, subcutaneous anchor securement system, gloves changed prior to final dressing and transparent securement dressing  Post procedure assessment: blood return through all ports and free fluid flow  PROCEDURE   Patient Tolerance:  Patient tolerated the procedure well with no immediate complicationsDescribe Procedure: LUE midline placed for difficult access pt. Line placed without difficulty. Ready for immediate use.

## 2023-09-14 NOTE — PROGRESS NOTES
[facility]    Medicine Progress Note - Hospitalist Service, GOLD TEAM 16    Date of Admission:  2023    Assessment & Plan   Tyrel Harrell is a 72 year old male admitted on 2023. 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 Patient admitted to Medicine for further evaluation and care.      ##Chills, abdominal pain, weakness:   ##Hx of biliary Obstruction  ##AHRF  ##Sepsis due to Gram -ve bacilli bacteremia   ##Gram +ve bacteremia   -- Admit - w/ acute cholangitis s/p ERCP (8/3/23) with noted blocked stent in duodenal bulb which was cleared. Presents with chills, abdominal pain, wheezing, dry cough, and weakness. WBC 14.9, T bili 3.3 (0.8), lipase 12, LFTs AST/ALT: 139/49, .  CT A/P with stable position of CBD stent with decreased pneumobilia; no significant change in heterogeneous pancreatic head mass; increased stool throughout the large bowel, punctate left renal calculus w/o obstruction. Abdominal US: Hypoechoic hepatic parenchyma with periportal hyperechogenicity- can be seen with hepatitis; distended and sludge filled gallbladder w/o signs or symptoms of acute cholecystitis; prominent, though not obstructed, stented CBD.  LA 2.3 --> 1.5 s/p 1L IVF, CRP 42.30, Procal 1.27. Maintains on 1L O2 via NC with Sats 94-98%. VB.42/49/17/31. NT-BNP ~ 10K. EKG with sinus tachycardia. Afebrile. CXR with moderate to large bilateral pleural effusions w/ adjacent hazy opacities representing atelectasis/ma vs infection. Pleural effusion similar to size compared to 23. RVP PCR negative.  UA with 20 wbc, though negative  nitrite and LE. Started on Zosyn in ED for possible intraabdominal infection. Suspect sx are multifactorial insetting of possible CHF exacerbation w/ likely infectious component (Pulmonary vs Urinary vs translocation from pancreatic tumor)  - Blood cx now growing gram negative bacilli bacteremia x2/2 sets likely suggestive of klebsiella pneumniae   Continue Zosyn for now (pseudomonal dosing)  Follow-up urine cx   Follow-up repeat Bcx2  GI consult given concern of bacteremia from intra-abdoinal source given recent instrumentation  As we obtain further speciation, we'll consult ID to help guide abx treatment    IR consult for thoracentesis; fluid analysis consistent with transudative  - Continue PTA creon w/ meals   - Tylenol PRN for pain  - Zofran PRN for nausea  - Palliative care consult placed to discuss ongoing GOC. Please discuss with in AM.   - PT  - Continue O2 to maintain sats greater than 94%    #Goals of Care  --Discussion was had with patient's sons Bird & Angel; they expressed the fact that their dad's overall clinical state is terminal; they have decided as a family, in conjunction with patient that less aggressive approach to care will be pursued going forward. Family will like to limit and invasive procedures and are opened to the possibility suppressive abx therapy rather than pursue ERCP.   --They'll like to focus on comfort directed therapy and willing to continue this discussion with our palliative care colleagues.      ##Chronic Bilateral pleural Effusions:   Presents since at least 7/2023 on available imaging. Family reports that patient has received thoracentesis previously, though 2 years ago. CXR with moderate to large bilateral pleural effusions w/ adjacent hazy opacities representing atelectasis/ma vs infection. Pleural effusion similar to size compared to 8/1/23.   -- s/p thoracentesis on 9/13/23 with 1.5 L removed from each lung cavity.   -- TTE showing reduced EF as well   -- currently on  room air  Keep SpO2 > 94%     ##Bicytopenia:  On admission, Hgb 9.6, Platelets 107. Likely in setting of chemotherapy. BL Hgb appears 9-10, BL platelets appears .  On 9/13/2023, hemoglobin 6.8.  - Monitor  Type and screen  Patient to receive 1 units of PRBC (irradiated cells) with adequate response.        ##Metastatic high-grade, but well-differentiated neuroendocrine tumor of the pancreas:   ##Hx of cured Hodgkin's disease  ##Mild cognitive decline: Tumor at head of pancreas with extensive liver mets. Follows with [facility] with recent video visit 9/11/23. Tumor stable on most recent imaging. Chemotherapy currently on hold d/t decline in functional status, though will resume in one week, followed by a 3 week break. Palliative care referral and Hospice consult placed. Repeat Imaging in October.  Noted to have recent hallucinations on Oncology clinic note. Patient not currently experiencing hallucinations on admission.   - Oncology consult placed.  Patient seen and evaluated by them, with plans to continue ongoing infectious work-up.  No role for anti cancer therapy at this time.      ##HX of PE: Not Currently on A/c d/t fall concerns.  - monitor    ##HLD: PTA statin  - Continue     ##HFrEF: Echo(6/23/23) with EF of 55-60% Nl RV function; s/p mitral clips, mild to moderate residual MR, no pericardial effusion, mild to moderate aortic insufficiency. NT-BNP ~ 10K on admission.  Lasix recently held ~ 4 weeks ago. Bilateral LE edema 2+. No PND, orthopnea.       ##Hypothyroidism: PTA synthroid.  - Continue PTA medication, TSH within normal limits.     ##Orthostatic Hypotension: Previously maintained on florinef. Admit 6/22-6/26/2023 for orthostatic hypotension, though s/t autonomic dysfunction.   - Monitor  - Fall precautions     ##Elevated lactic acid  - Likely due to decreased tissue perfusion in the setting of sepsis order hypovolemia; also in the setting of metastatic cancer     ##Paroxysmal Atrial  fibrillation  ##S/p mitral valve repair (2022): EKG with sinus tachycardia on admission. PTA metoprolol  - Continue PTA metoprolol with hold parameters HR less than 60 or SBP less than 100     ##Depression: PTA Effexor.   - Continue PTA medication        Diet: Low Fiber Diet  Snacks/Supplements Adult: Other; Gelatein Plus cherry at L, Strawberry Ensure at B, berry Magic Cup at D; With Meals    DVT Prophylaxis: Hold pharmacologic AC for now; PCD's   Craft Catheter: Not present  Lines: PRESENT             Cardiac Monitoring: ACTIVE order. Indication: Possible fluid overload  Code Status: No CPR- Do NOT Intubate      Clinically Significant Risk Factors        # Hypokalemia: Lowest K = 3.3 mmol/L in last 2 days, will replace as needed       # Hypoalbuminemia: Lowest albumin = 2.4 g/dL at 9/14/2023  7:38 AM, will monitor as appropriate    # Coagulation Defect: INR = 1.47 (Ref range: 0.85 - 1.15) and/or PTT = N/A, will monitor for bleeding    # Thrombocytopenia: Lowest platelets = 77 in last 2 days, will monitor for bleeding           # Severe Malnutrition: based on nutrition assessment, PRESENT ON ADMISSION          Disposition Plan  to be determined.      Expected Discharge Date: 09/15/2023      Destination: home with family;home with help/services (family interested in hospice, uncertain at this time)            ABBEY SHARMA MD  Hospitalist Service, GOLD TEAM 16  [facility]  Securely message with [email or web address] (more info)  Text page via [facility] Paging/Directory   See signed in provider for up to date coverage information  ______________________________________________________________________    Interval History   - NAEON  - Denies any abdominal pain, no sob  - Sons were at bedside, and family focus is on comfort directed therapy. Family is opened to possibility of suppressive abx given repeated Klebsiella bacteremia.       Physical Exam   Vital Signs: Temp: (!) 96.2  F (35.7  C)  Temp src: Oral BP: 121/63 Pulse: 89   Resp: 16 SpO2: 97 % O2 Device: None (Room air)    Weight: 154 lbs 1.62 oz    General Appearance: Lying comfortably in bed, on room air, in no acute distress of discomfort  HEENT: PERRL: EOMI; moist mucous membrane w/o lesions  Neck: No JVD  Pulmonary: Clear to auscultation bilaterally, no wheezes or crackles. Decreased breath sounds in the bases.   CVS: Regular rhythm, no murmurs, rubs or gallops  GI: BS (+), soft nontender, no rebound or guarding   Extremities: bilatereal LE edema   Skin: No rashes or lesions  Neurologic: A&O to person, situation; not to time or place.       Medical Decision Making       55 MINUTES SPENT BY ME on the date of service doing chart review, history, exam, documentation & further activities per the note.      Data   ------------------------- PAST 24 HR DATA REVIEWED -----------------------------------------------    I have personally reviewed the following data over the past 24 hrs:    9.1  \   9.5 (L)   / 77 (L)     137 103 17.7 /  97   4.6 23 1.21 (H) \     ALT: 41 AST: 71 (H) AP: 256 (H) TBILI: 2.2 (H)   ALB: 2.4 (L) TOT PROTEIN: 6.4 LIPASE: N/A     Procal: N/A CRP: N/A Lactic Acid: 1.7       Ferritin:  N/A % Retic:  N/A LDH:  N/A     Imaging results reviewed over the past 24 hrs:   Recent Results (from the past 24 hour(s))   Echo Complete   Result Value    Biplane LVEF 37%    Narrative    [id number]  [id number]  [id number]  [id number]^JOSE^MARVEL^[facility],[place]  Echocardiography Laboratory  [address] [place] [id number]     Name: MARTÍNEZ DENNISON  MRN: [id number]  : 1950  Study Date: 2023 10:17 AM  Age: 72 yrs  Gender: Male  Patient Location: [facility]  Reason For Study: CHF  Ordering Physician: MARVEL GARCIA  Performed By: Maryan Chand RDCS     BSA: 1.8 m2  Height: 68 in  Weight: 154  lb  ______________________________________________________________________________  Procedure  Complete Portable Echo Adult.  ______________________________________________________________________________  Interpretation Summary  Biplane LVEF is 37%.  Left ventricular size is normal.  Right ventricular function, chamber size, wall motion, and thickness are  normal.  S/P MitraClip. Mean mitral gradient 6mmHg.Heart rate 95BPM. Moderate residual  MR.  Mild aortic insufficiency is present.  Mild to moderate tricuspid insufficiency is present.  Right ventricular systolic pressure is 50mmHg above the right atrial pressure.  IVC diameter >2.1 cm collapsing <50% with sniff suggests a high RA pressure  estimated at 15 mmHg or greater.  No pericardial effusion is present.  ______________________________________________________________________________  Left Ventricle  Biplane LVEF is 37%. Left ventricular wall thickness is normal. Left  ventricular size is normal. Left ventricular diastolic function is  indeterminate. Moderate to severe diffuse hypokinesis is present.     Right Ventricle  Right ventricular function, chamber size, wall motion, and thickness are  normal.     Atria  The atria cannot be assessed.     Mitral Valve  S/P MitraClip. Mean mitral gradient 6mmHg.Heart rate 95BPM. Moderate residual  MR.     Aortic Valve  Aortic valve sclerosis is present. Mild aortic insufficiency is present.     Tricuspid Valve  Mild to moderate tricuspid insufficiency is present. Right ventricular  systolic pressure is 50mmHg above the right atrial pressure.     Pulmonic Valve  The pulmonic valve is normal.     Vessels  The thoracic aorta cannot be assessed. The pulmonary artery cannot be  assessed. IVC diameter >2.1 cm collapsing <50% with sniff suggests a high RA  pressure estimated at 15 mmHg or greater.     Pericardium  No pericardial effusion is  present.  ______________________________________________________________________________  MMode/2D Measurements & Calculations  IVSd: 0.92 cm     LVIDd: 4.8 cm  LVIDs: 4.0 cm  LVPWd: 0.70 cm  FS: 16.7 %  LV mass(C)d: 131.1 grams  LV mass(C)dI: 71.7 grams/m2  RWT: 0.29     Doppler Measurements & Calculations  MV max P.8 mmHg  MV mean P.0 mmHg  MV V2 VTI: 30.6 cm  TR max woody: 307.7 cm/sec  TR max P.3 mmHg     ______________________________________________________________________________  Report approved by: Alanna Balderrama 2023 01:31 PM

## 2023-09-14 NOTE — CONSULTS
Care Management Follow Up    Length of Stay (days): 2    Expected Discharge Date: 09/15/2023     Concerns to be Addressed: Discharge planning     Patient plan of care discussed at interdisciplinary rounds: Yes    Anticipated Discharge Disposition: Home, Hospice     Anticipated Discharge Services: None  Anticipated Discharge DME: None    Patient/family educated on Medicare website which has current facility and service quality ratings: No  Education Provided on the Discharge Plan: Yes  Patient/Family in Agreement with the Plan: Yes    Referrals Placed by CM/SW: Internal Clinic Care Coordination  Private pay costs discussed: Not applicable    Additional Information:  Social work informed by Leonor with palliative that family has chosen to discharge home on hospice. SW stopped by patients room to speak with him and his wife. Patient asked if social work could come back tomorrow as patient was exhausted from the day and his family was coming to visit him tonight for his birthday. Social work will plan to meet with patient and wife tomorrow to discuss options for hospice. Provided patient and family with a list from Medicare.gov of hospice organizations as they were not interested in using [facility] at this time. Will continue to follow.     MARCY Bose, LGSW  5 Med Surg and 10 ICU   [facility]  Phone: [phone]  Pager: [phone]

## 2023-09-14 NOTE — PLAN OF CARE
Changes this shift ; 0500 Zosyn was not administered as L.PIV infiltrated. RN flyer was unable to access a new IV. Peds vascular paged.    Pt alert but disoriented to time. Baseline intermittent forgetfulness/confusion. Bed alarm on for the safety.   On RA .Telemetry monitoring NSR. VSS.  Continent of  of B/B,LBM 9/13.  SBA with walker -steady gait    Had potassium replaced once this shift.

## 2023-09-14 NOTE — CONSULTS
Palliative Care Consultation Note  [facility]      Patient: Tyrel Harrell  Date of Admission:  9/12/2023    Requesting Clinician / Team:     Josefa Campo PA-C     Reason for consult: Goals of care  Decisional support  Patient and family support       Recommendations & Counseling     GOALS OF CARE:   Comfort focused goals of care  Continue oral antibiotics in hopes of some level of symptom control and infection suspression  No further procedures at this time  Planning discharge home with hospice services  Appreciate unit  support with disposition planning    ADVANCE CARE PLANNING:  HCD in electronic medical record  There is a POLST form on file, but will need to be updated prior to discharge.  Code status: No CPR- Do NOT Intubate      MEDICAL MANAGEMENT: the options below are to be considered, would not proceed with both however can consider starting one of the options below depending upon patient preference. Can readdress with patient tomorrow.    #Anorexia   Limited options given current infection and most have undesirable side effects, could consider low dose mirtazapine 7.5 mg at bedtime may be the best option    #CRF  Can consider modafanil 50 mg in AM and 50 mg at noon trial for fatigue     #Constipation  Polyethylene glycol (MiraLAX) BID    PSYCHOSOCIAL/SPIRITUAL SUPPORT:  Appreciate [facility] involvement  Happy birthday William! Hope you get to enjoy this day with people that matter to you most.    Palliative Care will continue to follow. Thank you for the consult and allowing us to aid in the care of Tyrel Harrell.    These recommendations have been discussed with primary team.    FADY Estrada  Securely message with MILI (more info)  Text page via [facility] Paging/Directory       Assessment      Tyrel Harrell is a 73 year old male with a past medical history of pancreatic neuroendocrine tumor, hodgkins lymphoma, orthostatic  hypotension, HFrEF, mitral regurgitation s/p mitraclip 04/2023, afib, history of PE, biliary obstruction s/p CBD stent and acute cholangitis who presented on 9/12 with chills, weakness, and abdominal pain.      Today, the patient was seen for:   Metastatic neuroendocrine tumor  Hodgkins lymphoma  HF  Bacteremia  Biliary obstruction s/p stenting with possible re-obstruction    Palliative Care Summary:   Met with patient and family (wife and two sons).   Introduced the role of palliative care as an interdisciplinary team that cares for patients with serious illness to help support symptom management, communication, coping for patients and their families as well as support with medical decision making.    Prognosis, Goals, & Planning:    Functional Status just prior to this current hospitalization:  ECOG2 (Ambulatory and capable of all selfcare but unable to carry out any work activities; may need help with IADLs up and about > 50% of waking hours)  3 (Capable of only limited self-care; needs help with ADLs; in bed/chair >50% of waking hours)    Prognosis, Goals, and/or Advance Care Planning:  We discussed general treatment options (full/restorative, selective/conservatives, and comfort only/hospice). We then discussed how these specifically apply to William.  Based on this discussion, William and family has decided to pursue comfort focused treatments at home with hospice. We discussed the role of antibiotics in his situation with oral possible for suppressive goals versus some people wanting to completely stop anything that may prolong life.     Code Status was addressed today:   Prior to visit is DNR/DNI, which still algins with his overall GOC.     Patient's decision making preferences: shared with support from loved ones        Patient has decision-making capacity today for complex decisions:Intact            Coping, Meaning, & Spirituality:   Mood, coping, and/or meaning in the context of serious illness were addressed  today: Yes  Patient seems to be coping appropriate, family supportive and appropriately tearful.    Social:   Important relationships/caregivers:spouse, children, grandchildren  Occupation:     Medications:  I have reviewed this patient's medication profile and medications from this hospitalization. Notable medications:    ROS:  Comprehensive ROS is reviewed and is negative except as here & per HPI:     Physical Exam   Vital Signs with Ranges  Temp:  [95.7  F (35.4  C)-98.1  F (36.7  C)] 96.2  F (35.7  C)  Pulse:  [85-96] 89  Resp:  [14-18] 16  BP: (108-123)/(51-68) 121/63  SpO2:  [91 %-100 %] 97 %  154 lbs 1.62 oz    PHYSICAL EXAM:  Constitutional: alert, no distress, and mildly jaundiced. Cachectic.  Respiratory: negative  NEURO: negative  SKIN: no suspicious lesions or rashes  JOINT/EXTREMITIES: extremities normal- no gross deformities noted    Data reviewed:  Recent Labs   Lab 09/14/23  1600 09/14/23  0739 09/14/23  0738 09/13/23  0743 09/12/23  1112   WBC  --  9.1  --  15.0* 14.9*   HGB  --  9.5*  --  6.8* 9.6*   MCV  --  96  --  99 102*   PLT  --  77*  --  112* 107*   INR  --   --   --   --  1.47*   NA  --   --  137 134* 140   POTASSIUM 4.5  --  4.6 3.3* 3.4   CHLORIDE  --   --  103 100 102   CO2  --   --  23 25 27   BUN  --   --  17.7 18.0 15.2   CR  --   --  1.21* 1.10 1.04   ANIONGAP  --   --  11 9 11   CARLOS  --   --  8.1* 8.3* 8.5*   GLC  --   --  97 100* 83   ALBUMIN  --   --  2.4* 2.7* 3.1*   PROTTOTAL  --   --  6.4 6.5  6.8 7.2   BILITOTAL  --   --  2.2* 2.9* 3.3*   ALKPHOS  --   --  256* 297* 354*   ALT  --   --  41 52 49   AST  --   --  71* 102* 139*   LIPASE  --   --   --   --  12*       No results found for this or any previous visit (from the past 24 hour(s)).    Medical Decision Making       75 MINUTES SPENT BY ME on the date of service doing chart review, history, exam, documentation & further activities per the note.

## 2023-09-14 NOTE — CONSULTS
LUE midline placed for difficult access pt. Line placed without difficulty. Ready for immediate use.

## 2023-09-14 NOTE — CONSULTS
"SPIRITUAL HEALTH SERVICES Consult Note  [facility] ([facility]) 5B    Saw pt Tyrel Harrell per routine consult follow up.      Patient/Family Understanding of Illness and Goals of Care - Pt Tyrel and wife Kacie shared that Tyrel has been through many hardships with his health and that his \"body is tired from many rounds of chemo.\" They are consulting the palliative care team in decision-making.     Distress and Loss - Tyrel and family did not express distress or loss.     Strengths, Coping, and Resources  - Tyrel celebrated his 73nd birthday today with his wife and two sons. Kacie expressed gratitude at his \"many years of good health after surviving cancer at a young age\". Pt named his 3 grandchildren as sources of love and priyank. We engaged in a brief life review of Tyrel and Kacie's relationship.     Meaning, Beliefs, and Spirituality - Tyrel's marin is important to him, and he appreciates [facility] support. I shared a blessing from a book as a prayer.     Plan of Care - I oriented Tyrel and family to [facility] and how to get in touch. [facility] remains available for visits as requested.     Eva Deshpande  Chaplain Resident  Pager [phone]    * [facility] remains available 24/7 for emergent requests/referrals, either by having the switchboard page the on-call  or by entering an ASAP/STAT consult in Epic (this will also page the on-call ). Routine Epic consults receive an initial response within 24 hours.*    "

## 2023-09-14 NOTE — PROGRESS NOTES
Writer called to assess IV site. IV was infiltrated and had to be removed. US used to assess veins for new access. Only visible vein was above the old site. Writer did not want to attempt access with PEDS vascular coming in within 20 minutes. Primary nurse updated and planned to page at 0700 today.

## 2023-09-14 NOTE — PROGRESS NOTES
Patient with very poor peripheral vasculature for vascular access device.  Requested midline order from MD to provide vascular access for nonvesicant antibiotics and lab draws.

## 2023-09-14 NOTE — PROGRESS NOTES
Gastroenterology Brief Note - SIGN OFF NOTE    Chart reviewed, noting the following interval history:  -S/p b/l thoracentesis with IR, removal of 1.5 L each from right and left and pending cultures  -Pending repeat BC 9/14, BC 9/13 positive GPC in clusters with verigene positive for staphylococcus, BC positive klebsiella pneumoniae 9/12.  -Meds: no AC, Zosyn  -Labs: T.bili 2.2 (2.9),  (297), WBC 9.1 (15)  -Vital signs  Temp: (!) 96.2  F (35.7  C) Temp src: Oral BP: 121/63 Pulse: 89   Resp: 16 SpO2: 97 % O2 Device: None (Room air)     -Diet: Low fiber  -Palliative consulted, pending eval/recs.    Recommendations:  Per discussion with primary medicine MD (Dr. Samayoa), pt/family likely to pursue hospice and do not wish to proceed with ERCP and desire to continue with less invasive medical management (abx per ID) for suspected cholangitis/bacteremia.    Above in collaboration/discussed with Dr. Carney, GI attending    The inpatient gastroenterology service will sign off at this time. Thank you for allowing us to participate in the care of this patient. Please do not hesitate to page the GI service with any questions or concerns.      Joselin Lindsey PA-C   GI Service  Pager *[phone]

## 2023-09-14 NOTE — PLAN OF CARE
Patient is confused, VSS, on RA, TELE in place and sinus rhyme.     Denied pain, chest pain, SOB, N/V,  Baseline of N/T at all extremities.     SBA with walker to transfer, steady gait,     New midline set up at E.     Elevated Lactic acid 2.2 around 1630, VSS, afebrile, Dr Montilla was paged and ordered 1 L NS bolus and will re-check lactic acid at 2200.     Cont of B/B, has BM today per patient.     Might prefer to use [employer] Fit at night.     Family was approved to stay overnight.     Cont of POC.

## 2023-09-15 NOTE — PROGRESS NOTES
Care Management Discharge Note    Discharge Date: 09/16/2023     Discharge Disposition: Hospice    Discharge Services: None    Discharge DME: None    Discharge Transportation: Agency, family or friend will provide    Private pay costs discussed: Not applicable    Does the patient's insurance plan have a 3 day qualifying hospital stay waiver?  No    PAS Confirmation Code: Not applicable   Patient/family educated on Medicare website which has current facility and service quality ratings: No    Education Provided on the Discharge Plan: Yes  Persons Notified of Discharge Plans: Patient and wife   Patient/Family in Agreement with the Plan: Yes    Handoff Referral Completed: No - orders not yet completed     Additional Information:  Social work met with patient and patients wife Kacie. Although patient and family have decided to pursue hospice, they are not sure which agency they would like to use at this time but are interested in [facility], [facility] and [facility]. Provided patient and family with the phone numbers for each of these facilities so that they can follow up on their own in the community. SW faxed hospice order and doctors notes to [facility] to assist with starting the process. Gave patient and family a copy of these documents as well so they can be provided to other hospice agencies if needed. At this time, there are no additional social work needs. Please reach out via phone or page if services are needed or family has any additional questions.     MARCY Bose, LGSW  5 Med Surg and 10 ICU   [facility]  Phone: [phone]  Pager: [phone]

## 2023-09-15 NOTE — PLAN OF CARE
Comfort cares provided.   Alert to self. Very confused and impulsive at times. Pt having visual hallucinations of various wildlife in room.  RN managed protocols discontinued. Tele discontinued. Pt still receiving intermittent abx infusions in L extended dwell. BP's taken on L forearm. Up to toilet with Ax1 w walker and gb. Otherwise uses urinal at bedside. Can be incontinent at times. Bed alarm set when family not at bedside. Wife remained at bedside throughout shift. Pt and wife are in good spirits and are celebrating their 50th wedding anniversary today.     María Elena Duncan, RN

## 2023-09-15 NOTE — PROGRESS NOTES
[facility]    Medicine Progress Note - Hospitalist Service, GOLD TEAM 16    Date of Admission:  2023    Assessment & Plan   Tyrel Harrell is a 72 year old male admitted on 2023. 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 Patient admitted to Medicine for further evaluation and care.      ##Chills, abdominal pain, weakness:   ##Hx of biliary Obstruction  ##AHRF  ##Sepsis due to Gram -ve bacilli bacteremia   ##Gram +ve bacteremia   -- Admit - w/ acute cholangitis s/p ERCP (8/3/23) with noted blocked stent in duodenal bulb which was cleared. Presents with chills, abdominal pain, wheezing, dry cough, and weakness. WBC 14.9, T bili 3.3 (0.8), lipase 12, LFTs AST/ALT: 139/49, .  CT A/P with stable position of CBD stent with decreased pneumobilia; no significant change in heterogeneous pancreatic head mass; increased stool throughout the large bowel, punctate left renal calculus w/o obstruction. Abdominal US: Hypoechoic hepatic parenchyma with periportal hyperechogenicity- can be seen with hepatitis; distended and sludge filled gallbladder w/o signs or symptoms of acute cholecystitis; prominent, though not obstructed, stented CBD.  LA 2.3 --> 1.5 s/p 1L IVF, CRP 42.30, Procal 1.27. Maintains on 1L O2 via NC with Sats 94-98%. VB.42/49/17/31. NT-BNP ~ 10K. EKG with sinus tachycardia. Afebrile. CXR with moderate to large bilateral pleural effusions w/ adjacent hazy opacities representing atelectasis/ma vs infection. Pleural effusion similar to size compared to 23. RVP PCR negative.  UA with 20 wbc, though negative  nitrite and LE. Started on Zosyn in ED for possible intraabdominal infection. Suspect sx are multifactorial insetting of possible CHF exacerbation w/ likely infectious component (Pulmonary vs Urinary vs translocation from pancreatic tumor)  - Blood cx now growing gram negative bacilli bacteremia x2/2 sets likely suggestive of klebsiella pneumniae   Discontinue Zosyn and start ciprofloxacin 500 mg twice daily.    Discussed case with infectious disease, plan to switch patient to ciprofloxacin 500 mg twice daily; recommend continuing this indefinitely, also obtain EKG in a few weeks.    #Goals of Care  --Discussion was had with patient's sons Bird & Angel; they expressed the fact that their dad's overall clinical state is terminal; they have decided as a family, in conjunction with patient that less aggressive approach to care will be pursued going forward. Family will like to limit and invasive procedures and are opened to the possibility suppressive abx therapy rather than pursue ERCP.   --They'll like to focus on comfort directed therapy and willing to continue this discussion with our palliative care colleagues.      ##Chronic Bilateral pleural Effusions:   Presents since at least 7/2023 on available imaging. Family reports that patient has received thoracentesis previously, though 2 years ago. CXR with moderate to large bilateral pleural effusions w/ adjacent hazy opacities representing atelectasis/ma vs infection. Pleural effusion similar to size compared to 8/1/23.   -- s/p thoracentesis on 9/13/23 with 1.5 L removed from each lung cavity.   -- TTE showing reduced EF as well   -- currently on room air  Keep SpO2 > 94%     ##Bicytopenia:  On admission, Hgb 9.6, Platelets 107. Likely in setting of chemotherapy. BL Hgb appears 9-10, BL platelets appears .  On 9/13/2023, hemoglobin 6.8.  Patient did receive 1 unit of PRBC with adequate response.       ##Metastatic high-grade, but well-differentiated  neuroendocrine tumor of the pancreas:   ##Hx of cured Hodgkin's disease  ##Mild cognitive decline: Tumor at head of pancreas with extensive liver mets. Follows with [facility] with recent video visit 9/11/23. Tumor stable on most recent imaging. Chemotherapy currently on hold d/t decline in functional status, though will resume in one week, followed by a 3 week break. Palliative care referral and Hospice consult placed. Repeat Imaging in October.  Noted to have recent hallucinations on Oncology clinic note. Patient not currently experiencing hallucinations on admission.   - Oncology consult placed.  Patient seen and evaluated by them, with plans to continue ongoing infectious work-up.  No role for anti cancer therapy at this time.      ##HX of PE:   -Not Currently on A/c d/t fall concerns.    ##HLD: PTA statin  - Continue     ##HFrEF: Echo(6/23/23) with EF of 55-60% Nl RV function; s/p mitral clips, mild to moderate residual MR, no pericardial effusion, mild to moderate aortic insufficiency. NT-BNP ~ 10K on admission.  Lasix recently held ~ 4 weeks ago. Bilateral LE edema 2+. No PND, orthopnea.       ##Hypothyroidism: PTA synthroid.  - Continue PTA medication, TSH within normal limits.     ##Orthostatic Hypotension: Previously maintained on florinef. Admit 6/22-6/26/2023 for orthostatic hypotension, though s/t autonomic dysfunction.   - Monitor  - Fall precautions     ##Elevated lactic acid  - Likely due to decreased tissue perfusion in the setting of sepsis order hypovolemia; also in the setting of metastatic cancer     ##Paroxysmal Atrial fibrillation  ##S/p mitral valve repair (2022): EKG with sinus tachycardia on admission. PTA metoprolol  - Continue PTA metoprolol with hold parameters HR less than 60 or SBP less than 100     ##Depression: PTA Effexor.   - Continue PTA medication        Diet: Low Fiber Diet  Snacks/Supplements Adult: Other; Gelatein Plus cherry at L, Strawberry Ensure at B, berry Magic Cup at  D; With Meals    DVT Prophylaxis: Hold pharmacologic AC for now; PCD's   Craft Catheter: Not present  Lines: PRESENT             Cardiac Monitoring: None  Code Status: No CPR- Do NOT Intubate      Clinically Significant Risk Factors              # Hypoalbuminemia: Lowest albumin = 2.4 g/dL at 9/14/2023  7:38 AM, will monitor as appropriate       # Thrombocytopenia: Lowest platelets = 77 in last 2 days, will monitor for bleeding           # Severe Malnutrition: based on nutrition assessment  , PRESENT ON ADMISSION          Disposition Plan discharge home with hospice on 9/16/2023.    Expected Discharge Date: 09/15/2023      Destination: home            ABBEY SHARMA MD  Hospitalist Service, GOLD TEAM 16  [facility]  Securely message with [employer] (more info)  Text page via [employer] Paging/Directory   See signed in provider for up to date coverage information  ______________________________________________________________________    Interval History   - NAEON  - Denies any abdominal pain, no sob  -Patient offers no complaints today.  -Wife at bedside, questions of family answered.      Physical Exam   Vital Signs: Temp: 97  F (36.1  C) Temp src: Axillary BP: 131/68 Pulse: 96   Resp: 17 SpO2: 96 % O2 Device: None (Room air)    Weight: 154 lbs 1.62 oz    General Appearance: Lying comfortably in bed, on room air, in no acute distress of discomfort  HEENT: PERRL: EOMI; moist mucous membrane w/o lesions  Neck: No JVD  Pulmonary: Clear to auscultation bilaterally, no wheezes or crackles. Decreased breath sounds in the bases.   CVS: Regular rhythm, no murmurs, rubs or gallops  GI: BS (+), soft nontender, no rebound or guarding   Extremities: bilatereal LE edema   Skin: No rashes or lesions  Neurologic: A&O to person, situation; not to time or place.       Medical Decision Making       55 MINUTES SPENT BY ME on the date of service doing chart review, history, exam, documentation &  further activities per the note.      Data   ------------------------- PAST 24 HR DATA REVIEWED -----------------------------------------------    I have personally reviewed the following data over the past 24 hrs:    N/A  \   N/A   / N/A     N/A N/A N/A /  N/A   4.5 N/A N/A \     ALT: N/A AST: N/A AP: N/A TBILI: N/A   ALB: N/A TOT PROTEIN: N/A LIPASE: N/A     Procal: N/A CRP: N/A Lactic Acid: 1.5       Imaging results reviewed over the past 24 hrs:   No results found for this or any previous visit (from the past 24 hour(s)).

## 2023-09-15 NOTE — PLAN OF CARE
Goal Outcome Evaluation:  VSS.Tele sinus rhythm.  Alert,intermittent confusion noted,redirectable.  Bed alarm triggered atleast x3 from pt being impulsive.  Primofit not working as pt moves a lot in bed.  Pull up pad on.Continent/incontinent of urine,to BR.  No BM this shift.LBM 9/14.  Extended dwell on L  arm for IV abx.                 Wife in room.  Plan: home on hospice.

## 2023-09-16 NOTE — PLAN OF CARE
"VS: /67 (Patient Position: Semi-Duarte's, Cuff Size: Adult Regular)   Pulse 89   Temp 97.5  F (36.4  C) (Oral)   Resp 16   Ht 1.727 m (5' 8\")   Wt 69.9 kg (154 lb 1.6 oz)   SpO2 98%   BMI 23.43 kg/m       O2: Saturating within normal limits on room air   Output: unmeasured   Last BM: 9/14/23   Activity: Standby assist of one with walker and gait belt   Skin: Within normal limits   Pain: Denies pain   CMS: Alert and Oriented times one. Disoriented to place, time and situation   Dressing:    Diet: regular   LDA: Has left external IV   Equipment: walker   Plan: Care to be continued. Waiting for hospice placement   Additional Info: Used primofit over the night        Goal Outcome Evaluation: the doctor confirmed to staff yesterday that No sepsis protocol should followed incase of a pop up because patient is looking forward to comfort care.                         "

## 2023-09-16 NOTE — PLAN OF CARE
"BP (!) 150/82 (BP Location: Left arm, Patient Position: Sitting, Cuff Size: Adult Regular)   Pulse 96   Temp (!) 96  F (35.6  C) (Oral)   Resp 14   Ht 1.727 m (5' 8\")   Wt 69.9 kg (154 lb 1.6 oz)   SpO2 95%   BMI 23.43 kg/m      Pt appears slightly less confused and impulsive than previous shift. Denies further hallucinations. Denies pain. Reports anxiety overnight which interrupted sleep.     Pt discharged today to undergo hospice treatment at home  Discharge instructions and medications discussed w patient and wife.     Wife has remained at bedside throughout pt stay and is very supportive of patient. Wife and pt often reflecting on their life together and sharing experiences with writer and other staff.     María Elena Duncan RN     "

## 2023-09-17 NOTE — PLAN OF CARE
Physical Therapy Discharge Summary    Reason for therapy discharge:    All goals and outcomes met, no further needs identified.    Progress towards therapy goal(s). See goals on Care Plan in [facility] electronic health record for goal details.  Goals met    Therapy recommendation(s):    Continued therapy is recommended.  Rationale/Recommendations:  home PT for safety evaluation and progression.

## 2023-09-19 PROBLEM — I77.810 ASCENDING AORTA DILATION (H): Status: RESOLVED | Noted: 2022-02-01 | Resolved: 2023-01-01

## 2023-09-19 NOTE — ASSESSMENT & PLAN NOTE
EF down to 37% on 9/12. We discussed that this could be contributing to symptoms and feeling of being worn down.

## 2023-09-19 NOTE — ASSESSMENT & PLAN NOTE
Patient was unfortunately admitted again with Klebsiella pneumonia bacteremia.  Source likely intra-abdominal after last admission in August with CBD stent placement.  Has elected to transition to hospice.  We discussed that this is more than appropriate today.  He will be enrolling with their team tomorrow.  I am available for any needs prior to then and after if hospice allows.

## 2023-09-19 NOTE — ASSESSMENT & PLAN NOTE
Did require O2 on admission. This was weaned prior to discharge. Cough and sob improved after thoracentesis on 9/13.

## 2023-09-19 NOTE — PROGRESS NOTES
William is a 73 year old who is being evaluated via a billable video visit.      How would you like to obtain your AVS? MyChart  If the video visit is dropped, the invitation should be resent by: Text to cell phone: [phone]  Will anyone else be joining your video visit? No      Assessment & Plan   Problem List Items Addressed This Visit          Respiratory    Acute respiratory failure with hypoxia (H)     Did require O2 on admission. This was weaned prior to discharge. Cough and sob improved after thoracentesis on 9/13.             Digestive    Secondary malignant neuroendocrine tumor of liver (H) - Primary     Patient was unfortunately admitted again with Klebsiella pneumonia bacteremia.  Source likely intra-abdominal after last admission in August with CBD stent placement.  Has elected to transition to hospice.  We discussed that this is more than appropriate today.  He will be enrolling with their team tomorrow.  I am available for any needs prior to then and after if hospice allows.            Circulatory    HFrEF (heart failure with reduced ejection fraction) (H)     EF down to 37% on 9/12. We discussed that this could be contributing to symptoms and feeling of being worn down.              I spent a total of 26 minutes on the day of the visit.   Time spent by me doing chart review, history and exam, documentation and further activities per the note     MED REC REQUIRED  Post Medication Reconciliation Status:  Discharge medications reconciled, continue medications without change  FUTURE APPOINTMENTS:       - Likely no follow up as he is enrolling in hospice tomorrow     Carmen Cho MD  [facility]    Jo-Ann Thomas is a 73 year old, presenting for the following health issues:  Hospital F/U        9/19/2023     9:23 AM   Additional Questions   Roomed by Tyrel WARREN CMA       [facility]       Hospital Follow-up Visit:    Hospital/Nursing Home/IP Rehab Facility: [facility]  [facility]  Date of Admission: 09/12/23  Date of Discharge: 09/14/23  Reason(s) for Admission: Lightheadedness, brain fog    Was your hospitalization related to COVID-19? No   Problems taking medications regularly:  None  Medication changes since discharge: None  Problems adhering to non-medication therapy:  None    Summary of hospitalization:  [facility] discharge summary reviewed  Diagnostic Tests/Treatments reviewed.  Follow up needed: none  Other Healthcare Providers Involved in Patient s Care:         Hospice  Update since discharge: stable.       Plan of care communicated with patient and family           Patient was admitted again at 9/12-9/16 for chills, weakness and abdominal pain.  He was found to have Klebsiella pneumonia bacteremia.  This is recurrent, source likely intra-abdominal in the setting of his prior CBD stent placement in August.  He was seen by infectious disease, transition from IV Zosyn to lifelong suppressive treatment with ciprofloxacin.  Given his metastatic cancer, poor prognosis, repeated admissions and frequent procedures, goals of care discussion was had and ultimately they decided to transition home on hospice care.    William was discharged from [facility] 9/18. Enrolling on 9/20 with [facility].     Today, William notes that despite his lab looking good and pancreatic mass being stable on imaging he still feels poor.  His appetite is not great.  We discussed that he has been through quite a lot in the last few years and his metastatic cancer, worsening heart failure, repeated admissions and recovering from bacteremia would leave anyone feeling poorly.     Review of Systems   Constitutional, HEENT, cardiovascular, pulmonary, gi and gu systems are negative, except as otherwise noted.      Objective         Vitals:  No vitals were obtained today due to virtual visit.    Physical Exam   GENERAL: Chronically ill, thin, but  alert, pleasant and no distress  EYES: Eyes grossly normal to inspection.  No discharge or erythema, or obvious scleral/conjunctival abnormalities.  RESP: No audible wheeze, cough, or visible cyanosis.  No visible retractions or increased work of breathing.    SKIN: Visible skin clear. No significant rash, abnormal pigmentation or lesions.  NEURO: Cranial nerves grossly intact.  Mentation and speech appropriate for age.  PSYCH: Mentation appears normal, affect normal/bright, judgement and insight intact, normal speech and appearance well-groomed.            Video-Visit Details    Type of service:  Video Visit   Video Start Time:  9:39  Video End Time: 9:56    Originating Location (pt. Location): Home  Distant Location (provider location):  On-site  Platform used for Video Visit: Vinay

## 2023-09-21 NOTE — RESULT ENCOUNTER NOTE
Result reviewed by SOC triage MD. Per chart review, patient was discharged home on hospice to take indefinite course of PO ciprofloxacin for Klebsiella pneumoniae bacteremia after discussion with ID. Per discharge summary, Staph hemolyticus in blood culture was known at time of discharge and felt to be a contaminant. No further action needed at this time.

## 2023-09-26 NOTE — TELEPHONE ENCOUNTER
[facility] Cancer Care Oral Chemotherapy Monitoring Program    Thank you for the opportunity to be a part in the care of this patient's oral chemotherapy. The oncology pharmacy will no longer be following this patient for oral chemotherapy. If there are any questions or the plan changes, feel free to contact us.        8/15/2023     1:41 PM 8/16/2023    11:00 AM 8/16/2023    11:23 AM 9/18/2023     9:00 AM 9/18/2023     9:08 AM 9/26/2023    10:00 AM 9/26/2023    11:00 AM   ORAL CHEMOTHERAPY   Assessment Type Refill Monthly Follow up Monthly Follow up Chart Review Chart Review Discontinuation Discontinuation   Stop Date      9/16/2023 9/16/2023   Reason for Discontinuation      Patient enrolled into hospice Patient enrolled into hospice   Diagnosis Code Pancreatic Cancer Pancreatic Cancer Pancreatic Cancer Pancreatic Cancer Pancreatic Cancer Pancreatic Cancer Pancreatic Cancer   Providers Dr. Keith Parker   [facility]   Is this patient followed by the [facility] OC team? Yes Yes Yes Yes Yes Yes Yes   Drug Name Temodar (temozolomide) Xeloda (capecitabine) Temodar (temozolomide) Temodar (temozolomide) Xeloda (capecitabine) Temodar (temozolomide) Xeloda (capecitabine)   Dose 400 mg 1,000 mg 400 mg 400 mg 1,000 mg 400 mg 1,000 mg   Current Schedule Daily BID Daily Daily BID Daily BID   Cycle Details Other Other Other Drug on Hold Drug on Hold     Planned next cycle start date 8/21/2023 8/21/2023 8/21/2023       Doses missed in last 2 weeks  0 0       Adherence Assessment  Adherent Adherent       Adverse Effects  No AE identified during assessment No AE identified during assessment       Any new drug interactions?  No No       Is the dose as ordered appropriate for the patient?  Yes Yes       Since the last time we talked, have you been hospitalized or used the emergency room?   No Yes       What medical service did you use?   Hospitalization       How many hospitalizations?   1       How many hospitalizations were related to the cancer medication?   0           Lien Crawford, PharmD  Hematology/Oncology Clinical Pharmacist  [facility]  [phone]

## 2024-04-06 NOTE — PROGRESS NOTES
-- DO NOT REPLY / DO NOT REPLY ALL --  -- Message is from [facility] ([facility]) --    ONLY TO BE USED WITHIN A REFILL MEDICATION ENCOUNTER    Med Refill  Is the patient currently having any symptoms?: No/Non-Emergent symptoms    Name of medication requested: See pended med    Is this the first request for the medication in the last 48 hours?: Yes    Patient is requesting a medication refill - the medication was not listed on the patient's active medication list.    RX Name:  Lisinopril  Dose:  N/A  Quantity:  N/A  Instruction(s):  N/A    Duration: N/A days    Full name of the provider who ordered the medication: Hamletsuzi Virk    [facility] site name / Account # for provider: Angela Fields    Preferred Pharmacy: Pharmacy  [facility]/Pharmacy #[id number] - [address]    Patient confirmed the above pharmacy as correct?  Yes    Caller Information         Type Contact Phone/Fax    04/05/2024 11:42 PM CDT Phone (Incoming) Stefanie Foss (Other) [phone]     POA            Alternative phone number: N/A    Can a detailed message be left?: Yes         Prior to immunization administration, verified patients identity using patient s name and date of birth. Please see Immunization Activity for additional information.     Screening Questionnaire for Adult Immunization    Are you sick today?   Yes   Do you have allergies to medications, food, a vaccine component or latex?   Yes   Have you ever had a serious reaction after receiving a vaccination?   No   Do you have a long-term health problem with heart, lung, kidney, or metabolic disease (e.g., diabetes), asthma, a blood disorder, no spleen, complement component deficiency, a cochlear implant, or a spinal fluid leak?  Are you on long-term aspirin therapy?   Yes   Do you have cancer, leukemia, HIV/AIDS, or any other immune system problem?   Yes   Do you have a parent, brother, or sister with an immune system problem?   No   In the past 3 months, have you taken medications that affect  your immune system, such as prednisone, other steroids, or anticancer drugs; drugs for the treatment of rheumatoid arthritis, Crohn s disease, or psoriasis; or have you had radiation treatments?   Yes   Have you had a seizure, or a brain or other nervous system problem?   No   During the past year, have you received a transfusion of blood or blood    products, or been given immune (gamma) globulin or antiviral drug?   Yes   For women: Are you pregnant or is there a chance you could become       pregnant during the next month?   No   Have you received any vaccinations in the past 4 weeks?   No     Immunization questionnaire was positive for at least one answer.  Notified SHERIF Eller.    I have reviewed the following standing orders:   This patient is due and qualifies for the Covid-19 vaccine.     Click here for COVID-19 Standing Order    I have reviewed the vaccines inclusion and exclusion criteria; There were concerns regarding the following exclusions, cancer, chemo, heart condition, Paxlovid. I have brought them to a provider who  approved vaccination.    Patient instructed to remain in clinic for 15 minutes afterwards, and to report any adverse reactions.     Screening performed by Shira Pichardo MA on 8/23/2023 at 9:35 AM.

## (undated) DEVICE — KIT CONNECTOR FOR OLYMPUS ENDOSCOPES DEFENDO 100310

## (undated) DEVICE — INFLATION DEVICE BIG 60 ENDO-AN6012

## (undated) DEVICE — CATH BALLOON DILATOR STERLING OTW 3.5X60X150 H74939032350610

## (undated) DEVICE — SOL WATER IRRIG 1000ML BOTTLE 2F7114

## (undated) DEVICE — GLOVE BIOGEL PI ULTRATOUCH G SZ 7.5 42175

## (undated) DEVICE — WIRE GUIDE ROADRUNNER X-SUPPORT .018X480CM STR TIP G22419

## (undated) DEVICE — PACK ENDOSCOPY GI CUSTOM UMMC

## (undated) DEVICE — BIOPSY VALVE BIOSHIELD 00711135

## (undated) DEVICE — KIT ENDO FIRST STEP DISINFECTANT 200ML W/POUCH EP-4

## (undated) DEVICE — ENDO BITE BLOCK ADULT OMNI-BLOC

## (undated) DEVICE — BALLOON EXTRACTION 15X1950MM 3.2MM TL B-V243Q-A

## (undated) DEVICE — WIRE GUIDE 0.025"X270CM STR VISIGLIDE G-240-2527S

## (undated) DEVICE — SUCTION MANIFOLD NEPTUNE 2 SYS 4 PORT 0702-020-000

## (undated) DEVICE — ENDO CAP AND TUBING STERILE FOR ENDOGATOR  100130

## (undated) DEVICE — WIRE GUIDE 0.025"X270CM SHORT STR VISIGLIDE 2 G-260-2527S

## (undated) DEVICE — ENDO PROBE COVER ULTRASOUND BALLOON LATEX  MAJ-249

## (undated) DEVICE — ENDO NDL BALL TIP ULTRASOUND 22GA 5.2FR ECHO-3-22

## (undated) DEVICE — ESU GROUND PAD ADULT W/CORD E7507

## (undated) DEVICE — ENDO FUSION OMNI-TOME G31903

## (undated) DEVICE — CATH RETRIEVAL BALLOON EXTRACTOR PRO RX-S INJ ABOVE 9-12MM

## (undated) DEVICE — ENDO ENDO DISTAL MAJ-2315

## (undated) DEVICE — ENDO TUBING CO2 SMARTCAP STERILE DISP 100145CO2EXT

## (undated) DEVICE — POSITIONER ARMBOARD FOAM 1PAIR LF FP-ARMB1

## (undated) DEVICE — ENDO SNARE POLYPECTOMY OVAL 15MM LOOP SD-240U-15

## (undated) DEVICE — Device

## (undated) DEVICE — ENDO DEVICE LOCKING AND BIOPSY CAP M00545261

## (undated) RX ORDER — FENTANYL CITRATE 50 UG/ML
INJECTION, SOLUTION INTRAMUSCULAR; INTRAVENOUS
Status: DISPENSED
Start: 2023-01-01

## (undated) RX ORDER — PROPOFOL 10 MG/ML
INJECTION, EMULSION INTRAVENOUS
Status: DISPENSED
Start: 2023-01-01

## (undated) RX ORDER — FENTANYL CITRATE-0.9 % NACL/PF 10 MCG/ML
PLASTIC BAG, INJECTION (ML) INTRAVENOUS
Status: DISPENSED
Start: 2023-01-01

## (undated) RX ORDER — HYDROMORPHONE HYDROCHLORIDE 1 MG/ML
INJECTION, SOLUTION INTRAMUSCULAR; INTRAVENOUS; SUBCUTANEOUS
Status: DISPENSED
Start: 2023-01-01

## (undated) RX ORDER — ONDANSETRON 2 MG/ML
INJECTION INTRAMUSCULAR; INTRAVENOUS
Status: DISPENSED
Start: 2023-01-01

## (undated) RX ORDER — DEXAMETHASONE SODIUM PHOSPHATE 4 MG/ML
INJECTION, SOLUTION INTRA-ARTICULAR; INTRALESIONAL; INTRAMUSCULAR; INTRAVENOUS; SOFT TISSUE
Status: DISPENSED
Start: 2023-01-01

## (undated) RX ORDER — LEVOFLOXACIN 5 MG/ML
INJECTION, SOLUTION INTRAVENOUS
Status: DISPENSED
Start: 2023-01-01